# Patient Record
Sex: FEMALE | Race: WHITE | Employment: OTHER
[De-identification: names, ages, dates, MRNs, and addresses within clinical notes are randomized per-mention and may not be internally consistent; named-entity substitution may affect disease eponyms.]

---

## 2017-06-01 ENCOUNTER — RECORDS - HEALTHEAST (OUTPATIENT)
Dept: ADMINISTRATIVE | Facility: OTHER | Age: 82
End: 2017-06-01

## 2017-08-04 ENCOUNTER — RECORDS - HEALTHEAST (OUTPATIENT)
Dept: ADMINISTRATIVE | Facility: OTHER | Age: 82
End: 2017-08-04

## 2017-11-28 ENCOUNTER — RECORDS - HEALTHEAST (OUTPATIENT)
Dept: ADMINISTRATIVE | Facility: OTHER | Age: 82
End: 2017-11-28

## 2017-11-30 ENCOUNTER — RECORDS - HEALTHEAST (OUTPATIENT)
Dept: ADMINISTRATIVE | Facility: OTHER | Age: 82
End: 2017-11-30

## 2017-11-30 ENCOUNTER — RECORDS - HEALTHEAST (OUTPATIENT)
Dept: LAB | Facility: CLINIC | Age: 82
End: 2017-11-30

## 2017-12-01 LAB
LAB AP CHARGES (HE HISTORICAL CONVERSION): ABNORMAL
PATH REPORT.ADDENDUM SPEC: ABNORMAL
PATH REPORT.COMMENTS IMP SPEC: ABNORMAL
PATH REPORT.COMMENTS IMP SPEC: ABNORMAL
PATH REPORT.FINAL DX SPEC: ABNORMAL
PATH REPORT.GROSS SPEC: ABNORMAL
PATH REPORT.MICROSCOPIC SPEC OTHER STN: ABNORMAL
PATH REPORT.RELEVANT HX SPEC: ABNORMAL
RESULT FLAG (HE HISTORICAL CONVERSION): ABNORMAL

## 2017-12-07 ENCOUNTER — COMMUNICATION - HEALTHEAST (OUTPATIENT)
Dept: ONCOLOGY | Facility: HOSPITAL | Age: 82
End: 2017-12-07

## 2017-12-07 ENCOUNTER — OFFICE VISIT - HEALTHEAST (OUTPATIENT)
Dept: SURGERY | Facility: CLINIC | Age: 82
End: 2017-12-07

## 2017-12-07 DIAGNOSIS — Z17.1 MALIGNANT NEOPLASM OF CENTRAL PORTION OF RIGHT BREAST IN FEMALE, ESTROGEN RECEPTOR NEGATIVE (H): ICD-10-CM

## 2017-12-07 DIAGNOSIS — C50.111 MALIGNANT NEOPLASM OF CENTRAL PORTION OF RIGHT BREAST IN FEMALE, ESTROGEN RECEPTOR NEGATIVE (H): ICD-10-CM

## 2017-12-07 ASSESSMENT — MIFFLIN-ST. JEOR: SCORE: 1239.21

## 2017-12-08 ENCOUNTER — RECORDS - HEALTHEAST (OUTPATIENT)
Dept: ADMINISTRATIVE | Facility: OTHER | Age: 82
End: 2017-12-08

## 2017-12-08 ENCOUNTER — COMMUNICATION - HEALTHEAST (OUTPATIENT)
Dept: ONCOLOGY | Facility: HOSPITAL | Age: 82
End: 2017-12-08

## 2017-12-11 ENCOUNTER — HOSPITAL ENCOUNTER (OUTPATIENT)
Dept: PET IMAGING | Facility: HOSPITAL | Age: 82
Discharge: HOME | End: 2017-12-11
Attending: SPECIALIST
Payer: MEDICARE

## 2017-12-11 ENCOUNTER — AMBULATORY - HEALTHEAST (OUTPATIENT)
Dept: ONCOLOGY | Facility: HOSPITAL | Age: 82
End: 2017-12-11

## 2017-12-11 ENCOUNTER — HOSPITAL ENCOUNTER (OUTPATIENT)
Dept: PET IMAGING | Facility: HOSPITAL | Age: 82
Discharge: HOME | End: 2017-12-11
Attending: SPECIALIST

## 2017-12-11 ENCOUNTER — OFFICE VISIT - HEALTHEAST (OUTPATIENT)
Dept: ONCOLOGY | Facility: HOSPITAL | Age: 82
End: 2017-12-11

## 2017-12-11 ENCOUNTER — COMMUNICATION - HEALTHEAST (OUTPATIENT)
Dept: ONCOLOGY | Facility: HOSPITAL | Age: 82
End: 2017-12-11

## 2017-12-11 DIAGNOSIS — C50.111 MALIGNANT NEOPLASM OF CENTRAL PORTION OF RIGHT BREAST IN FEMALE, ESTROGEN RECEPTOR NEGATIVE (H): ICD-10-CM

## 2017-12-11 DIAGNOSIS — Z17.1 MALIGNANT NEOPLASM OF CENTRAL PORTION OF RIGHT BREAST IN FEMALE, ESTROGEN RECEPTOR NEGATIVE (H): ICD-10-CM

## 2017-12-11 ASSESSMENT — MIFFLIN-ST. JEOR: SCORE: 1203.04

## 2017-12-12 ENCOUNTER — AMBULATORY - HEALTHEAST (OUTPATIENT)
Dept: SURGERY | Facility: CLINIC | Age: 82
End: 2017-12-12

## 2017-12-12 ENCOUNTER — COMMUNICATION - HEALTHEAST (OUTPATIENT)
Dept: ADMINISTRATIVE | Facility: HOSPITAL | Age: 82
End: 2017-12-12

## 2017-12-12 DIAGNOSIS — Z17.1 MALIGNANT NEOPLASM OF CENTRAL PORTION OF RIGHT BREAST IN FEMALE, ESTROGEN RECEPTOR NEGATIVE (H): ICD-10-CM

## 2017-12-12 DIAGNOSIS — C50.111 MALIGNANT NEOPLASM OF CENTRAL PORTION OF RIGHT BREAST IN FEMALE, ESTROGEN RECEPTOR NEGATIVE (H): ICD-10-CM

## 2017-12-12 LAB
LAB AP CHARGES (HE HISTORICAL CONVERSION): NORMAL
PATH REPORT.COMMENTS IMP SPEC: NORMAL
PATH REPORT.COMMENTS IMP SPEC: NORMAL
PATH REPORT.FINAL DX SPEC: NORMAL
PATH REPORT.GROSS SPEC: NORMAL
PATH REPORT.MICROSCOPIC SPEC OTHER STN: NORMAL
PATH REPORT.RELEVANT HX SPEC: NORMAL
RESULT FLAG (HE HISTORICAL CONVERSION): NORMAL

## 2017-12-12 ASSESSMENT — MIFFLIN-ST. JEOR
SCORE: 1198.95
SCORE: 1198.95

## 2017-12-13 ENCOUNTER — ANESTHESIA - HEALTHEAST (OUTPATIENT)
Dept: SURGERY | Facility: AMBULATORY SURGERY CENTER | Age: 82
End: 2017-12-13

## 2017-12-14 ENCOUNTER — SURGERY - HEALTHEAST (OUTPATIENT)
Dept: SURGERY | Facility: AMBULATORY SURGERY CENTER | Age: 82
End: 2017-12-14

## 2017-12-14 ENCOUNTER — HOSPITAL ENCOUNTER (OUTPATIENT)
Dept: NUCLEAR MEDICINE | Facility: HOSPITAL | Age: 82
Setting detail: RADIATION/ONCOLOGY SERIES
Discharge: STILL ADMITTED OR ADMINISTRATIVE | End: 2017-12-14
Attending: INTERNAL MEDICINE

## 2017-12-14 ENCOUNTER — HOSPITAL ENCOUNTER (OUTPATIENT)
Dept: SURGERY | Facility: AMBULATORY SURGERY CENTER | Age: 82
Discharge: HOME | End: 2017-12-14
Attending: SPECIALIST | Admitting: SPECIALIST
Payer: MEDICARE

## 2017-12-14 ENCOUNTER — AMBULATORY - HEALTHEAST (OUTPATIENT)
Dept: INFUSION THERAPY | Facility: HOSPITAL | Age: 82
End: 2017-12-14

## 2017-12-14 ENCOUNTER — AMBULATORY - HEALTHEAST (OUTPATIENT)
Dept: ONCOLOGY | Facility: HOSPITAL | Age: 82
End: 2017-12-14

## 2017-12-14 DIAGNOSIS — C50.111 MALIGNANT NEOPLASM OF CENTRAL PORTION OF RIGHT BREAST IN FEMALE, ESTROGEN RECEPTOR NEGATIVE (H): ICD-10-CM

## 2017-12-14 DIAGNOSIS — Z51.11 CHEMOTHERAPY MANAGEMENT, ENCOUNTER FOR: ICD-10-CM

## 2017-12-14 DIAGNOSIS — Z17.1 MALIGNANT NEOPLASM OF CENTRAL PORTION OF RIGHT BREAST IN FEMALE, ESTROGEN RECEPTOR NEGATIVE (H): ICD-10-CM

## 2017-12-14 DIAGNOSIS — C50.911 BREAST CANCER, RIGHT (H): ICD-10-CM

## 2017-12-14 LAB — MUGA LV EJECTION FRACTION: 75.7 %

## 2017-12-14 ASSESSMENT — MIFFLIN-ST. JEOR
SCORE: 1187.04
SCORE: 1187.04

## 2017-12-18 ENCOUNTER — INFUSION - HEALTHEAST (OUTPATIENT)
Dept: INFUSION THERAPY | Facility: HOSPITAL | Age: 82
End: 2017-12-18

## 2017-12-18 ENCOUNTER — AMBULATORY - HEALTHEAST (OUTPATIENT)
Dept: ONCOLOGY | Facility: HOSPITAL | Age: 82
End: 2017-12-18

## 2017-12-18 ENCOUNTER — AMBULATORY - HEALTHEAST (OUTPATIENT)
Dept: INFUSION THERAPY | Facility: HOSPITAL | Age: 82
End: 2017-12-18

## 2017-12-18 ENCOUNTER — OFFICE VISIT - HEALTHEAST (OUTPATIENT)
Dept: ONCOLOGY | Facility: HOSPITAL | Age: 82
End: 2017-12-18

## 2017-12-18 DIAGNOSIS — C50.111 MALIGNANT NEOPLASM OF CENTRAL PORTION OF RIGHT BREAST IN FEMALE, ESTROGEN RECEPTOR NEGATIVE (H): ICD-10-CM

## 2017-12-18 DIAGNOSIS — Z17.1 MALIGNANT NEOPLASM OF CENTRAL PORTION OF RIGHT BREAST IN FEMALE, ESTROGEN RECEPTOR NEGATIVE (H): ICD-10-CM

## 2017-12-20 LAB — BREAST CARCINOMA ASSOC AG(CA 27.29) - HISTORICAL: 29.4 U/ML

## 2017-12-21 ENCOUNTER — COMMUNICATION - HEALTHEAST (OUTPATIENT)
Dept: HEALTH INFORMATION MANAGEMENT | Facility: CLINIC | Age: 82
End: 2017-12-21

## 2017-12-21 ENCOUNTER — COMMUNICATION - HEALTHEAST (OUTPATIENT)
Dept: INFUSION THERAPY | Facility: HOSPITAL | Age: 82
End: 2017-12-21

## 2017-12-27 ENCOUNTER — INFUSION - HEALTHEAST (OUTPATIENT)
Dept: INFUSION THERAPY | Facility: HOSPITAL | Age: 82
End: 2017-12-27

## 2017-12-27 DIAGNOSIS — C50.111 MALIGNANT NEOPLASM OF CENTRAL PORTION OF RIGHT BREAST IN FEMALE, ESTROGEN RECEPTOR NEGATIVE (H): ICD-10-CM

## 2017-12-27 DIAGNOSIS — Z17.1 MALIGNANT NEOPLASM OF CENTRAL PORTION OF RIGHT BREAST IN FEMALE, ESTROGEN RECEPTOR NEGATIVE (H): ICD-10-CM

## 2018-01-03 ENCOUNTER — INFUSION - HEALTHEAST (OUTPATIENT)
Dept: INFUSION THERAPY | Facility: HOSPITAL | Age: 83
End: 2018-01-03

## 2018-01-03 DIAGNOSIS — Z17.1 MALIGNANT NEOPLASM OF CENTRAL PORTION OF RIGHT BREAST IN FEMALE, ESTROGEN RECEPTOR NEGATIVE (H): ICD-10-CM

## 2018-01-03 DIAGNOSIS — C50.111 MALIGNANT NEOPLASM OF CENTRAL PORTION OF RIGHT BREAST IN FEMALE, ESTROGEN RECEPTOR NEGATIVE (H): ICD-10-CM

## 2018-01-03 LAB
BASOPHILS # BLD AUTO: 0.1 THOU/UL (ref 0–0.2)
BASOPHILS NFR BLD AUTO: 1 % (ref 0–2)
EOSINOPHIL # BLD AUTO: 0.2 THOU/UL (ref 0–0.4)
EOSINOPHIL NFR BLD AUTO: 3 % (ref 0–6)
ERYTHROCYTE [DISTWIDTH] IN BLOOD BY AUTOMATED COUNT: 13.6 % (ref 11–14.5)
HCT VFR BLD AUTO: 37.6 % (ref 35–47)
HGB BLD-MCNC: 12.5 G/DL (ref 12–16)
LYMPHOCYTES # BLD AUTO: 1.1 THOU/UL (ref 0.8–4.4)
LYMPHOCYTES NFR BLD AUTO: 18 % (ref 20–40)
MCH RBC QN AUTO: 32.2 PG (ref 27–34)
MCHC RBC AUTO-ENTMCNC: 33.2 G/DL (ref 32–36)
MCV RBC AUTO: 97 FL (ref 80–100)
MONOCYTES # BLD AUTO: 0.9 THOU/UL (ref 0–0.9)
MONOCYTES NFR BLD AUTO: 14 % (ref 2–10)
NEUTROPHILS # BLD AUTO: 3.9 THOU/UL (ref 2–7.7)
NEUTROPHILS NFR BLD AUTO: 64 % (ref 50–70)
PLATELET # BLD AUTO: 372 THOU/UL (ref 140–440)
PMV BLD AUTO: 9.3 FL (ref 8.5–12.5)
RBC # BLD AUTO: 3.88 MILL/UL (ref 3.8–5.4)
WBC: 6.1 THOU/UL (ref 4–11)

## 2018-01-09 ENCOUNTER — AMBULATORY - HEALTHEAST (OUTPATIENT)
Dept: INFUSION THERAPY | Facility: HOSPITAL | Age: 83
End: 2018-01-09

## 2018-01-09 ENCOUNTER — OFFICE VISIT - HEALTHEAST (OUTPATIENT)
Dept: ONCOLOGY | Facility: HOSPITAL | Age: 83
End: 2018-01-09

## 2018-01-09 ENCOUNTER — INFUSION - HEALTHEAST (OUTPATIENT)
Dept: INFUSION THERAPY | Facility: HOSPITAL | Age: 83
End: 2018-01-09

## 2018-01-09 DIAGNOSIS — Z17.1 MALIGNANT NEOPLASM OF CENTRAL PORTION OF RIGHT BREAST IN FEMALE, ESTROGEN RECEPTOR NEGATIVE (H): ICD-10-CM

## 2018-01-09 DIAGNOSIS — C50.111 MALIGNANT NEOPLASM OF CENTRAL PORTION OF RIGHT BREAST IN FEMALE, ESTROGEN RECEPTOR NEGATIVE (H): ICD-10-CM

## 2018-01-09 DIAGNOSIS — R53.1 WEAKNESS: ICD-10-CM

## 2018-01-09 DIAGNOSIS — K52.1 DIARRHEA DUE TO DRUG: ICD-10-CM

## 2018-01-09 LAB
ALBUMIN SERPL-MCNC: 3.3 G/DL (ref 3.5–5)
ALP SERPL-CCNC: 50 U/L (ref 45–120)
ALT SERPL W P-5'-P-CCNC: 18 U/L (ref 0–45)
ANION GAP SERPL CALCULATED.3IONS-SCNC: 11 MMOL/L (ref 5–18)
AST SERPL W P-5'-P-CCNC: 17 U/L (ref 0–40)
BASOPHILS # BLD AUTO: 0.1 THOU/UL (ref 0–0.2)
BASOPHILS NFR BLD AUTO: 1 % (ref 0–2)
BILIRUB SERPL-MCNC: 0.6 MG/DL (ref 0–1)
BUN SERPL-MCNC: 22 MG/DL (ref 8–28)
CALCIUM SERPL-MCNC: 9.8 MG/DL (ref 8.5–10.5)
CHLORIDE BLD-SCNC: 102 MMOL/L (ref 98–107)
CO2 SERPL-SCNC: 23 MMOL/L (ref 22–31)
CREAT SERPL-MCNC: 1.15 MG/DL (ref 0.6–1.1)
EOSINOPHIL # BLD AUTO: 0.2 THOU/UL (ref 0–0.4)
EOSINOPHIL NFR BLD AUTO: 3 % (ref 0–6)
ERYTHROCYTE [DISTWIDTH] IN BLOOD BY AUTOMATED COUNT: 13.7 % (ref 11–14.5)
GFR SERPL CREATININE-BSD FRML MDRD: 45 ML/MIN/1.73M2
GLUCOSE BLD-MCNC: 119 MG/DL (ref 70–125)
HCT VFR BLD AUTO: 37.5 % (ref 35–47)
HGB BLD-MCNC: 12.3 G/DL (ref 12–16)
LYMPHOCYTES # BLD AUTO: 1.2 THOU/UL (ref 0.8–4.4)
LYMPHOCYTES NFR BLD AUTO: 18 % (ref 20–40)
MCH RBC QN AUTO: 31.8 PG (ref 27–34)
MCHC RBC AUTO-ENTMCNC: 32.8 G/DL (ref 32–36)
MCV RBC AUTO: 97 FL (ref 80–100)
MONOCYTES # BLD AUTO: 0.5 THOU/UL (ref 0–0.9)
MONOCYTES NFR BLD AUTO: 8 % (ref 2–10)
NEUTROPHILS # BLD AUTO: 4.5 THOU/UL (ref 2–7.7)
NEUTROPHILS NFR BLD AUTO: 70 % (ref 50–70)
PLATELET # BLD AUTO: 398 THOU/UL (ref 140–440)
PMV BLD AUTO: 10.2 FL (ref 8.5–12.5)
POTASSIUM BLD-SCNC: 4.2 MMOL/L (ref 3.5–5)
PROT SERPL-MCNC: 6.5 G/DL (ref 6–8)
RBC # BLD AUTO: 3.87 MILL/UL (ref 3.8–5.4)
SODIUM SERPL-SCNC: 136 MMOL/L (ref 136–145)
WBC: 6.5 THOU/UL (ref 4–11)

## 2018-01-12 ENCOUNTER — COMMUNICATION - HEALTHEAST (OUTPATIENT)
Dept: ONCOLOGY | Facility: HOSPITAL | Age: 83
End: 2018-01-12

## 2018-01-15 ENCOUNTER — INFUSION - HEALTHEAST (OUTPATIENT)
Dept: INFUSION THERAPY | Facility: HOSPITAL | Age: 83
End: 2018-01-15

## 2018-01-15 DIAGNOSIS — C50.111 MALIGNANT NEOPLASM OF CENTRAL PORTION OF RIGHT BREAST IN FEMALE, ESTROGEN RECEPTOR NEGATIVE (H): ICD-10-CM

## 2018-01-15 DIAGNOSIS — Z17.1 MALIGNANT NEOPLASM OF CENTRAL PORTION OF RIGHT BREAST IN FEMALE, ESTROGEN RECEPTOR NEGATIVE (H): ICD-10-CM

## 2018-01-15 LAB
BASOPHILS # BLD AUTO: 0 THOU/UL (ref 0–0.2)
BASOPHILS NFR BLD AUTO: 1 % (ref 0–2)
EOSINOPHIL # BLD AUTO: 0.2 THOU/UL (ref 0–0.4)
EOSINOPHIL NFR BLD AUTO: 3 % (ref 0–6)
ERYTHROCYTE [DISTWIDTH] IN BLOOD BY AUTOMATED COUNT: 14 % (ref 11–14.5)
HCT VFR BLD AUTO: 34.4 % (ref 35–47)
HGB BLD-MCNC: 11.4 G/DL (ref 12–16)
LYMPHOCYTES # BLD AUTO: 0.7 THOU/UL (ref 0.8–4.4)
LYMPHOCYTES NFR BLD AUTO: 14 % (ref 20–40)
MCH RBC QN AUTO: 32.7 PG (ref 27–34)
MCHC RBC AUTO-ENTMCNC: 33.1 G/DL (ref 32–36)
MCV RBC AUTO: 99 FL (ref 80–100)
MONOCYTES # BLD AUTO: 0.4 THOU/UL (ref 0–0.9)
MONOCYTES NFR BLD AUTO: 9 % (ref 2–10)
NEUTROPHILS # BLD AUTO: 3.6 THOU/UL (ref 2–7.7)
NEUTROPHILS NFR BLD AUTO: 73 % (ref 50–70)
PLATELET # BLD AUTO: 309 THOU/UL (ref 140–440)
PMV BLD AUTO: 9.9 FL (ref 8.5–12.5)
RBC # BLD AUTO: 3.49 MILL/UL (ref 3.8–5.4)
WBC: 4.9 THOU/UL (ref 4–11)

## 2018-01-22 ENCOUNTER — INFUSION - HEALTHEAST (OUTPATIENT)
Dept: INFUSION THERAPY | Facility: HOSPITAL | Age: 83
End: 2018-01-22

## 2018-01-22 DIAGNOSIS — C50.111 MALIGNANT NEOPLASM OF CENTRAL PORTION OF RIGHT BREAST IN FEMALE, ESTROGEN RECEPTOR NEGATIVE (H): ICD-10-CM

## 2018-01-22 DIAGNOSIS — Z17.1 MALIGNANT NEOPLASM OF CENTRAL PORTION OF RIGHT BREAST IN FEMALE, ESTROGEN RECEPTOR NEGATIVE (H): ICD-10-CM

## 2018-01-22 LAB
BASOPHILS # BLD AUTO: 0.1 THOU/UL (ref 0–0.2)
BASOPHILS NFR BLD AUTO: 1 % (ref 0–2)
EOSINOPHIL # BLD AUTO: 0.1 THOU/UL (ref 0–0.4)
EOSINOPHIL NFR BLD AUTO: 2 % (ref 0–6)
ERYTHROCYTE [DISTWIDTH] IN BLOOD BY AUTOMATED COUNT: 14.9 % (ref 11–14.5)
HCT VFR BLD AUTO: 33.2 % (ref 35–47)
HGB BLD-MCNC: 11.1 G/DL (ref 12–16)
LYMPHOCYTES # BLD AUTO: 1.4 THOU/UL (ref 0.8–4.4)
LYMPHOCYTES NFR BLD AUTO: 23 % (ref 20–40)
MCH RBC QN AUTO: 32.7 PG (ref 27–34)
MCHC RBC AUTO-ENTMCNC: 33.4 G/DL (ref 32–36)
MCV RBC AUTO: 98 FL (ref 80–100)
MONOCYTES # BLD AUTO: 0.6 THOU/UL (ref 0–0.9)
MONOCYTES NFR BLD AUTO: 10 % (ref 2–10)
NEUTROPHILS # BLD AUTO: 3.6 THOU/UL (ref 2–7.7)
NEUTROPHILS NFR BLD AUTO: 64 % (ref 50–70)
PLATELET # BLD AUTO: 367 THOU/UL (ref 140–440)
PMV BLD AUTO: 9.3 FL (ref 8.5–12.5)
RBC # BLD AUTO: 3.39 MILL/UL (ref 3.8–5.4)
WBC: 5.8 THOU/UL (ref 4–11)

## 2018-01-29 ENCOUNTER — AMBULATORY - HEALTHEAST (OUTPATIENT)
Dept: INFUSION THERAPY | Facility: HOSPITAL | Age: 83
End: 2018-01-29

## 2018-01-29 ENCOUNTER — AMBULATORY - HEALTHEAST (OUTPATIENT)
Dept: ONCOLOGY | Facility: HOSPITAL | Age: 83
End: 2018-01-29

## 2018-01-29 ENCOUNTER — OFFICE VISIT - HEALTHEAST (OUTPATIENT)
Dept: ONCOLOGY | Facility: HOSPITAL | Age: 83
End: 2018-01-29

## 2018-01-29 ENCOUNTER — INFUSION - HEALTHEAST (OUTPATIENT)
Dept: INFUSION THERAPY | Facility: HOSPITAL | Age: 83
End: 2018-01-29

## 2018-01-29 DIAGNOSIS — Z17.1 MALIGNANT NEOPLASM OF CENTRAL PORTION OF RIGHT BREAST IN FEMALE, ESTROGEN RECEPTOR NEGATIVE (H): ICD-10-CM

## 2018-01-29 DIAGNOSIS — C50.111 MALIGNANT NEOPLASM OF CENTRAL PORTION OF RIGHT BREAST IN FEMALE, ESTROGEN RECEPTOR NEGATIVE (H): ICD-10-CM

## 2018-01-29 LAB
ALBUMIN SERPL-MCNC: 3 G/DL (ref 3.5–5)
ALP SERPL-CCNC: 54 U/L (ref 45–120)
ALT SERPL W P-5'-P-CCNC: 19 U/L (ref 0–45)
ANION GAP SERPL CALCULATED.3IONS-SCNC: 16 MMOL/L (ref 5–18)
AST SERPL W P-5'-P-CCNC: 16 U/L (ref 0–40)
BASOPHILS # BLD AUTO: 0.1 THOU/UL (ref 0–0.2)
BASOPHILS NFR BLD AUTO: 1 % (ref 0–2)
BILIRUB SERPL-MCNC: 0.5 MG/DL (ref 0–1)
BUN SERPL-MCNC: 21 MG/DL (ref 8–28)
CALCIUM SERPL-MCNC: 9.5 MG/DL (ref 8.5–10.5)
CHLORIDE BLD-SCNC: 103 MMOL/L (ref 98–107)
CO2 SERPL-SCNC: 21 MMOL/L (ref 22–31)
CREAT SERPL-MCNC: 1.21 MG/DL (ref 0.6–1.1)
EOSINOPHIL # BLD AUTO: 0.1 THOU/UL (ref 0–0.4)
EOSINOPHIL NFR BLD AUTO: 2 % (ref 0–6)
ERYTHROCYTE [DISTWIDTH] IN BLOOD BY AUTOMATED COUNT: 15.7 % (ref 11–14.5)
GFR SERPL CREATININE-BSD FRML MDRD: 42 ML/MIN/1.73M2
GLUCOSE BLD-MCNC: 125 MG/DL (ref 70–125)
HCT VFR BLD AUTO: 33.1 % (ref 35–47)
HGB BLD-MCNC: 11 G/DL (ref 12–16)
LYMPHOCYTES # BLD AUTO: 1.8 THOU/UL (ref 0.8–4.4)
LYMPHOCYTES NFR BLD AUTO: 22 % (ref 20–40)
MCH RBC QN AUTO: 32.4 PG (ref 27–34)
MCHC RBC AUTO-ENTMCNC: 33.2 G/DL (ref 32–36)
MCV RBC AUTO: 98 FL (ref 80–100)
MONOCYTES # BLD AUTO: 0.9 THOU/UL (ref 0–0.9)
MONOCYTES NFR BLD AUTO: 11 % (ref 2–10)
NEUTROPHILS # BLD AUTO: 5 THOU/UL (ref 2–7.7)
NEUTROPHILS NFR BLD AUTO: 64 % (ref 50–70)
PLATELET # BLD AUTO: 447 THOU/UL (ref 140–440)
PMV BLD AUTO: 9.5 FL (ref 8.5–12.5)
POTASSIUM BLD-SCNC: 3.9 MMOL/L (ref 3.5–5)
PROT SERPL-MCNC: 6.3 G/DL (ref 6–8)
RBC # BLD AUTO: 3.39 MILL/UL (ref 3.8–5.4)
SODIUM SERPL-SCNC: 140 MMOL/L (ref 136–145)
WBC: 8 THOU/UL (ref 4–11)

## 2018-01-30 LAB — CANCER AG27-29 SERPL-ACNC: 24 U/ML (ref 0–39)

## 2018-02-05 ENCOUNTER — INFUSION - HEALTHEAST (OUTPATIENT)
Dept: INFUSION THERAPY | Facility: HOSPITAL | Age: 83
End: 2018-02-05

## 2018-02-05 DIAGNOSIS — C50.111 MALIGNANT NEOPLASM OF CENTRAL PORTION OF RIGHT BREAST IN FEMALE, ESTROGEN RECEPTOR NEGATIVE (H): ICD-10-CM

## 2018-02-05 DIAGNOSIS — Z17.1 MALIGNANT NEOPLASM OF CENTRAL PORTION OF RIGHT BREAST IN FEMALE, ESTROGEN RECEPTOR NEGATIVE (H): ICD-10-CM

## 2018-02-05 LAB
BASOPHILS # BLD AUTO: 0.1 THOU/UL (ref 0–0.2)
BASOPHILS NFR BLD AUTO: 1 % (ref 0–2)
EOSINOPHIL # BLD AUTO: 0.2 THOU/UL (ref 0–0.4)
EOSINOPHIL NFR BLD AUTO: 2 % (ref 0–6)
ERYTHROCYTE [DISTWIDTH] IN BLOOD BY AUTOMATED COUNT: 17.1 % (ref 11–14.5)
HCT VFR BLD AUTO: 32.4 % (ref 35–47)
HGB BLD-MCNC: 10.8 G/DL (ref 12–16)
LYMPHOCYTES # BLD AUTO: 1.9 THOU/UL (ref 0.8–4.4)
LYMPHOCYTES NFR BLD AUTO: 20 % (ref 20–40)
MCH RBC QN AUTO: 33.2 PG (ref 27–34)
MCHC RBC AUTO-ENTMCNC: 33.3 G/DL (ref 32–36)
MCV RBC AUTO: 100 FL (ref 80–100)
MONOCYTES # BLD AUTO: 1 THOU/UL (ref 0–0.9)
MONOCYTES NFR BLD AUTO: 10 % (ref 2–10)
NEUTROPHILS # BLD AUTO: 6.2 THOU/UL (ref 2–7.7)
NEUTROPHILS NFR BLD AUTO: 67 % (ref 50–70)
PLATELET # BLD AUTO: 439 THOU/UL (ref 140–440)
PMV BLD AUTO: 9.3 FL (ref 8.5–12.5)
RBC # BLD AUTO: 3.25 MILL/UL (ref 3.8–5.4)
WBC: 9.6 THOU/UL (ref 4–11)

## 2018-02-12 ENCOUNTER — INFUSION - HEALTHEAST (OUTPATIENT)
Dept: INFUSION THERAPY | Facility: HOSPITAL | Age: 83
End: 2018-02-12

## 2018-02-12 DIAGNOSIS — C50.111 MALIGNANT NEOPLASM OF CENTRAL PORTION OF RIGHT BREAST IN FEMALE, ESTROGEN RECEPTOR NEGATIVE (H): ICD-10-CM

## 2018-02-12 DIAGNOSIS — Z17.1 MALIGNANT NEOPLASM OF CENTRAL PORTION OF RIGHT BREAST IN FEMALE, ESTROGEN RECEPTOR NEGATIVE (H): ICD-10-CM

## 2018-02-12 LAB
BASOPHILS # BLD AUTO: 0.1 THOU/UL (ref 0–0.2)
BASOPHILS NFR BLD AUTO: 1 % (ref 0–2)
EOSINOPHIL # BLD AUTO: 0.2 THOU/UL (ref 0–0.4)
EOSINOPHIL NFR BLD AUTO: 3 % (ref 0–6)
ERYTHROCYTE [DISTWIDTH] IN BLOOD BY AUTOMATED COUNT: 17.8 % (ref 11–14.5)
HCT VFR BLD AUTO: 31.7 % (ref 35–47)
HGB BLD-MCNC: 10.5 G/DL (ref 12–16)
LYMPHOCYTES # BLD AUTO: 1.1 THOU/UL (ref 0.8–4.4)
LYMPHOCYTES NFR BLD AUTO: 16 % (ref 20–40)
MCH RBC QN AUTO: 33.4 PG (ref 27–34)
MCHC RBC AUTO-ENTMCNC: 33.1 G/DL (ref 32–36)
MCV RBC AUTO: 101 FL (ref 80–100)
MONOCYTES # BLD AUTO: 0.7 THOU/UL (ref 0–0.9)
MONOCYTES NFR BLD AUTO: 10 % (ref 2–10)
NEUTROPHILS # BLD AUTO: 4.7 THOU/UL (ref 2–7.7)
NEUTROPHILS NFR BLD AUTO: 70 % (ref 50–70)
PLATELET # BLD AUTO: 398 THOU/UL (ref 140–440)
PMV BLD AUTO: 9.7 FL (ref 8.5–12.5)
RBC # BLD AUTO: 3.14 MILL/UL (ref 3.8–5.4)
WBC: 6.9 THOU/UL (ref 4–11)

## 2018-02-19 ENCOUNTER — INFUSION - HEALTHEAST (OUTPATIENT)
Dept: INFUSION THERAPY | Facility: HOSPITAL | Age: 83
End: 2018-02-19

## 2018-02-19 ENCOUNTER — AMBULATORY - HEALTHEAST (OUTPATIENT)
Dept: ONCOLOGY | Facility: HOSPITAL | Age: 83
End: 2018-02-19

## 2018-02-19 ENCOUNTER — OFFICE VISIT - HEALTHEAST (OUTPATIENT)
Dept: ONCOLOGY | Facility: HOSPITAL | Age: 83
End: 2018-02-19

## 2018-02-19 ENCOUNTER — AMBULATORY - HEALTHEAST (OUTPATIENT)
Dept: INFUSION THERAPY | Facility: HOSPITAL | Age: 83
End: 2018-02-19

## 2018-02-19 DIAGNOSIS — C50.111 MALIGNANT NEOPLASM OF CENTRAL PORTION OF RIGHT BREAST IN FEMALE, ESTROGEN RECEPTOR NEGATIVE (H): ICD-10-CM

## 2018-02-19 DIAGNOSIS — Z17.1 MALIGNANT NEOPLASM OF CENTRAL PORTION OF RIGHT BREAST IN FEMALE, ESTROGEN RECEPTOR NEGATIVE (H): ICD-10-CM

## 2018-02-19 LAB
ALBUMIN SERPL-MCNC: 3.2 G/DL (ref 3.5–5)
ALP SERPL-CCNC: 53 U/L (ref 45–120)
ALT SERPL W P-5'-P-CCNC: 17 U/L (ref 0–45)
ANION GAP SERPL CALCULATED.3IONS-SCNC: 18 MMOL/L (ref 5–18)
AST SERPL W P-5'-P-CCNC: 13 U/L (ref 0–40)
BASOPHILS # BLD AUTO: 0.1 THOU/UL (ref 0–0.2)
BASOPHILS NFR BLD AUTO: 1 % (ref 0–2)
BILIRUB SERPL-MCNC: 0.6 MG/DL (ref 0–1)
BUN SERPL-MCNC: 25 MG/DL (ref 8–28)
CALCIUM SERPL-MCNC: 10.3 MG/DL (ref 8.5–10.5)
CHLORIDE BLD-SCNC: 101 MMOL/L (ref 98–107)
CO2 SERPL-SCNC: 20 MMOL/L (ref 22–31)
CREAT SERPL-MCNC: 1.45 MG/DL (ref 0.6–1.1)
EOSINOPHIL # BLD AUTO: 0.2 THOU/UL (ref 0–0.4)
EOSINOPHIL NFR BLD AUTO: 2 % (ref 0–6)
ERYTHROCYTE [DISTWIDTH] IN BLOOD BY AUTOMATED COUNT: 18.4 % (ref 11–14.5)
GFR SERPL CREATININE-BSD FRML MDRD: 34 ML/MIN/1.73M2
GLUCOSE BLD-MCNC: 144 MG/DL (ref 70–125)
HCT VFR BLD AUTO: 32.9 % (ref 35–47)
HGB BLD-MCNC: 10.8 G/DL (ref 12–16)
LYMPHOCYTES # BLD AUTO: 3.4 THOU/UL (ref 0.8–4.4)
LYMPHOCYTES NFR BLD AUTO: 31 % (ref 20–40)
MCH RBC QN AUTO: 33.9 PG (ref 27–34)
MCHC RBC AUTO-ENTMCNC: 32.8 G/DL (ref 32–36)
MCV RBC AUTO: 103 FL (ref 80–100)
MONOCYTES # BLD AUTO: 1 THOU/UL (ref 0–0.9)
MONOCYTES NFR BLD AUTO: 9 % (ref 2–10)
NEUTROPHILS # BLD AUTO: 6.2 THOU/UL (ref 2–7.7)
NEUTROPHILS NFR BLD AUTO: 58 % (ref 50–70)
PLATELET # BLD AUTO: 443 THOU/UL (ref 140–440)
PMV BLD AUTO: 9.7 FL (ref 8.5–12.5)
POTASSIUM BLD-SCNC: 4.2 MMOL/L (ref 3.5–5)
PROT SERPL-MCNC: 6.2 G/DL (ref 6–8)
RBC # BLD AUTO: 3.19 MILL/UL (ref 3.8–5.4)
SODIUM SERPL-SCNC: 139 MMOL/L (ref 136–145)
WBC: 11 THOU/UL (ref 4–11)

## 2018-02-19 ASSESSMENT — MIFFLIN-ST. JEOR: SCORE: 1167.09

## 2018-02-23 ENCOUNTER — COMMUNICATION - HEALTHEAST (OUTPATIENT)
Dept: ONCOLOGY | Facility: HOSPITAL | Age: 83
End: 2018-02-23

## 2018-02-26 ENCOUNTER — AMBULATORY - HEALTHEAST (OUTPATIENT)
Dept: ONCOLOGY | Facility: HOSPITAL | Age: 83
End: 2018-02-26

## 2018-02-26 ENCOUNTER — INFUSION - HEALTHEAST (OUTPATIENT)
Dept: INFUSION THERAPY | Facility: HOSPITAL | Age: 83
End: 2018-02-26

## 2018-02-26 DIAGNOSIS — Z17.1 MALIGNANT NEOPLASM OF CENTRAL PORTION OF RIGHT BREAST IN FEMALE, ESTROGEN RECEPTOR NEGATIVE (H): ICD-10-CM

## 2018-02-26 DIAGNOSIS — C50.111 MALIGNANT NEOPLASM OF CENTRAL PORTION OF RIGHT BREAST IN FEMALE, ESTROGEN RECEPTOR NEGATIVE (H): ICD-10-CM

## 2018-02-26 LAB
ANION GAP SERPL CALCULATED.3IONS-SCNC: 11 MMOL/L (ref 5–18)
BASOPHILS # BLD AUTO: 0.1 THOU/UL (ref 0–0.2)
BASOPHILS NFR BLD AUTO: 1 % (ref 0–2)
BUN SERPL-MCNC: 18 MG/DL (ref 8–28)
CALCIUM SERPL-MCNC: 9.4 MG/DL (ref 8.5–10.5)
CHLORIDE BLD-SCNC: 102 MMOL/L (ref 98–107)
CO2 SERPL-SCNC: 24 MMOL/L (ref 22–31)
CREAT SERPL-MCNC: 0.98 MG/DL (ref 0.6–1.1)
EOSINOPHIL # BLD AUTO: 0.1 THOU/UL (ref 0–0.4)
EOSINOPHIL NFR BLD AUTO: 1 % (ref 0–6)
ERYTHROCYTE [DISTWIDTH] IN BLOOD BY AUTOMATED COUNT: 18.7 % (ref 11–14.5)
GFR SERPL CREATININE-BSD FRML MDRD: 54 ML/MIN/1.73M2
GLUCOSE BLD-MCNC: 137 MG/DL (ref 70–125)
HCT VFR BLD AUTO: 30.1 % (ref 35–47)
HGB BLD-MCNC: 9.9 G/DL (ref 12–16)
LYMPHOCYTES # BLD AUTO: 1 THOU/UL (ref 0.8–4.4)
LYMPHOCYTES NFR BLD AUTO: 14 % (ref 20–40)
MCH RBC QN AUTO: 34.3 PG (ref 27–34)
MCHC RBC AUTO-ENTMCNC: 32.9 G/DL (ref 32–36)
MCV RBC AUTO: 104 FL (ref 80–100)
MONOCYTES # BLD AUTO: 0.6 THOU/UL (ref 0–0.9)
MONOCYTES NFR BLD AUTO: 9 % (ref 2–10)
NEUTROPHILS # BLD AUTO: 5.1 THOU/UL (ref 2–7.7)
NEUTROPHILS NFR BLD AUTO: 75 % (ref 50–70)
PLATELET # BLD AUTO: 378 THOU/UL (ref 140–440)
PMV BLD AUTO: 10 FL (ref 8.5–12.5)
POTASSIUM BLD-SCNC: 3.8 MMOL/L (ref 3.5–5)
RBC # BLD AUTO: 2.89 MILL/UL (ref 3.8–5.4)
SODIUM SERPL-SCNC: 137 MMOL/L (ref 136–145)
WBC: 6.9 THOU/UL (ref 4–11)

## 2018-03-02 ENCOUNTER — COMMUNICATION - HEALTHEAST (OUTPATIENT)
Dept: ONCOLOGY | Facility: HOSPITAL | Age: 83
End: 2018-03-02

## 2018-03-05 ENCOUNTER — AMBULATORY - HEALTHEAST (OUTPATIENT)
Dept: ONCOLOGY | Facility: HOSPITAL | Age: 83
End: 2018-03-05

## 2018-03-05 ENCOUNTER — INFUSION - HEALTHEAST (OUTPATIENT)
Dept: INFUSION THERAPY | Facility: HOSPITAL | Age: 83
End: 2018-03-05

## 2018-03-05 DIAGNOSIS — C50.111 MALIGNANT NEOPLASM OF CENTRAL PORTION OF RIGHT BREAST IN FEMALE, ESTROGEN RECEPTOR NEGATIVE (H): ICD-10-CM

## 2018-03-05 DIAGNOSIS — Z17.1 MALIGNANT NEOPLASM OF CENTRAL PORTION OF RIGHT BREAST IN FEMALE, ESTROGEN RECEPTOR NEGATIVE (H): ICD-10-CM

## 2018-03-05 LAB
ALBUMIN SERPL-MCNC: 3.1 G/DL (ref 3.5–5)
ALP SERPL-CCNC: 49 U/L (ref 45–120)
ALT SERPL W P-5'-P-CCNC: 18 U/L (ref 0–45)
ANION GAP SERPL CALCULATED.3IONS-SCNC: 12 MMOL/L (ref 5–18)
AST SERPL W P-5'-P-CCNC: 18 U/L (ref 0–40)
BASOPHILS # BLD AUTO: 0 THOU/UL (ref 0–0.2)
BASOPHILS NFR BLD AUTO: 1 % (ref 0–2)
BILIRUB SERPL-MCNC: 0.6 MG/DL (ref 0–1)
BUN SERPL-MCNC: 23 MG/DL (ref 8–28)
CALCIUM SERPL-MCNC: 9.8 MG/DL (ref 8.5–10.5)
CHLORIDE BLD-SCNC: 101 MMOL/L (ref 98–107)
CO2 SERPL-SCNC: 23 MMOL/L (ref 22–31)
CREAT SERPL-MCNC: 1.34 MG/DL (ref 0.6–1.1)
EOSINOPHIL # BLD AUTO: 0.1 THOU/UL (ref 0–0.4)
EOSINOPHIL NFR BLD AUTO: 2 % (ref 0–6)
ERYTHROCYTE [DISTWIDTH] IN BLOOD BY AUTOMATED COUNT: 18.7 % (ref 11–14.5)
GFR SERPL CREATININE-BSD FRML MDRD: 38 ML/MIN/1.73M2
GLUCOSE BLD-MCNC: 155 MG/DL (ref 70–125)
HCT VFR BLD AUTO: 29 % (ref 35–47)
HGB BLD-MCNC: 9.7 G/DL (ref 12–16)
LYMPHOCYTES # BLD AUTO: 1.1 THOU/UL (ref 0.8–4.4)
LYMPHOCYTES NFR BLD AUTO: 15 % (ref 20–40)
MCH RBC QN AUTO: 34.9 PG (ref 27–34)
MCHC RBC AUTO-ENTMCNC: 33.4 G/DL (ref 32–36)
MCV RBC AUTO: 104 FL (ref 80–100)
MONOCYTES # BLD AUTO: 0.6 THOU/UL (ref 0–0.9)
MONOCYTES NFR BLD AUTO: 9 % (ref 2–10)
NEUTROPHILS # BLD AUTO: 5.5 THOU/UL (ref 2–7.7)
NEUTROPHILS NFR BLD AUTO: 75 % (ref 50–70)
PLATELET # BLD AUTO: 377 THOU/UL (ref 140–440)
PMV BLD AUTO: 9.6 FL (ref 8.5–12.5)
POTASSIUM BLD-SCNC: 3.9 MMOL/L (ref 3.5–5)
PROT SERPL-MCNC: 6 G/DL (ref 6–8)
RBC # BLD AUTO: 2.78 MILL/UL (ref 3.8–5.4)
SODIUM SERPL-SCNC: 136 MMOL/L (ref 136–145)
WBC: 7.4 THOU/UL (ref 4–11)

## 2018-03-06 ENCOUNTER — OFFICE VISIT - HEALTHEAST (OUTPATIENT)
Dept: SURGERY | Facility: CLINIC | Age: 83
End: 2018-03-06

## 2018-03-06 ENCOUNTER — COMMUNICATION - HEALTHEAST (OUTPATIENT)
Dept: RADIATION ONCOLOGY | Facility: HOSPITAL | Age: 83
End: 2018-03-06

## 2018-03-06 DIAGNOSIS — Z17.1 MALIGNANT NEOPLASM OF CENTRAL PORTION OF RIGHT BREAST IN FEMALE, ESTROGEN RECEPTOR NEGATIVE (H): ICD-10-CM

## 2018-03-06 DIAGNOSIS — C50.111 MALIGNANT NEOPLASM OF CENTRAL PORTION OF RIGHT BREAST IN FEMALE, ESTROGEN RECEPTOR NEGATIVE (H): ICD-10-CM

## 2018-03-08 ENCOUNTER — OFFICE VISIT - HEALTHEAST (OUTPATIENT)
Dept: ONCOLOGY | Facility: HOSPITAL | Age: 83
End: 2018-03-08

## 2018-03-08 ENCOUNTER — HOSPITAL ENCOUNTER (OUTPATIENT)
Dept: NUCLEAR MEDICINE | Facility: HOSPITAL | Age: 83
Setting detail: RADIATION/ONCOLOGY SERIES
Discharge: STILL ADMITTED OR ADMINISTRATIVE | End: 2018-03-08
Attending: INTERNAL MEDICINE

## 2018-03-08 DIAGNOSIS — C50.111 MALIGNANT NEOPLASM OF CENTRAL PORTION OF RIGHT BREAST IN FEMALE, ESTROGEN RECEPTOR NEGATIVE (H): ICD-10-CM

## 2018-03-08 DIAGNOSIS — Z17.1 MALIGNANT NEOPLASM OF CENTRAL PORTION OF RIGHT BREAST IN FEMALE, ESTROGEN RECEPTOR NEGATIVE (H): ICD-10-CM

## 2018-03-08 DIAGNOSIS — F43.20 ADJUSTMENT REACTION: ICD-10-CM

## 2018-03-08 LAB
MUGA LV EJECTION FRACTION: 75.6 %
MUGA PRIOR EJECTION FRACTION: 75.7 %

## 2018-03-09 ENCOUNTER — COMMUNICATION - HEALTHEAST (OUTPATIENT)
Dept: RADIATION ONCOLOGY | Facility: HOSPITAL | Age: 83
End: 2018-03-09

## 2018-03-12 ENCOUNTER — INFUSION - HEALTHEAST (OUTPATIENT)
Dept: INFUSION THERAPY | Facility: HOSPITAL | Age: 83
End: 2018-03-12

## 2018-03-12 ENCOUNTER — OFFICE VISIT - HEALTHEAST (OUTPATIENT)
Dept: ONCOLOGY | Facility: HOSPITAL | Age: 83
End: 2018-03-12

## 2018-03-12 DIAGNOSIS — C50.111 MALIGNANT NEOPLASM OF CENTRAL PORTION OF RIGHT BREAST IN FEMALE, ESTROGEN RECEPTOR NEGATIVE (H): ICD-10-CM

## 2018-03-12 DIAGNOSIS — Z17.1 MALIGNANT NEOPLASM OF CENTRAL PORTION OF RIGHT BREAST IN FEMALE, ESTROGEN RECEPTOR NEGATIVE (H): ICD-10-CM

## 2018-03-12 DIAGNOSIS — T45.1X5A PERIPHERAL NEUROPATHY DUE TO CHEMOTHERAPY (H): ICD-10-CM

## 2018-03-12 DIAGNOSIS — R53.1 WEAK: ICD-10-CM

## 2018-03-12 DIAGNOSIS — L60.8 CHANGE IN NAIL APPEARANCE: ICD-10-CM

## 2018-03-12 DIAGNOSIS — G62.0 PERIPHERAL NEUROPATHY DUE TO CHEMOTHERAPY (H): ICD-10-CM

## 2018-03-12 DIAGNOSIS — D64.81 ANEMIA ASSOCIATED WITH CHEMOTHERAPY: ICD-10-CM

## 2018-03-12 DIAGNOSIS — T45.1X5A ANEMIA ASSOCIATED WITH CHEMOTHERAPY: ICD-10-CM

## 2018-03-12 LAB
ALBUMIN SERPL-MCNC: 3 G/DL (ref 3.5–5)
ALP SERPL-CCNC: 49 U/L (ref 45–120)
ALT SERPL W P-5'-P-CCNC: 14 U/L (ref 0–45)
ANION GAP SERPL CALCULATED.3IONS-SCNC: 10 MMOL/L (ref 5–18)
AST SERPL W P-5'-P-CCNC: 17 U/L (ref 0–40)
BASOPHILS # BLD AUTO: 0.1 THOU/UL (ref 0–0.2)
BASOPHILS NFR BLD AUTO: 1 % (ref 0–2)
BILIRUB SERPL-MCNC: 0.6 MG/DL (ref 0–1)
BUN SERPL-MCNC: 13 MG/DL (ref 8–28)
CALCIUM SERPL-MCNC: 9.6 MG/DL (ref 8.5–10.5)
CANCER AG27-29 SERPL-ACNC: 31 U/ML (ref 0–39)
CHLORIDE BLD-SCNC: 104 MMOL/L (ref 98–107)
CO2 SERPL-SCNC: 23 MMOL/L (ref 22–31)
CREAT SERPL-MCNC: 1.04 MG/DL (ref 0.6–1.1)
EOSINOPHIL COUNT (ABSOLUTE): 0.1 THOU/UL (ref 0–0.4)
EOSINOPHIL NFR BLD AUTO: 1 % (ref 0–6)
ERYTHROCYTE [DISTWIDTH] IN BLOOD BY AUTOMATED COUNT: 19.7 % (ref 11–14.5)
GFR SERPL CREATININE-BSD FRML MDRD: 51 ML/MIN/1.73M2
GLUCOSE BLD-MCNC: 113 MG/DL (ref 70–125)
HCT VFR BLD AUTO: 27.2 % (ref 35–47)
HGB BLD-MCNC: 8.8 G/DL (ref 12–16)
LYMPHOCYTES # BLD AUTO: 1.3 THOU/UL (ref 0.8–4.4)
LYMPHOCYTES NFR BLD AUTO: 23 % (ref 20–40)
MANUAL NRBC PER 100 CELLS: 2
MCH RBC QN AUTO: 34.8 PG (ref 27–34)
MCHC RBC AUTO-ENTMCNC: 32.4 G/DL (ref 32–36)
MCV RBC AUTO: 108 FL (ref 80–100)
MONOCYTES # BLD AUTO: 0.5 THOU/UL (ref 0–0.9)
MONOCYTES NFR BLD AUTO: 8 % (ref 2–10)
PLAT MORPH BLD: NORMAL
PLATELET # BLD AUTO: 352 THOU/UL (ref 140–440)
PMV BLD AUTO: 9.2 FL (ref 8.5–12.5)
POLYCHROMASIA BLD QL SMEAR: ABNORMAL
POTASSIUM BLD-SCNC: 4.1 MMOL/L (ref 3.5–5)
PROT SERPL-MCNC: 5.9 G/DL (ref 6–8)
RBC # BLD AUTO: 2.53 MILL/UL (ref 3.8–5.4)
SODIUM SERPL-SCNC: 137 MMOL/L (ref 136–145)
TOTAL NEUTROPHILS-ABS(DIFF): 3.8 THOU/UL (ref 2–7.7)
TOTAL NEUTROPHILS-REL(DIFF): 67 % (ref 50–70)
WBC: 5.7 THOU/UL (ref 4–11)

## 2018-03-13 ENCOUNTER — COMMUNICATION - HEALTHEAST (OUTPATIENT)
Dept: ONCOLOGY | Facility: HOSPITAL | Age: 83
End: 2018-03-13

## 2018-03-14 ENCOUNTER — COMMUNICATION - HEALTHEAST (OUTPATIENT)
Dept: ONCOLOGY | Facility: HOSPITAL | Age: 83
End: 2018-03-14

## 2018-03-15 ENCOUNTER — RECORDS - HEALTHEAST (OUTPATIENT)
Dept: ADMINISTRATIVE | Facility: OTHER | Age: 83
End: 2018-03-15

## 2018-03-16 ENCOUNTER — COMMUNICATION - HEALTHEAST (OUTPATIENT)
Dept: ONCOLOGY | Facility: HOSPITAL | Age: 83
End: 2018-03-16

## 2018-03-16 ENCOUNTER — OFFICE VISIT - HEALTHEAST (OUTPATIENT)
Dept: ONCOLOGY | Facility: HOSPITAL | Age: 83
End: 2018-03-16

## 2018-03-16 DIAGNOSIS — Z17.1 MALIGNANT NEOPLASM OF CENTRAL PORTION OF RIGHT BREAST IN FEMALE, ESTROGEN RECEPTOR NEGATIVE (H): ICD-10-CM

## 2018-03-16 DIAGNOSIS — C50.111 MALIGNANT NEOPLASM OF CENTRAL PORTION OF RIGHT BREAST IN FEMALE, ESTROGEN RECEPTOR NEGATIVE (H): ICD-10-CM

## 2018-03-16 DIAGNOSIS — L08.9 TOE INFECTION: ICD-10-CM

## 2018-03-16 DIAGNOSIS — L60.8 CHANGE IN NAIL APPEARANCE: ICD-10-CM

## 2018-03-22 ENCOUNTER — AMBULATORY - HEALTHEAST (OUTPATIENT)
Dept: SURGERY | Facility: CLINIC | Age: 83
End: 2018-03-22

## 2018-03-22 DIAGNOSIS — C50.111 MALIGNANT NEOPLASM OF CENTRAL PORTION OF RIGHT BREAST IN FEMALE, ESTROGEN RECEPTOR NEGATIVE (H): ICD-10-CM

## 2018-03-22 DIAGNOSIS — Z17.1 MALIGNANT NEOPLASM OF CENTRAL PORTION OF RIGHT BREAST IN FEMALE, ESTROGEN RECEPTOR NEGATIVE (H): ICD-10-CM

## 2018-03-23 ENCOUNTER — COMMUNICATION - HEALTHEAST (OUTPATIENT)
Dept: ONCOLOGY | Facility: HOSPITAL | Age: 83
End: 2018-03-23

## 2018-03-26 ENCOUNTER — COMMUNICATION - HEALTHEAST (OUTPATIENT)
Dept: ONCOLOGY | Facility: HOSPITAL | Age: 83
End: 2018-03-26

## 2018-03-27 ENCOUNTER — RECORDS - HEALTHEAST (OUTPATIENT)
Dept: LAB | Facility: CLINIC | Age: 83
End: 2018-03-27

## 2018-03-27 LAB
ANION GAP SERPL CALCULATED.3IONS-SCNC: 13 MMOL/L (ref 5–18)
BUN SERPL-MCNC: 21 MG/DL (ref 8–28)
CALCIUM SERPL-MCNC: 9.7 MG/DL (ref 8.5–10.5)
CHLORIDE BLD-SCNC: 101 MMOL/L (ref 98–107)
CO2 SERPL-SCNC: 22 MMOL/L (ref 22–31)
CREAT SERPL-MCNC: 0.99 MG/DL (ref 0.6–1.1)
GFR SERPL CREATININE-BSD FRML MDRD: 54 ML/MIN/1.73M2
GLUCOSE BLD-MCNC: 112 MG/DL (ref 70–125)
POTASSIUM BLD-SCNC: 4.5 MMOL/L (ref 3.5–5)
SODIUM SERPL-SCNC: 136 MMOL/L (ref 136–145)

## 2018-03-29 ASSESSMENT — MIFFLIN-ST. JEOR
SCORE: 1175.71
SCORE: 1175.71

## 2018-03-30 ENCOUNTER — INFUSION - HEALTHEAST (OUTPATIENT)
Dept: INFUSION THERAPY | Facility: HOSPITAL | Age: 83
End: 2018-03-30

## 2018-03-30 DIAGNOSIS — Z17.1 MALIGNANT NEOPLASM OF CENTRAL PORTION OF RIGHT BREAST IN FEMALE, ESTROGEN RECEPTOR NEGATIVE (H): ICD-10-CM

## 2018-03-30 DIAGNOSIS — C50.111 MALIGNANT NEOPLASM OF CENTRAL PORTION OF RIGHT BREAST IN FEMALE, ESTROGEN RECEPTOR NEGATIVE (H): ICD-10-CM

## 2018-03-30 LAB
BASOPHILS # BLD AUTO: 0.1 THOU/UL (ref 0–0.2)
BASOPHILS NFR BLD AUTO: 0 % (ref 0–2)
EOSINOPHIL # BLD AUTO: 0.3 THOU/UL (ref 0–0.4)
EOSINOPHIL NFR BLD AUTO: 2 % (ref 0–6)
ERYTHROCYTE [DISTWIDTH] IN BLOOD BY AUTOMATED COUNT: 14.8 % (ref 11–14.5)
HCT VFR BLD AUTO: 35.5 % (ref 35–47)
HGB BLD-MCNC: 11.3 G/DL (ref 12–16)
LYMPHOCYTES # BLD AUTO: 2.3 THOU/UL (ref 0.8–4.4)
LYMPHOCYTES NFR BLD AUTO: 19 % (ref 20–40)
MCH RBC QN AUTO: 33.7 PG (ref 27–34)
MCHC RBC AUTO-ENTMCNC: 31.8 G/DL (ref 32–36)
MCV RBC AUTO: 106 FL (ref 80–100)
MONOCYTES # BLD AUTO: 1.1 THOU/UL (ref 0–0.9)
MONOCYTES NFR BLD AUTO: 9 % (ref 2–10)
NEUTROPHILS # BLD AUTO: 8.4 THOU/UL (ref 2–7.7)
NEUTROPHILS NFR BLD AUTO: 70 % (ref 50–70)
PLATELET # BLD AUTO: 384 THOU/UL (ref 140–440)
PMV BLD AUTO: 9.7 FL (ref 8.5–12.5)
RBC # BLD AUTO: 3.35 MILL/UL (ref 3.8–5.4)
WBC: 12.2 THOU/UL (ref 4–11)

## 2018-03-31 ENCOUNTER — ANESTHESIA - HEALTHEAST (OUTPATIENT)
Dept: SURGERY | Facility: HOSPITAL | Age: 83
End: 2018-03-31

## 2018-04-02 ENCOUNTER — SURGERY - HEALTHEAST (OUTPATIENT)
Dept: SURGERY | Facility: HOSPITAL | Age: 83
End: 2018-04-02

## 2018-04-02 ENCOUNTER — HOSPITAL ENCOUNTER (OUTPATIENT)
Dept: MEDSURG UNIT | Facility: HOSPITAL | Age: 83
Discharge: HOME | End: 2018-04-02
Attending: SPECIALIST | Admitting: SPECIALIST
Payer: MEDICARE

## 2018-04-02 DIAGNOSIS — C50.111 MALIGNANT NEOPLASM OF CENTRAL PORTION OF RIGHT BREAST IN FEMALE, ESTROGEN RECEPTOR NEGATIVE (H): ICD-10-CM

## 2018-04-02 DIAGNOSIS — Z17.1 MALIGNANT NEOPLASM OF CENTRAL PORTION OF RIGHT BREAST IN FEMALE, ESTROGEN RECEPTOR NEGATIVE (H): ICD-10-CM

## 2018-04-02 ASSESSMENT — MIFFLIN-ST. JEOR
SCORE: 1175.71
SCORE: 1175.71

## 2018-04-03 ENCOUNTER — COMMUNICATION - HEALTHEAST (OUTPATIENT)
Dept: ONCOLOGY | Facility: HOSPITAL | Age: 83
End: 2018-04-03

## 2018-04-03 ENCOUNTER — AMBULATORY - HEALTHEAST (OUTPATIENT)
Dept: SURGERY | Facility: CLINIC | Age: 83
End: 2018-04-03

## 2018-04-03 DIAGNOSIS — C50.911 BREAST CANCER, RIGHT (H): ICD-10-CM

## 2018-04-06 ENCOUNTER — COMMUNICATION - HEALTHEAST (OUTPATIENT)
Dept: SURGERY | Facility: CLINIC | Age: 83
End: 2018-04-06

## 2018-04-10 ENCOUNTER — HOSPITAL ENCOUNTER (OUTPATIENT)
Dept: SURGERY | Facility: CLINIC | Age: 83
Discharge: HOME | End: 2018-04-10
Attending: SPECIALIST

## 2018-04-10 ENCOUNTER — RECORDS - HEALTHEAST (OUTPATIENT)
Dept: ADMINISTRATIVE | Facility: OTHER | Age: 83
End: 2018-04-10

## 2018-04-10 DIAGNOSIS — Z48.89 POSTOPERATIVE VISIT: ICD-10-CM

## 2018-04-10 LAB
LAB AP CHARGES (HE HISTORICAL CONVERSION): ABNORMAL
PATH REPORT.ADDENDUM SPEC: ABNORMAL
PATH REPORT.COMMENTS IMP SPEC: ABNORMAL
PATH REPORT.COMMENTS IMP SPEC: ABNORMAL
PATH REPORT.FINAL DX SPEC: ABNORMAL
PATH REPORT.GROSS SPEC: ABNORMAL
PATH REPORT.MICROSCOPIC SPEC OTHER STN: ABNORMAL
PATH REPORT.MICROSCOPIC SPEC OTHER STN: ABNORMAL
PATH REPORT.RELEVANT HX SPEC: ABNORMAL
PATHOLOGY SYNOPTIC REPORT: ABNORMAL
RESULT FLAG (HE HISTORICAL CONVERSION): ABNORMAL

## 2018-04-10 ASSESSMENT — MIFFLIN-ST. JEOR: SCORE: 1175.71

## 2018-04-13 ENCOUNTER — COMMUNICATION - HEALTHEAST (OUTPATIENT)
Dept: ONCOLOGY | Facility: HOSPITAL | Age: 83
End: 2018-04-13

## 2018-04-19 ENCOUNTER — COMMUNICATION - HEALTHEAST (OUTPATIENT)
Dept: ONCOLOGY | Facility: HOSPITAL | Age: 83
End: 2018-04-19

## 2018-04-23 ENCOUNTER — OFFICE VISIT - HEALTHEAST (OUTPATIENT)
Dept: ONCOLOGY | Facility: HOSPITAL | Age: 83
End: 2018-04-23

## 2018-04-23 ENCOUNTER — AMBULATORY - HEALTHEAST (OUTPATIENT)
Dept: INFUSION THERAPY | Facility: HOSPITAL | Age: 83
End: 2018-04-23

## 2018-04-23 ENCOUNTER — INFUSION - HEALTHEAST (OUTPATIENT)
Dept: INFUSION THERAPY | Facility: HOSPITAL | Age: 83
End: 2018-04-23

## 2018-04-23 DIAGNOSIS — C50.111 MALIGNANT NEOPLASM OF CENTRAL PORTION OF RIGHT BREAST IN FEMALE, ESTROGEN RECEPTOR NEGATIVE (H): ICD-10-CM

## 2018-04-23 DIAGNOSIS — Z17.1 MALIGNANT NEOPLASM OF CENTRAL PORTION OF RIGHT BREAST IN FEMALE, ESTROGEN RECEPTOR NEGATIVE (H): ICD-10-CM

## 2018-04-23 DIAGNOSIS — C50.111 MALIGNANT NEOPLASM OF CENTRAL PORTION OF RIGHT BREAST IN FEMALE, ESTROGEN RECEPTOR POSITIVE (H): ICD-10-CM

## 2018-04-23 DIAGNOSIS — Z17.0 MALIGNANT NEOPLASM OF CENTRAL PORTION OF RIGHT BREAST IN FEMALE, ESTROGEN RECEPTOR POSITIVE (H): ICD-10-CM

## 2018-04-23 LAB
ALBUMIN SERPL-MCNC: 2.9 G/DL (ref 3.5–5)
ALP SERPL-CCNC: 58 U/L (ref 45–120)
ALT SERPL W P-5'-P-CCNC: 15 U/L (ref 0–45)
ANION GAP SERPL CALCULATED.3IONS-SCNC: 13 MMOL/L (ref 5–18)
AST SERPL W P-5'-P-CCNC: 19 U/L (ref 0–40)
BASOPHILS # BLD AUTO: 0 THOU/UL (ref 0–0.2)
BASOPHILS NFR BLD AUTO: 1 % (ref 0–2)
BILIRUB SERPL-MCNC: 0.3 MG/DL (ref 0–1)
BUN SERPL-MCNC: 18 MG/DL (ref 8–28)
CALCIUM SERPL-MCNC: 9.8 MG/DL (ref 8.5–10.5)
CHLORIDE BLD-SCNC: 99 MMOL/L (ref 98–107)
CO2 SERPL-SCNC: 26 MMOL/L (ref 22–31)
CREAT SERPL-MCNC: 1.09 MG/DL (ref 0.6–1.1)
EOSINOPHIL # BLD AUTO: 0.4 THOU/UL (ref 0–0.4)
EOSINOPHIL NFR BLD AUTO: 4 % (ref 0–6)
ERYTHROCYTE [DISTWIDTH] IN BLOOD BY AUTOMATED COUNT: 13.4 % (ref 11–14.5)
GFR SERPL CREATININE-BSD FRML MDRD: 48 ML/MIN/1.73M2
GLUCOSE BLD-MCNC: 127 MG/DL (ref 70–125)
HCT VFR BLD AUTO: 35.7 % (ref 35–47)
HGB BLD-MCNC: 11.6 G/DL (ref 12–16)
LYMPHOCYTES # BLD AUTO: 1.7 THOU/UL (ref 0.8–4.4)
LYMPHOCYTES NFR BLD AUTO: 19 % (ref 20–40)
MCH RBC QN AUTO: 33 PG (ref 27–34)
MCHC RBC AUTO-ENTMCNC: 32.5 G/DL (ref 32–36)
MCV RBC AUTO: 102 FL (ref 80–100)
MONOCYTES # BLD AUTO: 1 THOU/UL (ref 0–0.9)
MONOCYTES NFR BLD AUTO: 12 % (ref 2–10)
NEUTROPHILS # BLD AUTO: 5.6 THOU/UL (ref 2–7.7)
NEUTROPHILS NFR BLD AUTO: 65 % (ref 50–70)
PLATELET # BLD AUTO: 357 THOU/UL (ref 140–440)
PMV BLD AUTO: 9.5 FL (ref 8.5–12.5)
POTASSIUM BLD-SCNC: 4.2 MMOL/L (ref 3.5–5)
PROT SERPL-MCNC: 6.2 G/DL (ref 6–8)
RBC # BLD AUTO: 3.51 MILL/UL (ref 3.8–5.4)
SODIUM SERPL-SCNC: 138 MMOL/L (ref 136–145)
WBC: 8.6 THOU/UL (ref 4–11)

## 2018-04-24 ENCOUNTER — HOSPITAL ENCOUNTER (OUTPATIENT)
Dept: SURGERY | Facility: CLINIC | Age: 83
Discharge: HOME | End: 2018-04-24
Attending: SPECIALIST

## 2018-04-24 DIAGNOSIS — Z48.89 POSTOPERATIVE VISIT: ICD-10-CM

## 2018-04-30 ENCOUNTER — COMMUNICATION - HEALTHEAST (OUTPATIENT)
Dept: ONCOLOGY | Facility: HOSPITAL | Age: 83
End: 2018-04-30

## 2018-05-01 ENCOUNTER — HOSPITAL ENCOUNTER (OUTPATIENT)
Dept: SURGERY | Facility: CLINIC | Age: 83
Discharge: HOME | End: 2018-05-01
Attending: SPECIALIST

## 2018-05-01 ENCOUNTER — AMBULATORY - HEALTHEAST (OUTPATIENT)
Dept: ONCOLOGY | Facility: HOSPITAL | Age: 83
End: 2018-05-01

## 2018-05-01 DIAGNOSIS — Z48.89 POSTOPERATIVE VISIT: ICD-10-CM

## 2018-05-01 DIAGNOSIS — C50.111 MALIGNANT NEOPLASM OF CENTRAL PORTION OF RIGHT BREAST IN FEMALE, ESTROGEN RECEPTOR POSITIVE (H): ICD-10-CM

## 2018-05-01 DIAGNOSIS — Z17.0 MALIGNANT NEOPLASM OF CENTRAL PORTION OF RIGHT BREAST IN FEMALE, ESTROGEN RECEPTOR POSITIVE (H): ICD-10-CM

## 2018-05-01 DIAGNOSIS — T88.8XXA FLUID COLLECTION AT SURGICAL SITE, INITIAL ENCOUNTER: ICD-10-CM

## 2018-05-02 ENCOUNTER — COMMUNICATION - HEALTHEAST (OUTPATIENT)
Dept: ONCOLOGY | Facility: HOSPITAL | Age: 83
End: 2018-05-02

## 2018-05-02 ENCOUNTER — COMMUNICATION - HEALTHEAST (OUTPATIENT)
Dept: ADMINISTRATIVE | Facility: HOSPITAL | Age: 83
End: 2018-05-02

## 2018-05-08 ENCOUNTER — HOSPITAL ENCOUNTER (OUTPATIENT)
Dept: SURGERY | Facility: CLINIC | Age: 83
Discharge: HOME | End: 2018-05-08
Attending: SPECIALIST

## 2018-05-08 DIAGNOSIS — T88.8XXD FLUID COLLECTION AT SURGICAL SITE, SUBSEQUENT ENCOUNTER: ICD-10-CM

## 2018-05-08 DIAGNOSIS — Z48.89 POSTOPERATIVE VISIT: ICD-10-CM

## 2018-05-09 ENCOUNTER — COMMUNICATION - HEALTHEAST (OUTPATIENT)
Dept: ONCOLOGY | Facility: HOSPITAL | Age: 83
End: 2018-05-09

## 2018-05-09 ENCOUNTER — AMBULATORY - HEALTHEAST (OUTPATIENT)
Dept: PALLIATIVE MEDICINE | Facility: OTHER | Age: 83
End: 2018-05-09

## 2018-05-09 DIAGNOSIS — Z17.0: ICD-10-CM

## 2018-05-09 DIAGNOSIS — C50.111: ICD-10-CM

## 2018-05-11 ENCOUNTER — HOSPITAL ENCOUNTER (OUTPATIENT)
Dept: SURGERY | Facility: CLINIC | Age: 83
Discharge: HOME | End: 2018-05-11
Attending: SPECIALIST

## 2018-05-11 DIAGNOSIS — Z48.89 POSTOPERATIVE VISIT: ICD-10-CM

## 2018-05-11 DIAGNOSIS — T88.8XXD FLUID COLLECTION AT SURGICAL SITE, SUBSEQUENT ENCOUNTER: ICD-10-CM

## 2018-05-11 ASSESSMENT — MIFFLIN-ST. JEOR: SCORE: 1153.03

## 2018-05-14 ENCOUNTER — INFUSION - HEALTHEAST (OUTPATIENT)
Dept: INFUSION THERAPY | Facility: HOSPITAL | Age: 83
End: 2018-05-14

## 2018-05-14 DIAGNOSIS — C50.111 MALIGNANT NEOPLASM OF CENTRAL PORTION OF RIGHT BREAST IN FEMALE, ESTROGEN RECEPTOR POSITIVE (H): ICD-10-CM

## 2018-05-14 DIAGNOSIS — Z17.0 MALIGNANT NEOPLASM OF CENTRAL PORTION OF RIGHT BREAST IN FEMALE, ESTROGEN RECEPTOR POSITIVE (H): ICD-10-CM

## 2018-05-15 ENCOUNTER — HOSPITAL ENCOUNTER (OUTPATIENT)
Dept: SURGERY | Facility: CLINIC | Age: 83
Discharge: HOME | End: 2018-05-15
Attending: SPECIALIST | Admitting: SPECIALIST

## 2018-05-15 DIAGNOSIS — T88.8XXD FLUID COLLECTION AT SURGICAL SITE, SUBSEQUENT ENCOUNTER: ICD-10-CM

## 2018-05-15 ASSESSMENT — MIFFLIN-ST. JEOR: SCORE: 1153.03

## 2018-05-21 ENCOUNTER — AMBULATORY - HEALTHEAST (OUTPATIENT)
Dept: SURGERY | Facility: CLINIC | Age: 83
End: 2018-05-21

## 2018-05-22 ENCOUNTER — COMMUNICATION - HEALTHEAST (OUTPATIENT)
Dept: ONCOLOGY | Facility: HOSPITAL | Age: 83
End: 2018-05-22

## 2018-05-31 ENCOUNTER — HOSPITAL ENCOUNTER (OUTPATIENT)
Dept: NUCLEAR MEDICINE | Facility: HOSPITAL | Age: 83
Setting detail: RADIATION/ONCOLOGY SERIES
Discharge: STILL ADMITTED OR ADMINISTRATIVE | End: 2018-05-31
Attending: INTERNAL MEDICINE

## 2018-05-31 DIAGNOSIS — Z01.818 EXAMINATION PRIOR TO CHEMOTHERAPY: ICD-10-CM

## 2018-05-31 DIAGNOSIS — C50.111 MALIGNANT NEOPLASM OF CENTRAL PORTION OF RIGHT BREAST (H): ICD-10-CM

## 2018-05-31 LAB
MUGA LV EJECTION FRACTION: 72.8 %
MUGA PRIOR EJECTION FRACTION: 75.6 %

## 2018-06-04 ENCOUNTER — INFUSION - HEALTHEAST (OUTPATIENT)
Dept: INFUSION THERAPY | Facility: HOSPITAL | Age: 83
End: 2018-06-04

## 2018-06-04 ENCOUNTER — AMBULATORY - HEALTHEAST (OUTPATIENT)
Dept: ONCOLOGY | Facility: HOSPITAL | Age: 83
End: 2018-06-04

## 2018-06-04 ENCOUNTER — OFFICE VISIT - HEALTHEAST (OUTPATIENT)
Dept: ONCOLOGY | Facility: HOSPITAL | Age: 83
End: 2018-06-04

## 2018-06-04 ENCOUNTER — AMBULATORY - HEALTHEAST (OUTPATIENT)
Dept: INFUSION THERAPY | Facility: HOSPITAL | Age: 83
End: 2018-06-04

## 2018-06-04 DIAGNOSIS — Z17.0 MALIGNANT NEOPLASM OF CENTRAL PORTION OF RIGHT BREAST IN FEMALE, ESTROGEN RECEPTOR POSITIVE (H): ICD-10-CM

## 2018-06-04 DIAGNOSIS — C50.111 MALIGNANT NEOPLASM OF CENTRAL PORTION OF RIGHT BREAST IN FEMALE, ESTROGEN RECEPTOR POSITIVE (H): ICD-10-CM

## 2018-06-04 LAB
ALBUMIN SERPL-MCNC: 3.2 G/DL (ref 3.5–5)
ALP SERPL-CCNC: 54 U/L (ref 45–120)
ALT SERPL W P-5'-P-CCNC: 13 U/L (ref 0–45)
ANION GAP SERPL CALCULATED.3IONS-SCNC: 10 MMOL/L (ref 5–18)
AST SERPL W P-5'-P-CCNC: 17 U/L (ref 0–40)
BASOPHILS # BLD AUTO: 0 THOU/UL (ref 0–0.2)
BASOPHILS NFR BLD AUTO: 0 % (ref 0–2)
BILIRUB SERPL-MCNC: 0.3 MG/DL (ref 0–1)
BUN SERPL-MCNC: 28 MG/DL (ref 8–28)
CALCIUM SERPL-MCNC: 9.8 MG/DL (ref 8.5–10.5)
CHLORIDE BLD-SCNC: 104 MMOL/L (ref 98–107)
CO2 SERPL-SCNC: 24 MMOL/L (ref 22–31)
CREAT SERPL-MCNC: 1.02 MG/DL (ref 0.6–1.1)
EOSINOPHIL # BLD AUTO: 0.4 THOU/UL (ref 0–0.4)
EOSINOPHIL NFR BLD AUTO: 3 % (ref 0–6)
ERYTHROCYTE [DISTWIDTH] IN BLOOD BY AUTOMATED COUNT: 13.8 % (ref 11–14.5)
GFR SERPL CREATININE-BSD FRML MDRD: 52 ML/MIN/1.73M2
GLUCOSE BLD-MCNC: 119 MG/DL (ref 70–125)
HCT VFR BLD AUTO: 37.6 % (ref 35–47)
HGB BLD-MCNC: 12.1 G/DL (ref 12–16)
LYMPHOCYTES # BLD AUTO: 1.9 THOU/UL (ref 0.8–4.4)
LYMPHOCYTES NFR BLD AUTO: 16 % (ref 20–40)
MCH RBC QN AUTO: 30.5 PG (ref 27–34)
MCHC RBC AUTO-ENTMCNC: 32.2 G/DL (ref 32–36)
MCV RBC AUTO: 95 FL (ref 80–100)
MONOCYTES # BLD AUTO: 1.1 THOU/UL (ref 0–0.9)
MONOCYTES NFR BLD AUTO: 10 % (ref 2–10)
NEUTROPHILS # BLD AUTO: 8.3 THOU/UL (ref 2–7.7)
NEUTROPHILS NFR BLD AUTO: 71 % (ref 50–70)
PLATELET # BLD AUTO: 352 THOU/UL (ref 140–440)
PMV BLD AUTO: 9.7 FL (ref 8.5–12.5)
POTASSIUM BLD-SCNC: 4.1 MMOL/L (ref 3.5–5)
PROT SERPL-MCNC: 6.4 G/DL (ref 6–8)
RBC # BLD AUTO: 3.97 MILL/UL (ref 3.8–5.4)
SODIUM SERPL-SCNC: 138 MMOL/L (ref 136–145)
WBC: 11.7 THOU/UL (ref 4–11)

## 2018-06-06 ENCOUNTER — COMMUNICATION - HEALTHEAST (OUTPATIENT)
Dept: ONCOLOGY | Facility: HOSPITAL | Age: 83
End: 2018-06-06

## 2018-06-07 ENCOUNTER — COMMUNICATION - HEALTHEAST (OUTPATIENT)
Dept: ONCOLOGY | Facility: HOSPITAL | Age: 83
End: 2018-06-07

## 2018-06-12 ENCOUNTER — HOSPITAL ENCOUNTER (OUTPATIENT)
Dept: SURGERY | Facility: CLINIC | Age: 83
Discharge: HOME | End: 2018-06-12
Attending: SPECIALIST

## 2018-06-12 DIAGNOSIS — T88.8XXS FLUID COLLECTION AT SURGICAL SITE, SEQUELA: ICD-10-CM

## 2018-06-12 ASSESSMENT — MIFFLIN-ST. JEOR: SCORE: 1143.96

## 2018-06-15 ENCOUNTER — COMMUNICATION - HEALTHEAST (OUTPATIENT)
Dept: ONCOLOGY | Facility: HOSPITAL | Age: 83
End: 2018-06-15

## 2018-06-25 ENCOUNTER — INFUSION - HEALTHEAST (OUTPATIENT)
Dept: INFUSION THERAPY | Facility: HOSPITAL | Age: 83
End: 2018-06-25

## 2018-06-25 DIAGNOSIS — Z17.0 MALIGNANT NEOPLASM OF CENTRAL PORTION OF RIGHT BREAST IN FEMALE, ESTROGEN RECEPTOR POSITIVE (H): ICD-10-CM

## 2018-06-25 DIAGNOSIS — C50.111 MALIGNANT NEOPLASM OF CENTRAL PORTION OF RIGHT BREAST IN FEMALE, ESTROGEN RECEPTOR POSITIVE (H): ICD-10-CM

## 2018-06-26 ENCOUNTER — COMMUNICATION - HEALTHEAST (OUTPATIENT)
Dept: ONCOLOGY | Facility: HOSPITAL | Age: 83
End: 2018-06-26

## 2018-07-10 ENCOUNTER — COMMUNICATION - HEALTHEAST (OUTPATIENT)
Dept: ONCOLOGY | Facility: HOSPITAL | Age: 83
End: 2018-07-10

## 2018-07-16 ENCOUNTER — OFFICE VISIT - HEALTHEAST (OUTPATIENT)
Dept: ONCOLOGY | Facility: HOSPITAL | Age: 83
End: 2018-07-16

## 2018-07-16 ENCOUNTER — INFUSION - HEALTHEAST (OUTPATIENT)
Dept: INFUSION THERAPY | Facility: HOSPITAL | Age: 83
End: 2018-07-16

## 2018-07-16 ENCOUNTER — AMBULATORY - HEALTHEAST (OUTPATIENT)
Dept: INFUSION THERAPY | Facility: HOSPITAL | Age: 83
End: 2018-07-16

## 2018-07-16 ENCOUNTER — RECORDS - HEALTHEAST (OUTPATIENT)
Dept: ADMINISTRATIVE | Facility: OTHER | Age: 83
End: 2018-07-16

## 2018-07-16 DIAGNOSIS — C50.111 MALIGNANT NEOPLASM OF CENTRAL PORTION OF RIGHT BREAST IN FEMALE, ESTROGEN RECEPTOR POSITIVE (H): ICD-10-CM

## 2018-07-16 DIAGNOSIS — Z17.0 MALIGNANT NEOPLASM OF CENTRAL PORTION OF RIGHT BREAST IN FEMALE, ESTROGEN RECEPTOR POSITIVE (H): ICD-10-CM

## 2018-07-16 DIAGNOSIS — Z79.811 LONG TERM CURRENT USE OF AROMATASE INHIBITOR: ICD-10-CM

## 2018-07-16 LAB
ALBUMIN SERPL-MCNC: 3.5 G/DL (ref 3.5–5)
ALP SERPL-CCNC: 47 U/L (ref 45–120)
ALT SERPL W P-5'-P-CCNC: 13 U/L (ref 0–45)
ANION GAP SERPL CALCULATED.3IONS-SCNC: 8 MMOL/L (ref 5–18)
AST SERPL W P-5'-P-CCNC: 16 U/L (ref 0–40)
BASOPHILS # BLD AUTO: 0 THOU/UL (ref 0–0.2)
BASOPHILS NFR BLD AUTO: 1 % (ref 0–2)
BILIRUB SERPL-MCNC: 0.6 MG/DL (ref 0–1)
BUN SERPL-MCNC: 25 MG/DL (ref 8–28)
CALCIUM SERPL-MCNC: 9.8 MG/DL (ref 8.5–10.5)
CHLORIDE BLD-SCNC: 103 MMOL/L (ref 98–107)
CO2 SERPL-SCNC: 28 MMOL/L (ref 22–31)
CREAT SERPL-MCNC: 0.93 MG/DL (ref 0.6–1.1)
EOSINOPHIL # BLD AUTO: 0.3 THOU/UL (ref 0–0.4)
EOSINOPHIL NFR BLD AUTO: 4 % (ref 0–6)
ERYTHROCYTE [DISTWIDTH] IN BLOOD BY AUTOMATED COUNT: 15.3 % (ref 11–14.5)
GFR SERPL CREATININE-BSD FRML MDRD: 58 ML/MIN/1.73M2
GLUCOSE BLD-MCNC: 108 MG/DL (ref 70–125)
HCT VFR BLD AUTO: 37.3 % (ref 35–47)
HGB BLD-MCNC: 12.2 G/DL (ref 12–16)
LYMPHOCYTES # BLD AUTO: 0.9 THOU/UL (ref 0.8–4.4)
LYMPHOCYTES NFR BLD AUTO: 12 % (ref 20–40)
MCH RBC QN AUTO: 30.8 PG (ref 27–34)
MCHC RBC AUTO-ENTMCNC: 32.7 G/DL (ref 32–36)
MCV RBC AUTO: 94 FL (ref 80–100)
MONOCYTES # BLD AUTO: 1 THOU/UL (ref 0–0.9)
MONOCYTES NFR BLD AUTO: 14 % (ref 2–10)
NEUTROPHILS # BLD AUTO: 4.7 THOU/UL (ref 2–7.7)
NEUTROPHILS NFR BLD AUTO: 69 % (ref 50–70)
PLATELET # BLD AUTO: 275 THOU/UL (ref 140–440)
PMV BLD AUTO: 9.3 FL (ref 8.5–12.5)
POTASSIUM BLD-SCNC: 3.8 MMOL/L (ref 3.5–5)
PROT SERPL-MCNC: 6 G/DL (ref 6–8)
RBC # BLD AUTO: 3.96 MILL/UL (ref 3.8–5.4)
SODIUM SERPL-SCNC: 139 MMOL/L (ref 136–145)
WBC: 6.9 THOU/UL (ref 4–11)

## 2018-07-31 ENCOUNTER — COMMUNICATION - HEALTHEAST (OUTPATIENT)
Dept: ONCOLOGY | Facility: HOSPITAL | Age: 83
End: 2018-07-31

## 2018-08-06 ENCOUNTER — AMBULATORY - HEALTHEAST (OUTPATIENT)
Dept: ONCOLOGY | Facility: HOSPITAL | Age: 83
End: 2018-08-06

## 2018-08-06 ENCOUNTER — INFUSION - HEALTHEAST (OUTPATIENT)
Dept: INFUSION THERAPY | Facility: HOSPITAL | Age: 83
End: 2018-08-06

## 2018-08-06 DIAGNOSIS — C50.111 MALIGNANT NEOPLASM OF CENTRAL PORTION OF RIGHT BREAST IN FEMALE, ESTROGEN RECEPTOR POSITIVE (H): ICD-10-CM

## 2018-08-06 DIAGNOSIS — Z17.0 MALIGNANT NEOPLASM OF CENTRAL PORTION OF RIGHT BREAST IN FEMALE, ESTROGEN RECEPTOR POSITIVE (H): ICD-10-CM

## 2018-08-13 ENCOUNTER — RECORDS - HEALTHEAST (OUTPATIENT)
Dept: BONE DENSITY | Facility: CLINIC | Age: 83
End: 2018-08-13

## 2018-08-13 DIAGNOSIS — C50.111 MALIGNANT NEOPLASM OF CENTRAL PORTION OF RIGHT FEMALE BREAST (H): ICD-10-CM

## 2018-08-13 DIAGNOSIS — Z17.0 ESTROGEN RECEPTOR POSITIVE STATUS (ER+): ICD-10-CM

## 2018-08-13 DIAGNOSIS — Z79.811 LONG TERM (CURRENT) USE OF AROMATASE INHIBITORS: ICD-10-CM

## 2018-08-15 ENCOUNTER — RECORDS - HEALTHEAST (OUTPATIENT)
Dept: ADMINISTRATIVE | Facility: OTHER | Age: 83
End: 2018-08-15

## 2018-08-23 ENCOUNTER — HOSPITAL ENCOUNTER (OUTPATIENT)
Dept: NUCLEAR MEDICINE | Facility: HOSPITAL | Age: 83
Setting detail: RADIATION/ONCOLOGY SERIES
Discharge: STILL ADMITTED OR ADMINISTRATIVE | End: 2018-08-23
Attending: INTERNAL MEDICINE

## 2018-08-23 DIAGNOSIS — Z17.0 MALIGNANT NEOPLASM OF CENTRAL PORTION OF RIGHT BREAST IN FEMALE, ESTROGEN RECEPTOR POSITIVE (H): ICD-10-CM

## 2018-08-23 DIAGNOSIS — C50.111 MALIGNANT NEOPLASM OF CENTRAL PORTION OF RIGHT BREAST IN FEMALE, ESTROGEN RECEPTOR POSITIVE (H): ICD-10-CM

## 2018-08-23 LAB
MUGA LV EJECTION FRACTION: 68 %
MUGA PRIOR EJECTION FRACTION: 73 %

## 2018-08-27 ENCOUNTER — OFFICE VISIT - HEALTHEAST (OUTPATIENT)
Dept: ONCOLOGY | Facility: HOSPITAL | Age: 83
End: 2018-08-27

## 2018-08-27 ENCOUNTER — AMBULATORY - HEALTHEAST (OUTPATIENT)
Dept: INFUSION THERAPY | Facility: HOSPITAL | Age: 83
End: 2018-08-27

## 2018-08-27 ENCOUNTER — INFUSION - HEALTHEAST (OUTPATIENT)
Dept: INFUSION THERAPY | Facility: HOSPITAL | Age: 83
End: 2018-08-27

## 2018-08-27 DIAGNOSIS — C50.111 MALIGNANT NEOPLASM OF CENTRAL PORTION OF RIGHT BREAST IN FEMALE, ESTROGEN RECEPTOR POSITIVE (H): ICD-10-CM

## 2018-08-27 DIAGNOSIS — Z17.0 MALIGNANT NEOPLASM OF CENTRAL PORTION OF RIGHT BREAST IN FEMALE, ESTROGEN RECEPTOR POSITIVE (H): ICD-10-CM

## 2018-08-27 LAB
ALBUMIN SERPL-MCNC: 3.7 G/DL (ref 3.5–5)
ALP SERPL-CCNC: 61 U/L (ref 45–120)
ALT SERPL W P-5'-P-CCNC: 22 U/L (ref 0–45)
ANION GAP SERPL CALCULATED.3IONS-SCNC: 8 MMOL/L (ref 5–18)
AST SERPL W P-5'-P-CCNC: 36 U/L (ref 0–40)
BASOPHILS # BLD AUTO: 0.1 THOU/UL (ref 0–0.2)
BASOPHILS NFR BLD AUTO: 0 % (ref 0–2)
BILIRUB SERPL-MCNC: 0.5 MG/DL (ref 0–1)
BUN SERPL-MCNC: 25 MG/DL (ref 8–28)
CALCIUM SERPL-MCNC: 10.4 MG/DL (ref 8.5–10.5)
CHLORIDE BLD-SCNC: 104 MMOL/L (ref 98–107)
CO2 SERPL-SCNC: 28 MMOL/L (ref 22–31)
CREAT SERPL-MCNC: 1.02 MG/DL (ref 0.6–1.1)
EOSINOPHIL # BLD AUTO: 0.3 THOU/UL (ref 0–0.4)
EOSINOPHIL NFR BLD AUTO: 2 % (ref 0–6)
ERYTHROCYTE [DISTWIDTH] IN BLOOD BY AUTOMATED COUNT: 13.8 % (ref 11–14.5)
GFR SERPL CREATININE-BSD FRML MDRD: 52 ML/MIN/1.73M2
GLUCOSE BLD-MCNC: 125 MG/DL (ref 70–125)
HCT VFR BLD AUTO: 40.1 % (ref 35–47)
HGB BLD-MCNC: 13.2 G/DL (ref 12–16)
LYMPHOCYTES # BLD AUTO: 1.1 THOU/UL (ref 0.8–4.4)
LYMPHOCYTES NFR BLD AUTO: 9 % (ref 20–40)
MCH RBC QN AUTO: 31.5 PG (ref 27–34)
MCHC RBC AUTO-ENTMCNC: 32.9 G/DL (ref 32–36)
MCV RBC AUTO: 96 FL (ref 80–100)
MONOCYTES # BLD AUTO: 0.9 THOU/UL (ref 0–0.9)
MONOCYTES NFR BLD AUTO: 7 % (ref 2–10)
NEUTROPHILS # BLD AUTO: 10.1 THOU/UL (ref 2–7.7)
NEUTROPHILS NFR BLD AUTO: 81 % (ref 50–70)
PLATELET # BLD AUTO: 293 THOU/UL (ref 140–440)
PMV BLD AUTO: 9.5 FL (ref 8.5–12.5)
POTASSIUM BLD-SCNC: 4.5 MMOL/L (ref 3.5–5)
PROT SERPL-MCNC: 6.8 G/DL (ref 6–8)
RBC # BLD AUTO: 4.19 MILL/UL (ref 3.8–5.4)
SODIUM SERPL-SCNC: 140 MMOL/L (ref 136–145)
WBC: 12.5 THOU/UL (ref 4–11)

## 2018-08-28 ENCOUNTER — COMMUNICATION - HEALTHEAST (OUTPATIENT)
Dept: ONCOLOGY | Facility: HOSPITAL | Age: 83
End: 2018-08-28

## 2018-09-17 ENCOUNTER — INFUSION - HEALTHEAST (OUTPATIENT)
Dept: INFUSION THERAPY | Facility: HOSPITAL | Age: 83
End: 2018-09-17

## 2018-09-17 ENCOUNTER — AMBULATORY - HEALTHEAST (OUTPATIENT)
Dept: ONCOLOGY | Facility: HOSPITAL | Age: 83
End: 2018-09-17

## 2018-09-17 DIAGNOSIS — C50.111 MALIGNANT NEOPLASM OF CENTRAL PORTION OF RIGHT BREAST IN FEMALE, ESTROGEN RECEPTOR POSITIVE (H): ICD-10-CM

## 2018-09-17 DIAGNOSIS — Z17.0 MALIGNANT NEOPLASM OF CENTRAL PORTION OF RIGHT BREAST IN FEMALE, ESTROGEN RECEPTOR POSITIVE (H): ICD-10-CM

## 2018-09-24 ENCOUNTER — COMMUNICATION - HEALTHEAST (OUTPATIENT)
Dept: ONCOLOGY | Facility: HOSPITAL | Age: 83
End: 2018-09-24

## 2018-10-01 ENCOUNTER — COMMUNICATION - HEALTHEAST (OUTPATIENT)
Dept: ONCOLOGY | Facility: HOSPITAL | Age: 83
End: 2018-10-01

## 2018-10-08 ENCOUNTER — INFUSION - HEALTHEAST (OUTPATIENT)
Dept: INFUSION THERAPY | Facility: HOSPITAL | Age: 83
End: 2018-10-08

## 2018-10-08 ENCOUNTER — AMBULATORY - HEALTHEAST (OUTPATIENT)
Dept: INFUSION THERAPY | Facility: HOSPITAL | Age: 83
End: 2018-10-08

## 2018-10-08 ENCOUNTER — OFFICE VISIT - HEALTHEAST (OUTPATIENT)
Dept: ONCOLOGY | Facility: HOSPITAL | Age: 83
End: 2018-10-08

## 2018-10-08 ENCOUNTER — AMBULATORY - HEALTHEAST (OUTPATIENT)
Dept: ONCOLOGY | Facility: HOSPITAL | Age: 83
End: 2018-10-08

## 2018-10-08 DIAGNOSIS — C50.111 MALIGNANT NEOPLASM OF CENTRAL PORTION OF RIGHT BREAST IN FEMALE, ESTROGEN RECEPTOR POSITIVE (H): ICD-10-CM

## 2018-10-08 DIAGNOSIS — Z17.0 MALIGNANT NEOPLASM OF CENTRAL PORTION OF RIGHT BREAST IN FEMALE, ESTROGEN RECEPTOR POSITIVE (H): ICD-10-CM

## 2018-10-08 LAB
ALBUMIN SERPL-MCNC: 3.5 G/DL (ref 3.5–5)
ALP SERPL-CCNC: 60 U/L (ref 45–120)
ALT SERPL W P-5'-P-CCNC: 15 U/L (ref 0–45)
ANION GAP SERPL CALCULATED.3IONS-SCNC: 6 MMOL/L (ref 5–18)
AST SERPL W P-5'-P-CCNC: 19 U/L (ref 0–40)
BASOPHILS # BLD AUTO: 0 THOU/UL (ref 0–0.2)
BASOPHILS NFR BLD AUTO: 1 % (ref 0–2)
BILIRUB SERPL-MCNC: 0.5 MG/DL (ref 0–1)
BUN SERPL-MCNC: 27 MG/DL (ref 8–28)
CALCIUM SERPL-MCNC: 10 MG/DL (ref 8.5–10.5)
CHLORIDE BLD-SCNC: 104 MMOL/L (ref 98–107)
CO2 SERPL-SCNC: 28 MMOL/L (ref 22–31)
CREAT SERPL-MCNC: 0.95 MG/DL (ref 0.6–1.1)
EOSINOPHIL # BLD AUTO: 0.3 THOU/UL (ref 0–0.4)
EOSINOPHIL NFR BLD AUTO: 4 % (ref 0–6)
ERYTHROCYTE [DISTWIDTH] IN BLOOD BY AUTOMATED COUNT: 13 % (ref 11–14.5)
GFR SERPL CREATININE-BSD FRML MDRD: 56 ML/MIN/1.73M2
GLUCOSE BLD-MCNC: 137 MG/DL (ref 70–125)
HCT VFR BLD AUTO: 39.8 % (ref 35–47)
HGB BLD-MCNC: 13 G/DL (ref 12–16)
LYMPHOCYTES # BLD AUTO: 1 THOU/UL (ref 0.8–4.4)
LYMPHOCYTES NFR BLD AUTO: 13 % (ref 20–40)
MCH RBC QN AUTO: 31.3 PG (ref 27–34)
MCHC RBC AUTO-ENTMCNC: 32.7 G/DL (ref 32–36)
MCV RBC AUTO: 96 FL (ref 80–100)
MONOCYTES # BLD AUTO: 0.8 THOU/UL (ref 0–0.9)
MONOCYTES NFR BLD AUTO: 11 % (ref 2–10)
NEUTROPHILS # BLD AUTO: 5.6 THOU/UL (ref 2–7.7)
NEUTROPHILS NFR BLD AUTO: 72 % (ref 50–70)
PLATELET # BLD AUTO: 268 THOU/UL (ref 140–440)
PMV BLD AUTO: 9.5 FL (ref 8.5–12.5)
POTASSIUM BLD-SCNC: 4.1 MMOL/L (ref 3.5–5)
PROT SERPL-MCNC: 6.4 G/DL (ref 6–8)
RBC # BLD AUTO: 4.15 MILL/UL (ref 3.8–5.4)
SODIUM SERPL-SCNC: 138 MMOL/L (ref 136–145)
WBC: 7.8 THOU/UL (ref 4–11)

## 2018-10-29 ENCOUNTER — INFUSION - HEALTHEAST (OUTPATIENT)
Dept: INFUSION THERAPY | Facility: HOSPITAL | Age: 83
End: 2018-10-29

## 2018-10-29 DIAGNOSIS — Z17.0 MALIGNANT NEOPLASM OF CENTRAL PORTION OF RIGHT BREAST IN FEMALE, ESTROGEN RECEPTOR POSITIVE (H): ICD-10-CM

## 2018-10-29 DIAGNOSIS — C50.111 MALIGNANT NEOPLASM OF CENTRAL PORTION OF RIGHT BREAST IN FEMALE, ESTROGEN RECEPTOR POSITIVE (H): ICD-10-CM

## 2018-11-21 ENCOUNTER — COMMUNICATION - HEALTHEAST (OUTPATIENT)
Dept: ONCOLOGY | Facility: HOSPITAL | Age: 83
End: 2018-11-21

## 2018-11-26 ENCOUNTER — OFFICE VISIT - HEALTHEAST (OUTPATIENT)
Dept: ONCOLOGY | Facility: HOSPITAL | Age: 83
End: 2018-11-26

## 2018-11-26 ENCOUNTER — AMBULATORY - HEALTHEAST (OUTPATIENT)
Dept: INFUSION THERAPY | Facility: HOSPITAL | Age: 83
End: 2018-11-26

## 2018-11-26 ENCOUNTER — INFUSION - HEALTHEAST (OUTPATIENT)
Dept: INFUSION THERAPY | Facility: HOSPITAL | Age: 83
End: 2018-11-26

## 2018-11-26 DIAGNOSIS — Z17.0 MALIGNANT NEOPLASM OF CENTRAL PORTION OF RIGHT BREAST IN FEMALE, ESTROGEN RECEPTOR POSITIVE (H): ICD-10-CM

## 2018-11-26 DIAGNOSIS — C50.111 MALIGNANT NEOPLASM OF CENTRAL PORTION OF RIGHT BREAST IN FEMALE, ESTROGEN RECEPTOR POSITIVE (H): ICD-10-CM

## 2018-11-26 LAB
ALBUMIN SERPL-MCNC: 3.6 G/DL (ref 3.5–5)
ALP SERPL-CCNC: 62 U/L (ref 45–120)
ALT SERPL W P-5'-P-CCNC: 15 U/L (ref 0–45)
ANION GAP SERPL CALCULATED.3IONS-SCNC: 8 MMOL/L (ref 5–18)
AST SERPL W P-5'-P-CCNC: 19 U/L (ref 0–40)
BASOPHILS # BLD AUTO: 0 THOU/UL (ref 0–0.2)
BASOPHILS NFR BLD AUTO: 0 % (ref 0–2)
BILIRUB SERPL-MCNC: 0.4 MG/DL (ref 0–1)
BUN SERPL-MCNC: 29 MG/DL (ref 8–28)
CALCIUM SERPL-MCNC: 10.5 MG/DL (ref 8.5–10.5)
CHLORIDE BLD-SCNC: 104 MMOL/L (ref 98–107)
CO2 SERPL-SCNC: 26 MMOL/L (ref 22–31)
CREAT SERPL-MCNC: 0.91 MG/DL (ref 0.6–1.1)
EOSINOPHIL # BLD AUTO: 0.3 THOU/UL (ref 0–0.4)
EOSINOPHIL NFR BLD AUTO: 3 % (ref 0–6)
ERYTHROCYTE [DISTWIDTH] IN BLOOD BY AUTOMATED COUNT: 13.2 % (ref 11–14.5)
GFR SERPL CREATININE-BSD FRML MDRD: 59 ML/MIN/1.73M2
GLUCOSE BLD-MCNC: 103 MG/DL (ref 70–125)
HCT VFR BLD AUTO: 40.9 % (ref 35–47)
HGB BLD-MCNC: 13.7 G/DL (ref 12–16)
LYMPHOCYTES # BLD AUTO: 1.5 THOU/UL (ref 0.8–4.4)
LYMPHOCYTES NFR BLD AUTO: 18 % (ref 20–40)
MCH RBC QN AUTO: 32 PG (ref 27–34)
MCHC RBC AUTO-ENTMCNC: 33.5 G/DL (ref 32–36)
MCV RBC AUTO: 96 FL (ref 80–100)
MONOCYTES # BLD AUTO: 0.8 THOU/UL (ref 0–0.9)
MONOCYTES NFR BLD AUTO: 10 % (ref 2–10)
NEUTROPHILS # BLD AUTO: 5.7 THOU/UL (ref 2–7.7)
NEUTROPHILS NFR BLD AUTO: 69 % (ref 50–70)
PLATELET # BLD AUTO: 298 THOU/UL (ref 140–440)
PMV BLD AUTO: 9.3 FL (ref 8.5–12.5)
POTASSIUM BLD-SCNC: 4.2 MMOL/L (ref 3.5–5)
PROT SERPL-MCNC: 6.9 G/DL (ref 6–8)
RBC # BLD AUTO: 4.28 MILL/UL (ref 3.8–5.4)
SODIUM SERPL-SCNC: 138 MMOL/L (ref 136–145)
WBC: 8.3 THOU/UL (ref 4–11)

## 2018-12-10 ENCOUNTER — AMBULATORY - HEALTHEAST (OUTPATIENT)
Dept: SURGERY | Facility: CLINIC | Age: 83
End: 2018-12-10

## 2018-12-10 DIAGNOSIS — Z85.3 PERSONAL HISTORY OF BREAST CANCER: ICD-10-CM

## 2018-12-13 ENCOUNTER — HOSPITAL ENCOUNTER (OUTPATIENT)
Dept: SURGERY | Facility: CLINIC | Age: 83
Discharge: HOME | End: 2018-12-13
Attending: SPECIALIST

## 2018-12-13 ENCOUNTER — AMBULATORY - HEALTHEAST (OUTPATIENT)
Dept: ONCOLOGY | Facility: HOSPITAL | Age: 83
End: 2018-12-13

## 2018-12-13 DIAGNOSIS — Z12.31 ENCOUNTER FOR SCREENING MAMMOGRAM FOR MALIGNANT NEOPLASM OF BREAST: ICD-10-CM

## 2018-12-13 DIAGNOSIS — Z85.3 PERSONAL HISTORY OF BREAST CANCER: ICD-10-CM

## 2018-12-13 ASSESSMENT — MIFFLIN-ST. JEOR: SCORE: 1162.1

## 2019-01-07 ENCOUNTER — INFUSION - HEALTHEAST (OUTPATIENT)
Dept: INFUSION THERAPY | Facility: HOSPITAL | Age: 84
End: 2019-01-07

## 2019-01-07 DIAGNOSIS — C50.919 BREAST CANCER (H): ICD-10-CM

## 2019-01-08 ENCOUNTER — HOSPITAL ENCOUNTER (OUTPATIENT)
Dept: MAMMOGRAPHY | Facility: CLINIC | Age: 84
Discharge: HOME | End: 2019-01-08
Attending: SPECIALIST

## 2019-01-08 DIAGNOSIS — Z12.31 ENCOUNTER FOR SCREENING MAMMOGRAM FOR MALIGNANT NEOPLASM OF BREAST: ICD-10-CM

## 2019-01-08 DIAGNOSIS — Z85.3 PERSONAL HISTORY OF BREAST CANCER: ICD-10-CM

## 2019-02-18 ENCOUNTER — AMBULATORY - HEALTHEAST (OUTPATIENT)
Dept: ONCOLOGY | Facility: HOSPITAL | Age: 84
End: 2019-02-18

## 2019-02-18 ENCOUNTER — OFFICE VISIT - HEALTHEAST (OUTPATIENT)
Dept: ONCOLOGY | Facility: HOSPITAL | Age: 84
End: 2019-02-18

## 2019-02-18 ENCOUNTER — AMBULATORY - HEALTHEAST (OUTPATIENT)
Dept: INFUSION THERAPY | Facility: HOSPITAL | Age: 84
End: 2019-02-18

## 2019-02-18 DIAGNOSIS — C50.111 MALIGNANT NEOPLASM OF CENTRAL PORTION OF RIGHT BREAST IN FEMALE, ESTROGEN RECEPTOR POSITIVE (H): ICD-10-CM

## 2019-02-18 DIAGNOSIS — Z17.0 MALIGNANT NEOPLASM OF CENTRAL PORTION OF RIGHT BREAST IN FEMALE, ESTROGEN RECEPTOR POSITIVE (H): ICD-10-CM

## 2019-02-18 LAB
ALBUMIN SERPL-MCNC: 3.5 G/DL (ref 3.5–5)
ALP SERPL-CCNC: 64 U/L (ref 45–120)
ALT SERPL W P-5'-P-CCNC: 15 U/L (ref 0–45)
ANION GAP SERPL CALCULATED.3IONS-SCNC: 9 MMOL/L (ref 5–18)
AST SERPL W P-5'-P-CCNC: 16 U/L (ref 0–40)
BASOPHILS # BLD AUTO: 0 THOU/UL (ref 0–0.2)
BASOPHILS NFR BLD AUTO: 1 % (ref 0–2)
BILIRUB SERPL-MCNC: 0.4 MG/DL (ref 0–1)
BUN SERPL-MCNC: 36 MG/DL (ref 8–28)
CALCIUM SERPL-MCNC: 10.2 MG/DL (ref 8.5–10.5)
CHLORIDE BLD-SCNC: 105 MMOL/L (ref 98–107)
CO2 SERPL-SCNC: 25 MMOL/L (ref 22–31)
CREAT SERPL-MCNC: 0.99 MG/DL (ref 0.6–1.1)
EOSINOPHIL # BLD AUTO: 0.3 THOU/UL (ref 0–0.4)
EOSINOPHIL NFR BLD AUTO: 4 % (ref 0–6)
ERYTHROCYTE [DISTWIDTH] IN BLOOD BY AUTOMATED COUNT: 13.4 % (ref 11–14.5)
GFR SERPL CREATININE-BSD FRML MDRD: 53 ML/MIN/1.73M2
GLUCOSE BLD-MCNC: 120 MG/DL (ref 70–125)
HCT VFR BLD AUTO: 40.4 % (ref 35–47)
HGB BLD-MCNC: 13.3 G/DL (ref 12–16)
LYMPHOCYTES # BLD AUTO: 1.7 THOU/UL (ref 0.8–4.4)
LYMPHOCYTES NFR BLD AUTO: 20 % (ref 20–40)
MCH RBC QN AUTO: 31.8 PG (ref 27–34)
MCHC RBC AUTO-ENTMCNC: 32.9 G/DL (ref 32–36)
MCV RBC AUTO: 97 FL (ref 80–100)
MONOCYTES # BLD AUTO: 0.8 THOU/UL (ref 0–0.9)
MONOCYTES NFR BLD AUTO: 10 % (ref 2–10)
NEUTROPHILS # BLD AUTO: 5.7 THOU/UL (ref 2–7.7)
NEUTROPHILS NFR BLD AUTO: 67 % (ref 50–70)
PLATELET # BLD AUTO: 290 THOU/UL (ref 140–440)
PMV BLD AUTO: 9.5 FL (ref 8.5–12.5)
POTASSIUM BLD-SCNC: 4.3 MMOL/L (ref 3.5–5)
PROT SERPL-MCNC: 6.6 G/DL (ref 6–8)
RBC # BLD AUTO: 4.18 MILL/UL (ref 3.8–5.4)
SODIUM SERPL-SCNC: 139 MMOL/L (ref 136–145)
WBC: 8.6 THOU/UL (ref 4–11)

## 2019-03-18 ASSESSMENT — MIFFLIN-ST. JEOR
SCORE: 1143.96
SCORE: 1143.96

## 2019-03-20 ENCOUNTER — SURGERY - HEALTHEAST (OUTPATIENT)
Dept: SURGERY | Facility: AMBULATORY SURGERY CENTER | Age: 84
End: 2019-03-20

## 2019-03-20 ENCOUNTER — HOSPITAL ENCOUNTER (OUTPATIENT)
Dept: SURGERY | Facility: AMBULATORY SURGERY CENTER | Age: 84
Discharge: HOME | End: 2019-03-20
Attending: SPECIALIST | Admitting: SPECIALIST
Payer: MEDICARE

## 2019-03-20 ASSESSMENT — MIFFLIN-ST. JEOR
SCORE: 1143.96
SCORE: 1143.96

## 2019-05-20 ENCOUNTER — OFFICE VISIT - HEALTHEAST (OUTPATIENT)
Dept: ONCOLOGY | Facility: HOSPITAL | Age: 84
End: 2019-05-20

## 2019-05-20 ENCOUNTER — AMBULATORY - HEALTHEAST (OUTPATIENT)
Dept: INFUSION THERAPY | Facility: HOSPITAL | Age: 84
End: 2019-05-20

## 2019-05-20 DIAGNOSIS — C50.111 MALIGNANT NEOPLASM OF CENTRAL PORTION OF RIGHT BREAST IN FEMALE, ESTROGEN RECEPTOR POSITIVE (H): ICD-10-CM

## 2019-05-20 DIAGNOSIS — Z17.0 MALIGNANT NEOPLASM OF CENTRAL PORTION OF RIGHT BREAST IN FEMALE, ESTROGEN RECEPTOR POSITIVE (H): ICD-10-CM

## 2019-05-20 LAB
ALBUMIN SERPL-MCNC: 3.7 G/DL (ref 3.5–5)
ALP SERPL-CCNC: 68 U/L (ref 45–120)
ALT SERPL W P-5'-P-CCNC: 10 U/L (ref 0–45)
ANION GAP SERPL CALCULATED.3IONS-SCNC: 12 MMOL/L (ref 5–18)
AST SERPL W P-5'-P-CCNC: 17 U/L (ref 0–40)
BASOPHILS # BLD AUTO: 0.1 THOU/UL (ref 0–0.2)
BASOPHILS NFR BLD AUTO: 1 % (ref 0–2)
BILIRUB SERPL-MCNC: 0.5 MG/DL (ref 0–1)
BUN SERPL-MCNC: 30 MG/DL (ref 8–28)
CALCIUM SERPL-MCNC: 11 MG/DL (ref 8.5–10.5)
CHLORIDE BLD-SCNC: 104 MMOL/L (ref 98–107)
CO2 SERPL-SCNC: 25 MMOL/L (ref 22–31)
CREAT SERPL-MCNC: 0.99 MG/DL (ref 0.6–1.1)
EOSINOPHIL # BLD AUTO: 0.3 THOU/UL (ref 0–0.4)
EOSINOPHIL NFR BLD AUTO: 2 % (ref 0–6)
ERYTHROCYTE [DISTWIDTH] IN BLOOD BY AUTOMATED COUNT: 13.2 % (ref 11–14.5)
GFR SERPL CREATININE-BSD FRML MDRD: 53 ML/MIN/1.73M2
GLUCOSE BLD-MCNC: 99 MG/DL (ref 70–125)
HCT VFR BLD AUTO: 43.3 % (ref 35–47)
HGB BLD-MCNC: 14.3 G/DL (ref 12–16)
LYMPHOCYTES # BLD AUTO: 1.8 THOU/UL (ref 0.8–4.4)
LYMPHOCYTES NFR BLD AUTO: 16 % (ref 20–40)
MCH RBC QN AUTO: 32.4 PG (ref 27–34)
MCHC RBC AUTO-ENTMCNC: 33 G/DL (ref 32–36)
MCV RBC AUTO: 98 FL (ref 80–100)
MONOCYTES # BLD AUTO: 1 THOU/UL (ref 0–0.9)
MONOCYTES NFR BLD AUTO: 9 % (ref 2–10)
NEUTROPHILS # BLD AUTO: 8.1 THOU/UL (ref 2–7.7)
NEUTROPHILS NFR BLD AUTO: 72 % (ref 50–70)
PLATELET # BLD AUTO: 349 THOU/UL (ref 140–440)
PMV BLD AUTO: 9.6 FL (ref 8.5–12.5)
POTASSIUM BLD-SCNC: 4.6 MMOL/L (ref 3.5–5)
PROT SERPL-MCNC: 6.9 G/DL (ref 6–8)
RBC # BLD AUTO: 4.41 MILL/UL (ref 3.8–5.4)
SODIUM SERPL-SCNC: 141 MMOL/L (ref 136–145)
WBC: 11.3 THOU/UL (ref 4–11)

## 2019-05-31 ENCOUNTER — COMMUNICATION - HEALTHEAST (OUTPATIENT)
Dept: ONCOLOGY | Facility: HOSPITAL | Age: 84
End: 2019-05-31

## 2019-05-31 ENCOUNTER — AMBULATORY - HEALTHEAST (OUTPATIENT)
Dept: INFUSION THERAPY | Facility: HOSPITAL | Age: 84
End: 2019-05-31

## 2019-05-31 DIAGNOSIS — E83.52 HYPERCALCEMIA: ICD-10-CM

## 2019-05-31 DIAGNOSIS — R30.9 PAINFUL URINATION: ICD-10-CM

## 2019-05-31 LAB
ALBUMIN UR-MCNC: ABNORMAL MG/DL
ANION GAP SERPL CALCULATED.3IONS-SCNC: 9 MMOL/L (ref 5–18)
APPEARANCE UR: ABNORMAL
BACTERIA #/AREA URNS HPF: ABNORMAL HPF
BILIRUB UR QL STRIP: NEGATIVE
BUN SERPL-MCNC: 27 MG/DL (ref 8–28)
CALCIUM SERPL-MCNC: 10.7 MG/DL (ref 8.5–10.5)
CHLORIDE BLD-SCNC: 105 MMOL/L (ref 98–107)
CO2 SERPL-SCNC: 24 MMOL/L (ref 22–31)
COLOR UR AUTO: YELLOW
CREAT SERPL-MCNC: 1.02 MG/DL (ref 0.6–1.1)
GFR SERPL CREATININE-BSD FRML MDRD: 52 ML/MIN/1.73M2
GLUCOSE BLD-MCNC: 95 MG/DL (ref 70–125)
GLUCOSE UR STRIP-MCNC: NEGATIVE MG/DL
HGB UR QL STRIP: ABNORMAL
KETONES UR STRIP-MCNC: NEGATIVE MG/DL
LEUKOCYTE ESTERASE UR QL STRIP: ABNORMAL
NITRATE UR QL: NEGATIVE
PH UR STRIP: 6.5 [PH] (ref 4.5–8)
POTASSIUM BLD-SCNC: 4.5 MMOL/L (ref 3.5–5)
RBC #/AREA URNS AUTO: >100 HPF
SODIUM SERPL-SCNC: 138 MMOL/L (ref 136–145)
SP GR UR STRIP: 1.02 (ref 1–1.03)
SQUAMOUS #/AREA URNS AUTO: ABNORMAL LPF
UROBILINOGEN UR STRIP-ACNC: ABNORMAL
WBC #/AREA URNS AUTO: >100 HPF
WBC CLUMPS #/AREA URNS HPF: PRESENT /[HPF]

## 2019-06-01 LAB — BACTERIA SPEC CULT: NORMAL

## 2019-06-03 ENCOUNTER — COMMUNICATION - HEALTHEAST (OUTPATIENT)
Dept: ONCOLOGY | Facility: HOSPITAL | Age: 84
End: 2019-06-03

## 2019-07-02 ASSESSMENT — MIFFLIN-ST. JEOR
SCORE: 1225.6
SCORE: 1225.6

## 2019-07-23 ENCOUNTER — RECORDS - HEALTHEAST (OUTPATIENT)
Dept: LAB | Facility: CLINIC | Age: 84
End: 2019-07-23

## 2019-07-23 LAB
ANION GAP SERPL CALCULATED.3IONS-SCNC: 13 MMOL/L (ref 5–18)
BUN SERPL-MCNC: 39 MG/DL (ref 8–28)
CALCIUM SERPL-MCNC: 10.5 MG/DL (ref 8.5–10.5)
CHLORIDE BLD-SCNC: 105 MMOL/L (ref 98–107)
CO2 SERPL-SCNC: 22 MMOL/L (ref 22–31)
CREAT SERPL-MCNC: 1.11 MG/DL (ref 0.6–1.1)
GFR SERPL CREATININE-BSD FRML MDRD: 47 ML/MIN/1.73M2
GLUCOSE BLD-MCNC: 82 MG/DL (ref 70–125)
MAGNESIUM SERPL-MCNC: 2.2 MG/DL (ref 1.8–2.6)
POTASSIUM BLD-SCNC: 5.2 MMOL/L (ref 3.5–5)
SODIUM SERPL-SCNC: 140 MMOL/L (ref 136–145)

## 2019-07-26 LAB — BACTERIA SPEC CULT: ABNORMAL

## 2019-07-26 ASSESSMENT — MIFFLIN-ST. JEOR
SCORE: 1153.03
SCORE: 1153.03

## 2019-07-30 ENCOUNTER — ANESTHESIA - HEALTHEAST (OUTPATIENT)
Dept: SURGERY | Facility: CLINIC | Age: 84
End: 2019-07-30

## 2019-07-31 ENCOUNTER — HOSPITAL ENCOUNTER (INPATIENT)
Dept: MEDSURG UNIT | Facility: CLINIC | Age: 84
Discharge: HOME | End: 2019-08-01
Attending: ORTHOPAEDIC SURGERY | Admitting: ORTHOPAEDIC SURGERY
Payer: MEDICARE

## 2019-07-31 ENCOUNTER — SURGERY - HEALTHEAST (OUTPATIENT)
Dept: SURGERY | Facility: CLINIC | Age: 84
End: 2019-07-31

## 2019-07-31 DIAGNOSIS — M16.11 PRIMARY OSTEOARTHRITIS OF RIGHT HIP: ICD-10-CM

## 2019-07-31 LAB
ABO/RH(D): NORMAL
ANTIBODY SCREEN: NEGATIVE
CREAT SERPL-MCNC: 0.98 MG/DL (ref 0.6–1.1)
ERYTHROCYTE [DISTWIDTH] IN BLOOD BY AUTOMATED COUNT: 13.4 % (ref 11–14.5)
GFR SERPL CREATININE-BSD FRML MDRD: 54 ML/MIN/1.73M2
HCT VFR BLD AUTO: 38.6 % (ref 35–47)
HGB BLD-MCNC: 12.6 G/DL (ref 12–16)
MCH RBC QN AUTO: 32.1 PG (ref 27–34)
MCHC RBC AUTO-ENTMCNC: 32.6 G/DL (ref 32–36)
MCV RBC AUTO: 99 FL (ref 80–100)
PLATELET # BLD AUTO: 304 THOU/UL (ref 140–440)
PMV BLD AUTO: 9.5 FL (ref 8.5–12.5)
POTASSIUM BLD-SCNC: 4.2 MMOL/L (ref 3.5–5)
RBC # BLD AUTO: 3.92 MILL/UL (ref 3.8–5.4)
WBC: 7.7 THOU/UL (ref 4–11)

## 2019-07-31 ASSESSMENT — MIFFLIN-ST. JEOR
SCORE: 1136.25

## 2019-08-01 LAB
ANION GAP SERPL CALCULATED.3IONS-SCNC: 6 MMOL/L (ref 5–18)
BUN SERPL-MCNC: 32 MG/DL (ref 8–28)
CALCIUM SERPL-MCNC: 9.1 MG/DL (ref 8.5–10.5)
CHLORIDE BLD-SCNC: 107 MMOL/L (ref 98–107)
CO2 SERPL-SCNC: 23 MMOL/L (ref 22–31)
CREAT SERPL-MCNC: 1.07 MG/DL (ref 0.6–1.1)
ERYTHROCYTE [DISTWIDTH] IN BLOOD BY AUTOMATED COUNT: 13.3 % (ref 11–14.5)
GFR SERPL CREATININE-BSD FRML MDRD: 49 ML/MIN/1.73M2
GLUCOSE BLD-MCNC: 162 MG/DL (ref 70–125)
HCT VFR BLD AUTO: 31.8 % (ref 35–47)
HGB BLD-MCNC: 10.3 G/DL (ref 12–16)
MCH RBC QN AUTO: 32.1 PG (ref 27–34)
MCHC RBC AUTO-ENTMCNC: 32.4 G/DL (ref 32–36)
MCV RBC AUTO: 99 FL (ref 80–100)
PLATELET # BLD AUTO: 248 THOU/UL (ref 140–440)
PMV BLD AUTO: 9.8 FL (ref 8.5–12.5)
POTASSIUM BLD-SCNC: 4.8 MMOL/L (ref 3.5–5)
RBC # BLD AUTO: 3.21 MILL/UL (ref 3.8–5.4)
SODIUM SERPL-SCNC: 136 MMOL/L (ref 136–145)
WBC: 15.2 THOU/UL (ref 4–11)

## 2019-08-13 ENCOUNTER — RECORDS - HEALTHEAST (OUTPATIENT)
Dept: LAB | Facility: CLINIC | Age: 84
End: 2019-08-13

## 2019-08-13 LAB
ANION GAP SERPL CALCULATED.3IONS-SCNC: 10 MMOL/L (ref 5–18)
BUN SERPL-MCNC: 32 MG/DL (ref 8–28)
CALCIUM SERPL-MCNC: 10.4 MG/DL (ref 8.5–10.5)
CHLORIDE BLD-SCNC: 106 MMOL/L (ref 98–107)
CO2 SERPL-SCNC: 25 MMOL/L (ref 22–31)
CREAT SERPL-MCNC: 1 MG/DL (ref 0.6–1.1)
GFR SERPL CREATININE-BSD FRML MDRD: 53 ML/MIN/1.73M2
GLUCOSE BLD-MCNC: 105 MG/DL (ref 70–125)
POTASSIUM BLD-SCNC: 5.5 MMOL/L (ref 3.5–5)
SODIUM SERPL-SCNC: 141 MMOL/L (ref 136–145)

## 2019-08-15 ENCOUNTER — RECORDS - HEALTHEAST (OUTPATIENT)
Dept: LAB | Facility: CLINIC | Age: 84
End: 2019-08-15

## 2019-08-15 LAB
ANION GAP SERPL CALCULATED.3IONS-SCNC: 9 MMOL/L (ref 5–18)
BUN SERPL-MCNC: 40 MG/DL (ref 8–28)
CALCIUM SERPL-MCNC: 10.2 MG/DL (ref 8.5–10.5)
CHLORIDE BLD-SCNC: 107 MMOL/L (ref 98–107)
CO2 SERPL-SCNC: 25 MMOL/L (ref 22–31)
CREAT SERPL-MCNC: 1.23 MG/DL (ref 0.6–1.1)
GFR SERPL CREATININE-BSD FRML MDRD: 41 ML/MIN/1.73M2
GLUCOSE BLD-MCNC: 140 MG/DL (ref 70–125)
POTASSIUM BLD-SCNC: 5.1 MMOL/L (ref 3.5–5)
SODIUM SERPL-SCNC: 141 MMOL/L (ref 136–145)

## 2019-09-19 ENCOUNTER — AMBULATORY - HEALTHEAST (OUTPATIENT)
Dept: INFUSION THERAPY | Facility: HOSPITAL | Age: 84
End: 2019-09-19

## 2019-09-19 ENCOUNTER — OFFICE VISIT - HEALTHEAST (OUTPATIENT)
Dept: ONCOLOGY | Facility: HOSPITAL | Age: 84
End: 2019-09-19

## 2019-09-19 DIAGNOSIS — Z17.0 MALIGNANT NEOPLASM OF CENTRAL PORTION OF RIGHT BREAST IN FEMALE, ESTROGEN RECEPTOR POSITIVE (H): ICD-10-CM

## 2019-09-19 DIAGNOSIS — C50.111 MALIGNANT NEOPLASM OF CENTRAL PORTION OF RIGHT BREAST IN FEMALE, ESTROGEN RECEPTOR POSITIVE (H): ICD-10-CM

## 2019-09-19 LAB
ALBUMIN SERPL-MCNC: 3.4 G/DL (ref 3.5–5)
ALP SERPL-CCNC: 75 U/L (ref 45–120)
ALT SERPL W P-5'-P-CCNC: 10 U/L (ref 0–45)
ANION GAP SERPL CALCULATED.3IONS-SCNC: 9 MMOL/L (ref 5–18)
AST SERPL W P-5'-P-CCNC: 15 U/L (ref 0–40)
BASOPHILS # BLD AUTO: 0.1 THOU/UL (ref 0–0.2)
BASOPHILS NFR BLD AUTO: 1 % (ref 0–2)
BILIRUB SERPL-MCNC: 0.4 MG/DL (ref 0–1)
BUN SERPL-MCNC: 32 MG/DL (ref 8–28)
CALCIUM SERPL-MCNC: 10.2 MG/DL (ref 8.5–10.5)
CHLORIDE BLD-SCNC: 104 MMOL/L (ref 98–107)
CO2 SERPL-SCNC: 28 MMOL/L (ref 22–31)
CREAT SERPL-MCNC: 1.17 MG/DL (ref 0.6–1.1)
EOSINOPHIL # BLD AUTO: 0.5 THOU/UL (ref 0–0.4)
EOSINOPHIL NFR BLD AUTO: 6 % (ref 0–6)
ERYTHROCYTE [DISTWIDTH] IN BLOOD BY AUTOMATED COUNT: 13.2 % (ref 11–14.5)
GFR SERPL CREATININE-BSD FRML MDRD: 44 ML/MIN/1.73M2
GLUCOSE BLD-MCNC: 104 MG/DL (ref 70–125)
HCT VFR BLD AUTO: 39.4 % (ref 35–47)
HGB BLD-MCNC: 12.3 G/DL (ref 12–16)
LYMPHOCYTES # BLD AUTO: 2.1 THOU/UL (ref 0.8–4.4)
LYMPHOCYTES NFR BLD AUTO: 23 % (ref 20–40)
MCH RBC QN AUTO: 31.5 PG (ref 27–34)
MCHC RBC AUTO-ENTMCNC: 31.2 G/DL (ref 32–36)
MCV RBC AUTO: 101 FL (ref 80–100)
MONOCYTES # BLD AUTO: 1.1 THOU/UL (ref 0–0.9)
MONOCYTES NFR BLD AUTO: 12 % (ref 2–10)
NEUTROPHILS # BLD AUTO: 5.3 THOU/UL (ref 2–7.7)
NEUTROPHILS NFR BLD AUTO: 58 % (ref 50–70)
PLATELET # BLD AUTO: 335 THOU/UL (ref 140–440)
PMV BLD AUTO: 9.4 FL (ref 8.5–12.5)
POTASSIUM BLD-SCNC: 4.3 MMOL/L (ref 3.5–5)
PROT SERPL-MCNC: 6.8 G/DL (ref 6–8)
RBC # BLD AUTO: 3.9 MILL/UL (ref 3.8–5.4)
SODIUM SERPL-SCNC: 141 MMOL/L (ref 136–145)
WBC: 9.1 THOU/UL (ref 4–11)

## 2020-01-01 ENCOUNTER — COMMUNICATION - HEALTHEAST (OUTPATIENT)
Dept: ONCOLOGY | Facility: HOSPITAL | Age: 85
End: 2020-01-01

## 2020-01-01 ENCOUNTER — AMBULATORY - HEALTHEAST (OUTPATIENT)
Dept: ONCOLOGY | Facility: HOSPITAL | Age: 85
End: 2020-01-01

## 2020-01-01 ENCOUNTER — RECORDS - HEALTHEAST (OUTPATIENT)
Dept: LAB | Facility: CLINIC | Age: 85
End: 2020-01-01

## 2020-01-01 ENCOUNTER — OFFICE VISIT - HEALTHEAST (OUTPATIENT)
Dept: ONCOLOGY | Facility: HOSPITAL | Age: 85
End: 2020-01-01

## 2020-01-01 ENCOUNTER — AMBULATORY - HEALTHEAST (OUTPATIENT)
Dept: FAMILY MEDICINE | Facility: CLINIC | Age: 85
End: 2020-01-01

## 2020-01-01 ENCOUNTER — AMBULATORY - HEALTHEAST (OUTPATIENT)
Dept: INFUSION THERAPY | Facility: HOSPITAL | Age: 85
End: 2020-01-01

## 2020-01-01 ENCOUNTER — HOSPITAL ENCOUNTER (OUTPATIENT)
Dept: MAMMOGRAPHY | Facility: CLINIC | Age: 85
Discharge: HOME | End: 2020-02-11
Attending: SPECIALIST

## 2020-01-01 ENCOUNTER — HOSPITAL ENCOUNTER (OUTPATIENT)
Dept: ULTRASOUND IMAGING | Facility: HOSPITAL | Age: 85
Setting detail: RADIATION/ONCOLOGY SERIES
Discharge: STILL ADMITTED OR ADMINISTRATIVE | End: 2020-07-07
Attending: INTERNAL MEDICINE

## 2020-01-01 DIAGNOSIS — Z11.59 ENCOUNTER FOR SCREENING FOR OTHER VIRAL DISEASES: ICD-10-CM

## 2020-01-01 DIAGNOSIS — C50.111 MALIGNANT NEOPLASM OF CENTRAL PORTION OF RIGHT BREAST IN FEMALE, ESTROGEN RECEPTOR POSITIVE (H): ICD-10-CM

## 2020-01-01 DIAGNOSIS — Z17.0 MALIGNANT NEOPLASM OF CENTRAL PORTION OF RIGHT BREAST IN FEMALE, ESTROGEN RECEPTOR POSITIVE (H): ICD-10-CM

## 2020-01-01 DIAGNOSIS — Z12.31 VISIT FOR SCREENING MAMMOGRAM: ICD-10-CM

## 2020-01-01 DIAGNOSIS — D21.12 BENIGN NEOPLASM OF CONNECTIVE AND OTHER SOFT TISSUE OF LEFT UPPER LIMB, INCLUDING SHOULDER: ICD-10-CM

## 2020-01-01 LAB
ALBUMIN SERPL-MCNC: 3.6 G/DL (ref 3.5–5)
ALP SERPL-CCNC: 72 U/L (ref 45–120)
ALT SERPL W P-5'-P-CCNC: 12 U/L (ref 0–45)
ANION GAP SERPL CALCULATED.3IONS-SCNC: 8 MMOL/L (ref 5–18)
ANION GAP SERPL CALCULATED.3IONS-SCNC: 9 MMOL/L (ref 5–18)
AST SERPL W P-5'-P-CCNC: 15 U/L (ref 0–40)
BASOPHILS # BLD AUTO: 0.1 THOU/UL (ref 0–0.2)
BASOPHILS NFR BLD AUTO: 1 % (ref 0–2)
BILIRUB SERPL-MCNC: 0.4 MG/DL (ref 0–1)
BUN SERPL-MCNC: 22 MG/DL (ref 8–28)
BUN SERPL-MCNC: 23 MG/DL (ref 8–28)
CALCIUM SERPL-MCNC: 10.3 MG/DL (ref 8.5–10.5)
CALCIUM SERPL-MCNC: 10.3 MG/DL (ref 8.5–10.5)
CHLORIDE BLD-SCNC: 105 MMOL/L (ref 98–107)
CHLORIDE BLD-SCNC: 105 MMOL/L (ref 98–107)
CO2 SERPL-SCNC: 26 MMOL/L (ref 22–31)
CO2 SERPL-SCNC: 27 MMOL/L (ref 22–31)
CREAT SERPL-MCNC: 1.02 MG/DL (ref 0.6–1.1)
CREAT SERPL-MCNC: 1.06 MG/DL (ref 0.6–1.1)
EOSINOPHIL # BLD AUTO: 0.2 THOU/UL (ref 0–0.4)
EOSINOPHIL NFR BLD AUTO: 2 % (ref 0–6)
ERYTHROCYTE [DISTWIDTH] IN BLOOD BY AUTOMATED COUNT: 14.2 % (ref 11–14.5)
GFR SERPL CREATININE-BSD FRML MDRD: 49 ML/MIN/1.73M2
GFR SERPL CREATININE-BSD FRML MDRD: 51 ML/MIN/1.73M2
GLUCOSE BLD-MCNC: 103 MG/DL (ref 70–125)
GLUCOSE BLD-MCNC: 132 MG/DL (ref 70–125)
HCT VFR BLD AUTO: 43.6 % (ref 35–47)
HGB BLD-MCNC: 13.7 G/DL (ref 12–16)
LYMPHOCYTES # BLD AUTO: 1.9 THOU/UL (ref 0.8–4.4)
LYMPHOCYTES NFR BLD AUTO: 18 % (ref 20–40)
MCH RBC QN AUTO: 31.4 PG (ref 27–34)
MCHC RBC AUTO-ENTMCNC: 31.4 G/DL (ref 32–36)
MCV RBC AUTO: 100 FL (ref 80–100)
MONOCYTES # BLD AUTO: 0.9 THOU/UL (ref 0–0.9)
MONOCYTES NFR BLD AUTO: 9 % (ref 2–10)
NEUTROPHILS # BLD AUTO: 7.8 THOU/UL (ref 2–7.7)
NEUTROPHILS NFR BLD AUTO: 71 % (ref 50–70)
PLATELET # BLD AUTO: 321 THOU/UL (ref 140–440)
PMV BLD AUTO: 9.4 FL (ref 8.5–12.5)
POTASSIUM BLD-SCNC: 4.2 MMOL/L (ref 3.5–5)
POTASSIUM BLD-SCNC: 5.1 MMOL/L (ref 3.5–5)
PROT SERPL-MCNC: 7 G/DL (ref 6–8)
RBC # BLD AUTO: 4.37 MILL/UL (ref 3.8–5.4)
SODIUM SERPL-SCNC: 140 MMOL/L (ref 136–145)
SODIUM SERPL-SCNC: 140 MMOL/L (ref 136–145)
WBC: 10.9 THOU/UL (ref 4–11)

## 2020-01-20 ENCOUNTER — AMBULATORY - HEALTHEAST (OUTPATIENT)
Dept: INFUSION THERAPY | Facility: HOSPITAL | Age: 85
End: 2020-01-20

## 2020-01-20 ENCOUNTER — OFFICE VISIT - HEALTHEAST (OUTPATIENT)
Dept: ONCOLOGY | Facility: HOSPITAL | Age: 85
End: 2020-01-20

## 2020-01-20 DIAGNOSIS — Z17.0 MALIGNANT NEOPLASM OF CENTRAL PORTION OF RIGHT BREAST IN FEMALE, ESTROGEN RECEPTOR POSITIVE (H): ICD-10-CM

## 2020-01-20 DIAGNOSIS — C50.111 MALIGNANT NEOPLASM OF CENTRAL PORTION OF RIGHT BREAST IN FEMALE, ESTROGEN RECEPTOR POSITIVE (H): ICD-10-CM

## 2020-01-20 LAB
ALBUMIN SERPL-MCNC: 3.7 G/DL (ref 3.5–5)
ALP SERPL-CCNC: 67 U/L (ref 45–120)
ALT SERPL W P-5'-P-CCNC: 9 U/L (ref 0–45)
ANION GAP SERPL CALCULATED.3IONS-SCNC: 10 MMOL/L (ref 5–18)
AST SERPL W P-5'-P-CCNC: 16 U/L (ref 0–40)
BASOPHILS # BLD AUTO: 0.1 THOU/UL (ref 0–0.2)
BASOPHILS NFR BLD AUTO: 1 % (ref 0–2)
BILIRUB SERPL-MCNC: 0.5 MG/DL (ref 0–1)
BUN SERPL-MCNC: 20 MG/DL (ref 8–28)
CALCIUM SERPL-MCNC: 10.1 MG/DL (ref 8.5–10.5)
CHLORIDE BLD-SCNC: 105 MMOL/L (ref 98–107)
CO2 SERPL-SCNC: 26 MMOL/L (ref 22–31)
CREAT SERPL-MCNC: 0.96 MG/DL (ref 0.6–1.1)
EOSINOPHIL # BLD AUTO: 0.3 THOU/UL (ref 0–0.4)
EOSINOPHIL NFR BLD AUTO: 4 % (ref 0–6)
ERYTHROCYTE [DISTWIDTH] IN BLOOD BY AUTOMATED COUNT: 14 % (ref 11–14.5)
GFR SERPL CREATININE-BSD FRML MDRD: 55 ML/MIN/1.73M2
GLUCOSE BLD-MCNC: 80 MG/DL (ref 70–125)
HCT VFR BLD AUTO: 43.3 % (ref 35–47)
HGB BLD-MCNC: 13.8 G/DL (ref 12–16)
LDH SERPL L TO P-CCNC: 165 U/L (ref 125–220)
LYMPHOCYTES # BLD AUTO: 1.6 THOU/UL (ref 0.8–4.4)
LYMPHOCYTES NFR BLD AUTO: 23 % (ref 20–40)
MCH RBC QN AUTO: 30.3 PG (ref 27–34)
MCHC RBC AUTO-ENTMCNC: 31.9 G/DL (ref 32–36)
MCV RBC AUTO: 95 FL (ref 80–100)
MONOCYTES # BLD AUTO: 1 THOU/UL (ref 0–0.9)
MONOCYTES NFR BLD AUTO: 14 % (ref 2–10)
NEUTROPHILS # BLD AUTO: 4.2 THOU/UL (ref 2–7.7)
NEUTROPHILS NFR BLD AUTO: 59 % (ref 50–70)
PLATELET # BLD AUTO: 318 THOU/UL (ref 140–440)
PMV BLD AUTO: 9.5 FL (ref 8.5–12.5)
POTASSIUM BLD-SCNC: 4.4 MMOL/L (ref 3.5–5)
PROT SERPL-MCNC: 6.6 G/DL (ref 6–8)
RBC # BLD AUTO: 4.55 MILL/UL (ref 3.8–5.4)
SODIUM SERPL-SCNC: 141 MMOL/L (ref 136–145)
WBC: 7.1 THOU/UL (ref 4–11)

## 2021-01-01 ENCOUNTER — APPOINTMENT (OUTPATIENT)
Dept: MRI IMAGING | Facility: CLINIC | Age: 86
DRG: 025 | End: 2021-01-01
Attending: PHYSICIAN ASSISTANT
Payer: MEDICARE

## 2021-01-01 ENCOUNTER — APPOINTMENT (OUTPATIENT)
Dept: CT IMAGING | Facility: CLINIC | Age: 86
DRG: 025 | End: 2021-01-01
Attending: INTERNAL MEDICINE
Payer: MEDICARE

## 2021-01-01 ENCOUNTER — COMMUNICATION - HEALTHEAST (OUTPATIENT)
Dept: SCHEDULING | Facility: CLINIC | Age: 86
End: 2021-01-01

## 2021-01-01 ENCOUNTER — APPOINTMENT (OUTPATIENT)
Dept: SPEECH THERAPY | Facility: CLINIC | Age: 86
DRG: 025 | End: 2021-01-01
Attending: INTERNAL MEDICINE
Payer: MEDICARE

## 2021-01-01 ENCOUNTER — APPOINTMENT (OUTPATIENT)
Dept: CT IMAGING | Facility: CLINIC | Age: 86
DRG: 025 | End: 2021-01-01
Attending: PHYSICIAN ASSISTANT
Payer: MEDICARE

## 2021-01-01 ENCOUNTER — APPOINTMENT (OUTPATIENT)
Dept: GENERAL RADIOLOGY | Facility: CLINIC | Age: 86
DRG: 025 | End: 2021-01-01
Attending: INTERNAL MEDICINE
Payer: MEDICARE

## 2021-01-01 ENCOUNTER — APPOINTMENT (OUTPATIENT)
Dept: NUCLEAR MEDICINE | Facility: CLINIC | Age: 86
DRG: 025 | End: 2021-01-01
Attending: INTERNAL MEDICINE
Payer: MEDICARE

## 2021-01-01 ENCOUNTER — APPOINTMENT (OUTPATIENT)
Dept: PHYSICAL THERAPY | Facility: CLINIC | Age: 86
DRG: 025 | End: 2021-01-01
Attending: INTERNAL MEDICINE
Payer: MEDICARE

## 2021-01-01 ENCOUNTER — ANESTHESIA (OUTPATIENT)
Dept: SURGERY | Facility: CLINIC | Age: 86
DRG: 025 | End: 2021-01-01
Payer: MEDICARE

## 2021-01-01 ENCOUNTER — APPOINTMENT (OUTPATIENT)
Dept: CT IMAGING | Facility: CLINIC | Age: 86
DRG: 025 | End: 2021-01-01
Attending: NURSE PRACTITIONER
Payer: MEDICARE

## 2021-01-01 ENCOUNTER — ANESTHESIA EVENT (OUTPATIENT)
Dept: SURGERY | Facility: CLINIC | Age: 86
DRG: 025 | End: 2021-01-01
Payer: MEDICARE

## 2021-01-01 ENCOUNTER — APPOINTMENT (OUTPATIENT)
Dept: CT IMAGING | Facility: CLINIC | Age: 86
DRG: 025 | End: 2021-01-01
Attending: NEUROLOGICAL SURGERY
Payer: MEDICARE

## 2021-01-01 ENCOUNTER — APPOINTMENT (OUTPATIENT)
Dept: OCCUPATIONAL THERAPY | Facility: CLINIC | Age: 86
DRG: 025 | End: 2021-01-01
Attending: PHYSICIAN ASSISTANT
Payer: MEDICARE

## 2021-01-01 ENCOUNTER — APPOINTMENT (OUTPATIENT)
Dept: PHYSICAL THERAPY | Facility: CLINIC | Age: 86
DRG: 025 | End: 2021-01-01
Attending: PHYSICIAN ASSISTANT
Payer: MEDICARE

## 2021-01-01 ENCOUNTER — APPOINTMENT (OUTPATIENT)
Dept: GENERAL RADIOLOGY | Facility: CLINIC | Age: 86
DRG: 025 | End: 2021-01-01
Attending: SURGERY
Payer: MEDICARE

## 2021-01-01 ENCOUNTER — APPOINTMENT (OUTPATIENT)
Dept: MRI IMAGING | Facility: CLINIC | Age: 86
DRG: 025 | End: 2021-01-01
Attending: NURSE PRACTITIONER
Payer: MEDICARE

## 2021-01-01 ENCOUNTER — APPOINTMENT (OUTPATIENT)
Dept: OCCUPATIONAL THERAPY | Facility: CLINIC | Age: 86
DRG: 025 | End: 2021-01-01
Attending: INTERNAL MEDICINE
Payer: MEDICARE

## 2021-01-01 ENCOUNTER — PREP FOR PROCEDURE (OUTPATIENT)
Dept: NEUROLOGY | Facility: CLINIC | Age: 86
End: 2021-01-01

## 2021-01-01 ENCOUNTER — APPOINTMENT (OUTPATIENT)
Dept: CARDIOLOGY | Facility: CLINIC | Age: 86
DRG: 025 | End: 2021-01-01
Attending: INTERNAL MEDICINE
Payer: MEDICARE

## 2021-01-01 ENCOUNTER — COMMUNICATION - HEALTHEAST (OUTPATIENT)
Dept: EMERGENCY MEDICINE | Facility: CLINIC | Age: 86
End: 2021-01-01

## 2021-01-01 ENCOUNTER — TRANSFERRED RECORDS (OUTPATIENT)
Dept: HEALTH INFORMATION MANAGEMENT | Facility: CLINIC | Age: 86
End: 2021-01-01

## 2021-01-01 ENCOUNTER — HOSPITAL ENCOUNTER (INPATIENT)
Facility: CLINIC | Age: 86
LOS: 24 days | Discharge: HOSPICE | DRG: 025 | End: 2021-01-27
Attending: INTERNAL MEDICINE | Admitting: HOSPITALIST
Payer: MEDICARE

## 2021-01-01 VITALS
SYSTOLIC BLOOD PRESSURE: 149 MMHG | DIASTOLIC BLOOD PRESSURE: 63 MMHG | HEIGHT: 65 IN | RESPIRATION RATE: 18 BRPM | OXYGEN SATURATION: 96 % | BODY MASS INDEX: 27.18 KG/M2 | WEIGHT: 163.14 LBS | TEMPERATURE: 96.9 F | HEART RATE: 74 BPM

## 2021-01-01 DIAGNOSIS — Z51.5 END OF LIFE CARE: Primary | ICD-10-CM

## 2021-01-01 DIAGNOSIS — C79.31 BRAIN METASTASIS: Primary | ICD-10-CM

## 2021-01-01 DIAGNOSIS — C79.31 BRAIN METASTASIS: ICD-10-CM

## 2021-01-01 DIAGNOSIS — S06.5XAA SUBDURAL HEMATOMA (H): ICD-10-CM

## 2021-01-01 DIAGNOSIS — S06.5XAA SUBDURAL HEMATOMA (H): Primary | ICD-10-CM

## 2021-01-01 LAB
ABO + RH BLD: NORMAL
ABO + RH BLD: NORMAL
ALBUMIN SERPL-MCNC: 1.8 G/DL (ref 3.4–5)
ALBUMIN SERPL-MCNC: 2.6 G/DL (ref 3.4–5)
ALBUMIN SERPL-MCNC: 3 G/DL (ref 3.4–5)
ALP SERPL-CCNC: 103 U/L (ref 40–150)
ALP SERPL-CCNC: 61 U/L (ref 40–150)
ALP SERPL-CCNC: 76 U/L (ref 40–150)
ALT SERPL W P-5'-P-CCNC: 13 U/L (ref 0–50)
ALT SERPL W P-5'-P-CCNC: 20 U/L (ref 0–50)
ALT SERPL W P-5'-P-CCNC: 24 U/L (ref 0–50)
ALT SERPL-CCNC: 14 U/L (ref 0–45)
ANION GAP SERPL CALCULATED.3IONS-SCNC: 10 MMOL/L (ref 3–14)
ANION GAP SERPL CALCULATED.3IONS-SCNC: 2 MMOL/L (ref 3–14)
ANION GAP SERPL CALCULATED.3IONS-SCNC: 2 MMOL/L (ref 3–14)
ANION GAP SERPL CALCULATED.3IONS-SCNC: 3 MMOL/L (ref 3–14)
ANION GAP SERPL CALCULATED.3IONS-SCNC: 4 MMOL/L (ref 3–14)
ANION GAP SERPL CALCULATED.3IONS-SCNC: 5 MMOL/L (ref 3–14)
ANION GAP SERPL CALCULATED.3IONS-SCNC: 6 MMOL/L (ref 3–14)
ANION GAP SERPL CALCULATED.3IONS-SCNC: 6 MMOL/L (ref 3–14)
ANION GAP SERPL CALCULATED.3IONS-SCNC: 7 MMOL/L (ref 3–14)
ANION GAP SERPL CALCULATED.3IONS-SCNC: 7 MMOL/L (ref 3–14)
AST SERPL W P-5'-P-CCNC: 13 U/L (ref 0–45)
AST SERPL W P-5'-P-CCNC: 13 U/L (ref 0–45)
AST SERPL W P-5'-P-CCNC: 20 U/L (ref 0–45)
AST SERPL-CCNC: 23 U/L (ref 0–40)
BASE DEFICIT BLDA-SCNC: 3 MMOL/L
BASE DEFICIT BLDA-SCNC: 5.1 MMOL/L
BASOPHILS # BLD AUTO: 0 10E9/L (ref 0–0.2)
BASOPHILS # BLD AUTO: 0.1 10E9/L (ref 0–0.2)
BASOPHILS # BLD AUTO: 0.1 10E9/L (ref 0–0.2)
BASOPHILS NFR BLD AUTO: 0.1 %
BASOPHILS NFR BLD AUTO: 0.1 %
BASOPHILS NFR BLD AUTO: 0.2 %
BILIRUB DIRECT SERPL-MCNC: 0.1 MG/DL (ref 0–0.2)
BILIRUB SERPL-MCNC: 0.2 MG/DL (ref 0.2–1.3)
BILIRUB SERPL-MCNC: 0.5 MG/DL (ref 0.2–1.3)
BILIRUB SERPL-MCNC: 0.6 MG/DL (ref 0.2–1.3)
BLD GP AB SCN SERPL QL: NORMAL
BLD PROD TYP BPU: NORMAL
BLD UNIT ID BPU: 0
BLOOD BANK CMNT PATIENT-IMP: NORMAL
BLOOD PRODUCT CODE: NORMAL
BPU ID: NORMAL
BUN SERPL-MCNC: 22 MG/DL (ref 7–30)
BUN SERPL-MCNC: 23 MG/DL (ref 7–30)
BUN SERPL-MCNC: 24 MG/DL (ref 7–30)
BUN SERPL-MCNC: 28 MG/DL (ref 7–30)
BUN SERPL-MCNC: 32 MG/DL (ref 7–30)
BUN SERPL-MCNC: 33 MG/DL (ref 7–30)
BUN SERPL-MCNC: 33 MG/DL (ref 7–30)
BUN SERPL-MCNC: 34 MG/DL (ref 7–30)
BUN SERPL-MCNC: 35 MG/DL (ref 7–30)
BUN SERPL-MCNC: 35 MG/DL (ref 7–30)
BUN SERPL-MCNC: 36 MG/DL (ref 7–30)
BUN SERPL-MCNC: 36 MG/DL (ref 7–30)
BUN SERPL-MCNC: 37 MG/DL (ref 7–30)
BUN SERPL-MCNC: 44 MG/DL (ref 7–30)
BUN SERPL-MCNC: 46 MG/DL (ref 7–30)
BUN SERPL-MCNC: 52 MG/DL (ref 7–30)
BUN SERPL-MCNC: 57 MG/DL (ref 7–30)
C COLI+JEJUNI+LARI FUSA STL QL NAA+PROBE: NOT DETECTED
C DIFF TOX B STL QL: POSITIVE
CALCIUM SERPL-MCNC: 7.7 MG/DL (ref 8.5–10.1)
CALCIUM SERPL-MCNC: 7.8 MG/DL (ref 8.5–10.1)
CALCIUM SERPL-MCNC: 8 MG/DL (ref 8.5–10.1)
CALCIUM SERPL-MCNC: 8.1 MG/DL (ref 8.5–10.1)
CALCIUM SERPL-MCNC: 8.2 MG/DL (ref 8.5–10.1)
CALCIUM SERPL-MCNC: 8.3 MG/DL (ref 8.5–10.1)
CALCIUM SERPL-MCNC: 8.3 MG/DL (ref 8.5–10.1)
CALCIUM SERPL-MCNC: 8.4 MG/DL (ref 8.5–10.1)
CALCIUM SERPL-MCNC: 8.6 MG/DL (ref 8.5–10.1)
CALCIUM SERPL-MCNC: 8.6 MG/DL (ref 8.5–10.1)
CALCIUM SERPL-MCNC: 9.3 MG/DL (ref 8.5–10.1)
CHLORIDE SERPL-SCNC: 100 MMOL/L (ref 94–109)
CHLORIDE SERPL-SCNC: 101 MMOL/L (ref 94–109)
CHLORIDE SERPL-SCNC: 102 MMOL/L (ref 94–109)
CHLORIDE SERPL-SCNC: 103 MMOL/L (ref 94–109)
CHLORIDE SERPL-SCNC: 105 MMOL/L (ref 94–109)
CHLORIDE SERPL-SCNC: 106 MMOL/L (ref 94–109)
CHLORIDE SERPL-SCNC: 109 MMOL/L (ref 94–109)
CHLORIDE SERPL-SCNC: 109 MMOL/L (ref 94–109)
CHLORIDE SERPL-SCNC: 115 MMOL/L (ref 94–109)
CHLORIDE SERPL-SCNC: 116 MMOL/L (ref 94–109)
CHLORIDE SERPL-SCNC: 119 MMOL/L (ref 94–109)
CHLORIDE SERPL-SCNC: 98 MMOL/L (ref 94–109)
CHLORIDE SERPL-SCNC: 98 MMOL/L (ref 94–109)
CO2 BLD-SCNC: 19 MMOL/L (ref 21–28)
CO2 SERPL-SCNC: 21 MMOL/L (ref 20–32)
CO2 SERPL-SCNC: 22 MMOL/L (ref 20–32)
CO2 SERPL-SCNC: 22 MMOL/L (ref 20–32)
CO2 SERPL-SCNC: 23 MMOL/L (ref 20–32)
CO2 SERPL-SCNC: 25 MMOL/L (ref 20–32)
CO2 SERPL-SCNC: 26 MMOL/L (ref 20–32)
CO2 SERPL-SCNC: 26 MMOL/L (ref 20–32)
CO2 SERPL-SCNC: 27 MMOL/L (ref 20–32)
CO2 SERPL-SCNC: 28 MMOL/L (ref 20–32)
CO2 SERPL-SCNC: 28 MMOL/L (ref 20–32)
CO2 SERPL-SCNC: 29 MMOL/L (ref 20–32)
CO2 SERPL-SCNC: 29 MMOL/L (ref 20–32)
CO2 SERPL-SCNC: 30 MMOL/L (ref 20–32)
COPATH REPORT: NORMAL
CREAT SERPL-MCNC: 0.48 MG/DL (ref 0.52–1.04)
CREAT SERPL-MCNC: 0.56 MG/DL (ref 0.52–1.04)
CREAT SERPL-MCNC: 0.57 MG/DL (ref 0.52–1.04)
CREAT SERPL-MCNC: 0.57 MG/DL (ref 0.52–1.04)
CREAT SERPL-MCNC: 0.58 MG/DL (ref 0.52–1.04)
CREAT SERPL-MCNC: 0.63 MG/DL (ref 0.52–1.04)
CREAT SERPL-MCNC: 0.64 MG/DL (ref 0.52–1.04)
CREAT SERPL-MCNC: 0.65 MG/DL (ref 0.52–1.04)
CREAT SERPL-MCNC: 0.66 MG/DL (ref 0.52–1.04)
CREAT SERPL-MCNC: 0.67 MG/DL (ref 0.52–1.04)
CREAT SERPL-MCNC: 0.7 MG/DL (ref 0.52–1.04)
CREAT SERPL-MCNC: 0.73 MG/DL (ref 0.52–1.04)
CREAT SERPL-MCNC: 0.74 MG/DL (ref 0.52–1.04)
CREAT SERPL-MCNC: 0.8 MG/DL (ref 0.52–1.04)
CREAT SERPL-MCNC: 0.81 MG/DL (ref 0.52–1.04)
CREAT SERPL-MCNC: 1.06 MG/DL (ref 0.6–1.1)
CREAT UR-MCNC: 57 MG/DL
DIFFERENTIAL METHOD BLD: ABNORMAL
DIFFERENTIAL METHOD BLD: NORMAL
EC STX1 GENE STL QL NAA+PROBE: NOT DETECTED
EC STX2 GENE STL QL NAA+PROBE: NOT DETECTED
ENTERIC PATHOGEN COMMENT: NORMAL
EOSINOPHIL # BLD AUTO: 0 10E9/L (ref 0–0.7)
EOSINOPHIL NFR BLD AUTO: 0 %
EOSINOPHIL NFR BLD AUTO: 0.1 %
EOSINOPHIL NFR BLD AUTO: 0.1 %
ERYTHROCYTE [DISTWIDTH] IN BLOOD BY AUTOMATED COUNT: 13.7 % (ref 10–15)
ERYTHROCYTE [DISTWIDTH] IN BLOOD BY AUTOMATED COUNT: 13.8 % (ref 10–15)
ERYTHROCYTE [DISTWIDTH] IN BLOOD BY AUTOMATED COUNT: 15.7 % (ref 10–15)
ERYTHROCYTE [DISTWIDTH] IN BLOOD BY AUTOMATED COUNT: 16 % (ref 10–15)
ERYTHROCYTE [DISTWIDTH] IN BLOOD BY AUTOMATED COUNT: 16.2 % (ref 10–15)
ERYTHROCYTE [DISTWIDTH] IN BLOOD BY AUTOMATED COUNT: 16.2 % (ref 10–15)
ERYTHROCYTE [DISTWIDTH] IN BLOOD BY AUTOMATED COUNT: 16.3 % (ref 10–15)
ERYTHROCYTE [DISTWIDTH] IN BLOOD BY AUTOMATED COUNT: 16.8 % (ref 10–15)
ERYTHROCYTE [DISTWIDTH] IN BLOOD BY AUTOMATED COUNT: 16.9 % (ref 10–15)
ERYTHROCYTE [DISTWIDTH] IN BLOOD BY AUTOMATED COUNT: 18 % (ref 10–15)
ERYTHROCYTE [DISTWIDTH] IN BLOOD BY AUTOMATED COUNT: 18.1 % (ref 10–15)
ERYTHROCYTE [DISTWIDTH] IN BLOOD BY AUTOMATED COUNT: 18.1 % (ref 10–15)
FRACT EXCRET NA UR+SERPL-RTO: 0.4 %
GFR SERPL CREATININE-BSD FRML MDRD: 49 ML/MIN/1.73M2
GFR SERPL CREATININE-BSD FRML MDRD: 66 ML/MIN/{1.73_M2}
GFR SERPL CREATININE-BSD FRML MDRD: 67 ML/MIN/{1.73_M2}
GFR SERPL CREATININE-BSD FRML MDRD: 73 ML/MIN/{1.73_M2}
GFR SERPL CREATININE-BSD FRML MDRD: 74 ML/MIN/{1.73_M2}
GFR SERPL CREATININE-BSD FRML MDRD: 78 ML/MIN/{1.73_M2}
GFR SERPL CREATININE-BSD FRML MDRD: 80 ML/MIN/{1.73_M2}
GFR SERPL CREATININE-BSD FRML MDRD: 81 ML/MIN/{1.73_M2}
GFR SERPL CREATININE-BSD FRML MDRD: 81 ML/MIN/{1.73_M2}
GFR SERPL CREATININE-BSD FRML MDRD: 83 ML/MIN/{1.73_M2}
GFR SERPL CREATININE-BSD FRML MDRD: 84 ML/MIN/{1.73_M2}
GFR SERPL CREATININE-BSD FRML MDRD: 89 ML/MIN/{1.73_M2}
GLUCOSE BLDC GLUCOMTR-MCNC: 100 MG/DL (ref 70–99)
GLUCOSE BLDC GLUCOMTR-MCNC: 100 MG/DL (ref 70–99)
GLUCOSE BLDC GLUCOMTR-MCNC: 109 MG/DL (ref 70–99)
GLUCOSE BLDC GLUCOMTR-MCNC: 114 MG/DL (ref 70–99)
GLUCOSE BLDC GLUCOMTR-MCNC: 123 MG/DL (ref 70–99)
GLUCOSE BLDC GLUCOMTR-MCNC: 127 MG/DL (ref 70–99)
GLUCOSE BLDC GLUCOMTR-MCNC: 129 MG/DL (ref 70–99)
GLUCOSE BLDC GLUCOMTR-MCNC: 131 MG/DL (ref 70–99)
GLUCOSE BLDC GLUCOMTR-MCNC: 134 MG/DL (ref 70–99)
GLUCOSE BLDC GLUCOMTR-MCNC: 136 MG/DL (ref 70–99)
GLUCOSE BLDC GLUCOMTR-MCNC: 138 MG/DL (ref 70–99)
GLUCOSE BLDC GLUCOMTR-MCNC: 140 MG/DL (ref 70–99)
GLUCOSE BLDC GLUCOMTR-MCNC: 143 MG/DL (ref 70–99)
GLUCOSE BLDC GLUCOMTR-MCNC: 144 MG/DL (ref 70–99)
GLUCOSE BLDC GLUCOMTR-MCNC: 145 MG/DL (ref 70–99)
GLUCOSE BLDC GLUCOMTR-MCNC: 145 MG/DL (ref 70–99)
GLUCOSE BLDC GLUCOMTR-MCNC: 150 MG/DL (ref 70–99)
GLUCOSE BLDC GLUCOMTR-MCNC: 151 MG/DL (ref 70–99)
GLUCOSE BLDC GLUCOMTR-MCNC: 151 MG/DL (ref 70–99)
GLUCOSE BLDC GLUCOMTR-MCNC: 154 MG/DL (ref 70–99)
GLUCOSE BLDC GLUCOMTR-MCNC: 159 MG/DL (ref 70–99)
GLUCOSE BLDC GLUCOMTR-MCNC: 159 MG/DL (ref 70–99)
GLUCOSE BLDC GLUCOMTR-MCNC: 161 MG/DL (ref 70–99)
GLUCOSE BLDC GLUCOMTR-MCNC: 163 MG/DL (ref 70–99)
GLUCOSE BLDC GLUCOMTR-MCNC: 163 MG/DL (ref 70–99)
GLUCOSE BLDC GLUCOMTR-MCNC: 167 MG/DL (ref 70–99)
GLUCOSE BLDC GLUCOMTR-MCNC: 167 MG/DL (ref 70–99)
GLUCOSE BLDC GLUCOMTR-MCNC: 169 MG/DL (ref 70–99)
GLUCOSE BLDC GLUCOMTR-MCNC: 170 MG/DL (ref 70–99)
GLUCOSE BLDC GLUCOMTR-MCNC: 170 MG/DL (ref 70–99)
GLUCOSE BLDC GLUCOMTR-MCNC: 171 MG/DL (ref 70–99)
GLUCOSE BLDC GLUCOMTR-MCNC: 174 MG/DL (ref 70–99)
GLUCOSE BLDC GLUCOMTR-MCNC: 174 MG/DL (ref 70–99)
GLUCOSE BLDC GLUCOMTR-MCNC: 175 MG/DL (ref 70–99)
GLUCOSE BLDC GLUCOMTR-MCNC: 176 MG/DL (ref 70–99)
GLUCOSE BLDC GLUCOMTR-MCNC: 177 MG/DL (ref 70–99)
GLUCOSE BLDC GLUCOMTR-MCNC: 178 MG/DL (ref 70–99)
GLUCOSE BLDC GLUCOMTR-MCNC: 181 MG/DL (ref 70–99)
GLUCOSE BLDC GLUCOMTR-MCNC: 182 MG/DL (ref 70–99)
GLUCOSE BLDC GLUCOMTR-MCNC: 188 MG/DL (ref 70–99)
GLUCOSE BLDC GLUCOMTR-MCNC: 191 MG/DL (ref 70–99)
GLUCOSE BLDC GLUCOMTR-MCNC: 193 MG/DL (ref 70–99)
GLUCOSE BLDC GLUCOMTR-MCNC: 193 MG/DL (ref 70–99)
GLUCOSE BLDC GLUCOMTR-MCNC: 194 MG/DL (ref 70–99)
GLUCOSE BLDC GLUCOMTR-MCNC: 195 MG/DL (ref 70–99)
GLUCOSE BLDC GLUCOMTR-MCNC: 196 MG/DL (ref 70–99)
GLUCOSE BLDC GLUCOMTR-MCNC: 196 MG/DL (ref 70–99)
GLUCOSE BLDC GLUCOMTR-MCNC: 197 MG/DL (ref 70–99)
GLUCOSE BLDC GLUCOMTR-MCNC: 197 MG/DL (ref 70–99)
GLUCOSE BLDC GLUCOMTR-MCNC: 198 MG/DL (ref 70–99)
GLUCOSE BLDC GLUCOMTR-MCNC: 201 MG/DL (ref 70–99)
GLUCOSE BLDC GLUCOMTR-MCNC: 201 MG/DL (ref 70–99)
GLUCOSE BLDC GLUCOMTR-MCNC: 202 MG/DL (ref 70–99)
GLUCOSE BLDC GLUCOMTR-MCNC: 203 MG/DL (ref 70–99)
GLUCOSE BLDC GLUCOMTR-MCNC: 205 MG/DL (ref 70–99)
GLUCOSE BLDC GLUCOMTR-MCNC: 206 MG/DL (ref 70–99)
GLUCOSE BLDC GLUCOMTR-MCNC: 206 MG/DL (ref 70–99)
GLUCOSE BLDC GLUCOMTR-MCNC: 207 MG/DL (ref 70–99)
GLUCOSE BLDC GLUCOMTR-MCNC: 209 MG/DL (ref 70–99)
GLUCOSE BLDC GLUCOMTR-MCNC: 212 MG/DL (ref 70–99)
GLUCOSE BLDC GLUCOMTR-MCNC: 213 MG/DL (ref 70–99)
GLUCOSE BLDC GLUCOMTR-MCNC: 216 MG/DL (ref 70–99)
GLUCOSE BLDC GLUCOMTR-MCNC: 218 MG/DL (ref 70–99)
GLUCOSE BLDC GLUCOMTR-MCNC: 220 MG/DL (ref 70–99)
GLUCOSE BLDC GLUCOMTR-MCNC: 222 MG/DL (ref 70–99)
GLUCOSE BLDC GLUCOMTR-MCNC: 222 MG/DL (ref 70–99)
GLUCOSE BLDC GLUCOMTR-MCNC: 223 MG/DL (ref 70–99)
GLUCOSE BLDC GLUCOMTR-MCNC: 224 MG/DL (ref 70–99)
GLUCOSE BLDC GLUCOMTR-MCNC: 227 MG/DL (ref 70–99)
GLUCOSE BLDC GLUCOMTR-MCNC: 227 MG/DL (ref 70–99)
GLUCOSE BLDC GLUCOMTR-MCNC: 228 MG/DL (ref 70–99)
GLUCOSE BLDC GLUCOMTR-MCNC: 242 MG/DL (ref 70–99)
GLUCOSE BLDC GLUCOMTR-MCNC: 242 MG/DL (ref 70–99)
GLUCOSE BLDC GLUCOMTR-MCNC: 243 MG/DL (ref 70–99)
GLUCOSE BLDC GLUCOMTR-MCNC: 243 MG/DL (ref 70–99)
GLUCOSE BLDC GLUCOMTR-MCNC: 244 MG/DL (ref 70–99)
GLUCOSE BLDC GLUCOMTR-MCNC: 244 MG/DL (ref 70–99)
GLUCOSE BLDC GLUCOMTR-MCNC: 253 MG/DL (ref 70–99)
GLUCOSE BLDC GLUCOMTR-MCNC: 254 MG/DL (ref 70–99)
GLUCOSE BLDC GLUCOMTR-MCNC: 261 MG/DL (ref 70–99)
GLUCOSE BLDC GLUCOMTR-MCNC: 269 MG/DL (ref 70–99)
GLUCOSE BLDC GLUCOMTR-MCNC: 277 MG/DL (ref 70–99)
GLUCOSE BLDC GLUCOMTR-MCNC: 301 MG/DL (ref 70–99)
GLUCOSE BLDC GLUCOMTR-MCNC: 306 MG/DL (ref 70–99)
GLUCOSE SERPL-MCNC: 131 MG/DL (ref 70–125)
GLUCOSE SERPL-MCNC: 146 MG/DL (ref 70–99)
GLUCOSE SERPL-MCNC: 177 MG/DL (ref 70–99)
GLUCOSE SERPL-MCNC: 187 MG/DL (ref 70–99)
GLUCOSE SERPL-MCNC: 188 MG/DL (ref 70–99)
GLUCOSE SERPL-MCNC: 191 MG/DL (ref 70–99)
GLUCOSE SERPL-MCNC: 193 MG/DL (ref 70–99)
GLUCOSE SERPL-MCNC: 196 MG/DL (ref 70–99)
GLUCOSE SERPL-MCNC: 201 MG/DL (ref 70–99)
GLUCOSE SERPL-MCNC: 202 MG/DL (ref 70–99)
GLUCOSE SERPL-MCNC: 204 MG/DL (ref 70–99)
GLUCOSE SERPL-MCNC: 207 MG/DL (ref 70–99)
GLUCOSE SERPL-MCNC: 207 MG/DL (ref 70–99)
GLUCOSE SERPL-MCNC: 212 MG/DL (ref 70–99)
GLUCOSE SERPL-MCNC: 212 MG/DL (ref 70–99)
GLUCOSE SERPL-MCNC: 220 MG/DL (ref 70–99)
GLUCOSE SERPL-MCNC: 223 MG/DL (ref 70–99)
GLUCOSE SERPL-MCNC: 234 MG/DL (ref 70–99)
GLUCOSE SERPL-MCNC: 241 MG/DL (ref 70–99)
GLUCOSE SERPL-MCNC: 276 MG/DL (ref 70–99)
GLUCOSE SERPL-MCNC: 287 MG/DL (ref 70–99)
HBA1C MFR BLD: 5.6 % (ref 0–5.6)
HBA1C MFR BLD: 6.1 % (ref 0–5.6)
HCO3 BLD-SCNC: 20 MMOL/L (ref 21–28)
HCO3 BLD-SCNC: 21 MMOL/L (ref 21–28)
HCT VFR BLD AUTO: 25.4 % (ref 35–47)
HCT VFR BLD AUTO: 29.5 % (ref 35–47)
HCT VFR BLD AUTO: 29.5 % (ref 35–47)
HCT VFR BLD AUTO: 29.8 % (ref 35–47)
HCT VFR BLD AUTO: 30 % (ref 35–47)
HCT VFR BLD AUTO: 30.3 % (ref 35–47)
HCT VFR BLD AUTO: 31.4 % (ref 35–47)
HCT VFR BLD AUTO: 33 % (ref 35–47)
HCT VFR BLD AUTO: 34.6 % (ref 35–47)
HCT VFR BLD AUTO: 35.1 % (ref 35–47)
HCT VFR BLD AUTO: 35.1 % (ref 35–47)
HCT VFR BLD AUTO: 41.7 % (ref 35–47)
HCT VFR BLD CALC: 25 %PCV (ref 35–47)
HGB BLD CALC-MCNC: 8.5 G/DL (ref 11.7–15.7)
HGB BLD-MCNC: 10 G/DL (ref 11.7–15.7)
HGB BLD-MCNC: 10 G/DL (ref 11.7–15.7)
HGB BLD-MCNC: 10.6 G/DL (ref 11.7–15.7)
HGB BLD-MCNC: 10.9 G/DL (ref 11.7–15.7)
HGB BLD-MCNC: 11 G/DL (ref 11.7–15.7)
HGB BLD-MCNC: 11.2 G/DL (ref 11.7–15.7)
HGB BLD-MCNC: 13.6 G/DL (ref 11.7–15.7)
HGB BLD-MCNC: 6.1 G/DL (ref 11.7–15.7)
HGB BLD-MCNC: 6.3 G/DL (ref 11.7–15.7)
HGB BLD-MCNC: 6.6 G/DL (ref 11.7–15.7)
HGB BLD-MCNC: 7.6 G/DL (ref 11.7–15.7)
HGB BLD-MCNC: 8 G/DL (ref 11.7–15.7)
HGB BLD-MCNC: 8.1 G/DL (ref 11.7–15.7)
HGB BLD-MCNC: 9.1 G/DL (ref 11.7–15.7)
HGB BLD-MCNC: 9.2 G/DL (ref 11.7–15.7)
HGB BLD-MCNC: 9.4 G/DL (ref 11.7–15.7)
HGB BLD-MCNC: 9.5 G/DL (ref 11.7–15.7)
HGB BLD-MCNC: 9.5 G/DL (ref 11.7–15.7)
HGB BLD-MCNC: 9.6 G/DL (ref 11.7–15.7)
HGB BLD-MCNC: 9.7 G/DL (ref 11.7–15.7)
HGB BLD-MCNC: 9.9 G/DL (ref 11.7–15.7)
IMM GRANULOCYTES # BLD: 0.1 10E9/L (ref 0–0.4)
IMM GRANULOCYTES # BLD: 0.3 10E9/L (ref 0–0.4)
IMM GRANULOCYTES # BLD: 0.3 10E9/L (ref 0–0.4)
IMM GRANULOCYTES # BLD: 0.4 10E9/L (ref 0–0.4)
IMM GRANULOCYTES # BLD: 0.5 10E9/L (ref 0–0.4)
IMM GRANULOCYTES # BLD: 0.5 10E9/L (ref 0–0.4)
IMM GRANULOCYTES # BLD: 0.6 10E9/L (ref 0–0.4)
IMM GRANULOCYTES NFR BLD: 0.7 %
IMM GRANULOCYTES NFR BLD: 1.2 %
IMM GRANULOCYTES NFR BLD: 1.4 %
IMM GRANULOCYTES NFR BLD: 1.8 %
IMM GRANULOCYTES NFR BLD: 2.3 %
IMM GRANULOCYTES NFR BLD: 3 %
IMM GRANULOCYTES NFR BLD: 3.1 %
INR PPP: 1.06 (ref 0.86–1.14)
INTERPRETATION ECG - MUSE: NORMAL
LABORATORY COMMENT REPORT: NORMAL
LABORATORY COMMENT REPORT: NORMAL
LIPASE SERPL-CCNC: 322 U/L (ref 73–393)
LYMPHOCYTES # BLD AUTO: 0.4 10E9/L (ref 0.8–5.3)
LYMPHOCYTES # BLD AUTO: 0.6 10E9/L (ref 0.8–5.3)
LYMPHOCYTES # BLD AUTO: 0.6 10E9/L (ref 0.8–5.3)
LYMPHOCYTES # BLD AUTO: 0.8 10E9/L (ref 0.8–5.3)
LYMPHOCYTES # BLD AUTO: 0.9 10E9/L (ref 0.8–5.3)
LYMPHOCYTES NFR BLD AUTO: 1.7 %
LYMPHOCYTES NFR BLD AUTO: 2.7 %
LYMPHOCYTES NFR BLD AUTO: 3.6 %
LYMPHOCYTES NFR BLD AUTO: 3.7 %
LYMPHOCYTES NFR BLD AUTO: 4 %
LYMPHOCYTES NFR BLD AUTO: 4.6 %
LYMPHOCYTES NFR BLD AUTO: 9.8 %
MAGNESIUM SERPL-MCNC: 1.8 MG/DL (ref 1.6–2.3)
MAGNESIUM SERPL-MCNC: 2 MG/DL (ref 1.6–2.3)
MAGNESIUM SERPL-MCNC: 2.1 MG/DL (ref 1.6–2.3)
MAGNESIUM SERPL-MCNC: 2.2 MG/DL (ref 1.6–2.3)
MAGNESIUM SERPL-MCNC: 2.3 MG/DL (ref 1.6–2.3)
MAGNESIUM SERPL-MCNC: 2.4 MG/DL (ref 1.6–2.3)
MAGNESIUM SERPL-MCNC: 2.5 MG/DL (ref 1.6–2.3)
MCH RBC QN AUTO: 29.2 PG (ref 26.5–33)
MCH RBC QN AUTO: 29.2 PG (ref 26.5–33)
MCH RBC QN AUTO: 29.3 PG (ref 26.5–33)
MCH RBC QN AUTO: 29.3 PG (ref 26.5–33)
MCH RBC QN AUTO: 29.6 PG (ref 26.5–33)
MCH RBC QN AUTO: 29.6 PG (ref 26.5–33)
MCH RBC QN AUTO: 29.8 PG (ref 26.5–33)
MCH RBC QN AUTO: 29.9 PG (ref 26.5–33)
MCH RBC QN AUTO: 31.2 PG (ref 26.5–33)
MCH RBC QN AUTO: 31.4 PG (ref 26.5–33)
MCHC RBC AUTO-ENTMCNC: 30.5 G/DL (ref 31.5–36.5)
MCHC RBC AUTO-ENTMCNC: 31.1 G/DL (ref 31.5–36.5)
MCHC RBC AUTO-ENTMCNC: 31.8 G/DL (ref 31.5–36.5)
MCHC RBC AUTO-ENTMCNC: 31.8 G/DL (ref 31.5–36.5)
MCHC RBC AUTO-ENTMCNC: 31.9 G/DL (ref 31.5–36.5)
MCHC RBC AUTO-ENTMCNC: 32 G/DL (ref 31.5–36.5)
MCHC RBC AUTO-ENTMCNC: 32.1 G/DL (ref 31.5–36.5)
MCHC RBC AUTO-ENTMCNC: 32.6 G/DL (ref 31.5–36.5)
MCHC RBC AUTO-ENTMCNC: 32.7 G/DL (ref 31.5–36.5)
MCHC RBC AUTO-ENTMCNC: 32.9 G/DL (ref 31.5–36.5)
MCV RBC AUTO: 91 FL (ref 78–100)
MCV RBC AUTO: 91 FL (ref 78–100)
MCV RBC AUTO: 92 FL (ref 78–100)
MCV RBC AUTO: 94 FL (ref 78–100)
MCV RBC AUTO: 96 FL (ref 78–100)
MCV RBC AUTO: 96 FL (ref 78–100)
MCV RBC AUTO: 98 FL (ref 78–100)
MONOCYTES # BLD AUTO: 0.2 10E9/L (ref 0–1.3)
MONOCYTES # BLD AUTO: 0.9 10E9/L (ref 0–1.3)
MONOCYTES # BLD AUTO: 1 10E9/L (ref 0–1.3)
MONOCYTES # BLD AUTO: 1.2 10E9/L (ref 0–1.3)
MONOCYTES # BLD AUTO: 1.3 10E9/L (ref 0–1.3)
MONOCYTES NFR BLD AUTO: 2.7 %
MONOCYTES NFR BLD AUTO: 4 %
MONOCYTES NFR BLD AUTO: 4.4 %
MONOCYTES NFR BLD AUTO: 4.6 %
MONOCYTES NFR BLD AUTO: 5 %
MONOCYTES NFR BLD AUTO: 7 %
MONOCYTES NFR BLD AUTO: 7.2 %
NEUTROPHILS # BLD AUTO: 14.9 10E9/L (ref 1.6–8.3)
NEUTROPHILS # BLD AUTO: 15.1 10E9/L (ref 1.6–8.3)
NEUTROPHILS # BLD AUTO: 17.9 10E9/L (ref 1.6–8.3)
NEUTROPHILS # BLD AUTO: 20 10E9/L (ref 1.6–8.3)
NEUTROPHILS # BLD AUTO: 20.4 10E9/L (ref 1.6–8.3)
NEUTROPHILS # BLD AUTO: 21.4 10E9/L (ref 1.6–8.3)
NEUTROPHILS # BLD AUTO: 7.6 10E9/L (ref 1.6–8.3)
NEUTROPHILS NFR BLD AUTO: 85 %
NEUTROPHILS NFR BLD AUTO: 86 %
NEUTROPHILS NFR BLD AUTO: 86.6 %
NEUTROPHILS NFR BLD AUTO: 88.5 %
NEUTROPHILS NFR BLD AUTO: 90.6 %
NEUTROPHILS NFR BLD AUTO: 91.7 %
NEUTROPHILS NFR BLD AUTO: 91.7 %
NOROV GI+II ORF1-ORF2 JNC STL QL NAA+PR: NOT DETECTED
NRBC # BLD AUTO: 0 10*3/UL
NRBC BLD AUTO-RTO: 0 /100
NUM BPU REQUESTED: 3
O2/TOTAL GAS SETTING VFR VENT: 30 %
O2/TOTAL GAS SETTING VFR VENT: 30 %
OSMOLALITY SERPL: 286 MMOL/KG (ref 280–301)
OSMOLALITY UR: 740 MMOL/KG (ref 100–1200)
OXYHGB MFR BLD: 99 % (ref 92–100)
OXYHGB MFR BLD: 99 % (ref 92–100)
PCO2 BLD: 34 MM HG (ref 35–45)
PCO2 BLD: 34 MM HG (ref 35–45)
PCO2 BLD: 39 MM HG (ref 35–45)
PH BLD: 7.29 PH (ref 7.35–7.45)
PH BLD: 7.37 PH (ref 7.35–7.45)
PH BLD: 7.4 PH (ref 7.35–7.45)
PHOSPHATE SERPL-MCNC: 1.4 MG/DL (ref 2.5–4.5)
PHOSPHATE SERPL-MCNC: 1.9 MG/DL (ref 2.5–4.5)
PHOSPHATE SERPL-MCNC: 2 MG/DL (ref 2.5–4.5)
PHOSPHATE SERPL-MCNC: 2.1 MG/DL (ref 2.5–4.5)
PHOSPHATE SERPL-MCNC: 2.2 MG/DL (ref 2.5–4.5)
PHOSPHATE SERPL-MCNC: 2.4 MG/DL (ref 2.5–4.5)
PHOSPHATE SERPL-MCNC: 2.7 MG/DL (ref 2.5–4.5)
PHOSPHATE SERPL-MCNC: 2.7 MG/DL (ref 2.5–4.5)
PHOSPHATE SERPL-MCNC: 3.5 MG/DL (ref 2.5–4.5)
PHOSPHATE SERPL-MCNC: 4.2 MG/DL (ref 2.5–4.5)
PLATELET # BLD AUTO: 248 10E9/L (ref 150–450)
PLATELET # BLD AUTO: 263 10E9/L (ref 150–450)
PLATELET # BLD AUTO: 276 10E9/L (ref 150–450)
PLATELET # BLD AUTO: 304 10E9/L (ref 150–450)
PLATELET # BLD AUTO: 320 10E9/L (ref 150–450)
PLATELET # BLD AUTO: 324 10E9/L (ref 150–450)
PLATELET # BLD AUTO: 353 10E9/L (ref 150–450)
PLATELET # BLD AUTO: 359 10E9/L (ref 150–450)
PLATELET # BLD AUTO: 367 10E9/L (ref 150–450)
PLATELET # BLD AUTO: 380 10E9/L (ref 150–450)
PLATELET # BLD AUTO: 384 10E9/L (ref 150–450)
PLATELET # BLD AUTO: 533 10E9/L (ref 150–450)
PO2 BLD: 150 MM HG (ref 80–105)
PO2 BLD: 162 MM HG (ref 80–105)
PO2 BLD: 282 MM HG (ref 80–105)
POTASSIUM BLD-SCNC: 4.3 MMOL/L (ref 3.4–5.3)
POTASSIUM SERPL-SCNC: 3.4 MMOL/L (ref 3.4–5.3)
POTASSIUM SERPL-SCNC: 3.7 MMOL/L (ref 3.4–5.3)
POTASSIUM SERPL-SCNC: 3.8 MMOL/L (ref 3.4–5.3)
POTASSIUM SERPL-SCNC: 4.1 MMOL/L (ref 3.4–5.3)
POTASSIUM SERPL-SCNC: 4.3 MMOL/L (ref 3.4–5.3)
POTASSIUM SERPL-SCNC: 4.3 MMOL/L (ref 3.4–5.3)
POTASSIUM SERPL-SCNC: 4.4 MMOL/L (ref 3.4–5.3)
POTASSIUM SERPL-SCNC: 4.5 MMOL/L (ref 3.4–5.3)
POTASSIUM SERPL-SCNC: 4.6 MMOL/L (ref 3.5–5)
POTASSIUM SERPL-SCNC: 4.8 MMOL/L (ref 3.4–5.3)
POTASSIUM SERPL-SCNC: 5 MMOL/L (ref 3.4–5.3)
POTASSIUM SERPL-SCNC: 5.1 MMOL/L (ref 3.4–5.3)
POTASSIUM SERPL-SCNC: 5.1 MMOL/L (ref 3.4–5.3)
POTASSIUM SERPL-SCNC: 5.2 MMOL/L (ref 3.4–5.3)
PROT SERPL-MCNC: 4.9 G/DL (ref 6.8–8.8)
PROT SERPL-MCNC: 5.2 G/DL (ref 6.8–8.8)
PROT SERPL-MCNC: 7.4 G/DL (ref 6.8–8.8)
RADIOLOGIST FLAGS: ABNORMAL
RBC # BLD AUTO: 2.58 10E12/L (ref 3.8–5.2)
RBC # BLD AUTO: 3.12 10E12/L (ref 3.8–5.2)
RBC # BLD AUTO: 3.14 10E12/L (ref 3.8–5.2)
RBC # BLD AUTO: 3.21 10E12/L (ref 3.8–5.2)
RBC # BLD AUTO: 3.25 10E12/L (ref 3.8–5.2)
RBC # BLD AUTO: 3.34 10E12/L (ref 3.8–5.2)
RBC # BLD AUTO: 3.35 10E12/L (ref 3.8–5.2)
RBC # BLD AUTO: 3.58 10E12/L (ref 3.8–5.2)
RBC # BLD AUTO: 3.72 10E12/L (ref 3.8–5.2)
RBC # BLD AUTO: 3.76 10E12/L (ref 3.8–5.2)
RBC # BLD AUTO: 3.83 10E12/L (ref 3.8–5.2)
RBC # BLD AUTO: 4.36 10E12/L (ref 3.8–5.2)
RVA NSP5 STL QL NAA+PROBE: NOT DETECTED
SALMONELLA SP RPOD STL QL NAA+PROBE: NOT DETECTED
SAO2 % BLDA FROM PO2: 100 % (ref 92–100)
SARS-COV-2 RNA RESP QL NAA+PROBE: NEGATIVE
SARS-COV-2 RNA SPEC QL NAA+PROBE: NEGATIVE
SHIGELLA SP+EIEC IPAH STL QL NAA+PROBE: NOT DETECTED
SODIUM BLD-SCNC: 139 MMOL/L (ref 133–144)
SODIUM SERPL-SCNC: 129 MMOL/L (ref 133–144)
SODIUM SERPL-SCNC: 130 MMOL/L (ref 133–144)
SODIUM SERPL-SCNC: 131 MMOL/L (ref 133–144)
SODIUM SERPL-SCNC: 132 MMOL/L (ref 133–144)
SODIUM SERPL-SCNC: 132 MMOL/L (ref 133–144)
SODIUM SERPL-SCNC: 133 MMOL/L (ref 133–144)
SODIUM SERPL-SCNC: 133 MMOL/L (ref 133–144)
SODIUM SERPL-SCNC: 134 MMOL/L (ref 133–144)
SODIUM SERPL-SCNC: 135 MMOL/L (ref 133–144)
SODIUM SERPL-SCNC: 136 MMOL/L (ref 133–144)
SODIUM SERPL-SCNC: 136 MMOL/L (ref 133–144)
SODIUM SERPL-SCNC: 138 MMOL/L (ref 133–144)
SODIUM SERPL-SCNC: 138 MMOL/L (ref 133–144)
SODIUM SERPL-SCNC: 140 MMOL/L (ref 133–144)
SODIUM SERPL-SCNC: 143 MMOL/L (ref 133–144)
SODIUM SERPL-SCNC: 145 MMOL/L (ref 133–144)
SODIUM SERPL-SCNC: 147 MMOL/L (ref 133–144)
SODIUM UR-SCNC: 46 MMOL/L
SPECIMEN EXP DATE BLD: NORMAL
SPECIMEN SOURCE: ABNORMAL
SPECIMEN SOURCE: NORMAL
SPECIMEN SOURCE: NORMAL
T4 FREE SERPL-MCNC: 1.03 NG/DL (ref 0.76–1.46)
TRANSFUSION STATUS PATIENT QL: NORMAL
TSH SERPL DL<=0.005 MIU/L-ACNC: 0.09 MU/L (ref 0.4–4)
TSH SERPL DL<=0.005 MIU/L-ACNC: 0.51 MU/L (ref 0.4–4)
V CHOL+PARA RFBL+TRKH+TNAA STL QL NAA+PR: NOT DETECTED
WBC # BLD AUTO: 17.5 10E9/L (ref 4–11)
WBC # BLD AUTO: 17.6 10E9/L (ref 4–11)
WBC # BLD AUTO: 20.3 10E9/L (ref 4–11)
WBC # BLD AUTO: 22 10E9/L (ref 4–11)
WBC # BLD AUTO: 22.3 10E9/L (ref 4–11)
WBC # BLD AUTO: 23.4 10E9/L (ref 4–11)
WBC # BLD AUTO: 29 10E9/L (ref 4–11)
WBC # BLD AUTO: 32.1 10E9/L (ref 4–11)
WBC # BLD AUTO: 34.6 10E9/L (ref 4–11)
WBC # BLD AUTO: 37 10E9/L (ref 4–11)
WBC # BLD AUTO: 39.6 10E9/L (ref 4–11)
WBC # BLD AUTO: 8.8 10E9/L (ref 4–11)
Y ENTERO RECN STL QL NAA+PROBE: NOT DETECTED

## 2021-01-01 PROCEDURE — 250N000009 HC RX 250: Performed by: INTERNAL MEDICINE

## 2021-01-01 PROCEDURE — 250N000011 HC RX IP 250 OP 636: Performed by: PHYSICIAN ASSISTANT

## 2021-01-01 PROCEDURE — 250N000013 HC RX MED GY IP 250 OP 250 PS 637: Performed by: INTERNAL MEDICINE

## 2021-01-01 PROCEDURE — 92526 ORAL FUNCTION THERAPY: CPT | Mod: GN | Performed by: SPEECH-LANGUAGE PATHOLOGIST

## 2021-01-01 PROCEDURE — 999N001017 HC STATISTIC GLUCOSE BY METER IP

## 2021-01-01 PROCEDURE — 250N000011 HC RX IP 250 OP 636: Performed by: NEUROLOGICAL SURGERY

## 2021-01-01 PROCEDURE — 85025 COMPLETE CBC W/AUTO DIFF WBC: CPT | Performed by: INTERNAL MEDICINE

## 2021-01-01 PROCEDURE — 99231 SBSQ HOSP IP/OBS SF/LOW 25: CPT | Performed by: PHYSICIAN ASSISTANT

## 2021-01-01 PROCEDURE — 250N000011 HC RX IP 250 OP 636: Performed by: INTERNAL MEDICINE

## 2021-01-01 PROCEDURE — 70450 CT HEAD/BRAIN W/O DYE: CPT | Mod: MG

## 2021-01-01 PROCEDURE — 250N000013 HC RX MED GY IP 250 OP 250 PS 637: Performed by: PHYSICIAN ASSISTANT

## 2021-01-01 PROCEDURE — 360N000078 HC SURGERY LEVEL 5, PER MIN: Performed by: NEUROLOGICAL SURGERY

## 2021-01-01 PROCEDURE — 80053 COMPREHEN METABOLIC PANEL: CPT | Performed by: INTERNAL MEDICINE

## 2021-01-01 PROCEDURE — 84100 ASSAY OF PHOSPHORUS: CPT | Performed by: INTERNAL MEDICINE

## 2021-01-01 PROCEDURE — 999N000128 HC STATISTIC PERIPHERAL IV START W/O US GUIDANCE

## 2021-01-01 PROCEDURE — 93005 ELECTROCARDIOGRAM TRACING: CPT

## 2021-01-01 PROCEDURE — P9041 ALBUMIN (HUMAN),5%, 50ML: HCPCS | Performed by: NURSE ANESTHETIST, CERTIFIED REGISTERED

## 2021-01-01 PROCEDURE — 80048 BASIC METABOLIC PNL TOTAL CA: CPT | Performed by: INTERNAL MEDICINE

## 2021-01-01 PROCEDURE — 250N000025 HC SEVOFLURANE, PER MIN: Performed by: NEUROLOGICAL SURGERY

## 2021-01-01 PROCEDURE — 85014 HEMATOCRIT: CPT

## 2021-01-01 PROCEDURE — 99232 SBSQ HOSP IP/OBS MODERATE 35: CPT | Performed by: PHYSICIAN ASSISTANT

## 2021-01-01 PROCEDURE — 86900 BLOOD TYPING SEROLOGIC ABO: CPT | Performed by: NEUROLOGICAL SURGERY

## 2021-01-01 PROCEDURE — 360N000079 HC SURGERY LEVEL 6, PER MIN: Performed by: NEUROLOGICAL SURGERY

## 2021-01-01 PROCEDURE — 200N000001 HC R&B ICU

## 2021-01-01 PROCEDURE — 84100 ASSAY OF PHOSPHORUS: CPT | Performed by: SURGERY

## 2021-01-01 PROCEDURE — 84100 ASSAY OF PHOSPHORUS: CPT | Performed by: NURSE PRACTITIONER

## 2021-01-01 PROCEDURE — 83930 ASSAY OF BLOOD OSMOLALITY: CPT | Performed by: INTERNAL MEDICINE

## 2021-01-01 PROCEDURE — 99233 SBSQ HOSP IP/OBS HIGH 50: CPT | Performed by: INTERNAL MEDICINE

## 2021-01-01 PROCEDURE — 82805 BLOOD GASES W/O2 SATURATION: CPT | Performed by: SURGERY

## 2021-01-01 PROCEDURE — 250N000012 HC RX MED GY IP 250 OP 636 PS 637: Performed by: PHYSICIAN ASSISTANT

## 2021-01-01 PROCEDURE — 370N000017 HC ANESTHESIA TECHNICAL FEE, PER MIN: Performed by: NEUROLOGICAL SURGERY

## 2021-01-01 PROCEDURE — 258N000003 HC RX IP 258 OP 636: Performed by: PHYSICIAN ASSISTANT

## 2021-01-01 PROCEDURE — 250N000011 HC RX IP 250 OP 636: Performed by: HOSPITALIST

## 2021-01-01 PROCEDURE — 86901 BLOOD TYPING SEROLOGIC RH(D): CPT | Performed by: NEUROLOGICAL SURGERY

## 2021-01-01 PROCEDURE — 86923 COMPATIBILITY TEST ELECTRIC: CPT | Performed by: NEUROLOGICAL SURGERY

## 2021-01-01 PROCEDURE — 258N000003 HC RX IP 258 OP 636: Performed by: INTERNAL MEDICINE

## 2021-01-01 PROCEDURE — 87635 SARS-COV-2 COVID-19 AMP PRB: CPT | Performed by: PHYSICIAN ASSISTANT

## 2021-01-01 PROCEDURE — 250N000012 HC RX MED GY IP 250 OP 636 PS 637: Performed by: INTERNAL MEDICINE

## 2021-01-01 PROCEDURE — 272N000078 HC NUTRITION PRODUCT INTERMEDIATE LITER

## 2021-01-01 PROCEDURE — 999N000157 HC STATISTIC RCP TIME EA 10 MIN

## 2021-01-01 PROCEDURE — 250N000011 HC RX IP 250 OP 636: Performed by: SURGERY

## 2021-01-01 PROCEDURE — 999N000190 HC STATISTIC VAT ROUNDS

## 2021-01-01 PROCEDURE — 99291 CRITICAL CARE FIRST HOUR: CPT | Performed by: INTERNAL MEDICINE

## 2021-01-01 PROCEDURE — 272N000083 HC NUTRITION PRODUCT SEMIELEM INTERMED LITER

## 2021-01-01 PROCEDURE — 84132 ASSAY OF SERUM POTASSIUM: CPT

## 2021-01-01 PROCEDURE — 258N000003 HC RX IP 258 OP 636: Performed by: HOSPITALIST

## 2021-01-01 PROCEDURE — 83735 ASSAY OF MAGNESIUM: CPT | Performed by: SURGERY

## 2021-01-01 PROCEDURE — G1004 CDSM NDSC: HCPCS

## 2021-01-01 PROCEDURE — 61518 REMOVAL OF BRAIN LESION: CPT | Mod: AS | Performed by: PHYSICIAN ASSISTANT

## 2021-01-01 PROCEDURE — 99232 SBSQ HOSP IP/OBS MODERATE 35: CPT | Performed by: INTERNAL MEDICINE

## 2021-01-01 PROCEDURE — 61781 SCAN PROC CRANIAL INTRA: CPT | Performed by: NEUROLOGICAL SURGERY

## 2021-01-01 PROCEDURE — 250N000013 HC RX MED GY IP 250 OP 250 PS 637: Performed by: STUDENT IN AN ORGANIZED HEALTH CARE EDUCATION/TRAINING PROGRAM

## 2021-01-01 PROCEDURE — 92610 EVALUATE SWALLOWING FUNCTION: CPT | Mod: GN | Performed by: SPEECH-LANGUAGE PATHOLOGIST

## 2021-01-01 PROCEDURE — 999N000009 HC STATISTIC AIRWAY CARE

## 2021-01-01 PROCEDURE — 250N000005 HC OR RX SURGIFLO HEMOSTATIC MATRIX 10ML 199102S OPNP: Performed by: NEUROLOGICAL SURGERY

## 2021-01-01 PROCEDURE — 83735 ASSAY OF MAGNESIUM: CPT | Performed by: INTERNAL MEDICINE

## 2021-01-01 PROCEDURE — 36415 COLL VENOUS BLD VENIPUNCTURE: CPT | Performed by: SURGERY

## 2021-01-01 PROCEDURE — 85018 HEMOGLOBIN: CPT | Performed by: INTERNAL MEDICINE

## 2021-01-01 PROCEDURE — 80048 BASIC METABOLIC PNL TOTAL CA: CPT | Performed by: SURGERY

## 2021-01-01 PROCEDURE — 255N000002 HC RX 255 OP 636: Performed by: INTERNAL MEDICINE

## 2021-01-01 PROCEDURE — 120N000001 HC R&B MED SURG/OB

## 2021-01-01 PROCEDURE — 250N000013 HC RX MED GY IP 250 OP 250 PS 637: Performed by: HOSPITALIST

## 2021-01-01 PROCEDURE — 99231 SBSQ HOSP IP/OBS SF/LOW 25: CPT | Performed by: INTERNAL MEDICINE

## 2021-01-01 PROCEDURE — 99207 PR NO CHARGE LOS: CPT | Performed by: PHYSICIAN ASSISTANT

## 2021-01-01 PROCEDURE — 00943ZZ DRAINAGE OF INTRACRANIAL SUBDURAL SPACE, PERCUTANEOUS APPROACH: ICD-10-PCS | Performed by: NEUROLOGICAL SURGERY

## 2021-01-01 PROCEDURE — 272N000001 HC OR GENERAL SUPPLY STERILE: Performed by: NEUROLOGICAL SURGERY

## 2021-01-01 PROCEDURE — 250N000012 HC RX MED GY IP 250 OP 636 PS 637: Performed by: SURGERY

## 2021-01-01 PROCEDURE — 80048 BASIC METABOLIC PNL TOTAL CA: CPT | Performed by: STUDENT IN AN ORGANIZED HEALTH CARE EDUCATION/TRAINING PROGRAM

## 2021-01-01 PROCEDURE — 258N000003 HC RX IP 258 OP 636: Performed by: STUDENT IN AN ORGANIZED HEALTH CARE EDUCATION/TRAINING PROGRAM

## 2021-01-01 PROCEDURE — 97161 PT EVAL LOW COMPLEX 20 MIN: CPT | Mod: GP | Performed by: PHYSICAL THERAPIST

## 2021-01-01 PROCEDURE — 85025 COMPLETE CBC W/AUTO DIFF WBC: CPT | Performed by: SURGERY

## 2021-01-01 PROCEDURE — 250N000009 HC RX 250: Performed by: HOSPITALIST

## 2021-01-01 PROCEDURE — 99291 CRITICAL CARE FIRST HOUR: CPT | Performed by: PSYCHIATRY & NEUROLOGY

## 2021-01-01 PROCEDURE — 250N000011 HC RX IP 250 OP 636: Performed by: STUDENT IN AN ORGANIZED HEALTH CARE EDUCATION/TRAINING PROGRAM

## 2021-01-01 PROCEDURE — 278N000051 HC OR IMPLANT GENERAL: Performed by: NEUROLOGICAL SURGERY

## 2021-01-01 PROCEDURE — 97530 THERAPEUTIC ACTIVITIES: CPT | Mod: GP | Performed by: PHYSICAL THERAPIST

## 2021-01-01 PROCEDURE — 84132 ASSAY OF SERUM POTASSIUM: CPT | Performed by: INTERNAL MEDICINE

## 2021-01-01 PROCEDURE — 84295 ASSAY OF SERUM SODIUM: CPT | Performed by: HOSPITALIST

## 2021-01-01 PROCEDURE — 009630Z DRAINAGE OF CEREBRAL VENTRICLE WITH DRAINAGE DEVICE, PERCUTANEOUS APPROACH: ICD-10-PCS | Performed by: NEUROLOGICAL SURGERY

## 2021-01-01 PROCEDURE — 250N000013 HC RX MED GY IP 250 OP 250 PS 637: Performed by: SURGERY

## 2021-01-01 PROCEDURE — 250N000012 HC RX MED GY IP 250 OP 636 PS 637: Performed by: STUDENT IN AN ORGANIZED HEALTH CARE EDUCATION/TRAINING PROGRAM

## 2021-01-01 PROCEDURE — 84439 ASSAY OF FREE THYROXINE: CPT | Performed by: INTERNAL MEDICINE

## 2021-01-01 PROCEDURE — 250N000009 HC RX 250: Performed by: NEUROLOGICAL SURGERY

## 2021-01-01 PROCEDURE — 82570 ASSAY OF URINE CREATININE: CPT | Performed by: HOSPITALIST

## 2021-01-01 PROCEDURE — 250N000011 HC RX IP 250 OP 636: Performed by: ANESTHESIOLOGY

## 2021-01-01 PROCEDURE — 999N000141 HC STATISTIC PRE-PROCEDURE NURSING ASSESSMENT: Performed by: NEUROLOGICAL SURGERY

## 2021-01-01 PROCEDURE — C1763 CONN TISS, NON-HUMAN: HCPCS | Performed by: NEUROLOGICAL SURGERY

## 2021-01-01 PROCEDURE — 250N000009 HC RX 250: Performed by: SURGERY

## 2021-01-01 PROCEDURE — 36415 COLL VENOUS BLD VENIPUNCTURE: CPT | Performed by: NURSE PRACTITIONER

## 2021-01-01 PROCEDURE — 258N000003 HC RX IP 258 OP 636: Performed by: NURSE PRACTITIONER

## 2021-01-01 PROCEDURE — 250N000011 HC RX IP 250 OP 636

## 2021-01-01 PROCEDURE — 710N000011 HC RECOVERY PHASE 1, LEVEL 3, PER MIN: Performed by: NEUROLOGICAL SURGERY

## 2021-01-01 PROCEDURE — 88331 PATH CONSLTJ SURG 1 BLK 1SPC: CPT | Mod: TC | Performed by: NEUROLOGICAL SURGERY

## 2021-01-01 PROCEDURE — 250N000009 HC RX 250: Performed by: PHYSICIAN ASSISTANT

## 2021-01-01 PROCEDURE — 61315 CRNEC/CRNOT ITTL NTRACEREBLR: CPT | Performed by: NEUROLOGICAL SURGERY

## 2021-01-01 PROCEDURE — 99239 HOSP IP/OBS DSCHRG MGMT >30: CPT | Mod: GW | Performed by: INTERNAL MEDICINE

## 2021-01-01 PROCEDURE — A9503 TC99M MEDRONATE: HCPCS | Performed by: INTERNAL MEDICINE

## 2021-01-01 PROCEDURE — 999N000065 XR ABDOMEN PORT 1 VW

## 2021-01-01 PROCEDURE — 272N000451 HC KIT SHRLOCK 5FR POWER PICC DOUBLE LUMEN

## 2021-01-01 PROCEDURE — 8E09XBH COMPUTER ASSISTED PROCEDURE OF HEAD AND NECK REGION, WITH MAGNETIC RESONANCE IMAGING: ICD-10-PCS | Performed by: NEUROLOGICAL SURGERY

## 2021-01-01 PROCEDURE — 83930 ASSAY OF BLOOD OSMOLALITY: CPT | Performed by: HOSPITALIST

## 2021-01-01 PROCEDURE — 97110 THERAPEUTIC EXERCISES: CPT | Mod: GP

## 2021-01-01 PROCEDURE — 99223 1ST HOSP IP/OBS HIGH 75: CPT | Performed by: SURGERY

## 2021-01-01 PROCEDURE — 93010 ELECTROCARDIOGRAM REPORT: CPT | Performed by: INTERNAL MEDICINE

## 2021-01-01 PROCEDURE — C1713 ANCHOR/SCREW BN/BN,TIS/BN: HCPCS | Performed by: NEUROLOGICAL SURGERY

## 2021-01-01 PROCEDURE — 99291 CRITICAL CARE FIRST HOUR: CPT | Mod: GC | Performed by: PSYCHIATRY & NEUROLOGY

## 2021-01-01 PROCEDURE — U0005 INFEC AGEN DETEC AMPLI PROBE: HCPCS | Performed by: INTERNAL MEDICINE

## 2021-01-01 PROCEDURE — 85025 COMPLETE CBC W/AUTO DIFF WBC: CPT | Performed by: STUDENT IN AN ORGANIZED HEALTH CARE EDUCATION/TRAINING PROGRAM

## 2021-01-01 PROCEDURE — 99233 SBSQ HOSP IP/OBS HIGH 50: CPT | Performed by: HOSPITALIST

## 2021-01-01 PROCEDURE — 71260 CT THORAX DX C+: CPT | Mod: MG

## 2021-01-01 PROCEDURE — 00CC0ZZ EXTIRPATION OF MATTER FROM CEREBELLUM, OPEN APPROACH: ICD-10-PCS | Performed by: NEUROLOGICAL SURGERY

## 2021-01-01 PROCEDURE — 250N000009 HC RX 250: Performed by: NURSE PRACTITIONER

## 2021-01-01 PROCEDURE — P9041 ALBUMIN (HUMAN),5%, 50ML: HCPCS

## 2021-01-01 PROCEDURE — 99232 SBSQ HOSP IP/OBS MODERATE 35: CPT | Performed by: STUDENT IN AN ORGANIZED HEALTH CARE EDUCATION/TRAINING PROGRAM

## 2021-01-01 PROCEDURE — 85025 COMPLETE CBC W/AUTO DIFF WBC: CPT | Performed by: PHYSICIAN ASSISTANT

## 2021-01-01 PROCEDURE — U0003 INFECTIOUS AGENT DETECTION BY NUCLEIC ACID (DNA OR RNA); SEVERE ACUTE RESPIRATORY SYNDROME CORONAVIRUS 2 (SARS-COV-2) (CORONAVIRUS DISEASE [COVID-19]), AMPLIFIED PROBE TECHNIQUE, MAKING USE OF HIGH THROUGHPUT TECHNOLOGIES AS DESCRIBED BY CMS-2020-01-R: HCPCS | Performed by: INTERNAL MEDICINE

## 2021-01-01 PROCEDURE — 36415 COLL VENOUS BLD VENIPUNCTURE: CPT | Performed by: PHYSICIAN ASSISTANT

## 2021-01-01 PROCEDURE — 80076 HEPATIC FUNCTION PANEL: CPT | Performed by: INTERNAL MEDICINE

## 2021-01-01 PROCEDURE — 258N000002 HC RX IP 258 OP 250: Performed by: INTERNAL MEDICINE

## 2021-01-01 PROCEDURE — 999N000040 HC STATISTIC CONSULT NO CHARGE VASC ACCESS

## 2021-01-01 PROCEDURE — 97530 THERAPEUTIC ACTIVITIES: CPT | Mod: GO

## 2021-01-01 PROCEDURE — 97165 OT EVAL LOW COMPLEX 30 MIN: CPT | Mod: GO

## 2021-01-01 PROCEDURE — 84443 ASSAY THYROID STIM HORMONE: CPT | Performed by: INTERNAL MEDICINE

## 2021-01-01 PROCEDURE — 78306 BONE IMAGING WHOLE BODY: CPT | Mod: MG

## 2021-01-01 PROCEDURE — 84100 ASSAY OF PHOSPHORUS: CPT | Performed by: HOSPITALIST

## 2021-01-01 PROCEDURE — 87493 C DIFF AMPLIFIED PROBE: CPT | Performed by: HOSPITALIST

## 2021-01-01 PROCEDURE — 93306 TTE W/DOPPLER COMPLETE: CPT

## 2021-01-01 PROCEDURE — 99222 1ST HOSP IP/OBS MODERATE 55: CPT | Performed by: INTERNAL MEDICINE

## 2021-01-01 PROCEDURE — 70450 CT HEAD/BRAIN W/O DYE: CPT | Mod: ME

## 2021-01-01 PROCEDURE — 99221 1ST HOSP IP/OBS SF/LOW 40: CPT | Performed by: INTERNAL MEDICINE

## 2021-01-01 PROCEDURE — 250N000011 HC RX IP 250 OP 636: Performed by: NURSE ANESTHETIST, CERTIFIED REGISTERED

## 2021-01-01 PROCEDURE — 36569 INSJ PICC 5 YR+ W/O IMAGING: CPT

## 2021-01-01 PROCEDURE — 85018 HEMOGLOBIN: CPT | Performed by: SURGERY

## 2021-01-01 PROCEDURE — 70450 CT HEAD/BRAIN W/O DYE: CPT | Mod: MG,77

## 2021-01-01 PROCEDURE — 85610 PROTHROMBIN TIME: CPT | Performed by: PHYSICIAN ASSISTANT

## 2021-01-01 PROCEDURE — 83735 ASSAY OF MAGNESIUM: CPT | Performed by: HOSPITALIST

## 2021-01-01 PROCEDURE — 80048 BASIC METABOLIC PNL TOTAL CA: CPT | Performed by: NURSE PRACTITIONER

## 2021-01-01 PROCEDURE — 250N000009 HC RX 250: Performed by: NURSE ANESTHETIST, CERTIFIED REGISTERED

## 2021-01-01 PROCEDURE — 36415 COLL VENOUS BLD VENIPUNCTURE: CPT | Performed by: STUDENT IN AN ORGANIZED HEALTH CARE EDUCATION/TRAINING PROGRAM

## 2021-01-01 PROCEDURE — 85027 COMPLETE CBC AUTOMATED: CPT | Performed by: INTERNAL MEDICINE

## 2021-01-01 PROCEDURE — 88331 PATH CONSLTJ SURG 1 BLK 1SPC: CPT | Mod: 26 | Performed by: PATHOLOGY

## 2021-01-01 PROCEDURE — 5A1945Z RESPIRATORY VENTILATION, 24-96 CONSECUTIVE HOURS: ICD-10-PCS | Performed by: SURGERY

## 2021-01-01 PROCEDURE — 94003 VENT MGMT INPAT SUBQ DAY: CPT

## 2021-01-01 PROCEDURE — 250N000009 HC RX 250

## 2021-01-01 PROCEDURE — 61315 CRNEC/CRNOT ITTL NTRACEREBLR: CPT | Mod: AS | Performed by: PHYSICIAN ASSISTANT

## 2021-01-01 PROCEDURE — 88307 TISSUE EXAM BY PATHOLOGIST: CPT | Mod: TC | Performed by: NEUROLOGICAL SURGERY

## 2021-01-01 PROCEDURE — 999N000185 HC STATISTIC TRANSPORT TIME EA 15 MIN

## 2021-01-01 PROCEDURE — 250N000011 HC RX IP 250 OP 636: Performed by: NURSE PRACTITIONER

## 2021-01-01 PROCEDURE — 83735 ASSAY OF MAGNESIUM: CPT | Performed by: NURSE PRACTITIONER

## 2021-01-01 PROCEDURE — 87506 IADNA-DNA/RNA PROBE TQ 6-11: CPT | Performed by: HOSPITALIST

## 2021-01-01 PROCEDURE — 00BC0ZZ EXCISION OF CEREBELLUM, OPEN APPROACH: ICD-10-PCS | Performed by: NEUROLOGICAL SURGERY

## 2021-01-01 PROCEDURE — 343N000001 HC RX 343: Performed by: INTERNAL MEDICINE

## 2021-01-01 PROCEDURE — 99233 SBSQ HOSP IP/OBS HIGH 50: CPT | Performed by: STUDENT IN AN ORGANIZED HEALTH CARE EDUCATION/TRAINING PROGRAM

## 2021-01-01 PROCEDURE — 88161 CYTOPATH SMEAR OTHER SOURCE: CPT | Mod: 26 | Performed by: PATHOLOGY

## 2021-01-01 PROCEDURE — 84295 ASSAY OF SERUM SODIUM: CPT

## 2021-01-01 PROCEDURE — P9016 RBC LEUKOCYTES REDUCED: HCPCS | Performed by: NEUROLOGICAL SURGERY

## 2021-01-01 PROCEDURE — 97110 THERAPEUTIC EXERCISES: CPT | Mod: GO

## 2021-01-01 PROCEDURE — 258N000001 HC RX 258: Performed by: NEUROLOGICAL SURGERY

## 2021-01-01 PROCEDURE — 70553 MRI BRAIN STEM W/O & W/DYE: CPT | Mod: MG

## 2021-01-01 PROCEDURE — 99207 PR CDG-CUT & PASTE-POTENTIAL IMPACT ON LEVEL: CPT | Performed by: INTERNAL MEDICINE

## 2021-01-01 PROCEDURE — 258N000003 HC RX IP 258 OP 636

## 2021-01-01 PROCEDURE — 80048 BASIC METABOLIC PNL TOTAL CA: CPT | Performed by: HOSPITALIST

## 2021-01-01 PROCEDURE — 36415 COLL VENOUS BLD VENIPUNCTURE: CPT | Performed by: NEUROLOGICAL SURGERY

## 2021-01-01 PROCEDURE — 99223 1ST HOSP IP/OBS HIGH 75: CPT | Mod: AI | Performed by: PHYSICIAN ASSISTANT

## 2021-01-01 PROCEDURE — 61154 BURR HOLE W/EVAC&/DRG HMTMA: CPT | Mod: 78 | Performed by: NEUROLOGICAL SURGERY

## 2021-01-01 PROCEDURE — 258N000003 HC RX IP 258 OP 636: Performed by: NURSE ANESTHETIST, CERTIFIED REGISTERED

## 2021-01-01 PROCEDURE — 258N000003 HC RX IP 258 OP 636: Performed by: ANESTHESIOLOGY

## 2021-01-01 PROCEDURE — 99205 OFFICE O/P NEW HI 60 MIN: CPT | Performed by: PHYSICIAN ASSISTANT

## 2021-01-01 PROCEDURE — 82803 BLOOD GASES ANY COMBINATION: CPT

## 2021-01-01 PROCEDURE — A9585 GADOBUTROL INJECTION: HCPCS | Performed by: INTERNAL MEDICINE

## 2021-01-01 PROCEDURE — 36415 COLL VENOUS BLD VENIPUNCTURE: CPT | Performed by: INTERNAL MEDICINE

## 2021-01-01 PROCEDURE — 82805 BLOOD GASES W/O2 SATURATION: CPT | Performed by: INTERNAL MEDICINE

## 2021-01-01 PROCEDURE — 999N000065 XR CHEST PORT 1 VW

## 2021-01-01 PROCEDURE — 83690 ASSAY OF LIPASE: CPT | Performed by: INTERNAL MEDICINE

## 2021-01-01 PROCEDURE — 710N000010 HC RECOVERY PHASE 1, LEVEL 2, PER MIN: Performed by: NEUROLOGICAL SURGERY

## 2021-01-01 PROCEDURE — 99291 CRITICAL CARE FIRST HOUR: CPT | Performed by: SURGERY

## 2021-01-01 PROCEDURE — 97530 THERAPEUTIC ACTIVITIES: CPT | Mod: GP

## 2021-01-01 PROCEDURE — 88161 CYTOPATH SMEAR OTHER SOURCE: CPT | Mod: TC | Performed by: NEUROLOGICAL SURGERY

## 2021-01-01 PROCEDURE — 86850 RBC ANTIBODY SCREEN: CPT | Performed by: NEUROLOGICAL SURGERY

## 2021-01-01 PROCEDURE — 83036 HEMOGLOBIN GLYCOSYLATED A1C: CPT | Performed by: INTERNAL MEDICINE

## 2021-01-01 PROCEDURE — 94002 VENT MGMT INPAT INIT DAY: CPT | Performed by: SURGERY

## 2021-01-01 PROCEDURE — 80053 COMPREHEN METABOLIC PANEL: CPT | Performed by: PHYSICIAN ASSISTANT

## 2021-01-01 PROCEDURE — 99223 1ST HOSP IP/OBS HIGH 75: CPT | Performed by: INTERNAL MEDICINE

## 2021-01-01 PROCEDURE — 88307 TISSUE EXAM BY PATHOLOGIST: CPT | Mod: 26 | Performed by: PATHOLOGY

## 2021-01-01 PROCEDURE — 83036 HEMOGLOBIN GLYCOSYLATED A1C: CPT | Performed by: PHYSICIAN ASSISTANT

## 2021-01-01 PROCEDURE — 93306 TTE W/DOPPLER COMPLETE: CPT | Mod: 26 | Performed by: INTERNAL MEDICINE

## 2021-01-01 PROCEDURE — 258N000003 HC RX IP 258 OP 636: Performed by: SURGERY

## 2021-01-01 PROCEDURE — 83935 ASSAY OF URINE OSMOLALITY: CPT | Performed by: HOSPITALIST

## 2021-01-01 PROCEDURE — 84300 ASSAY OF URINE SODIUM: CPT | Performed by: HOSPITALIST

## 2021-01-01 PROCEDURE — 61518 REMOVAL OF BRAIN LESION: CPT | Performed by: NEUROLOGICAL SURGERY

## 2021-01-01 PROCEDURE — 84132 ASSAY OF SERUM POTASSIUM: CPT | Performed by: PHYSICIAN ASSISTANT

## 2021-01-01 PROCEDURE — 61210 BURR HOLE IMPLT VENTR CATH: CPT | Mod: 59 | Performed by: NEUROLOGICAL SURGERY

## 2021-01-01 PROCEDURE — 250N000012 HC RX MED GY IP 250 OP 636 PS 637: Performed by: HOSPITALIST

## 2021-01-01 DEVICE — GRAFT DURAGEN 3X3" ID330: Type: IMPLANTABLE DEVICE | Site: CRANIAL | Status: FUNCTIONAL

## 2021-01-01 DEVICE — IMP PLATE BURR HOLE COVER  MATRIXNEURO SHUNT 17MM 04.503.028: Type: IMPLANTABLE DEVICE | Site: CRANIAL | Status: FUNCTIONAL

## 2021-01-01 DEVICE — IMP PLATE SYN BURR HOLE COVER 17MM 04.503.023: Type: IMPLANTABLE DEVICE | Site: CRANIAL | Status: FUNCTIONAL

## 2021-01-01 DEVICE — IMPLANTABLE DEVICE: Type: IMPLANTABLE DEVICE | Site: CRANIAL | Status: FUNCTIONAL

## 2021-01-01 DEVICE — IMP SCR SYN MATRIX LOW PRO 1.5X04MM SELF DRILL 04.503.104.01: Type: IMPLANTABLE DEVICE | Site: CRANIAL | Status: FUNCTIONAL

## 2021-01-01 RX ORDER — AMOXICILLIN 250 MG
2 CAPSULE ORAL 2 TIMES DAILY PRN
Status: DISCONTINUED | OUTPATIENT
Start: 2021-01-01 | End: 2021-01-01

## 2021-01-01 RX ORDER — CEFAZOLIN SODIUM 1 G/3ML
1 INJECTION, POWDER, FOR SOLUTION INTRAMUSCULAR; INTRAVENOUS SEE ADMIN INSTRUCTIONS
Status: DISCONTINUED | OUTPATIENT
Start: 2021-01-01 | End: 2021-01-01 | Stop reason: HOSPADM

## 2021-01-01 RX ORDER — ONDANSETRON 2 MG/ML
4 INJECTION INTRAMUSCULAR; INTRAVENOUS EVERY 6 HOURS PRN
Status: DISCONTINUED | OUTPATIENT
Start: 2021-01-01 | End: 2021-01-01

## 2021-01-01 RX ORDER — CEFAZOLIN SODIUM 2 G/100ML
2 INJECTION, SOLUTION INTRAVENOUS
Status: COMPLETED | OUTPATIENT
Start: 2021-01-01 | End: 2021-01-01

## 2021-01-01 RX ORDER — AMOXICILLIN 250 MG
1 CAPSULE ORAL 2 TIMES DAILY
Status: DISCONTINUED | OUTPATIENT
Start: 2021-01-01 | End: 2021-01-01

## 2021-01-01 RX ORDER — SALIVA STIMULANT COMB. NO.3
1 SPRAY, NON-AEROSOL (ML) MUCOUS MEMBRANE
Status: DISCONTINUED | OUTPATIENT
Start: 2021-01-01 | End: 2021-01-01 | Stop reason: HOSPADM

## 2021-01-01 RX ORDER — SODIUM CHLORIDE 450 MG/100ML
INJECTION, SOLUTION INTRAVENOUS CONTINUOUS
Status: DISCONTINUED | OUTPATIENT
Start: 2021-01-01 | End: 2021-01-01

## 2021-01-01 RX ORDER — AMIODARONE HYDROCHLORIDE 200 MG/1
400 TABLET ORAL 2 TIMES DAILY
Status: DISCONTINUED | OUTPATIENT
Start: 2021-01-01 | End: 2021-01-01

## 2021-01-01 RX ORDER — DEXTROSE MONOHYDRATE 100 MG/ML
INJECTION, SOLUTION INTRAVENOUS CONTINUOUS PRN
Status: DISCONTINUED | OUTPATIENT
Start: 2021-01-01 | End: 2021-01-01

## 2021-01-01 RX ORDER — FENTANYL CITRATE 50 UG/ML
50-100 INJECTION, SOLUTION INTRAMUSCULAR; INTRAVENOUS ONCE
Status: COMPLETED | OUTPATIENT
Start: 2021-01-01 | End: 2021-01-01

## 2021-01-01 RX ORDER — NICARDIPINE HYDROCHLORIDE 0.2 MG/ML
2.5-15 INJECTION INTRAVENOUS CONTINUOUS
Status: DISCONTINUED | OUTPATIENT
Start: 2021-01-01 | End: 2021-01-01

## 2021-01-01 RX ORDER — LORAZEPAM 0.5 MG/1
0.5 TABLET ORAL EVERY 4 HOURS PRN
Qty: 30 TABLET | Refills: 0 | Status: SHIPPED | OUTPATIENT
Start: 2021-01-01

## 2021-01-01 RX ORDER — NALOXONE HYDROCHLORIDE 0.4 MG/ML
0.4 INJECTION, SOLUTION INTRAMUSCULAR; INTRAVENOUS; SUBCUTANEOUS
Status: DISCONTINUED | OUTPATIENT
Start: 2021-01-01 | End: 2021-01-01

## 2021-01-01 RX ORDER — PROCHLORPERAZINE MALEATE 5 MG
5 TABLET ORAL EVERY 6 HOURS PRN
Status: DISCONTINUED | OUTPATIENT
Start: 2021-01-01 | End: 2021-01-01

## 2021-01-01 RX ORDER — LABETALOL HYDROCHLORIDE 5 MG/ML
10-40 INJECTION, SOLUTION INTRAVENOUS EVERY 10 MIN PRN
Status: DISCONTINUED | OUTPATIENT
Start: 2021-01-01 | End: 2021-01-01

## 2021-01-01 RX ORDER — MORPHINE SULFATE 2 MG/ML
1-2 INJECTION, SOLUTION INTRAMUSCULAR; INTRAVENOUS
Status: DISCONTINUED | OUTPATIENT
Start: 2021-01-01 | End: 2021-01-01 | Stop reason: HOSPADM

## 2021-01-01 RX ORDER — LORAZEPAM 1 MG/1
1 TABLET ORAL
Status: DISCONTINUED | OUTPATIENT
Start: 2021-01-01 | End: 2021-01-01

## 2021-01-01 RX ORDER — ACETAMINOPHEN 500 MG
1000 TABLET ORAL EVERY 6 HOURS PRN
Status: ON HOLD | COMMUNITY
End: 2021-01-01

## 2021-01-01 RX ORDER — DEXAMETHASONE SODIUM PHOSPHATE 4 MG/ML
4 INJECTION, SOLUTION INTRA-ARTICULAR; INTRALESIONAL; INTRAMUSCULAR; INTRAVENOUS; SOFT TISSUE EVERY 8 HOURS
Status: DISCONTINUED | OUTPATIENT
Start: 2021-01-01 | End: 2021-01-01

## 2021-01-01 RX ORDER — GADOBUTROL 604.72 MG/ML
7 INJECTION INTRAVENOUS ONCE
Status: COMPLETED | OUTPATIENT
Start: 2021-01-01 | End: 2021-01-01

## 2021-01-01 RX ORDER — LISINOPRIL 10 MG/1
10 TABLET ORAL DAILY
Status: DISCONTINUED | OUTPATIENT
Start: 2021-01-01 | End: 2021-01-01

## 2021-01-01 RX ORDER — FENTANYL CITRATE 50 UG/ML
25-50 INJECTION, SOLUTION INTRAMUSCULAR; INTRAVENOUS
Status: DISCONTINUED | OUTPATIENT
Start: 2021-01-01 | End: 2021-01-01

## 2021-01-01 RX ORDER — PROCHLORPERAZINE 25 MG
12.5 SUPPOSITORY, RECTAL RECTAL EVERY 12 HOURS PRN
Status: DISCONTINUED | OUTPATIENT
Start: 2021-01-01 | End: 2021-01-01 | Stop reason: HOSPADM

## 2021-01-01 RX ORDER — CARBOXYMETHYLCELLULOSE SODIUM 5 MG/ML
1-2 SOLUTION/ DROPS OPHTHALMIC EVERY 8 HOURS PRN
Status: DISCONTINUED | OUTPATIENT
Start: 2021-01-01 | End: 2021-01-01 | Stop reason: HOSPADM

## 2021-01-01 RX ORDER — HYDRALAZINE HYDROCHLORIDE 20 MG/ML
10 INJECTION INTRAMUSCULAR; INTRAVENOUS EVERY 4 HOURS PRN
Status: DISCONTINUED | OUTPATIENT
Start: 2021-01-01 | End: 2021-01-01 | Stop reason: DRUGHIGH

## 2021-01-01 RX ORDER — HALOPERIDOL 0.5 MG/1
0.5 TABLET ORAL EVERY 6 HOURS PRN
Qty: 30 TABLET | Refills: 0 | Status: SHIPPED | OUTPATIENT
Start: 2021-01-01

## 2021-01-01 RX ORDER — AMOXICILLIN 250 MG
2 CAPSULE ORAL 2 TIMES DAILY PRN
Status: DISCONTINUED | OUTPATIENT
Start: 2021-01-01 | End: 2021-01-01 | Stop reason: HOSPADM

## 2021-01-01 RX ORDER — NICOTINE POLACRILEX 4 MG
15-30 LOZENGE BUCCAL
Status: DISCONTINUED | OUTPATIENT
Start: 2021-01-01 | End: 2021-01-01

## 2021-01-01 RX ORDER — LISINOPRIL 20 MG/1
20 TABLET ORAL DAILY
Status: DISCONTINUED | OUTPATIENT
Start: 2021-01-01 | End: 2021-01-01

## 2021-01-01 RX ORDER — NYSTATIN 100000/ML
500000 SUSPENSION, ORAL (FINAL DOSE FORM) ORAL 4 TIMES DAILY
Status: DISCONTINUED | OUTPATIENT
Start: 2021-01-01 | End: 2021-01-01

## 2021-01-01 RX ORDER — NALOXONE HYDROCHLORIDE 0.4 MG/ML
0.2 INJECTION, SOLUTION INTRAMUSCULAR; INTRAVENOUS; SUBCUTANEOUS
Status: DISCONTINUED | OUTPATIENT
Start: 2021-01-01 | End: 2021-01-01 | Stop reason: HOSPADM

## 2021-01-01 RX ORDER — EPHEDRINE SULFATE 50 MG/ML
INJECTION, SOLUTION INTRAMUSCULAR; INTRAVENOUS; SUBCUTANEOUS PRN
Status: DISCONTINUED | OUTPATIENT
Start: 2021-01-01 | End: 2021-01-01

## 2021-01-01 RX ORDER — SODIUM CHLORIDE, SODIUM LACTATE, POTASSIUM CHLORIDE, CALCIUM CHLORIDE 600; 310; 30; 20 MG/100ML; MG/100ML; MG/100ML; MG/100ML
INJECTION, SOLUTION INTRAVENOUS CONTINUOUS
Status: DISCONTINUED | OUTPATIENT
Start: 2021-01-01 | End: 2021-01-01

## 2021-01-01 RX ORDER — MORPHINE SULFATE 100 MG/5ML
5-10 SOLUTION ORAL
Status: DISCONTINUED | OUTPATIENT
Start: 2021-01-01 | End: 2021-01-01 | Stop reason: HOSPADM

## 2021-01-01 RX ORDER — MAGNESIUM SULFATE HEPTAHYDRATE 40 MG/ML
2 INJECTION, SOLUTION INTRAVENOUS ONCE
Status: COMPLETED | OUTPATIENT
Start: 2021-01-01 | End: 2021-01-01

## 2021-01-01 RX ORDER — HYDRALAZINE HYDROCHLORIDE 20 MG/ML
10 INJECTION INTRAMUSCULAR; INTRAVENOUS ONCE
Status: COMPLETED | OUTPATIENT
Start: 2021-01-01 | End: 2021-01-01

## 2021-01-01 RX ORDER — PROPOFOL 10 MG/ML
INJECTION, EMULSION INTRAVENOUS PRN
Status: DISCONTINUED | OUTPATIENT
Start: 2021-01-01 | End: 2021-01-01

## 2021-01-01 RX ORDER — ACETAMINOPHEN 650 MG/1
650 SUPPOSITORY RECTAL EVERY 4 HOURS PRN
Status: DISCONTINUED | OUTPATIENT
Start: 2021-01-01 | End: 2021-01-01 | Stop reason: HOSPADM

## 2021-01-01 RX ORDER — LIDOCAINE HYDROCHLORIDE 20 MG/ML
INJECTION, SOLUTION INFILTRATION; PERINEURAL PRN
Status: DISCONTINUED | OUTPATIENT
Start: 2021-01-01 | End: 2021-01-01

## 2021-01-01 RX ORDER — MORPHINE SULFATE 30 MG/1
5 TABLET ORAL
Qty: 60 TABLET | Refills: 0 | Status: SHIPPED | OUTPATIENT
Start: 2021-01-01 | End: 2021-01-01

## 2021-01-01 RX ORDER — MAGNESIUM HYDROXIDE 1200 MG/15ML
LIQUID ORAL PRN
Status: DISCONTINUED | OUTPATIENT
Start: 2021-01-01 | End: 2021-01-01 | Stop reason: HOSPADM

## 2021-01-01 RX ORDER — DEXMEDETOMIDINE HYDROCHLORIDE 4 UG/ML
0.2-0.7 INJECTION, SOLUTION INTRAVENOUS CONTINUOUS
Status: DISCONTINUED | OUTPATIENT
Start: 2021-01-01 | End: 2021-01-01

## 2021-01-01 RX ORDER — OXYCODONE HYDROCHLORIDE 5 MG/1
5-10 TABLET ORAL EVERY 4 HOURS PRN
Status: DISCONTINUED | OUTPATIENT
Start: 2021-01-01 | End: 2021-01-01

## 2021-01-01 RX ORDER — ONDANSETRON 4 MG/1
4 TABLET, ORALLY DISINTEGRATING ORAL EVERY 6 HOURS PRN
Status: DISCONTINUED | OUTPATIENT
Start: 2021-01-01 | End: 2021-01-01

## 2021-01-01 RX ORDER — AMLODIPINE BESYLATE 5 MG/1
5 TABLET ORAL DAILY
Status: DISCONTINUED | OUTPATIENT
Start: 2021-01-01 | End: 2021-01-01

## 2021-01-01 RX ORDER — LIDOCAINE 40 MG/G
CREAM TOPICAL
Status: DISCONTINUED | OUTPATIENT
Start: 2021-01-01 | End: 2021-01-01 | Stop reason: HOSPADM

## 2021-01-01 RX ORDER — FLUMAZENIL 0.1 MG/ML
0.2 INJECTION, SOLUTION INTRAVENOUS
Status: ACTIVE | OUTPATIENT
Start: 2021-01-01 | End: 2021-01-01

## 2021-01-01 RX ORDER — PHENYLEPHRINE HCL IN 0.9% NACL 50MG/250ML
0.5-6 PLASTIC BAG, INJECTION (ML) INTRAVENOUS CONTINUOUS
Status: DISCONTINUED | OUTPATIENT
Start: 2021-01-01 | End: 2021-01-01

## 2021-01-01 RX ORDER — MEROPENEM 1 G/1
1 INJECTION, POWDER, FOR SOLUTION INTRAVENOUS EVERY 8 HOURS
Status: DISPENSED | OUTPATIENT
Start: 2021-01-01 | End: 2021-01-01

## 2021-01-01 RX ORDER — AMOXICILLIN 250 MG
1 CAPSULE ORAL 2 TIMES DAILY PRN
Status: DISCONTINUED | OUTPATIENT
Start: 2021-01-01 | End: 2021-01-01

## 2021-01-01 RX ORDER — LORAZEPAM 2 MG/ML
1 CONCENTRATE ORAL ONCE
Status: DISCONTINUED | OUTPATIENT
Start: 2021-01-01 | End: 2021-01-01

## 2021-01-01 RX ORDER — ALBUMIN, HUMAN INJ 5% 5 %
SOLUTION INTRAVENOUS CONTINUOUS PRN
Status: DISCONTINUED | OUTPATIENT
Start: 2021-01-01 | End: 2021-01-01

## 2021-01-01 RX ORDER — PROCHLORPERAZINE MALEATE 5 MG
5 TABLET ORAL EVERY 6 HOURS PRN
Status: DISCONTINUED | OUTPATIENT
Start: 2021-01-01 | End: 2021-01-01 | Stop reason: HOSPADM

## 2021-01-01 RX ORDER — ACETAMINOPHEN 325 MG/1
650 TABLET ORAL EVERY 4 HOURS PRN
Status: DISCONTINUED | OUTPATIENT
Start: 2021-01-01 | End: 2021-01-01 | Stop reason: HOSPADM

## 2021-01-01 RX ORDER — LORAZEPAM 2 MG/ML
0.25 CONCENTRATE ORAL EVERY 4 HOURS PRN
Qty: 15 ML | Refills: 0 | Status: SHIPPED | OUTPATIENT
Start: 2021-01-01 | End: 2021-01-01

## 2021-01-01 RX ORDER — HYDROMORPHONE HYDROCHLORIDE 1 MG/ML
.3-.5 INJECTION, SOLUTION INTRAMUSCULAR; INTRAVENOUS; SUBCUTANEOUS
Status: DISCONTINUED | OUTPATIENT
Start: 2021-01-01 | End: 2021-01-01

## 2021-01-01 RX ORDER — TC 99M MEDRONATE 20 MG/10ML
25 INJECTION, POWDER, LYOPHILIZED, FOR SOLUTION INTRAVENOUS ONCE
Status: COMPLETED | OUTPATIENT
Start: 2021-01-01 | End: 2021-01-01

## 2021-01-01 RX ORDER — BUPIVACAINE HYDROCHLORIDE AND EPINEPHRINE 2.5; 5 MG/ML; UG/ML
INJECTION, SOLUTION INFILTRATION; PERINEURAL PRN
Status: DISCONTINUED | OUTPATIENT
Start: 2021-01-01 | End: 2021-01-01 | Stop reason: HOSPADM

## 2021-01-01 RX ORDER — LORAZEPAM 2 MG/ML
1 INJECTION INTRAMUSCULAR EVERY 8 HOURS
Status: DISCONTINUED | OUTPATIENT
Start: 2021-01-01 | End: 2021-01-01

## 2021-01-01 RX ORDER — ACETAMINOPHEN 325 MG/1
650 TABLET ORAL EVERY 4 HOURS PRN
Status: DISCONTINUED | OUTPATIENT
Start: 2021-01-01 | End: 2021-01-01

## 2021-01-01 RX ORDER — IOPAMIDOL 755 MG/ML
80 INJECTION, SOLUTION INTRAVASCULAR ONCE
Status: COMPLETED | OUTPATIENT
Start: 2021-01-01 | End: 2021-01-01

## 2021-01-01 RX ORDER — LABETALOL HYDROCHLORIDE 5 MG/ML
10 INJECTION, SOLUTION INTRAVENOUS EVERY 4 HOURS PRN
Status: DISCONTINUED | OUTPATIENT
Start: 2021-01-01 | End: 2021-01-01

## 2021-01-01 RX ORDER — AMOXICILLIN 500 MG/1
2000 CAPSULE ORAL PRN
Status: ON HOLD | COMMUNITY
Start: 2019-07-02 | End: 2021-01-01

## 2021-01-01 RX ORDER — BISACODYL 10 MG
10 SUPPOSITORY, RECTAL RECTAL DAILY PRN
Qty: 10 SUPPOSITORY | Refills: 0 | Status: SHIPPED | OUTPATIENT
Start: 2021-01-01

## 2021-01-01 RX ORDER — DEXTROSE MONOHYDRATE 25 G/50ML
25-50 INJECTION, SOLUTION INTRAVENOUS
Status: DISCONTINUED | OUTPATIENT
Start: 2021-01-01 | End: 2021-01-01

## 2021-01-01 RX ORDER — SODIUM CHLORIDE 9 MG/ML
INJECTION, SOLUTION INTRAVENOUS CONTINUOUS PRN
Status: DISCONTINUED | OUTPATIENT
Start: 2021-01-01 | End: 2021-01-01

## 2021-01-01 RX ORDER — CEFAZOLIN SODIUM 2 G/100ML
INJECTION, SOLUTION INTRAVENOUS PRN
Status: DISCONTINUED | OUTPATIENT
Start: 2021-01-01 | End: 2021-01-01

## 2021-01-01 RX ORDER — ACETAMINOPHEN 325 MG/1
975 TABLET ORAL EVERY 8 HOURS
Status: DISCONTINUED | OUTPATIENT
Start: 2021-01-01 | End: 2021-01-01

## 2021-01-01 RX ORDER — GADOBUTROL 604.72 MG/ML
20 INJECTION INTRAVENOUS ONCE
Status: COMPLETED | OUTPATIENT
Start: 2021-01-01 | End: 2021-01-01

## 2021-01-01 RX ORDER — AMOXICILLIN 250 MG
2 CAPSULE ORAL 2 TIMES DAILY
Status: DISCONTINUED | OUTPATIENT
Start: 2021-01-01 | End: 2021-01-01

## 2021-01-01 RX ORDER — CEFTRIAXONE 1 G/1
1 INJECTION, POWDER, FOR SOLUTION INTRAMUSCULAR; INTRAVENOUS EVERY 24 HOURS
Status: DISCONTINUED | OUTPATIENT
Start: 2021-01-01 | End: 2021-01-01

## 2021-01-01 RX ORDER — ONDANSETRON 2 MG/ML
4 INJECTION INTRAMUSCULAR; INTRAVENOUS EVERY 6 HOURS PRN
Status: DISCONTINUED | OUTPATIENT
Start: 2021-01-01 | End: 2021-01-01 | Stop reason: HOSPADM

## 2021-01-01 RX ORDER — NOREPINEPHRINE BITARTRATE 0.06 MG/ML
0.03-0.4 INJECTION, SOLUTION INTRAVENOUS CONTINUOUS
Status: DISCONTINUED | OUTPATIENT
Start: 2021-01-01 | End: 2021-01-01

## 2021-01-01 RX ORDER — DOCUSATE SODIUM 100 MG/1
100 CAPSULE, LIQUID FILLED ORAL DAILY
Status: ON HOLD | COMMUNITY
End: 2021-01-01

## 2021-01-01 RX ORDER — ACETAMINOPHEN 650 MG/1
650 SUPPOSITORY RECTAL EVERY 4 HOURS PRN
Qty: 30 SUPPOSITORY | Refills: 0 | Status: SHIPPED | OUTPATIENT
Start: 2021-01-01

## 2021-01-01 RX ORDER — ANASTROZOLE 1 MG/1
1 TABLET ORAL DAILY
Status: DISCONTINUED | OUTPATIENT
Start: 2021-01-01 | End: 2021-01-01

## 2021-01-01 RX ORDER — IOPAMIDOL 755 MG/ML
82 INJECTION, SOLUTION INTRAVASCULAR ONCE
Status: COMPLETED | OUTPATIENT
Start: 2021-01-01 | End: 2021-01-01

## 2021-01-01 RX ORDER — ATROPINE SULFATE 10 MG/ML
1-2 SOLUTION/ DROPS OPHTHALMIC
Status: DISCONTINUED | OUTPATIENT
Start: 2021-01-01 | End: 2021-01-01 | Stop reason: HOSPADM

## 2021-01-01 RX ORDER — DOCUSATE SODIUM 100 MG/1
100 CAPSULE, LIQUID FILLED ORAL 2 TIMES DAILY
Status: DISCONTINUED | OUTPATIENT
Start: 2021-01-01 | End: 2021-01-01

## 2021-01-01 RX ORDER — HYDRALAZINE HYDROCHLORIDE 20 MG/ML
10 INJECTION INTRAMUSCULAR; INTRAVENOUS EVERY 4 HOURS PRN
Status: DISCONTINUED | OUTPATIENT
Start: 2021-01-01 | End: 2021-01-01

## 2021-01-01 RX ORDER — NALOXONE HYDROCHLORIDE 0.4 MG/ML
0.2 INJECTION, SOLUTION INTRAMUSCULAR; INTRAVENOUS; SUBCUTANEOUS
Status: DISCONTINUED | OUTPATIENT
Start: 2021-01-01 | End: 2021-01-01

## 2021-01-01 RX ORDER — VECURONIUM BROMIDE 1 MG/ML
INJECTION, POWDER, LYOPHILIZED, FOR SOLUTION INTRAVENOUS PRN
Status: DISCONTINUED | OUTPATIENT
Start: 2021-01-01 | End: 2021-01-01

## 2021-01-01 RX ORDER — DEXMEDETOMIDINE HYDROCHLORIDE 4 UG/ML
.2-1.7 INJECTION, SOLUTION INTRAVENOUS CONTINUOUS
Status: DISCONTINUED | OUTPATIENT
Start: 2021-01-01 | End: 2021-01-01

## 2021-01-01 RX ORDER — CARVEDILOL 6.25 MG/1
12.5 TABLET ORAL 2 TIMES DAILY WITH MEALS
Status: DISCONTINUED | OUTPATIENT
Start: 2021-01-01 | End: 2021-01-01

## 2021-01-01 RX ORDER — AMOXICILLIN 250 MG
1 CAPSULE ORAL 2 TIMES DAILY PRN
Status: DISCONTINUED | OUTPATIENT
Start: 2021-01-01 | End: 2021-01-01 | Stop reason: HOSPADM

## 2021-01-01 RX ORDER — BACITRACIN ZINC 500 [USP'U]/G
OINTMENT TOPICAL PRN
Status: DISCONTINUED | OUTPATIENT
Start: 2021-01-01 | End: 2021-01-01 | Stop reason: HOSPADM

## 2021-01-01 RX ORDER — LIDOCAINE 40 MG/G
CREAM TOPICAL
Status: DISCONTINUED | OUTPATIENT
Start: 2021-01-01 | End: 2021-01-01

## 2021-01-01 RX ORDER — CARVEDILOL 6.25 MG/1
6.25 TABLET ORAL 2 TIMES DAILY WITH MEALS
Status: DISCONTINUED | OUTPATIENT
Start: 2021-01-01 | End: 2021-01-01

## 2021-01-01 RX ORDER — ACETAMINOPHEN 325 MG/10.15ML
975 LIQUID ORAL 3 TIMES DAILY
Status: DISCONTINUED | OUTPATIENT
Start: 2021-01-01 | End: 2021-01-01

## 2021-01-01 RX ORDER — SODIUM CHLORIDE, SODIUM LACTATE, POTASSIUM CHLORIDE, CALCIUM CHLORIDE 600; 310; 30; 20 MG/100ML; MG/100ML; MG/100ML; MG/100ML
INJECTION, SOLUTION INTRAVENOUS CONTINUOUS PRN
Status: DISCONTINUED | OUTPATIENT
Start: 2021-01-01 | End: 2021-01-01

## 2021-01-01 RX ORDER — HYDROXYZINE HYDROCHLORIDE 10 MG/1
10 TABLET, FILM COATED ORAL EVERY 6 HOURS PRN
Status: DISCONTINUED | OUTPATIENT
Start: 2021-01-01 | End: 2021-01-01

## 2021-01-01 RX ORDER — NALOXONE HYDROCHLORIDE 0.4 MG/ML
0.2 INJECTION, SOLUTION INTRAMUSCULAR; INTRAVENOUS; SUBCUTANEOUS
Status: ACTIVE | OUTPATIENT
Start: 2021-01-01 | End: 2021-01-01

## 2021-01-01 RX ORDER — MORPHINE SULFATE 30 MG/1
5 TABLET ORAL
Qty: 60 TABLET | Refills: 0 | Status: SHIPPED | OUTPATIENT
Start: 2021-01-01

## 2021-01-01 RX ORDER — SODIUM CHLORIDE 9 MG/ML
INJECTION, SOLUTION INTRAVENOUS CONTINUOUS
Status: DISCONTINUED | OUTPATIENT
Start: 2021-01-01 | End: 2021-01-01

## 2021-01-01 RX ORDER — CEFAZOLIN SODIUM 2 G/100ML
2 INJECTION, SOLUTION INTRAVENOUS
Status: DISCONTINUED | OUTPATIENT
Start: 2021-01-01 | End: 2021-01-01 | Stop reason: HOSPADM

## 2021-01-01 RX ORDER — VANCOMYCIN HYDROCHLORIDE 50 MG/ML
125 KIT ORAL 4 TIMES DAILY
Status: DISCONTINUED | OUTPATIENT
Start: 2021-01-01 | End: 2021-01-01

## 2021-01-01 RX ORDER — FENTANYL CITRATE 50 UG/ML
INJECTION, SOLUTION INTRAMUSCULAR; INTRAVENOUS PRN
Status: DISCONTINUED | OUTPATIENT
Start: 2021-01-01 | End: 2021-01-01

## 2021-01-01 RX ORDER — MORPHINE SULFATE 10 MG/5ML
5-10 SOLUTION ORAL
Status: DISCONTINUED | OUTPATIENT
Start: 2021-01-01 | End: 2021-01-01 | Stop reason: HOSPADM

## 2021-01-01 RX ORDER — VITAMIN B COMPLEX
1 TABLET ORAL DAILY
Status: ON HOLD | COMMUNITY
End: 2021-01-01

## 2021-01-01 RX ORDER — BISACODYL 10 MG
10 SUPPOSITORY, RECTAL RECTAL DAILY PRN
Status: DISCONTINUED | OUTPATIENT
Start: 2021-01-01 | End: 2021-01-01 | Stop reason: HOSPADM

## 2021-01-01 RX ORDER — LORAZEPAM 2 MG/ML
1 INJECTION INTRAMUSCULAR
Status: DISCONTINUED | OUTPATIENT
Start: 2021-01-01 | End: 2021-01-01

## 2021-01-01 RX ORDER — METOPROLOL TARTRATE 25 MG/1
25 TABLET, FILM COATED ORAL ONCE
Status: COMPLETED | OUTPATIENT
Start: 2021-01-01 | End: 2021-01-01

## 2021-01-01 RX ORDER — POLYETHYLENE GLYCOL 3350 17 G/17G
17 POWDER, FOR SOLUTION ORAL DAILY
Status: DISCONTINUED | OUTPATIENT
Start: 2021-01-01 | End: 2021-01-01

## 2021-01-01 RX ORDER — POTASSIUM CHLORIDE 29.8 MG/ML
20 INJECTION INTRAVENOUS
Status: COMPLETED | OUTPATIENT
Start: 2021-01-01 | End: 2021-01-01

## 2021-01-01 RX ORDER — FENTANYL CITRATE 50 UG/ML
INJECTION, SOLUTION INTRAMUSCULAR; INTRAVENOUS
Status: DISCONTINUED
Start: 2021-01-01 | End: 2021-01-01 | Stop reason: HOSPADM

## 2021-01-01 RX ORDER — AMIODARONE HYDROCHLORIDE 200 MG/1
200 TABLET ORAL DAILY
Status: DISCONTINUED | OUTPATIENT
Start: 2021-01-01 | End: 2021-01-01

## 2021-01-01 RX ORDER — ONDANSETRON 2 MG/ML
INJECTION INTRAMUSCULAR; INTRAVENOUS PRN
Status: DISCONTINUED | OUTPATIENT
Start: 2021-01-01 | End: 2021-01-01

## 2021-01-01 RX ORDER — MORPHINE SULFATE 10 MG/5ML
5 SOLUTION ORAL
Qty: 30 ML | Refills: 0 | Status: SHIPPED | OUTPATIENT
Start: 2021-01-01 | End: 2021-01-01

## 2021-01-01 RX ORDER — ATROPINE SULFATE 10 MG/ML
2 SOLUTION/ DROPS OPHTHALMIC
Qty: 1 ML | Refills: 0 | Status: SHIPPED | OUTPATIENT
Start: 2021-01-01

## 2021-01-01 RX ORDER — NALOXONE HYDROCHLORIDE 0.4 MG/ML
0.4 INJECTION, SOLUTION INTRAMUSCULAR; INTRAVENOUS; SUBCUTANEOUS
Status: ACTIVE | OUTPATIENT
Start: 2021-01-01 | End: 2021-01-01

## 2021-01-01 RX ORDER — BUPIVACAINE HYDROCHLORIDE AND EPINEPHRINE 5; 5 MG/ML; UG/ML
INJECTION, SOLUTION PERINEURAL PRN
Status: DISCONTINUED | OUTPATIENT
Start: 2021-01-01 | End: 2021-01-01 | Stop reason: HOSPADM

## 2021-01-01 RX ORDER — METOPROLOL TARTRATE 1 MG/ML
5 INJECTION, SOLUTION INTRAVENOUS
Status: COMPLETED | OUTPATIENT
Start: 2021-01-01 | End: 2021-01-01

## 2021-01-01 RX ORDER — METOPROLOL TARTRATE 25 MG/1
25 TABLET, FILM COATED ORAL 2 TIMES DAILY
Status: DISCONTINUED | OUTPATIENT
Start: 2021-01-01 | End: 2021-01-01

## 2021-01-01 RX ORDER — NALOXONE HYDROCHLORIDE 0.4 MG/ML
0.4 INJECTION, SOLUTION INTRAMUSCULAR; INTRAVENOUS; SUBCUTANEOUS
Status: DISCONTINUED | OUTPATIENT
Start: 2021-01-01 | End: 2021-01-01 | Stop reason: HOSPADM

## 2021-01-01 RX ORDER — LABETALOL HYDROCHLORIDE 5 MG/ML
INJECTION, SOLUTION INTRAVENOUS PRN
Status: DISCONTINUED | OUTPATIENT
Start: 2021-01-01 | End: 2021-01-01

## 2021-01-01 RX ORDER — CALCIUM CARBONATE 500 MG/1
1000 TABLET, CHEWABLE ORAL 4 TIMES DAILY PRN
Status: DISCONTINUED | OUTPATIENT
Start: 2021-01-01 | End: 2021-01-01

## 2021-01-01 RX ORDER — ANASTROZOLE 1 MG/1
1 TABLET ORAL DAILY
Status: ON HOLD | COMMUNITY
Start: 2020-01-01 | End: 2021-01-01

## 2021-01-01 RX ORDER — POLYETHYLENE GLYCOL 3350 17 G/17G
17 POWDER, FOR SOLUTION ORAL DAILY PRN
Status: DISCONTINUED | OUTPATIENT
Start: 2021-01-01 | End: 2021-01-01

## 2021-01-01 RX ORDER — VANCOMYCIN HYDROCHLORIDE 125 MG/1
125 CAPSULE ORAL 4 TIMES DAILY
Status: DISCONTINUED | OUTPATIENT
Start: 2021-01-01 | End: 2021-01-01

## 2021-01-01 RX ORDER — CARVEDILOL 12.5 MG/1
12.5 TABLET ORAL 2 TIMES DAILY WITH MEALS
Status: DISCONTINUED | OUTPATIENT
Start: 2021-01-01 | End: 2021-01-01

## 2021-01-01 RX ORDER — ONDANSETRON 4 MG/1
4 TABLET, ORALLY DISINTEGRATING ORAL EVERY 6 HOURS PRN
Status: DISCONTINUED | OUTPATIENT
Start: 2021-01-01 | End: 2021-01-01 | Stop reason: HOSPADM

## 2021-01-01 RX ORDER — SODIUM CHLORIDE, SODIUM LACTATE, POTASSIUM CHLORIDE, CALCIUM CHLORIDE 600; 310; 30; 20 MG/100ML; MG/100ML; MG/100ML; MG/100ML
INJECTION, SOLUTION INTRAVENOUS CONTINUOUS
Status: DISCONTINUED | OUTPATIENT
Start: 2021-01-01 | End: 2021-01-01 | Stop reason: HOSPADM

## 2021-01-01 RX ORDER — LORAZEPAM 2 MG/ML
1 CONCENTRATE ORAL EVERY 8 HOURS
Status: DISCONTINUED | OUTPATIENT
Start: 2021-01-01 | End: 2021-01-01

## 2021-01-01 RX ORDER — DEXAMETHASONE SODIUM PHOSPHATE 4 MG/ML
4 INJECTION, SOLUTION INTRA-ARTICULAR; INTRALESIONAL; INTRAMUSCULAR; INTRAVENOUS; SOFT TISSUE EVERY 12 HOURS
Status: DISCONTINUED | OUTPATIENT
Start: 2021-01-01 | End: 2021-01-01

## 2021-01-01 RX ORDER — HYDRALAZINE HYDROCHLORIDE 20 MG/ML
10-20 INJECTION INTRAMUSCULAR; INTRAVENOUS EVERY 30 MIN PRN
Status: DISCONTINUED | OUTPATIENT
Start: 2021-01-01 | End: 2021-01-01

## 2021-01-01 RX ORDER — DEXAMETHASONE SODIUM PHOSPHATE 4 MG/ML
INJECTION, SOLUTION INTRA-ARTICULAR; INTRALESIONAL; INTRAMUSCULAR; INTRAVENOUS; SOFT TISSUE PRN
Status: DISCONTINUED | OUTPATIENT
Start: 2021-01-01 | End: 2021-01-01

## 2021-01-01 RX ORDER — LISINOPRIL 20 MG/1
20 TABLET ORAL DAILY
Status: ON HOLD | COMMUNITY
Start: 2020-01-01 | End: 2021-01-01

## 2021-01-01 RX ORDER — METOPROLOL TARTRATE 1 MG/ML
INJECTION, SOLUTION INTRAVENOUS
Status: DISCONTINUED
Start: 2021-01-01 | End: 2021-01-01 | Stop reason: HOSPADM

## 2021-01-01 RX ORDER — MINERAL OIL/HYDROPHIL PETROLAT
OINTMENT (GRAM) TOPICAL EVERY 8 HOURS PRN
Status: DISCONTINUED | OUTPATIENT
Start: 2021-01-01 | End: 2021-01-01 | Stop reason: HOSPADM

## 2021-01-01 RX ORDER — CHLORHEXIDINE GLUCONATE ORAL RINSE 1.2 MG/ML
15 SOLUTION DENTAL EVERY 12 HOURS
Status: DISCONTINUED | OUTPATIENT
Start: 2021-01-01 | End: 2021-01-01 | Stop reason: CLARIF

## 2021-01-01 RX ORDER — LANOLIN ALCOHOL/MO/W.PET/CERES
3 CREAM (GRAM) TOPICAL
COMMUNITY

## 2021-01-01 RX ORDER — MAGNESIUM HYDROXIDE/ALUMINUM HYDROXICE/SIMETHICONE 120; 1200; 1200 MG/30ML; MG/30ML; MG/30ML
30 SUSPENSION ORAL EVERY 4 HOURS PRN
Status: DISCONTINUED | OUTPATIENT
Start: 2021-01-01 | End: 2021-01-01

## 2021-01-01 RX ORDER — LORAZEPAM 1 MG/1
1 TABLET ORAL EVERY 8 HOURS
Status: DISCONTINUED | OUTPATIENT
Start: 2021-01-01 | End: 2021-01-01 | Stop reason: HOSPADM

## 2021-01-01 RX ORDER — ACETAMINOPHEN 325 MG/10.15ML
975 LIQUID ORAL EVERY 8 HOURS
Status: COMPLETED | OUTPATIENT
Start: 2021-01-01 | End: 2021-01-01

## 2021-01-01 RX ADMIN — TC 99M MEDRONATE 28 MCI.: 20 INJECTION, POWDER, LYOPHILIZED, FOR SOLUTION INTRAVENOUS at 07:15

## 2021-01-01 RX ADMIN — DEXAMETHASONE SODIUM PHOSPHATE 4 MG: 4 INJECTION, SOLUTION INTRAMUSCULAR; INTRAVENOUS at 21:03

## 2021-01-01 RX ADMIN — LISINOPRIL 10 MG: 10 TABLET ORAL at 09:06

## 2021-01-01 RX ADMIN — OXYCODONE HYDROCHLORIDE 5 MG: 5 TABLET ORAL at 09:19

## 2021-01-01 RX ADMIN — Medication 10 MG: at 18:07

## 2021-01-01 RX ADMIN — Medication 5 MG: at 14:10

## 2021-01-01 RX ADMIN — FENTANYL CITRATE 50 MCG: 50 INJECTION, SOLUTION INTRAMUSCULAR; INTRAVENOUS at 13:05

## 2021-01-01 RX ADMIN — CARVEDILOL 12.5 MG: 6.25 TABLET, FILM COATED ORAL at 18:04

## 2021-01-01 RX ADMIN — PANTOPRAZOLE SODIUM 40 MG: 40 TABLET, DELAYED RELEASE ORAL at 12:49

## 2021-01-01 RX ADMIN — ACETAMINOPHEN 975 MG: 325 SUSPENSION ORAL at 05:02

## 2021-01-01 RX ADMIN — CEFTRIAXONE SODIUM 1 G: 1 INJECTION, POWDER, FOR SOLUTION INTRAMUSCULAR; INTRAVENOUS at 15:28

## 2021-01-01 RX ADMIN — INSULIN ASPART 1 UNITS: 100 INJECTION, SOLUTION INTRAVENOUS; SUBCUTANEOUS at 23:57

## 2021-01-01 RX ADMIN — ACETAMINOPHEN 975 MG: 325 SUSPENSION ORAL at 00:02

## 2021-01-01 RX ADMIN — AMIODARONE HYDROCHLORIDE 200 MG: 200 TABLET ORAL at 10:14

## 2021-01-01 RX ADMIN — MORPHINE SULFATE 5 MG: 100 SOLUTION ORAL at 08:21

## 2021-01-01 RX ADMIN — CARVEDILOL 12.5 MG: 12.5 TABLET, FILM COATED ORAL at 18:15

## 2021-01-01 RX ADMIN — CARVEDILOL 12.5 MG: 6.25 TABLET, FILM COATED ORAL at 08:04

## 2021-01-01 RX ADMIN — ROCURONIUM BROMIDE 30 MG: 10 INJECTION INTRAVENOUS at 14:10

## 2021-01-01 RX ADMIN — SODIUM PHOSPHATE, MONOBASIC, MONOHYDRATE 9 MMOL: 276; 142 INJECTION, SOLUTION INTRAVENOUS at 05:56

## 2021-01-01 RX ADMIN — ONDANSETRON 4 MG: 2 INJECTION INTRAMUSCULAR; INTRAVENOUS at 17:13

## 2021-01-01 RX ADMIN — HYDRALAZINE HYDROCHLORIDE 10 MG: 20 INJECTION INTRAMUSCULAR; INTRAVENOUS at 11:10

## 2021-01-01 RX ADMIN — DOCUSATE SODIUM 100 MG: 50 LIQUID ORAL at 21:34

## 2021-01-01 RX ADMIN — NOREPINEPHRINE BITARTRATE 6.4 MCG: 1 INJECTION, SOLUTION, CONCENTRATE INTRAVENOUS at 22:45

## 2021-01-01 RX ADMIN — ACETAMINOPHEN 650 MG: 325 TABLET, FILM COATED ORAL at 09:28

## 2021-01-01 RX ADMIN — POTASSIUM & SODIUM PHOSPHATES POWDER PACK 280-160-250 MG 1 PACKET: 280-160-250 PACK at 22:12

## 2021-01-01 RX ADMIN — POTASSIUM & SODIUM PHOSPHATES POWDER PACK 280-160-250 MG 1 PACKET: 280-160-250 PACK at 08:27

## 2021-01-01 RX ADMIN — HYDRALAZINE HYDROCHLORIDE 10 MG: 20 INJECTION INTRAMUSCULAR; INTRAVENOUS at 04:13

## 2021-01-01 RX ADMIN — PHENYLEPHRINE HYDROCHLORIDE 50 MCG: 10 INJECTION INTRAVENOUS at 16:23

## 2021-01-01 RX ADMIN — HYDROMORPHONE HYDROCHLORIDE 0.5 MG: 1 INJECTION, SOLUTION INTRAMUSCULAR; INTRAVENOUS; SUBCUTANEOUS at 03:04

## 2021-01-01 RX ADMIN — FENTANYL CITRATE 50 MCG: 50 INJECTION, SOLUTION INTRAMUSCULAR; INTRAVENOUS at 15:52

## 2021-01-01 RX ADMIN — SODIUM CHLORIDE 65 ML: 9 INJECTION, SOLUTION INTRAVENOUS at 13:56

## 2021-01-01 RX ADMIN — ACETAMINOPHEN 975 MG: 325 SUSPENSION ORAL at 13:45

## 2021-01-01 RX ADMIN — HYDRALAZINE HYDROCHLORIDE 10 MG: 20 INJECTION INTRAMUSCULAR; INTRAVENOUS at 04:04

## 2021-01-01 RX ADMIN — ANASTROZOLE 1 MG: 1 TABLET, COATED ORAL at 22:16

## 2021-01-01 RX ADMIN — DEXAMETHASONE SODIUM PHOSPHATE 4 MG: 4 INJECTION, SOLUTION INTRAMUSCULAR; INTRAVENOUS at 15:45

## 2021-01-01 RX ADMIN — SENNOSIDES 5 ML: 8.8 SYRUP ORAL at 20:35

## 2021-01-01 RX ADMIN — SODIUM CHLORIDE, POTASSIUM CHLORIDE, SODIUM LACTATE AND CALCIUM CHLORIDE: 600; 310; 30; 20 INJECTION, SOLUTION INTRAVENOUS at 21:21

## 2021-01-01 RX ADMIN — MORPHINE SULFATE 5 MG: 100 SOLUTION ORAL at 08:34

## 2021-01-01 RX ADMIN — CARVEDILOL 12.5 MG: 6.25 TABLET, FILM COATED ORAL at 08:27

## 2021-01-01 RX ADMIN — PANTOPRAZOLE SODIUM 40 MG: 40 TABLET, DELAYED RELEASE ORAL at 09:05

## 2021-01-01 RX ADMIN — DEXAMETHASONE SODIUM PHOSPHATE 4 MG: 4 INJECTION, SOLUTION INTRAMUSCULAR; INTRAVENOUS at 09:25

## 2021-01-01 RX ADMIN — HYDROMORPHONE HYDROCHLORIDE 0.4 MG: 1 INJECTION, SOLUTION INTRAMUSCULAR; INTRAVENOUS; SUBCUTANEOUS at 20:48

## 2021-01-01 RX ADMIN — HYDROMORPHONE HYDROCHLORIDE 0.3 MG: 1 INJECTION, SOLUTION INTRAMUSCULAR; INTRAVENOUS; SUBCUTANEOUS at 05:47

## 2021-01-01 RX ADMIN — DOCUSATE SODIUM 100 MG: 50 LIQUID ORAL at 08:51

## 2021-01-01 RX ADMIN — LABETALOL HYDROCHLORIDE 5 MG: 5 INJECTION INTRAVENOUS at 17:40

## 2021-01-01 RX ADMIN — SODIUM CHLORIDE 3 UNITS/HR: 9 INJECTION, SOLUTION INTRAVENOUS at 06:44

## 2021-01-01 RX ADMIN — Medication 15 MG: at 18:13

## 2021-01-01 RX ADMIN — NYSTATIN 500000 UNITS: 100000 SUSPENSION ORAL at 00:16

## 2021-01-01 RX ADMIN — POTASSIUM & SODIUM PHOSPHATES POWDER PACK 280-160-250 MG 1 PACKET: 280-160-250 PACK at 21:34

## 2021-01-01 RX ADMIN — NYSTATIN 500000 UNITS: 100000 SUSPENSION ORAL at 08:13

## 2021-01-01 RX ADMIN — DOCUSATE SODIUM 50 MG: 50 LIQUID ORAL at 08:52

## 2021-01-01 RX ADMIN — Medication 1 UNITS: at 22:13

## 2021-01-01 RX ADMIN — OXYCODONE HYDROCHLORIDE 5 MG: 5 TABLET ORAL at 23:59

## 2021-01-01 RX ADMIN — OXYCODONE HYDROCHLORIDE 5 MG: 5 TABLET ORAL at 11:54

## 2021-01-01 RX ADMIN — OXYCODONE HYDROCHLORIDE 5 MG: 5 TABLET ORAL at 15:10

## 2021-01-01 RX ADMIN — AMIODARONE HYDROCHLORIDE 400 MG: 200 TABLET ORAL at 21:20

## 2021-01-01 RX ADMIN — MEROPENEM 1 G: 1 INJECTION, POWDER, FOR SOLUTION INTRAVENOUS at 21:09

## 2021-01-01 RX ADMIN — LABETALOL HYDROCHLORIDE 10 MG: 5 INJECTION, SOLUTION INTRAVENOUS at 16:13

## 2021-01-01 RX ADMIN — HYDROMORPHONE HYDROCHLORIDE 0.5 MG: 1 INJECTION, SOLUTION INTRAMUSCULAR; INTRAVENOUS; SUBCUTANEOUS at 21:34

## 2021-01-01 RX ADMIN — CHLORHEXIDINE GLUCONATE 0.12% ORAL RINSE 15 ML: 1.2 LIQUID ORAL at 21:03

## 2021-01-01 RX ADMIN — ROCURONIUM BROMIDE 50 MG: 10 INJECTION INTRAVENOUS at 22:20

## 2021-01-01 RX ADMIN — DOCUSATE SODIUM 100 MG: 50 LIQUID ORAL at 08:40

## 2021-01-01 RX ADMIN — HYDROMORPHONE HYDROCHLORIDE 0.5 MG: 1 INJECTION, SOLUTION INTRAMUSCULAR; INTRAVENOUS; SUBCUTANEOUS at 15:07

## 2021-01-01 RX ADMIN — DEXAMETHASONE SODIUM PHOSPHATE 4 MG: 4 INJECTION, SOLUTION INTRAMUSCULAR; INTRAVENOUS at 00:14

## 2021-01-01 RX ADMIN — DEXAMETHASONE SODIUM PHOSPHATE 4 MG: 4 INJECTION, SOLUTION INTRAMUSCULAR; INTRAVENOUS at 20:03

## 2021-01-01 RX ADMIN — HYDRALAZINE HYDROCHLORIDE 10 MG: 20 INJECTION INTRAMUSCULAR; INTRAVENOUS at 06:38

## 2021-01-01 RX ADMIN — SODIUM CHLORIDE: 9 INJECTION, SOLUTION INTRAVENOUS at 21:11

## 2021-01-01 RX ADMIN — PHENYLEPHRINE HYDROCHLORIDE 100 MCG: 10 INJECTION INTRAVENOUS at 18:13

## 2021-01-01 RX ADMIN — MEROPENEM 1 G: 1 INJECTION, POWDER, FOR SOLUTION INTRAVENOUS at 05:38

## 2021-01-01 RX ADMIN — HYDROMORPHONE HYDROCHLORIDE 0.5 MG: 1 INJECTION, SOLUTION INTRAMUSCULAR; INTRAVENOUS; SUBCUTANEOUS at 23:34

## 2021-01-01 RX ADMIN — HYDROMORPHONE HYDROCHLORIDE 0.3 MG: 1 INJECTION, SOLUTION INTRAMUSCULAR; INTRAVENOUS; SUBCUTANEOUS at 06:12

## 2021-01-01 RX ADMIN — DOCUSATE SODIUM 100 MG: 50 LIQUID ORAL at 08:25

## 2021-01-01 RX ADMIN — MEROPENEM 1 G: 1 INJECTION, POWDER, FOR SOLUTION INTRAVENOUS at 13:18

## 2021-01-01 RX ADMIN — DOCUSATE SODIUM 100 MG: 50 LIQUID ORAL at 20:35

## 2021-01-01 RX ADMIN — HYDROMORPHONE HYDROCHLORIDE 0.3 MG: 1 INJECTION, SOLUTION INTRAMUSCULAR; INTRAVENOUS; SUBCUTANEOUS at 10:59

## 2021-01-01 RX ADMIN — SUGAMMADEX 200 MG: 100 INJECTION, SOLUTION INTRAVENOUS at 17:34

## 2021-01-01 RX ADMIN — SODIUM PHOSPHATE, MONOBASIC, MONOHYDRATE 15 MMOL: 276; 142 INJECTION, SOLUTION INTRAVENOUS at 06:03

## 2021-01-01 RX ADMIN — INSULIN ASPART 1 UNITS: 100 INJECTION, SOLUTION INTRAVENOUS; SUBCUTANEOUS at 06:23

## 2021-01-01 RX ADMIN — NOREPINEPHRINE BITARTRATE 0.03 MCG/KG/MIN: 1 INJECTION, SOLUTION, CONCENTRATE INTRAVENOUS at 22:20

## 2021-01-01 RX ADMIN — DEXAMETHASONE SODIUM PHOSPHATE 4 MG: 4 INJECTION, SOLUTION INTRAMUSCULAR; INTRAVENOUS at 15:26

## 2021-01-01 RX ADMIN — SENNOSIDES 5 ML: 8.8 SYRUP ORAL at 08:25

## 2021-01-01 RX ADMIN — FENTANYL CITRATE 150 MCG: 50 INJECTION, SOLUTION INTRAMUSCULAR; INTRAVENOUS at 13:51

## 2021-01-01 RX ADMIN — Medication 0.03 MCG/KG/MIN: at 02:30

## 2021-01-01 RX ADMIN — ACETAMINOPHEN 650 MG: 325 TABLET, FILM COATED ORAL at 18:13

## 2021-01-01 RX ADMIN — OXYCODONE HYDROCHLORIDE 5 MG: 5 TABLET ORAL at 00:21

## 2021-01-01 RX ADMIN — INSULIN GLARGINE 8 UNITS: 100 INJECTION, SOLUTION SUBCUTANEOUS at 07:59

## 2021-01-01 RX ADMIN — ACETAMINOPHEN 975 MG: 325 SUSPENSION ORAL at 21:20

## 2021-01-01 RX ADMIN — DEXAMETHASONE SODIUM PHOSPHATE 4 MG: 4 INJECTION, SOLUTION INTRAMUSCULAR; INTRAVENOUS at 07:49

## 2021-01-01 RX ADMIN — LABETALOL HYDROCHLORIDE 10 MG: 5 INJECTION, SOLUTION INTRAVENOUS at 23:03

## 2021-01-01 RX ADMIN — OXYCODONE HYDROCHLORIDE 5 MG: 5 TABLET ORAL at 18:40

## 2021-01-01 RX ADMIN — HYDROMORPHONE HYDROCHLORIDE 0.5 MG: 1 INJECTION, SOLUTION INTRAMUSCULAR; INTRAVENOUS; SUBCUTANEOUS at 21:01

## 2021-01-01 RX ADMIN — DEXAMETHASONE SODIUM PHOSPHATE 4 MG: 4 INJECTION, SOLUTION INTRAMUSCULAR; INTRAVENOUS at 01:09

## 2021-01-01 RX ADMIN — LIDOCAINE HYDROCHLORIDE 80 MG: 20 INJECTION, SOLUTION INFILTRATION; PERINEURAL at 17:42

## 2021-01-01 RX ADMIN — ANASTROZOLE 1 MG: 1 TABLET, COATED ORAL at 09:13

## 2021-01-01 RX ADMIN — OXYCODONE HYDROCHLORIDE 5 MG: 5 TABLET ORAL at 14:01

## 2021-01-01 RX ADMIN — LISINOPRIL 20 MG: 20 TABLET ORAL at 08:51

## 2021-01-01 RX ADMIN — VANCOMYCIN HYDROCHLORIDE 125 MG: KIT at 18:37

## 2021-01-01 RX ADMIN — MORPHINE SULFATE 10 MG: 10 SOLUTION ORAL at 04:31

## 2021-01-01 RX ADMIN — LISINOPRIL 20 MG: 20 TABLET ORAL at 08:31

## 2021-01-01 RX ADMIN — SODIUM PHOSPHATE, MONOBASIC, MONOHYDRATE 9 MMOL: 276; 142 INJECTION, SOLUTION INTRAVENOUS at 06:04

## 2021-01-01 RX ADMIN — AMIODARONE HYDROCHLORIDE 400 MG: 200 TABLET ORAL at 21:43

## 2021-01-01 RX ADMIN — IOPAMIDOL 80 ML: 755 INJECTION, SOLUTION INTRAVENOUS at 09:07

## 2021-01-01 RX ADMIN — OXYCODONE HYDROCHLORIDE 5 MG: 5 TABLET ORAL at 21:59

## 2021-01-01 RX ADMIN — HYDROMORPHONE HYDROCHLORIDE 0.5 MG: 1 INJECTION, SOLUTION INTRAMUSCULAR; INTRAVENOUS; SUBCUTANEOUS at 03:03

## 2021-01-01 RX ADMIN — NOREPINEPHRINE BITARTRATE 6.4 MCG: 1 INJECTION, SOLUTION, CONCENTRATE INTRAVENOUS at 22:34

## 2021-01-01 RX ADMIN — INSULIN GLARGINE 17 UNITS: 100 INJECTION, SOLUTION SUBCUTANEOUS at 22:28

## 2021-01-01 RX ADMIN — ROCURONIUM BROMIDE 50 MG: 10 INJECTION INTRAVENOUS at 21:04

## 2021-01-01 RX ADMIN — NYSTATIN 500000 UNITS: 100000 SUSPENSION ORAL at 00:08

## 2021-01-01 RX ADMIN — SENNOSIDES 10 ML: 8.8 SYRUP ORAL at 07:51

## 2021-01-01 RX ADMIN — OXYCODONE HYDROCHLORIDE 5 MG: 5 TABLET ORAL at 11:39

## 2021-01-01 RX ADMIN — HYDROMORPHONE HYDROCHLORIDE 0.5 MG: 1 INJECTION, SOLUTION INTRAMUSCULAR; INTRAVENOUS; SUBCUTANEOUS at 10:39

## 2021-01-01 RX ADMIN — MULTIVITAMIN 15 ML: LIQUID ORAL at 08:40

## 2021-01-01 RX ADMIN — HYDROMORPHONE HYDROCHLORIDE 0.3 MG: 1 INJECTION, SOLUTION INTRAMUSCULAR; INTRAVENOUS; SUBCUTANEOUS at 20:19

## 2021-01-01 RX ADMIN — LABETALOL HYDROCHLORIDE 15 MG: 5 INJECTION INTRAVENOUS at 17:32

## 2021-01-01 RX ADMIN — Medication 1 UNITS: at 22:15

## 2021-01-01 RX ADMIN — DEXAMETHASONE SODIUM PHOSPHATE 4 MG: 4 INJECTION, SOLUTION INTRAMUSCULAR; INTRAVENOUS at 08:13

## 2021-01-01 RX ADMIN — LABETALOL HYDROCHLORIDE 10 MG: 5 INJECTION, SOLUTION INTRAVENOUS at 00:05

## 2021-01-01 RX ADMIN — METOPROLOL TARTRATE 25 MG: 25 TABLET, FILM COATED ORAL at 08:13

## 2021-01-01 RX ADMIN — HYDRALAZINE HYDROCHLORIDE 20 MG: 20 INJECTION INTRAMUSCULAR; INTRAVENOUS at 09:03

## 2021-01-01 RX ADMIN — CARVEDILOL 6.25 MG: 6.25 TABLET, FILM COATED ORAL at 17:02

## 2021-01-01 RX ADMIN — HYDRALAZINE HYDROCHLORIDE 10 MG: 20 INJECTION INTRAMUSCULAR; INTRAVENOUS at 07:46

## 2021-01-01 RX ADMIN — CARVEDILOL 12.5 MG: 6.25 TABLET, FILM COATED ORAL at 17:47

## 2021-01-01 RX ADMIN — AMIODARONE HYDROCHLORIDE 400 MG: 200 TABLET ORAL at 21:42

## 2021-01-01 RX ADMIN — OXYCODONE HYDROCHLORIDE 5 MG: 5 TABLET ORAL at 05:01

## 2021-01-01 RX ADMIN — NYSTATIN 500000 UNITS: 100000 SUSPENSION ORAL at 13:12

## 2021-01-01 RX ADMIN — ACETAMINOPHEN 975 MG: 325 SUSPENSION ORAL at 12:47

## 2021-01-01 RX ADMIN — ACETAMINOPHEN 975 MG: 325 SUSPENSION ORAL at 13:03

## 2021-01-01 RX ADMIN — HYDRALAZINE HYDROCHLORIDE 10 MG: 20 INJECTION INTRAMUSCULAR; INTRAVENOUS at 15:48

## 2021-01-01 RX ADMIN — DOCUSATE SODIUM 50 MG: 50 LIQUID ORAL at 00:51

## 2021-01-01 RX ADMIN — NOREPINEPHRINE BITARTRATE 6.4 MCG: 1 INJECTION, SOLUTION, CONCENTRATE INTRAVENOUS at 22:25

## 2021-01-01 RX ADMIN — PHENYLEPHRINE HYDROCHLORIDE 200 MCG: 10 INJECTION INTRAVENOUS at 21:21

## 2021-01-01 RX ADMIN — SODIUM CHLORIDE 500 ML: 9 INJECTION, SOLUTION INTRAVENOUS at 05:49

## 2021-01-01 RX ADMIN — MEROPENEM 1 G: 1 INJECTION, POWDER, FOR SOLUTION INTRAVENOUS at 21:04

## 2021-01-01 RX ADMIN — PANTOPRAZOLE SODIUM 40 MG: 40 TABLET, DELAYED RELEASE ORAL at 08:31

## 2021-01-01 RX ADMIN — HYDRALAZINE HYDROCHLORIDE 10 MG: 20 INJECTION INTRAMUSCULAR; INTRAVENOUS at 19:51

## 2021-01-01 RX ADMIN — INSULIN GLARGINE 15 UNITS: 100 INJECTION, SOLUTION SUBCUTANEOUS at 21:46

## 2021-01-01 RX ADMIN — INSULIN GLARGINE 15 UNITS: 100 INJECTION, SOLUTION SUBCUTANEOUS at 21:20

## 2021-01-01 RX ADMIN — DEXAMETHASONE SODIUM PHOSPHATE 8 MG: 4 INJECTION, SOLUTION INTRA-ARTICULAR; INTRALESIONAL; INTRAMUSCULAR; INTRAVENOUS; SOFT TISSUE at 14:05

## 2021-01-01 RX ADMIN — HYDROMORPHONE HYDROCHLORIDE 0.4 MG: 1 INJECTION, SOLUTION INTRAMUSCULAR; INTRAVENOUS; SUBCUTANEOUS at 05:13

## 2021-01-01 RX ADMIN — CARVEDILOL 12.5 MG: 6.25 TABLET, FILM COATED ORAL at 08:40

## 2021-01-01 RX ADMIN — MEROPENEM 1 G: 1 INJECTION, POWDER, FOR SOLUTION INTRAVENOUS at 13:03

## 2021-01-01 RX ADMIN — INSULIN ASPART 1 UNITS: 100 INJECTION, SOLUTION INTRAVENOUS; SUBCUTANEOUS at 10:01

## 2021-01-01 RX ADMIN — INSULIN GLARGINE 10 UNITS: 100 INJECTION, SOLUTION SUBCUTANEOUS at 21:17

## 2021-01-01 RX ADMIN — HYDRALAZINE HYDROCHLORIDE 10 MG: 20 INJECTION INTRAMUSCULAR; INTRAVENOUS at 23:42

## 2021-01-01 RX ADMIN — DEXAMETHASONE SODIUM PHOSPHATE 4 MG: 4 INJECTION, SOLUTION INTRAMUSCULAR; INTRAVENOUS at 19:51

## 2021-01-01 RX ADMIN — LABETALOL HYDROCHLORIDE 10 MG: 5 INJECTION INTRAVENOUS at 18:02

## 2021-01-01 RX ADMIN — AMIODARONE HYDROCHLORIDE 400 MG: 200 TABLET ORAL at 10:48

## 2021-01-01 RX ADMIN — HYDRALAZINE HYDROCHLORIDE 10 MG: 20 INJECTION INTRAMUSCULAR; INTRAVENOUS at 01:07

## 2021-01-01 RX ADMIN — ONDANSETRON 4 MG: 4 TABLET, ORALLY DISINTEGRATING ORAL at 11:54

## 2021-01-01 RX ADMIN — LABETALOL HYDROCHLORIDE 10 MG: 5 INJECTION INTRAVENOUS at 15:37

## 2021-01-01 RX ADMIN — DOCUSATE SODIUM 100 MG: 50 LIQUID ORAL at 09:29

## 2021-01-01 RX ADMIN — SODIUM CHLORIDE 64 ML: 9 INJECTION, SOLUTION INTRAVENOUS at 09:07

## 2021-01-01 RX ADMIN — CARVEDILOL 12.5 MG: 6.25 TABLET, FILM COATED ORAL at 18:12

## 2021-01-01 RX ADMIN — SENNOSIDES 5 ML: 8.8 SYRUP ORAL at 21:03

## 2021-01-01 RX ADMIN — MULTIVITAMIN 15 ML: LIQUID ORAL at 13:10

## 2021-01-01 RX ADMIN — AMIODARONE HYDROCHLORIDE 400 MG: 200 TABLET ORAL at 21:34

## 2021-01-01 RX ADMIN — ACETAMINOPHEN 650 MG: 325 TABLET, FILM COATED ORAL at 22:15

## 2021-01-01 RX ADMIN — ACETAMINOPHEN 975 MG: 325 SUSPENSION ORAL at 23:45

## 2021-01-01 RX ADMIN — NYSTATIN 500000 UNITS: 100000 SUSPENSION ORAL at 18:13

## 2021-01-01 RX ADMIN — PANTOPRAZOLE SODIUM 40 MG: 40 TABLET, DELAYED RELEASE ORAL at 10:14

## 2021-01-01 RX ADMIN — MEROPENEM 1 G: 1 INJECTION, POWDER, FOR SOLUTION INTRAVENOUS at 13:41

## 2021-01-01 RX ADMIN — DEXAMETHASONE SODIUM PHOSPHATE 4 MG: 4 INJECTION, SOLUTION INTRAMUSCULAR; INTRAVENOUS at 19:41

## 2021-01-01 RX ADMIN — LABETALOL HYDROCHLORIDE 10 MG: 5 INJECTION, SOLUTION INTRAVENOUS at 08:13

## 2021-01-01 RX ADMIN — NYSTATIN 500000 UNITS: 100000 SUSPENSION ORAL at 00:14

## 2021-01-01 RX ADMIN — NYSTATIN 500000 UNITS: 100000 SUSPENSION ORAL at 11:55

## 2021-01-01 RX ADMIN — HYDROMORPHONE HYDROCHLORIDE 0.5 MG: 1 INJECTION, SOLUTION INTRAMUSCULAR; INTRAVENOUS; SUBCUTANEOUS at 21:52

## 2021-01-01 RX ADMIN — IOPAMIDOL 82 ML: 755 INJECTION, SOLUTION INTRAVENOUS at 13:56

## 2021-01-01 RX ADMIN — NYSTATIN 500000 UNITS: 100000 SUSPENSION ORAL at 11:59

## 2021-01-01 RX ADMIN — PHENYLEPHRINE HYDROCHLORIDE 200 MCG: 10 INJECTION INTRAVENOUS at 22:12

## 2021-01-01 RX ADMIN — PANTOPRAZOLE SODIUM 40 MG: 40 TABLET, DELAYED RELEASE ORAL at 09:30

## 2021-01-01 RX ADMIN — DEXAMETHASONE SODIUM PHOSPHATE 4 MG: 4 INJECTION, SOLUTION INTRAMUSCULAR; INTRAVENOUS at 22:17

## 2021-01-01 RX ADMIN — CHLORHEXIDINE GLUCONATE 0.12% ORAL RINSE 15 ML: 1.2 LIQUID ORAL at 20:54

## 2021-01-01 RX ADMIN — PANTOPRAZOLE SODIUM 40 MG: 40 TABLET, DELAYED RELEASE ORAL at 08:01

## 2021-01-01 RX ADMIN — OXYCODONE HYDROCHLORIDE 5 MG: 5 TABLET ORAL at 21:20

## 2021-01-01 RX ADMIN — CARVEDILOL 12.5 MG: 6.25 TABLET, FILM COATED ORAL at 08:31

## 2021-01-01 RX ADMIN — PANTOPRAZOLE SODIUM 40 MG: 40 TABLET, DELAYED RELEASE ORAL at 08:26

## 2021-01-01 RX ADMIN — CHLORHEXIDINE GLUCONATE 0.12% ORAL RINSE 15 ML: 1.2 LIQUID ORAL at 07:42

## 2021-01-01 RX ADMIN — LABETALOL HYDROCHLORIDE 10 MG: 5 INJECTION, SOLUTION INTRAVENOUS at 14:01

## 2021-01-01 RX ADMIN — ACETAMINOPHEN 975 MG: 325 SUSPENSION ORAL at 06:21

## 2021-01-01 RX ADMIN — ROCURONIUM BROMIDE 50 MG: 10 INJECTION INTRAVENOUS at 13:37

## 2021-01-01 RX ADMIN — DEXAMETHASONE SODIUM PHOSPHATE 4 MG: 4 INJECTION, SOLUTION INTRAMUSCULAR; INTRAVENOUS at 08:25

## 2021-01-01 RX ADMIN — ACETAMINOPHEN 975 MG: 325 SUSPENSION ORAL at 05:13

## 2021-01-01 RX ADMIN — HYDROMORPHONE HYDROCHLORIDE 0.3 MG: 1 INJECTION, SOLUTION INTRAMUSCULAR; INTRAVENOUS; SUBCUTANEOUS at 04:24

## 2021-01-01 RX ADMIN — INSULIN ASPART 2 UNITS: 100 INJECTION, SOLUTION INTRAVENOUS; SUBCUTANEOUS at 16:37

## 2021-01-01 RX ADMIN — SODIUM CHLORIDE: 0.9 INJECTION, SOLUTION INTRAVENOUS at 13:50

## 2021-01-01 RX ADMIN — HYDROMORPHONE HYDROCHLORIDE 0.5 MG: 1 INJECTION, SOLUTION INTRAMUSCULAR; INTRAVENOUS; SUBCUTANEOUS at 19:52

## 2021-01-01 RX ADMIN — METOPROLOL TARTRATE 12.5 MG: 25 TABLET, FILM COATED ORAL at 20:55

## 2021-01-01 RX ADMIN — ROCURONIUM BROMIDE 30 MG: 10 INJECTION INTRAVENOUS at 18:08

## 2021-01-01 RX ADMIN — DEXAMETHASONE SODIUM PHOSPHATE 4 MG: 4 INJECTION, SOLUTION INTRAMUSCULAR; INTRAVENOUS at 07:43

## 2021-01-01 RX ADMIN — INSULIN ASPART 3 UNITS: 100 INJECTION, SOLUTION INTRAVENOUS; SUBCUTANEOUS at 08:21

## 2021-01-01 RX ADMIN — INSULIN GLARGINE 8 UNITS: 100 INJECTION, SOLUTION SUBCUTANEOUS at 08:27

## 2021-01-01 RX ADMIN — LISINOPRIL 10 MG: 10 TABLET ORAL at 08:01

## 2021-01-01 RX ADMIN — FENTANYL CITRATE 25 MCG: 50 INJECTION, SOLUTION INTRAMUSCULAR; INTRAVENOUS at 16:39

## 2021-01-01 RX ADMIN — METOPROLOL TARTRATE 25 MG: 25 TABLET, FILM COATED ORAL at 10:23

## 2021-01-01 RX ADMIN — INSULIN GLARGINE 15 UNITS: 100 INJECTION, SOLUTION SUBCUTANEOUS at 22:24

## 2021-01-01 RX ADMIN — FENTANYL CITRATE 50 MCG: 50 INJECTION, SOLUTION INTRAMUSCULAR; INTRAVENOUS at 21:18

## 2021-01-01 RX ADMIN — POLYETHYLENE GLYCOL 3350 17 G: 17 POWDER, FOR SOLUTION ORAL at 14:33

## 2021-01-01 RX ADMIN — MORPHINE SULFATE 5 MG: 100 SOLUTION ORAL at 02:53

## 2021-01-01 RX ADMIN — INSULIN GLARGINE 8 UNITS: 100 INJECTION, SOLUTION SUBCUTANEOUS at 10:23

## 2021-01-01 RX ADMIN — ACETAMINOPHEN 975 MG: 325 SUSPENSION ORAL at 06:12

## 2021-01-01 RX ADMIN — DEXAMETHASONE SODIUM PHOSPHATE 4 MG: 4 INJECTION, SOLUTION INTRAMUSCULAR; INTRAVENOUS at 07:59

## 2021-01-01 RX ADMIN — AMIODARONE HYDROCHLORIDE 200 MG: 200 TABLET ORAL at 09:28

## 2021-01-01 RX ADMIN — NYSTATIN 500000 UNITS: 100000 SUSPENSION ORAL at 13:15

## 2021-01-01 RX ADMIN — HYDRALAZINE HYDROCHLORIDE 10 MG: 20 INJECTION INTRAMUSCULAR; INTRAVENOUS at 09:33

## 2021-01-01 RX ADMIN — NYSTATIN 500000 UNITS: 100000 SUSPENSION ORAL at 00:02

## 2021-01-01 RX ADMIN — SODIUM CHLORIDE, POTASSIUM CHLORIDE, SODIUM LACTATE AND CALCIUM CHLORIDE 500 ML: 600; 310; 30; 20 INJECTION, SOLUTION INTRAVENOUS at 06:08

## 2021-01-01 RX ADMIN — ACETAMINOPHEN 975 MG: 325 SUSPENSION ORAL at 21:09

## 2021-01-01 RX ADMIN — OXYCODONE HYDROCHLORIDE 5 MG: 5 TABLET ORAL at 06:59

## 2021-01-01 RX ADMIN — AMIODARONE HYDROCHLORIDE 200 MG: 200 TABLET ORAL at 08:01

## 2021-01-01 RX ADMIN — METOPROLOL TARTRATE 12.5 MG: 25 TABLET, FILM COATED ORAL at 07:15

## 2021-01-01 RX ADMIN — PHENYLEPHRINE HYDROCHLORIDE 200 MCG: 10 INJECTION INTRAVENOUS at 21:08

## 2021-01-01 RX ADMIN — PROPOFOL 80 MG: 10 INJECTION, EMULSION INTRAVENOUS at 13:37

## 2021-01-01 RX ADMIN — INSULIN ASPART 1 UNITS: 100 INJECTION, SOLUTION INTRAVENOUS; SUBCUTANEOUS at 02:49

## 2021-01-01 RX ADMIN — HYDRALAZINE HYDROCHLORIDE 10 MG: 20 INJECTION INTRAMUSCULAR; INTRAVENOUS at 13:17

## 2021-01-01 RX ADMIN — ACETAMINOPHEN 975 MG: 325 SUSPENSION ORAL at 19:38

## 2021-01-01 RX ADMIN — POTASSIUM & SODIUM PHOSPHATES POWDER PACK 280-160-250 MG 1 PACKET: 280-160-250 PACK at 22:13

## 2021-01-01 RX ADMIN — DEXAMETHASONE SODIUM PHOSPHATE 4 MG: 4 INJECTION, SOLUTION INTRAMUSCULAR; INTRAVENOUS at 06:21

## 2021-01-01 RX ADMIN — VECURONIUM BROMIDE 2 MG: 1 INJECTION, POWDER, LYOPHILIZED, FOR SOLUTION INTRAVENOUS at 15:38

## 2021-01-01 RX ADMIN — PANTOPRAZOLE SODIUM 40 MG: 40 TABLET, DELAYED RELEASE ORAL at 08:40

## 2021-01-01 RX ADMIN — CARVEDILOL 12.5 MG: 6.25 TABLET, FILM COATED ORAL at 17:07

## 2021-01-01 RX ADMIN — HYDROMORPHONE HYDROCHLORIDE 0.3 MG: 1 INJECTION, SOLUTION INTRAMUSCULAR; INTRAVENOUS; SUBCUTANEOUS at 07:51

## 2021-01-01 RX ADMIN — INSULIN GLARGINE 8 UNITS: 100 INJECTION, SOLUTION SUBCUTANEOUS at 08:26

## 2021-01-01 RX ADMIN — PANTOPRAZOLE SODIUM 40 MG: 40 TABLET, DELAYED RELEASE ORAL at 08:04

## 2021-01-01 RX ADMIN — HYDRALAZINE HYDROCHLORIDE 10 MG: 20 INJECTION INTRAMUSCULAR; INTRAVENOUS at 21:43

## 2021-01-01 RX ADMIN — DEXAMETHASONE SODIUM PHOSPHATE 4 MG: 4 INJECTION, SOLUTION INTRAMUSCULAR; INTRAVENOUS at 08:11

## 2021-01-01 RX ADMIN — ACETAMINOPHEN 975 MG: 325 SUSPENSION ORAL at 15:43

## 2021-01-01 RX ADMIN — PHENYLEPHRINE HYDROCHLORIDE 100 MCG: 10 INJECTION INTRAVENOUS at 16:10

## 2021-01-01 RX ADMIN — ONDANSETRON 4 MG: 2 INJECTION INTRAMUSCULAR; INTRAVENOUS at 18:29

## 2021-01-01 RX ADMIN — INSULIN GLARGINE 15 UNITS: 100 INJECTION, SOLUTION SUBCUTANEOUS at 22:08

## 2021-01-01 RX ADMIN — NYSTATIN 500000 UNITS: 100000 SUSPENSION ORAL at 17:00

## 2021-01-01 RX ADMIN — OXYCODONE HYDROCHLORIDE 5 MG: 5 TABLET ORAL at 23:47

## 2021-01-01 RX ADMIN — OXYCODONE HYDROCHLORIDE 5 MG: 5 TABLET ORAL at 19:38

## 2021-01-01 RX ADMIN — CEFAZOLIN SODIUM 1 G: 2 INJECTION, SOLUTION INTRAVENOUS at 16:13

## 2021-01-01 RX ADMIN — NYSTATIN 500000 UNITS: 100000 SUSPENSION ORAL at 12:18

## 2021-01-01 RX ADMIN — PHENYLEPHRINE HYDROCHLORIDE 0.25 MCG/KG/MIN: 10 INJECTION INTRAVENOUS at 21:09

## 2021-01-01 RX ADMIN — INSULIN GLARGINE 8 UNITS: 100 INJECTION, SOLUTION SUBCUTANEOUS at 08:10

## 2021-01-01 RX ADMIN — LORAZEPAM 1 MG: 2 INJECTION INTRAMUSCULAR; INTRAVENOUS at 09:29

## 2021-01-01 RX ADMIN — DEXAMETHASONE SODIUM PHOSPHATE 4 MG: 4 INJECTION, SOLUTION INTRAMUSCULAR; INTRAVENOUS at 19:52

## 2021-01-01 RX ADMIN — ACETAMINOPHEN 975 MG: 325 SUSPENSION ORAL at 21:34

## 2021-01-01 RX ADMIN — POTASSIUM & SODIUM PHOSPHATES POWDER PACK 280-160-250 MG 1 PACKET: 280-160-250 PACK at 16:18

## 2021-01-01 RX ADMIN — SODIUM CHLORIDE 4 UNITS/HR: 9 INJECTION, SOLUTION INTRAVENOUS at 01:50

## 2021-01-01 RX ADMIN — HYDRALAZINE HYDROCHLORIDE 10 MG: 20 INJECTION INTRAMUSCULAR; INTRAVENOUS at 18:18

## 2021-01-01 RX ADMIN — LABETALOL HYDROCHLORIDE 5 MG: 5 INJECTION INTRAVENOUS at 23:27

## 2021-01-01 RX ADMIN — LISINOPRIL 10 MG: 10 TABLET ORAL at 08:27

## 2021-01-01 RX ADMIN — LABETALOL HYDROCHLORIDE 10 MG: 5 INJECTION INTRAVENOUS at 14:33

## 2021-01-01 RX ADMIN — DOCUSATE SODIUM 100 MG: 50 LIQUID ORAL at 21:45

## 2021-01-01 RX ADMIN — CARVEDILOL 12.5 MG: 6.25 TABLET, FILM COATED ORAL at 17:08

## 2021-01-01 RX ADMIN — DEXAMETHASONE SODIUM PHOSPHATE 4 MG: 4 INJECTION, SOLUTION INTRAMUSCULAR; INTRAVENOUS at 08:27

## 2021-01-01 RX ADMIN — OXYCODONE HYDROCHLORIDE 5 MG: 5 TABLET ORAL at 23:45

## 2021-01-01 RX ADMIN — ROCURONIUM BROMIDE 20 MG: 10 INJECTION INTRAVENOUS at 15:01

## 2021-01-01 RX ADMIN — OXYCODONE HYDROCHLORIDE 5 MG: 5 TABLET ORAL at 12:49

## 2021-01-01 RX ADMIN — PHENYLEPHRINE HYDROCHLORIDE 200 MCG: 10 INJECTION INTRAVENOUS at 22:07

## 2021-01-01 RX ADMIN — PHENYLEPHRINE HYDROCHLORIDE 0.5 MCG/KG/MIN: 10 INJECTION, SOLUTION INTRAVENOUS at 00:39

## 2021-01-01 RX ADMIN — DEXAMETHASONE SODIUM PHOSPHATE 4 MG: 4 INJECTION, SOLUTION INTRAMUSCULAR; INTRAVENOUS at 00:05

## 2021-01-01 RX ADMIN — NYSTATIN 500000 UNITS: 100000 SUSPENSION ORAL at 05:50

## 2021-01-01 RX ADMIN — SODIUM CHLORIDE 2.5 MG/HR: 900 INJECTION, SOLUTION INTRAVENOUS at 14:47

## 2021-01-01 RX ADMIN — MEROPENEM 1 G: 1 INJECTION, POWDER, FOR SOLUTION INTRAVENOUS at 06:04

## 2021-01-01 RX ADMIN — Medication 5 MG: at 21:55

## 2021-01-01 RX ADMIN — ACETAMINOPHEN 975 MG: 325 SUSPENSION ORAL at 23:00

## 2021-01-01 RX ADMIN — NYSTATIN 500000 UNITS: 100000 SUSPENSION ORAL at 06:12

## 2021-01-01 RX ADMIN — ACETAMINOPHEN 975 MG: 325 SUSPENSION ORAL at 08:30

## 2021-01-01 RX ADMIN — HYDRALAZINE HYDROCHLORIDE 10 MG: 20 INJECTION INTRAMUSCULAR; INTRAVENOUS at 20:08

## 2021-01-01 RX ADMIN — SODIUM CHLORIDE, POTASSIUM CHLORIDE, SODIUM LACTATE AND CALCIUM CHLORIDE 500 ML: 600; 310; 30; 20 INJECTION, SOLUTION INTRAVENOUS at 06:31

## 2021-01-01 RX ADMIN — ACETAMINOPHEN 975 MG: 325 SUSPENSION ORAL at 13:16

## 2021-01-01 RX ADMIN — LABETALOL HYDROCHLORIDE 10 MG: 5 INJECTION, SOLUTION INTRAVENOUS at 05:26

## 2021-01-01 RX ADMIN — METOPROLOL TARTRATE 12.5 MG: 25 TABLET, FILM COATED ORAL at 08:13

## 2021-01-01 RX ADMIN — HYDROMORPHONE HYDROCHLORIDE 0.5 MG: 1 INJECTION, SOLUTION INTRAMUSCULAR; INTRAVENOUS; SUBCUTANEOUS at 01:43

## 2021-01-01 RX ADMIN — HYDRALAZINE HYDROCHLORIDE 10 MG: 20 INJECTION INTRAMUSCULAR; INTRAVENOUS at 13:09

## 2021-01-01 RX ADMIN — ACETAMINOPHEN 975 MG: 325 SUSPENSION ORAL at 21:03

## 2021-01-01 RX ADMIN — ACETAMINOPHEN 650 MG: 325 TABLET, FILM COATED ORAL at 19:23

## 2021-01-01 RX ADMIN — POLYETHYLENE GLYCOL 3350 17 G: 17 POWDER, FOR SOLUTION ORAL at 18:57

## 2021-01-01 RX ADMIN — LORAZEPAM 1 MG: 2 INJECTION INTRAMUSCULAR; INTRAVENOUS at 01:05

## 2021-01-01 RX ADMIN — HYDROMORPHONE HYDROCHLORIDE 0.5 MG: 1 INJECTION, SOLUTION INTRAMUSCULAR; INTRAVENOUS; SUBCUTANEOUS at 23:59

## 2021-01-01 RX ADMIN — ACETAMINOPHEN 650 MG: 325 TABLET, FILM COATED ORAL at 06:04

## 2021-01-01 RX ADMIN — MORPHINE SULFATE 5 MG: 100 SOLUTION ORAL at 22:57

## 2021-01-01 RX ADMIN — PROPOFOL 100 MG: 10 INJECTION, EMULSION INTRAVENOUS at 21:04

## 2021-01-01 RX ADMIN — HYDROMORPHONE HYDROCHLORIDE 0.5 MG: 1 INJECTION, SOLUTION INTRAMUSCULAR; INTRAVENOUS; SUBCUTANEOUS at 11:44

## 2021-01-01 RX ADMIN — NOREPINEPHRINE BITARTRATE 6.4 MCG: 1 INJECTION, SOLUTION, CONCENTRATE INTRAVENOUS at 22:59

## 2021-01-01 RX ADMIN — HYDRALAZINE HYDROCHLORIDE 10 MG: 20 INJECTION INTRAMUSCULAR; INTRAVENOUS at 03:02

## 2021-01-01 RX ADMIN — PHENYLEPHRINE HYDROCHLORIDE 200 MCG: 10 INJECTION INTRAVENOUS at 21:15

## 2021-01-01 RX ADMIN — SODIUM CHLORIDE, SODIUM LACTATE, POTASSIUM CHLORIDE, CALCIUM CHLORIDE: 600; 310; 30; 20 INJECTION, SOLUTION INTRAVENOUS at 17:33

## 2021-01-01 RX ADMIN — HYDRALAZINE HYDROCHLORIDE 10 MG: 20 INJECTION INTRAMUSCULAR; INTRAVENOUS at 20:16

## 2021-01-01 RX ADMIN — OXYCODONE HYDROCHLORIDE 5 MG: 5 TABLET ORAL at 04:36

## 2021-01-01 RX ADMIN — NYSTATIN 500000 UNITS: 100000 SUSPENSION ORAL at 06:21

## 2021-01-01 RX ADMIN — SODIUM CHLORIDE: 4.5 INJECTION, SOLUTION INTRAVENOUS at 23:38

## 2021-01-01 RX ADMIN — HYDROMORPHONE HYDROCHLORIDE 0.3 MG: 1 INJECTION, SOLUTION INTRAMUSCULAR; INTRAVENOUS; SUBCUTANEOUS at 13:19

## 2021-01-01 RX ADMIN — ACETAMINOPHEN 325 MG: 325 SUSPENSION ORAL at 13:38

## 2021-01-01 RX ADMIN — DOCUSATE SODIUM 100 MG: 50 LIQUID ORAL at 21:42

## 2021-01-01 RX ADMIN — OXYCODONE HYDROCHLORIDE 5 MG: 5 TABLET ORAL at 00:02

## 2021-01-01 RX ADMIN — PANTOPRAZOLE SODIUM 40 MG: 40 TABLET, DELAYED RELEASE ORAL at 08:25

## 2021-01-01 RX ADMIN — OXYCODONE HYDROCHLORIDE 5 MG: 5 TABLET ORAL at 19:41

## 2021-01-01 RX ADMIN — NYSTATIN 500000 UNITS: 100000 SUSPENSION ORAL at 06:29

## 2021-01-01 RX ADMIN — HYDRALAZINE HYDROCHLORIDE 10 MG: 20 INJECTION INTRAMUSCULAR; INTRAVENOUS at 23:05

## 2021-01-01 RX ADMIN — SODIUM CHLORIDE 500 ML: 9 INJECTION, SOLUTION INTRAVENOUS at 22:07

## 2021-01-01 RX ADMIN — Medication 10 MG: at 22:03

## 2021-01-01 RX ADMIN — HYDRALAZINE HYDROCHLORIDE 10 MG: 20 INJECTION INTRAMUSCULAR; INTRAVENOUS at 13:23

## 2021-01-01 RX ADMIN — MULTIVITAMIN 15 ML: LIQUID ORAL at 09:00

## 2021-01-01 RX ADMIN — HYDRALAZINE HYDROCHLORIDE 10 MG: 20 INJECTION INTRAMUSCULAR; INTRAVENOUS at 13:12

## 2021-01-01 RX ADMIN — MEROPENEM 1 G: 1 INJECTION, POWDER, FOR SOLUTION INTRAVENOUS at 13:15

## 2021-01-01 RX ADMIN — CEFAZOLIN SODIUM 2 G: 2 INJECTION, SOLUTION INTRAVENOUS at 13:44

## 2021-01-01 RX ADMIN — LABETALOL HYDROCHLORIDE 10 MG: 5 INJECTION, SOLUTION INTRAVENOUS at 22:02

## 2021-01-01 RX ADMIN — LIDOCAINE HYDROCHLORIDE 3 ML: 10 INJECTION, SOLUTION EPIDURAL; INFILTRATION; INTRACAUDAL; PERINEURAL at 11:55

## 2021-01-01 RX ADMIN — NYSTATIN 500000 UNITS: 100000 SUSPENSION ORAL at 14:07

## 2021-01-01 RX ADMIN — OXYCODONE HYDROCHLORIDE 5 MG: 5 TABLET ORAL at 11:59

## 2021-01-01 RX ADMIN — DOCUSATE SODIUM 100 MG: 50 LIQUID ORAL at 12:48

## 2021-01-01 RX ADMIN — LISINOPRIL 20 MG: 20 TABLET ORAL at 08:30

## 2021-01-01 RX ADMIN — OXYCODONE HYDROCHLORIDE 5 MG: 5 TABLET ORAL at 21:43

## 2021-01-01 RX ADMIN — HYDRALAZINE HYDROCHLORIDE 10 MG: 20 INJECTION INTRAMUSCULAR; INTRAVENOUS at 04:38

## 2021-01-01 RX ADMIN — HYDRALAZINE HYDROCHLORIDE 10 MG: 20 INJECTION INTRAMUSCULAR; INTRAVENOUS at 18:40

## 2021-01-01 RX ADMIN — NYSTATIN 500000 UNITS: 100000 SUSPENSION ORAL at 17:17

## 2021-01-01 RX ADMIN — HYDROMORPHONE HYDROCHLORIDE 0.5 MG: 1 INJECTION, SOLUTION INTRAMUSCULAR; INTRAVENOUS; SUBCUTANEOUS at 17:10

## 2021-01-01 RX ADMIN — PROPOFOL 50 MG: 10 INJECTION, EMULSION INTRAVENOUS at 14:07

## 2021-01-01 RX ADMIN — SODIUM CHLORIDE, POTASSIUM CHLORIDE, SODIUM LACTATE AND CALCIUM CHLORIDE: 600; 310; 30; 20 INJECTION, SOLUTION INTRAVENOUS at 12:45

## 2021-01-01 RX ADMIN — OXYCODONE HYDROCHLORIDE 5 MG: 5 TABLET ORAL at 04:18

## 2021-01-01 RX ADMIN — NYSTATIN 500000 UNITS: 100000 SUSPENSION ORAL at 23:59

## 2021-01-01 RX ADMIN — HYDROMORPHONE HYDROCHLORIDE 0.5 MG: 1 INJECTION, SOLUTION INTRAMUSCULAR; INTRAVENOUS; SUBCUTANEOUS at 02:14

## 2021-01-01 RX ADMIN — LISINOPRIL 20 MG: 20 TABLET ORAL at 08:13

## 2021-01-01 RX ADMIN — HYDROMORPHONE HYDROCHLORIDE 0.5 MG: 1 INJECTION, SOLUTION INTRAMUSCULAR; INTRAVENOUS; SUBCUTANEOUS at 23:45

## 2021-01-01 RX ADMIN — INSULIN ASPART 1 UNITS: 100 INJECTION, SOLUTION INTRAVENOUS; SUBCUTANEOUS at 04:07

## 2021-01-01 RX ADMIN — LORAZEPAM 1 MG: 2 INJECTION INTRAMUSCULAR; INTRAVENOUS at 19:08

## 2021-01-01 RX ADMIN — LISINOPRIL 10 MG: 10 TABLET ORAL at 10:08

## 2021-01-01 RX ADMIN — NYSTATIN 500000 UNITS: 100000 SUSPENSION ORAL at 12:31

## 2021-01-01 RX ADMIN — HYDROMORPHONE HYDROCHLORIDE 0.3 MG: 1 INJECTION, SOLUTION INTRAMUSCULAR; INTRAVENOUS; SUBCUTANEOUS at 12:30

## 2021-01-01 RX ADMIN — HYDROMORPHONE HYDROCHLORIDE 0.3 MG: 1 INJECTION, SOLUTION INTRAMUSCULAR; INTRAVENOUS; SUBCUTANEOUS at 15:24

## 2021-01-01 RX ADMIN — PANTOPRAZOLE SODIUM 40 MG: 40 TABLET, DELAYED RELEASE ORAL at 08:30

## 2021-01-01 RX ADMIN — DOCUSATE SODIUM 100 MG: 50 LIQUID ORAL at 09:06

## 2021-01-01 RX ADMIN — HYDRALAZINE HYDROCHLORIDE 10 MG: 20 INJECTION INTRAMUSCULAR; INTRAVENOUS at 22:00

## 2021-01-01 RX ADMIN — NYSTATIN 500000 UNITS: 100000 SUSPENSION ORAL at 14:33

## 2021-01-01 RX ADMIN — ACETAMINOPHEN 975 MG: 325 SUSPENSION ORAL at 21:04

## 2021-01-01 RX ADMIN — LISINOPRIL 20 MG: 20 TABLET ORAL at 09:14

## 2021-01-01 RX ADMIN — NYSTATIN 500000 UNITS: 100000 SUSPENSION ORAL at 12:49

## 2021-01-01 RX ADMIN — INSULIN GLARGINE 20 UNITS: 100 INJECTION, SOLUTION SUBCUTANEOUS at 21:34

## 2021-01-01 RX ADMIN — LISINOPRIL 10 MG: 10 TABLET ORAL at 07:52

## 2021-01-01 RX ADMIN — POLYETHYLENE GLYCOL 3350 17 G: 17 POWDER, FOR SOLUTION ORAL at 07:51

## 2021-01-01 RX ADMIN — ALBUMIN HUMAN: 0.05 INJECTION, SOLUTION INTRAVENOUS at 14:55

## 2021-01-01 RX ADMIN — METOPROLOL TARTRATE 12.5 MG: 25 TABLET, FILM COATED ORAL at 08:30

## 2021-01-01 RX ADMIN — SODIUM CHLORIDE: 9 INJECTION, SOLUTION INTRAVENOUS at 22:32

## 2021-01-01 RX ADMIN — DEXAMETHASONE SODIUM PHOSPHATE 4 MG: 4 INJECTION, SOLUTION INTRAMUSCULAR; INTRAVENOUS at 16:31

## 2021-01-01 RX ADMIN — MEROPENEM 1 G: 1 INJECTION, POWDER, FOR SOLUTION INTRAVENOUS at 05:58

## 2021-01-01 RX ADMIN — METOPROLOL TARTRATE 25 MG: 25 TABLET, FILM COATED ORAL at 21:09

## 2021-01-01 RX ADMIN — DEXAMETHASONE SODIUM PHOSPHATE 4 MG: 4 INJECTION, SOLUTION INTRAMUSCULAR; INTRAVENOUS at 09:53

## 2021-01-01 RX ADMIN — OXYCODONE HYDROCHLORIDE 5 MG: 5 TABLET ORAL at 08:10

## 2021-01-01 RX ADMIN — DEXAMETHASONE SODIUM PHOSPHATE 4 MG: 4 INJECTION, SOLUTION INTRAMUSCULAR; INTRAVENOUS at 20:14

## 2021-01-01 RX ADMIN — INSULIN ASPART 1 UNITS: 100 INJECTION, SOLUTION INTRAVENOUS; SUBCUTANEOUS at 07:46

## 2021-01-01 RX ADMIN — POTASSIUM & SODIUM PHOSPHATES POWDER PACK 280-160-250 MG 1 PACKET: 280-160-250 PACK at 09:06

## 2021-01-01 RX ADMIN — ACETAMINOPHEN 975 MG: 325 SUSPENSION ORAL at 06:00

## 2021-01-01 RX ADMIN — DEXAMETHASONE SODIUM PHOSPHATE 4 MG: 4 INJECTION, SOLUTION INTRAMUSCULAR; INTRAVENOUS at 05:36

## 2021-01-01 RX ADMIN — NYSTATIN 500000 UNITS: 100000 SUSPENSION ORAL at 23:30

## 2021-01-01 RX ADMIN — MULTIVITAMIN 15 ML: LIQUID ORAL at 10:32

## 2021-01-01 RX ADMIN — POTASSIUM CHLORIDE 20 MEQ: 29.8 INJECTION, SOLUTION INTRAVENOUS at 12:17

## 2021-01-01 RX ADMIN — Medication 1 UNITS: at 22:58

## 2021-01-01 RX ADMIN — NYSTATIN 500000 UNITS: 100000 SUSPENSION ORAL at 07:52

## 2021-01-01 RX ADMIN — OXYCODONE HYDROCHLORIDE 5 MG: 5 TABLET ORAL at 22:15

## 2021-01-01 RX ADMIN — POTASSIUM CHLORIDE 20 MEQ: 29.8 INJECTION, SOLUTION INTRAVENOUS at 07:44

## 2021-01-01 RX ADMIN — NYSTATIN 500000 UNITS: 100000 SUSPENSION ORAL at 11:57

## 2021-01-01 RX ADMIN — MEROPENEM 1 G: 1 INJECTION, POWDER, FOR SOLUTION INTRAVENOUS at 06:31

## 2021-01-01 RX ADMIN — SODIUM CHLORIDE 10 MG/HR: 900 INJECTION, SOLUTION INTRAVENOUS at 19:38

## 2021-01-01 RX ADMIN — AMIODARONE HYDROCHLORIDE 400 MG: 200 TABLET ORAL at 09:06

## 2021-01-01 RX ADMIN — DEXAMETHASONE SODIUM PHOSPHATE 4 MG: 4 INJECTION, SOLUTION INTRAMUSCULAR; INTRAVENOUS at 19:57

## 2021-01-01 RX ADMIN — HYDROMORPHONE HYDROCHLORIDE 0.5 MG: 1 INJECTION, SOLUTION INTRAMUSCULAR; INTRAVENOUS; SUBCUTANEOUS at 09:40

## 2021-01-01 RX ADMIN — NYSTATIN 500000 UNITS: 100000 SUSPENSION ORAL at 00:51

## 2021-01-01 RX ADMIN — MORPHINE SULFATE 10 MG: 100 SOLUTION ORAL at 14:53

## 2021-01-01 RX ADMIN — ACETAMINOPHEN 975 MG: 325 SUSPENSION ORAL at 21:01

## 2021-01-01 RX ADMIN — DEXAMETHASONE SODIUM PHOSPHATE 4 MG: 4 INJECTION, SOLUTION INTRAMUSCULAR; INTRAVENOUS at 08:31

## 2021-01-01 RX ADMIN — POTASSIUM & SODIUM PHOSPHATES POWDER PACK 280-160-250 MG 1 PACKET: 280-160-250 PACK at 15:41

## 2021-01-01 RX ADMIN — DILTIAZEM HYDROCHLORIDE 5 MG/HR: 5 INJECTION INTRAVENOUS at 17:00

## 2021-01-01 RX ADMIN — NYSTATIN 500000 UNITS: 100000 SUSPENSION ORAL at 21:09

## 2021-01-01 RX ADMIN — MEROPENEM 1 G: 1 INJECTION, POWDER, FOR SOLUTION INTRAVENOUS at 22:17

## 2021-01-01 RX ADMIN — HYDRALAZINE HYDROCHLORIDE 10 MG: 20 INJECTION INTRAMUSCULAR; INTRAVENOUS at 13:07

## 2021-01-01 RX ADMIN — SODIUM PHOSPHATE, MONOBASIC, MONOHYDRATE 9 MMOL: 276; 142 INJECTION, SOLUTION INTRAVENOUS at 10:05

## 2021-01-01 RX ADMIN — AMIODARONE HYDROCHLORIDE 400 MG: 200 TABLET ORAL at 21:45

## 2021-01-01 RX ADMIN — NYSTATIN 500000 UNITS: 100000 SUSPENSION ORAL at 17:05

## 2021-01-01 RX ADMIN — SODIUM CHLORIDE 12.5 MG/HR: 900 INJECTION, SOLUTION INTRAVENOUS at 19:23

## 2021-01-01 RX ADMIN — NYSTATIN 500000 UNITS: 100000 SUSPENSION ORAL at 18:04

## 2021-01-01 RX ADMIN — CARVEDILOL 12.5 MG: 6.25 TABLET, FILM COATED ORAL at 18:34

## 2021-01-01 RX ADMIN — PHENYLEPHRINE HYDROCHLORIDE 200 MCG: 10 INJECTION INTRAVENOUS at 21:51

## 2021-01-01 RX ADMIN — ACETAMINOPHEN 975 MG: 325 SUSPENSION ORAL at 17:47

## 2021-01-01 RX ADMIN — INSULIN GLARGINE 15 UNITS: 100 INJECTION, SOLUTION SUBCUTANEOUS at 21:10

## 2021-01-01 RX ADMIN — PANTOPRAZOLE SODIUM 40 MG: 40 TABLET, DELAYED RELEASE ORAL at 07:51

## 2021-01-01 RX ADMIN — METOPROLOL TARTRATE 12.5 MG: 25 TABLET, FILM COATED ORAL at 10:26

## 2021-01-01 RX ADMIN — NYSTATIN 500000 UNITS: 100000 SUSPENSION ORAL at 17:47

## 2021-01-01 RX ADMIN — ACETAMINOPHEN 975 MG: 325 SUSPENSION ORAL at 14:41

## 2021-01-01 RX ADMIN — DEXAMETHASONE SODIUM PHOSPHATE 4 MG: 4 INJECTION, SOLUTION INTRAMUSCULAR; INTRAVENOUS at 08:30

## 2021-01-01 RX ADMIN — NYSTATIN 500000 UNITS: 100000 SUSPENSION ORAL at 12:38

## 2021-01-01 RX ADMIN — HYDROMORPHONE HYDROCHLORIDE 0.3 MG: 1 INJECTION, SOLUTION INTRAMUSCULAR; INTRAVENOUS; SUBCUTANEOUS at 19:16

## 2021-01-01 RX ADMIN — HYDRALAZINE HYDROCHLORIDE 10 MG: 20 INJECTION INTRAMUSCULAR; INTRAVENOUS at 21:59

## 2021-01-01 RX ADMIN — ACETAMINOPHEN 975 MG: 325 SUSPENSION ORAL at 05:31

## 2021-01-01 RX ADMIN — ACETAMINOPHEN 975 MG: 325 SUSPENSION ORAL at 22:13

## 2021-01-01 RX ADMIN — DEXAMETHASONE SODIUM PHOSPHATE 4 MG: 4 INJECTION, SOLUTION INTRAMUSCULAR; INTRAVENOUS at 16:13

## 2021-01-01 RX ADMIN — PROPOFOL 70 MG: 10 INJECTION, EMULSION INTRAVENOUS at 13:51

## 2021-01-01 RX ADMIN — AMIODARONE HYDROCHLORIDE 400 MG: 200 TABLET ORAL at 08:25

## 2021-01-01 RX ADMIN — NOREPINEPHRINE BITARTRATE 6.4 MCG: 1 INJECTION, SOLUTION, CONCENTRATE INTRAVENOUS at 22:39

## 2021-01-01 RX ADMIN — BISACODYL 10 MG: 10 SUPPOSITORY RECTAL at 11:59

## 2021-01-01 RX ADMIN — ALBUMIN HUMAN: 0.05 INJECTION, SOLUTION INTRAVENOUS at 22:58

## 2021-01-01 RX ADMIN — DOCUSATE SODIUM 50 MG: 50 LIQUID ORAL at 11:02

## 2021-01-01 RX ADMIN — DOCUSATE SODIUM 100 MG: 50 LIQUID ORAL at 20:37

## 2021-01-01 RX ADMIN — ACETAMINOPHEN 975 MG: 325 SUSPENSION ORAL at 05:50

## 2021-01-01 RX ADMIN — LISINOPRIL 10 MG: 10 TABLET ORAL at 09:28

## 2021-01-01 RX ADMIN — LABETALOL HYDROCHLORIDE 10 MG: 5 INJECTION, SOLUTION INTRAVENOUS at 16:23

## 2021-01-01 RX ADMIN — HYDRALAZINE HYDROCHLORIDE 20 MG: 20 INJECTION INTRAMUSCULAR; INTRAVENOUS at 12:45

## 2021-01-01 RX ADMIN — DEXMEDETOMIDINE HYDROCHLORIDE 0.2 MCG/KG/HR: 4 INJECTION, SOLUTION INTRAVENOUS at 04:23

## 2021-01-01 RX ADMIN — DEXAMETHASONE SODIUM PHOSPHATE 4 MG: 4 INJECTION, SOLUTION INTRAMUSCULAR; INTRAVENOUS at 00:34

## 2021-01-01 RX ADMIN — INSULIN ASPART 3 UNITS: 100 INJECTION, SOLUTION INTRAVENOUS; SUBCUTANEOUS at 18:59

## 2021-01-01 RX ADMIN — INSULIN GLARGINE 8 UNITS: 100 INJECTION, SOLUTION SUBCUTANEOUS at 08:13

## 2021-01-01 RX ADMIN — DOCUSATE SODIUM 100 MG: 50 LIQUID ORAL at 20:54

## 2021-01-01 RX ADMIN — INSULIN ASPART 1 UNITS: 100 INJECTION, SOLUTION INTRAVENOUS; SUBCUTANEOUS at 18:32

## 2021-01-01 RX ADMIN — MULTIVITAMIN 15 ML: LIQUID ORAL at 09:05

## 2021-01-01 RX ADMIN — Medication 1 UNITS: at 22:50

## 2021-01-01 RX ADMIN — HYDRALAZINE HYDROCHLORIDE 20 MG: 20 INJECTION INTRAMUSCULAR; INTRAVENOUS at 07:53

## 2021-01-01 RX ADMIN — AMIODARONE HYDROCHLORIDE 400 MG: 200 TABLET ORAL at 08:26

## 2021-01-01 RX ADMIN — Medication 10 MG: at 14:15

## 2021-01-01 RX ADMIN — ACETAMINOPHEN 975 MG: 325 SUSPENSION ORAL at 16:07

## 2021-01-01 RX ADMIN — NYSTATIN 500000 UNITS: 100000 SUSPENSION ORAL at 17:02

## 2021-01-01 RX ADMIN — MEROPENEM 1 G: 1 INJECTION, POWDER, FOR SOLUTION INTRAVENOUS at 13:45

## 2021-01-01 RX ADMIN — PHENYLEPHRINE HYDROCHLORIDE 200 MCG: 10 INJECTION INTRAVENOUS at 18:17

## 2021-01-01 RX ADMIN — PHENYLEPHRINE HYDROCHLORIDE 200 MCG: 10 INJECTION INTRAVENOUS at 21:10

## 2021-01-01 RX ADMIN — PHENYLEPHRINE HYDROCHLORIDE 200 MCG: 10 INJECTION INTRAVENOUS at 22:11

## 2021-01-01 RX ADMIN — DOCUSATE SODIUM 50 MG: 50 LIQUID ORAL at 21:03

## 2021-01-01 RX ADMIN — GADOBUTROL 7 ML: 604.72 INJECTION INTRAVENOUS at 08:47

## 2021-01-01 RX ADMIN — INSULIN GLARGINE 20 UNITS: 100 INJECTION, SOLUTION SUBCUTANEOUS at 23:04

## 2021-01-01 RX ADMIN — AMIODARONE HYDROCHLORIDE 400 MG: 200 TABLET ORAL at 20:20

## 2021-01-01 RX ADMIN — DEXAMETHASONE SODIUM PHOSPHATE 4 MG: 4 INJECTION, SOLUTION INTRAMUSCULAR; INTRAVENOUS at 20:35

## 2021-01-01 RX ADMIN — CARVEDILOL 6.25 MG: 6.25 TABLET, FILM COATED ORAL at 10:08

## 2021-01-01 RX ADMIN — LISINOPRIL 20 MG: 20 TABLET ORAL at 12:48

## 2021-01-01 RX ADMIN — FENTANYL CITRATE 100 MCG: 50 INJECTION, SOLUTION INTRAMUSCULAR; INTRAVENOUS at 13:37

## 2021-01-01 RX ADMIN — MEROPENEM 1 G: 1 INJECTION, POWDER, FOR SOLUTION INTRAVENOUS at 05:20

## 2021-01-01 RX ADMIN — OXYCODONE HYDROCHLORIDE 5 MG: 5 TABLET ORAL at 02:47

## 2021-01-01 RX ADMIN — LABETALOL HYDROCHLORIDE 10 MG: 5 INJECTION, SOLUTION INTRAVENOUS at 16:52

## 2021-01-01 RX ADMIN — HYDRALAZINE HYDROCHLORIDE 10 MG: 20 INJECTION INTRAMUSCULAR; INTRAVENOUS at 16:15

## 2021-01-01 RX ADMIN — SODIUM CHLORIDE, POTASSIUM CHLORIDE, SODIUM LACTATE AND CALCIUM CHLORIDE: 600; 310; 30; 20 INJECTION, SOLUTION INTRAVENOUS at 15:07

## 2021-01-01 RX ADMIN — MEROPENEM 1 G: 1 INJECTION, POWDER, FOR SOLUTION INTRAVENOUS at 05:24

## 2021-01-01 RX ADMIN — SODIUM CHLORIDE, POTASSIUM CHLORIDE, SODIUM LACTATE AND CALCIUM CHLORIDE: 600; 310; 30; 20 INJECTION, SOLUTION INTRAVENOUS at 02:14

## 2021-01-01 RX ADMIN — CARVEDILOL 12.5 MG: 12.5 TABLET, FILM COATED ORAL at 10:14

## 2021-01-01 RX ADMIN — CEFAZOLIN SODIUM 2 G: 2 INJECTION, SOLUTION INTRAVENOUS at 21:15

## 2021-01-01 RX ADMIN — PHENYLEPHRINE HYDROCHLORIDE 100 MCG: 10 INJECTION INTRAVENOUS at 16:45

## 2021-01-01 RX ADMIN — LABETALOL HYDROCHLORIDE 10 MG: 5 INJECTION INTRAVENOUS at 23:25

## 2021-01-01 RX ADMIN — PROPOFOL 180 MG: 10 INJECTION, EMULSION INTRAVENOUS at 17:42

## 2021-01-01 RX ADMIN — LORAZEPAM 1 MG: 2 LIQUID ORAL at 11:17

## 2021-01-01 RX ADMIN — SODIUM CHLORIDE: 4.5 INJECTION, SOLUTION INTRAVENOUS at 00:05

## 2021-01-01 RX ADMIN — HYDRALAZINE HYDROCHLORIDE 20 MG: 20 INJECTION INTRAMUSCULAR; INTRAVENOUS at 22:12

## 2021-01-01 RX ADMIN — OXYCODONE HYDROCHLORIDE 5 MG: 5 TABLET ORAL at 18:12

## 2021-01-01 RX ADMIN — LISINOPRIL 20 MG: 20 TABLET ORAL at 20:40

## 2021-01-01 RX ADMIN — HYDRALAZINE HYDROCHLORIDE 10 MG: 20 INJECTION INTRAMUSCULAR; INTRAVENOUS at 19:20

## 2021-01-01 RX ADMIN — NYSTATIN 500000 UNITS: 100000 SUSPENSION ORAL at 22:13

## 2021-01-01 RX ADMIN — FENTANYL CITRATE 50 MCG: 50 INJECTION, SOLUTION INTRAMUSCULAR; INTRAVENOUS at 17:42

## 2021-01-01 RX ADMIN — VANCOMYCIN HYDROCHLORIDE 125 MG: KIT at 10:14

## 2021-01-01 RX ADMIN — DEXAMETHASONE SODIUM PHOSPHATE 4 MG: 4 INJECTION, SOLUTION INTRAMUSCULAR; INTRAVENOUS at 07:52

## 2021-01-01 RX ADMIN — POTASSIUM & SODIUM PHOSPHATES POWDER PACK 280-160-250 MG 1 PACKET: 280-160-250 PACK at 15:51

## 2021-01-01 RX ADMIN — DEXAMETHASONE SODIUM PHOSPHATE 4 MG: 4 INJECTION, SOLUTION INTRAMUSCULAR; INTRAVENOUS at 15:14

## 2021-01-01 RX ADMIN — VECURONIUM BROMIDE 2 MG: 1 INJECTION, POWDER, LYOPHILIZED, FOR SOLUTION INTRAVENOUS at 22:36

## 2021-01-01 RX ADMIN — AMIODARONE HYDROCHLORIDE 400 MG: 200 TABLET ORAL at 20:35

## 2021-01-01 RX ADMIN — SODIUM CHLORIDE, POTASSIUM CHLORIDE, SODIUM LACTATE AND CALCIUM CHLORIDE: 600; 310; 30; 20 INJECTION, SOLUTION INTRAVENOUS at 13:27

## 2021-01-01 RX ADMIN — DOCUSATE SODIUM 100 MG: 50 LIQUID ORAL at 08:30

## 2021-01-01 RX ADMIN — MULTIVITAMIN 15 ML: LIQUID ORAL at 08:26

## 2021-01-01 RX ADMIN — OXYCODONE HYDROCHLORIDE 5 MG: 5 TABLET ORAL at 16:20

## 2021-01-01 RX ADMIN — METOPROLOL TARTRATE 12.5 MG: 25 TABLET, FILM COATED ORAL at 21:04

## 2021-01-01 RX ADMIN — HYDRALAZINE HYDROCHLORIDE 10 MG: 20 INJECTION INTRAMUSCULAR; INTRAVENOUS at 16:41

## 2021-01-01 RX ADMIN — AMIODARONE HYDROCHLORIDE 400 MG: 200 TABLET ORAL at 08:31

## 2021-01-01 RX ADMIN — LISINOPRIL 10 MG: 10 TABLET ORAL at 10:14

## 2021-01-01 RX ADMIN — Medication 1 UNITS: at 22:14

## 2021-01-01 RX ADMIN — AMIODARONE HYDROCHLORIDE 400 MG: 200 TABLET ORAL at 08:40

## 2021-01-01 RX ADMIN — ACETAMINOPHEN 975 MG: 325 SUSPENSION ORAL at 14:33

## 2021-01-01 RX ADMIN — HYDROMORPHONE HYDROCHLORIDE 0.5 MG: 1 INJECTION, SOLUTION INTRAMUSCULAR; INTRAVENOUS; SUBCUTANEOUS at 04:42

## 2021-01-01 RX ADMIN — HYDROMORPHONE HYDROCHLORIDE 0.5 MG: 1 INJECTION, SOLUTION INTRAMUSCULAR; INTRAVENOUS; SUBCUTANEOUS at 06:47

## 2021-01-01 RX ADMIN — CARVEDILOL 12.5 MG: 6.25 TABLET, FILM COATED ORAL at 07:52

## 2021-01-01 RX ADMIN — MEROPENEM 1 G: 1 INJECTION, POWDER, FOR SOLUTION INTRAVENOUS at 21:17

## 2021-01-01 RX ADMIN — HYDRALAZINE HYDROCHLORIDE 10 MG: 20 INJECTION INTRAMUSCULAR; INTRAVENOUS at 10:32

## 2021-01-01 RX ADMIN — FENTANYL CITRATE 50 MCG: 50 INJECTION, SOLUTION INTRAMUSCULAR; INTRAVENOUS at 08:11

## 2021-01-01 RX ADMIN — POTASSIUM & SODIUM PHOSPHATES POWDER PACK 280-160-250 MG 2 PACKET: 280-160-250 PACK at 15:45

## 2021-01-01 RX ADMIN — MULTIVITAMIN 15 ML: LIQUID ORAL at 08:13

## 2021-01-01 RX ADMIN — ACETAMINOPHEN 650 MG: 325 TABLET, FILM COATED ORAL at 13:38

## 2021-01-01 RX ADMIN — DEXAMETHASONE SODIUM PHOSPHATE 4 MG: 4 INJECTION, SOLUTION INTRAMUSCULAR; INTRAVENOUS at 19:23

## 2021-01-01 RX ADMIN — SENNOSIDES 5 ML: 8.8 SYRUP ORAL at 21:45

## 2021-01-01 RX ADMIN — OXYCODONE HYDROCHLORIDE 5 MG: 5 TABLET ORAL at 09:22

## 2021-01-01 RX ADMIN — LABETALOL HYDROCHLORIDE 5 MG: 5 INJECTION INTRAVENOUS at 17:47

## 2021-01-01 RX ADMIN — OXYCODONE HYDROCHLORIDE 5 MG: 5 TABLET ORAL at 06:04

## 2021-01-01 RX ADMIN — ACETAMINOPHEN 975 MG: 325 SUSPENSION ORAL at 05:58

## 2021-01-01 RX ADMIN — AMIODARONE HYDROCHLORIDE 400 MG: 200 TABLET ORAL at 07:52

## 2021-01-01 RX ADMIN — SODIUM CHLORIDE 12.5 MG/HR: 900 INJECTION, SOLUTION INTRAVENOUS at 21:22

## 2021-01-01 RX ADMIN — INSULIN GLARGINE 10 UNITS: 100 INJECTION, SOLUTION SUBCUTANEOUS at 22:16

## 2021-01-01 RX ADMIN — POTASSIUM & SODIUM PHOSPHATES POWDER PACK 280-160-250 MG 2 PACKET: 280-160-250 PACK at 08:51

## 2021-01-01 RX ADMIN — LISINOPRIL 10 MG: 10 TABLET ORAL at 08:40

## 2021-01-01 RX ADMIN — ALBUMIN HUMAN: 0.05 INJECTION, SOLUTION INTRAVENOUS at 15:28

## 2021-01-01 RX ADMIN — CARVEDILOL 12.5 MG: 6.25 TABLET, FILM COATED ORAL at 08:01

## 2021-01-01 RX ADMIN — AMIODARONE HYDROCHLORIDE 200 MG: 200 TABLET ORAL at 08:04

## 2021-01-01 RX ADMIN — LISINOPRIL 10 MG: 10 TABLET ORAL at 08:25

## 2021-01-01 RX ADMIN — DEXAMETHASONE SODIUM PHOSPHATE 4 MG: 4 INJECTION, SOLUTION INTRAMUSCULAR; INTRAVENOUS at 19:54

## 2021-01-01 RX ADMIN — SODIUM CHLORIDE: 9 INJECTION, SOLUTION INTRAVENOUS at 13:09

## 2021-01-01 RX ADMIN — MORPHINE SULFATE 5 MG: 100 SOLUTION ORAL at 22:42

## 2021-01-01 RX ADMIN — INSULIN GLARGINE 8 UNITS: 100 INJECTION, SOLUTION SUBCUTANEOUS at 08:50

## 2021-01-01 RX ADMIN — SUGAMMADEX 200 MG: 100 INJECTION, SOLUTION INTRAVENOUS at 18:25

## 2021-01-01 RX ADMIN — ACETAMINOPHEN 650 MG: 325 TABLET, FILM COATED ORAL at 13:32

## 2021-01-01 RX ADMIN — SENNOSIDES 5 ML: 8.8 SYRUP ORAL at 08:31

## 2021-01-01 RX ADMIN — INSULIN GLARGINE 15 UNITS: 100 INJECTION, SOLUTION SUBCUTANEOUS at 22:15

## 2021-01-01 RX ADMIN — NYSTATIN 500000 UNITS: 100000 SUSPENSION ORAL at 17:08

## 2021-01-01 RX ADMIN — SODIUM CHLORIDE: 4.5 INJECTION, SOLUTION INTRAVENOUS at 10:31

## 2021-01-01 RX ADMIN — MEROPENEM 1 G: 1 INJECTION, POWDER, FOR SOLUTION INTRAVENOUS at 22:11

## 2021-01-01 RX ADMIN — CARVEDILOL 12.5 MG: 6.25 TABLET, FILM COATED ORAL at 09:28

## 2021-01-01 RX ADMIN — CARVEDILOL 12.5 MG: 6.25 TABLET, FILM COATED ORAL at 08:25

## 2021-01-01 RX ADMIN — PANTOPRAZOLE SODIUM 40 MG: 40 TABLET, DELAYED RELEASE ORAL at 08:51

## 2021-01-01 RX ADMIN — METOPROLOL TARTRATE 5 MG: 5 INJECTION INTRAVENOUS at 06:31

## 2021-01-01 RX ADMIN — MEROPENEM 1 G: 1 INJECTION, POWDER, FOR SOLUTION INTRAVENOUS at 15:14

## 2021-01-01 RX ADMIN — LISINOPRIL 20 MG: 20 TABLET ORAL at 09:13

## 2021-01-01 RX ADMIN — CHLORHEXIDINE GLUCONATE 0.12% ORAL RINSE 15 ML: 1.2 LIQUID ORAL at 07:52

## 2021-01-01 RX ADMIN — NYSTATIN 500000 UNITS: 100000 SUSPENSION ORAL at 06:04

## 2021-01-01 RX ADMIN — PANTOPRAZOLE SODIUM 40 MG: 40 TABLET, DELAYED RELEASE ORAL at 08:13

## 2021-01-01 RX ADMIN — DEXAMETHASONE SODIUM PHOSPHATE 4 MG: 4 INJECTION, SOLUTION INTRAMUSCULAR; INTRAVENOUS at 22:12

## 2021-01-01 RX ADMIN — LABETALOL HYDROCHLORIDE 10 MG: 5 INJECTION, SOLUTION INTRAVENOUS at 10:36

## 2021-01-01 RX ADMIN — LORAZEPAM 1 MG: 1 TABLET ORAL at 08:25

## 2021-01-01 RX ADMIN — DOCUSATE SODIUM 50 MG: 50 LIQUID ORAL at 21:46

## 2021-01-01 RX ADMIN — HYDROMORPHONE HYDROCHLORIDE 0.3 MG: 1 INJECTION, SOLUTION INTRAMUSCULAR; INTRAVENOUS; SUBCUTANEOUS at 11:03

## 2021-01-01 RX ADMIN — POTASSIUM & SODIUM PHOSPHATES POWDER PACK 280-160-250 MG 1 PACKET: 280-160-250 PACK at 16:13

## 2021-01-01 RX ADMIN — NYSTATIN 500000 UNITS: 100000 SUSPENSION ORAL at 17:25

## 2021-01-01 RX ADMIN — SENNOSIDES 5 ML: 8.8 SYRUP ORAL at 00:51

## 2021-01-01 RX ADMIN — MORPHINE SULFATE 5 MG: 100 SOLUTION ORAL at 20:35

## 2021-01-01 RX ADMIN — POTASSIUM & SODIUM PHOSPHATES POWDER PACK 280-160-250 MG 1 PACKET: 280-160-250 PACK at 08:13

## 2021-01-01 RX ADMIN — MORPHINE SULFATE 5 MG: 100 SOLUTION ORAL at 15:03

## 2021-01-01 RX ADMIN — HYDRALAZINE HYDROCHLORIDE 10 MG: 20 INJECTION INTRAMUSCULAR; INTRAVENOUS at 14:07

## 2021-01-01 RX ADMIN — SODIUM CHLORIDE: 9 INJECTION, SOLUTION INTRAVENOUS at 00:30

## 2021-01-01 RX ADMIN — POTASSIUM & SODIUM PHOSPHATES POWDER PACK 280-160-250 MG 1 PACKET: 280-160-250 PACK at 21:04

## 2021-01-01 RX ADMIN — HYDRALAZINE HYDROCHLORIDE 10 MG: 20 INJECTION INTRAMUSCULAR; INTRAVENOUS at 03:06

## 2021-01-01 RX ADMIN — DOCUSATE SODIUM 100 MG: 50 LIQUID ORAL at 21:03

## 2021-01-01 RX ADMIN — MEROPENEM 1 G: 1 INJECTION, POWDER, FOR SOLUTION INTRAVENOUS at 16:12

## 2021-01-01 RX ADMIN — DEXAMETHASONE SODIUM PHOSPHATE 4 MG: 4 INJECTION, SOLUTION INTRAMUSCULAR; INTRAVENOUS at 05:37

## 2021-01-01 RX ADMIN — DEXAMETHASONE SODIUM PHOSPHATE 4 MG: 4 INJECTION, SOLUTION INTRAMUSCULAR; INTRAVENOUS at 22:18

## 2021-01-01 RX ADMIN — DEXAMETHASONE SODIUM PHOSPHATE 4 MG: 4 INJECTION, SOLUTION INTRAMUSCULAR; INTRAVENOUS at 08:01

## 2021-01-01 RX ADMIN — METOPROLOL TARTRATE 5 MG: 5 INJECTION INTRAVENOUS at 07:15

## 2021-01-01 RX ADMIN — PROPOFOL 50 MG: 10 INJECTION, EMULSION INTRAVENOUS at 14:26

## 2021-01-01 RX ADMIN — GADOBUTROL 20 ML: 604.72 INJECTION INTRAVENOUS at 12:12

## 2021-01-01 RX ADMIN — HYDRALAZINE HYDROCHLORIDE 20 MG: 20 INJECTION INTRAMUSCULAR; INTRAVENOUS at 15:06

## 2021-01-01 RX ADMIN — SODIUM CHLORIDE, POTASSIUM CHLORIDE, SODIUM LACTATE AND CALCIUM CHLORIDE: 600; 310; 30; 20 INJECTION, SOLUTION INTRAVENOUS at 00:15

## 2021-01-01 RX ADMIN — SENNOSIDES 5 ML: 8.8 SYRUP ORAL at 11:03

## 2021-01-01 RX ADMIN — OXYCODONE HYDROCHLORIDE 5 MG: 5 TABLET ORAL at 19:54

## 2021-01-01 RX ADMIN — Medication 5 MG: at 14:12

## 2021-01-01 RX ADMIN — NYSTATIN 500000 UNITS: 100000 SUSPENSION ORAL at 05:02

## 2021-01-01 RX ADMIN — LABETALOL HYDROCHLORIDE 10 MG: 5 INJECTION INTRAVENOUS at 17:36

## 2021-01-01 RX ADMIN — HYDRALAZINE HYDROCHLORIDE 20 MG: 20 INJECTION INTRAMUSCULAR; INTRAVENOUS at 04:56

## 2021-01-01 RX ADMIN — POTASSIUM & SODIUM PHOSPHATES POWDER PACK 280-160-250 MG 2 PACKET: 280-160-250 PACK at 21:00

## 2021-01-01 RX ADMIN — MIDAZOLAM HYDROCHLORIDE 1 MG: 1 INJECTION, SOLUTION INTRAMUSCULAR; INTRAVENOUS at 13:06

## 2021-01-01 RX ADMIN — INSULIN ASPART 3 UNITS: 100 INJECTION, SOLUTION INTRAVENOUS; SUBCUTANEOUS at 22:49

## 2021-01-01 RX ADMIN — INSULIN GLARGINE 20 UNITS: 100 INJECTION, SOLUTION SUBCUTANEOUS at 21:30

## 2021-01-01 RX ADMIN — LABETALOL HYDROCHLORIDE 10 MG: 5 INJECTION INTRAVENOUS at 15:22

## 2021-01-01 RX ADMIN — CARVEDILOL 12.5 MG: 6.25 TABLET, FILM COATED ORAL at 17:25

## 2021-01-01 RX ADMIN — POTASSIUM & SODIUM PHOSPHATES POWDER PACK 280-160-250 MG 1 PACKET: 280-160-250 PACK at 21:20

## 2021-01-01 RX ADMIN — INSULIN GLARGINE 15 UNITS: 100 INJECTION, SOLUTION SUBCUTANEOUS at 22:13

## 2021-01-01 RX ADMIN — POLYETHYLENE GLYCOL 3350 17 G: 17 POWDER, FOR SOLUTION ORAL at 08:25

## 2021-01-01 RX ADMIN — DOCUSATE SODIUM 100 MG: 50 LIQUID ORAL at 07:52

## 2021-01-01 RX ADMIN — SODIUM CHLORIDE, POTASSIUM CHLORIDE, SODIUM LACTATE AND CALCIUM CHLORIDE: 600; 310; 30; 20 INJECTION, SOLUTION INTRAVENOUS at 13:09

## 2021-01-01 RX ADMIN — LISINOPRIL 10 MG: 10 TABLET ORAL at 08:04

## 2021-01-01 RX ADMIN — MAGNESIUM SULFATE HEPTAHYDRATE 2 G: 40 INJECTION, SOLUTION INTRAVENOUS at 23:11

## 2021-01-01 RX ADMIN — HYDROMORPHONE HYDROCHLORIDE 0.5 MG: 1 INJECTION, SOLUTION INTRAMUSCULAR; INTRAVENOUS; SUBCUTANEOUS at 12:20

## 2021-01-01 RX ADMIN — METOPROLOL TARTRATE 5 MG: 5 INJECTION INTRAVENOUS at 06:48

## 2021-01-01 RX ADMIN — DEXAMETHASONE SODIUM PHOSPHATE 4 MG: 4 INJECTION, SOLUTION INTRAMUSCULAR; INTRAVENOUS at 22:47

## 2021-01-01 RX ADMIN — MEROPENEM 1 G: 1 INJECTION, POWDER, FOR SOLUTION INTRAVENOUS at 21:18

## 2021-01-01 RX ADMIN — LIDOCAINE HYDROCHLORIDE 60 MG: 20 INJECTION, SOLUTION INFILTRATION; PERINEURAL at 13:37

## 2021-01-01 RX ADMIN — ACETAMINOPHEN 975 MG: 325 SUSPENSION ORAL at 16:13

## 2021-01-01 RX ADMIN — HYDROMORPHONE HYDROCHLORIDE 0.5 MG: 1 INJECTION, SOLUTION INTRAMUSCULAR; INTRAVENOUS; SUBCUTANEOUS at 04:58

## 2021-01-01 RX ADMIN — PHENYLEPHRINE HYDROCHLORIDE 200 MCG: 10 INJECTION INTRAVENOUS at 21:54

## 2021-01-01 RX ADMIN — PHENYLEPHRINE HYDROCHLORIDE 200 MCG: 10 INJECTION INTRAVENOUS at 22:09

## 2021-01-01 RX ADMIN — CARVEDILOL 12.5 MG: 6.25 TABLET, FILM COATED ORAL at 09:06

## 2021-01-01 RX ADMIN — ACETAMINOPHEN 975 MG: 325 SUSPENSION ORAL at 07:51

## 2021-01-01 RX ADMIN — INSULIN GLARGINE 17 UNITS: 100 INJECTION, SOLUTION SUBCUTANEOUS at 21:22

## 2021-01-01 RX ADMIN — SODIUM CHLORIDE: 9 INJECTION, SOLUTION INTRAVENOUS at 20:53

## 2021-01-01 RX ADMIN — VANCOMYCIN HYDROCHLORIDE 125 MG: KIT at 21:22

## 2021-01-01 RX ADMIN — AMIODARONE HYDROCHLORIDE 400 MG: 200 TABLET ORAL at 08:27

## 2021-01-01 RX ADMIN — CALCIUM GLUCONATE 1 G: 98 INJECTION, SOLUTION INTRAVENOUS at 07:37

## 2021-01-01 RX ADMIN — OXYCODONE HYDROCHLORIDE 5 MG: 5 TABLET ORAL at 13:32

## 2021-01-01 RX ADMIN — NYSTATIN 500000 UNITS: 100000 SUSPENSION ORAL at 05:19

## 2021-01-01 RX ADMIN — POTASSIUM & SODIUM PHOSPHATES POWDER PACK 280-160-250 MG 1 PACKET: 280-160-250 PACK at 08:30

## 2021-01-01 RX ADMIN — ACETAMINOPHEN 650 MG: 325 TABLET, FILM COATED ORAL at 15:14

## 2021-01-01 RX ADMIN — HYDRALAZINE HYDROCHLORIDE 20 MG: 20 INJECTION INTRAMUSCULAR; INTRAVENOUS at 00:01

## 2021-01-01 RX ADMIN — ACETAMINOPHEN 975 MG: 325 SUSPENSION ORAL at 08:24

## 2021-01-01 SDOH — HEALTH STABILITY: MENTAL HEALTH: HOW OFTEN DO YOU HAVE A DRINK CONTAINING ALCOHOL?: NEVER

## 2021-01-01 ASSESSMENT — ACTIVITIES OF DAILY LIVING (ADL)
ADLS_ACUITY_SCORE: 24
ADLS_ACUITY_SCORE: 19
ADLS_ACUITY_SCORE: 20
ADLS_ACUITY_SCORE: 22
ADLS_ACUITY_SCORE: 24
ADLS_ACUITY_SCORE: 22
ADLS_ACUITY_SCORE: 22
ADLS_ACUITY_SCORE: 19
ADLS_ACUITY_SCORE: 22
ADLS_ACUITY_SCORE: 19
ADLS_ACUITY_SCORE: 24
ADLS_ACUITY_SCORE: 24
ADLS_ACUITY_SCORE: 19
ADLS_ACUITY_SCORE: 28
ADLS_ACUITY_SCORE: 22
ADLS_ACUITY_SCORE: 20
ADLS_ACUITY_SCORE: 19
ADLS_ACUITY_SCORE: 19
ADLS_ACUITY_SCORE: 22
ADLS_ACUITY_SCORE: 19
ADLS_ACUITY_SCORE: 22
ADLS_ACUITY_SCORE: 19
ADLS_ACUITY_SCORE: 24
ADLS_ACUITY_SCORE: 19
ADLS_ACUITY_SCORE: 20
ADLS_ACUITY_SCORE: 26
ADLS_ACUITY_SCORE: 19
ADLS_ACUITY_SCORE: 22
ADLS_ACUITY_SCORE: 19
ADLS_ACUITY_SCORE: 24
ADLS_ACUITY_SCORE: 19
ADLS_ACUITY_SCORE: 19
ADLS_ACUITY_SCORE: 20
ADLS_ACUITY_SCORE: 22
ADLS_ACUITY_SCORE: 26
ADLS_ACUITY_SCORE: 21
ADLS_ACUITY_SCORE: 26
ADLS_ACUITY_SCORE: 24
ADLS_ACUITY_SCORE: 20
ADLS_ACUITY_SCORE: 22
ADLS_ACUITY_SCORE: 19
ADLS_ACUITY_SCORE: 19
ADLS_ACUITY_SCORE: 26
ADLS_ACUITY_SCORE: 22
ADLS_ACUITY_SCORE: 26
ADLS_ACUITY_SCORE: 26
ADLS_ACUITY_SCORE: 24
ADLS_ACUITY_SCORE: 19
ADLS_ACUITY_SCORE: 22
ADLS_ACUITY_SCORE: 20
ADLS_ACUITY_SCORE: 24
ADLS_ACUITY_SCORE: 19
ADLS_ACUITY_SCORE: 26
ADLS_ACUITY_SCORE: 19
ADLS_ACUITY_SCORE: 13
ADLS_ACUITY_SCORE: 20
ADLS_ACUITY_SCORE: 22
ADLS_ACUITY_SCORE: 22
ADLS_ACUITY_SCORE: 19
ADLS_ACUITY_SCORE: 24
ADLS_ACUITY_SCORE: 22
ADLS_ACUITY_SCORE: 19
ADLS_ACUITY_SCORE: 24
ADLS_ACUITY_SCORE: 26
ADLS_ACUITY_SCORE: 19
ADLS_ACUITY_SCORE: 22
ADLS_ACUITY_SCORE: 22
ADLS_ACUITY_SCORE: 19
ADLS_ACUITY_SCORE: 20
ADLS_ACUITY_SCORE: 22
ADLS_ACUITY_SCORE: 22
ADLS_ACUITY_SCORE: 19
ADLS_ACUITY_SCORE: 22
ADLS_ACUITY_SCORE: 24
ADLS_ACUITY_SCORE: 19
ADLS_ACUITY_SCORE: 19
ADLS_ACUITY_SCORE: 28
ADLS_ACUITY_SCORE: 22
ADLS_ACUITY_SCORE: 22
ADLS_ACUITY_SCORE: 19
ADLS_ACUITY_SCORE: 19
ADLS_ACUITY_SCORE: 22
ADLS_ACUITY_SCORE: 19
ADLS_ACUITY_SCORE: 24
ADLS_ACUITY_SCORE: 22
ADLS_ACUITY_SCORE: 22
ADLS_ACUITY_SCORE: 19
ADLS_ACUITY_SCORE: 24
ADLS_ACUITY_SCORE: 22
ADLS_ACUITY_SCORE: 24
ADLS_ACUITY_SCORE: 22
ADLS_ACUITY_SCORE: 19
ADLS_ACUITY_SCORE: 19
ADLS_ACUITY_SCORE: 22
ADLS_ACUITY_SCORE: 22
ADLS_ACUITY_SCORE: 20
ADLS_ACUITY_SCORE: 26
ADLS_ACUITY_SCORE: 22
ADLS_ACUITY_SCORE: 24
ADLS_ACUITY_SCORE: 22
ADLS_ACUITY_SCORE: 24
ADLS_ACUITY_SCORE: 20
ADLS_ACUITY_SCORE: 19
ADLS_ACUITY_SCORE: 22
ADLS_ACUITY_SCORE: 24
ADLS_ACUITY_SCORE: 22
ADLS_ACUITY_SCORE: 20
ADLS_ACUITY_SCORE: 19
ADLS_ACUITY_SCORE: 24
ADLS_ACUITY_SCORE: 22
ADLS_ACUITY_SCORE: 20
ADLS_ACUITY_SCORE: 19
ADLS_ACUITY_SCORE: 22
ADLS_ACUITY_SCORE: 24
ADLS_ACUITY_SCORE: 24
ADLS_ACUITY_SCORE: 26
ADLS_ACUITY_SCORE: 22
ADLS_ACUITY_SCORE: 21
ADLS_ACUITY_SCORE: 22
ADLS_ACUITY_SCORE: 20
ADLS_ACUITY_SCORE: 16

## 2021-01-01 ASSESSMENT — VISUAL ACUITY
OU: OTHER (SEE COMMENT)
OU: NORMAL ACUITY
OU: OTHER (SEE COMMENT)
OU: NORMAL ACUITY
OU: OTHER (SEE COMMENT)
OU: NORMAL ACUITY
OU: OTHER (SEE COMMENT)
OU: OTHER (SEE COMMENT)
OU: NORMAL ACUITY
OU: OTHER (SEE COMMENT)
OU: NORMAL ACUITY
OU: OTHER (SEE COMMENT)
OU: NORMAL ACUITY
OU: OTHER (SEE COMMENT)
OU: OTHER (SEE COMMENT)
OU: NORMAL ACUITY
OU: OTHER (SEE COMMENT)
OU: NORMAL ACUITY

## 2021-01-01 ASSESSMENT — MIFFLIN-ST. JEOR
SCORE: 1151.88
SCORE: 1185.88
SCORE: 1166.88
SCORE: 1152.88
SCORE: 1164.88
SCORE: 1122.88
SCORE: 1171.88
SCORE: 1157.88
SCORE: 1128.88
SCORE: 1143.88
SCORE: 1173.88
SCORE: 1180.88
SCORE: 1176.88
SCORE: 1169.88
SCORE: 1187.88
SCORE: 1134.88
SCORE: 1158.88
SCORE: 1171.88
SCORE: 1180.24
SCORE: 1177.88

## 2021-01-01 ASSESSMENT — ENCOUNTER SYMPTOMS
SEIZURES: 0
SEIZURES: 0
DYSRHYTHMIAS: 0
SEIZURES: 0

## 2021-01-03 PROBLEM — G93.89 BRAIN MASS: Status: ACTIVE | Noted: 2021-01-01

## 2021-01-03 NOTE — H&P
[facility]    History and Physical - Hospitalist Service       Date of Admission:  1/3/2021  PRIMARY CARE PROVIDER:    No primary care provider on file.    Assessment & Plan   Jacque Bernstein is a 86 year old female admitted on 1/3/2021.    Past medical history significant for HTN, GERD, CKD stage 2, OA, Osteopenia, Chemotherapy induced neuropathy, and frequent falls history of right sided breast cancer who was directly admitted to [facility] due to incidental discovery of 2 intra-axial masses following a mechanical fall.    Incidental discovery of 2 intra-axial masses concerning for metastatic disease:  Presented to [facility] ED after a mechanical fall which resulted in her striking her head on the headboard of her bed causing a persistent headache. Head CT revealed the 2 masses.  The larger mass is located in the cerebellum with mass effect and tonsillar herniation and associated mild hydrocephalus.  A 2 cm parietal mass with vasogenic edema was also noted.  With noted history of breast cancer these are concerning for metastatic disease.  Neurosurgery was called and advised starting dexamethasone and recommend transfer to [facility].    --Neurosurgery consult requested.     --Defer imaging studies to their service.  --Continue dexamethasone 4 mg every 8 hours.    --Neuro checks every 4 hours.      UTI:  UA from the ED grossly abnormal and patient was started on ceftriaxone.    --Continue ceftriaxone.    --Follow urine culture result from [facility].      History of right sided breast cancer:  Clinical staging from 2017: Stage IIIA (T3, N2a, M0).  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  S/p mastectomy, radiation and chemo therapy.    --Continue Arimidex 1 mg daily.      HTN:  --Resume PTA lisinopril 20 mg daily with hold parameters.      GERD:  --Resume PTA Pepcid 20 mg daily PRN.      OA:  --PRN APAP available.      Frequent  Falls:  --PT.      CKD stage 2:  Creatinine baseline around 1.0 with GFR in the 50-60's.    --Monitor.      Chemotherapy induced neuropathy:  Symptomatic management and not currently on gabapentin.  --PRN APAP.      Osteopenia:  --Hold PTA Calcium supplements and resume at discharge.      COVID-19 screening, asymptomatic:  Recent negative test on 12/18/2020.   --PCR ordered upon admit.         Diet: Combination Diet Low Saturated Fat Na <2400mg Diet  DVT Prophylaxis: Pneumatic Compression Devices  Matthews Catheter: not present  Code Status: Full Code       Disposition Plan   Expected discharge: 4 - 7 days, recommended to TBD once evalaution and management of suspected brain mets completed, safe dispo plan in place.  Entered: Ted Lua PA-C 01/03/2021, 3:17 PM     The patient's care was discussed with the Patient and Neurosurgery Consultant.    The patient has been discussed with Dr. Larkin, who agrees with the assessment and plan at this time. Dr. Larkin will evaluate the patient independently.     Ted Lua PA-C  [employer]    ______________________________________________________________________    Chief Complaint   Direct admit due to incidental discovery of 2 intra-axial masses following a mechanical fall.    History is obtained from the patient and EMR.      History of Present Illness   Jacque Bernstein is a 86 year old female with a past medical history significant for HTN, GERD, CKD stage 2, OA, Osteopenia, Chemotherapy induced neuropathy, and frequent falls history of right sided breast cancer who was directly admitted to [facility] due to incidental discovery of 2 intra-axial masses following a mechanical fall.    Presented to [facility] ED after a mechanical fall which resulted in her striking her head on the headboard of her bed causing a persistent headache. Head CT revealed the 2 masses.  The larger mass is in cerebellum with mass  effect and tonsillar herniation and associated mild hydrocephalus.  A 2 cm parietal mass with vasogenic edema was also noted.  With noted history of breast cancer these are concerning for metastatic disease.  Neurosurgery was called and advised starting dexamethasone and recommend transfer to [facility].      Cervical spine was negative.  UA was found to be grossly abnormal and patient was started on ceftriaxone.  CBC was unremarkable.  BMP with creatinine of 1.06 with GFR of 60.      Patient was seen in her hospital room.  Celestino Fisher PA-C of Neurosurgery was present.  Briefly discussed plan regarding imaging and steroids.      Patient stated she has had some chills a couple of times this past week or two.  She has had increased fatigue and reports some weight loss.  She rarely gets headaches but had one after hitting her head this morning when she fell.  She complained of pain in the back of her neck.  At first she denied having any vision changes but when asked if she has blurred or double vision she stated yes.  Patient has been having double vision for the last year.      She occasionally has palpitations.  She feels her feet and ankles swell regularly.  She has ongoing issues with constipation and her last bowel movement was a few days ago.  She has been having increased urinary frequency but no dysuria.  Patient has frequent falls and recently moved into [facility].  She feels she bruises and bleeds easily.  At times, she has dizzy spells and when asked she indicated it was a spinning sensation and not a feeling that she was going to faint or pass out.  She also feels her speech is slurred at times.      Review of Systems    The 10 point Review of Systems is negative other than noted in the HPI.      Past Medical History    I have reviewed this patient's medical history and updated it with pertinent information if needed.   No past medical history on file.   HTN, GERD, CKD stage 2, OA, Osteopenia,  Chemotherapy induced neuropathy, and frequent falls history of right sided breast cancer    Past Surgical History   I have reviewed this patient's surgical history and updated it with pertinent information if needed.  No past surgical history on file.   Knee arthroscopy, Appendectomy, Oophorectomy, Left chest wall port placement and subsequent removal, hysterectomy, Right sided mastectomy, Right TKA, Left MARY, Bilateral cataracts,  Right MARY    Social History   I have reviewed this patient's social history and updated it with pertinent information if needed.  Patient resides at [facility] with her .  Patient has never smoked.  She stated she does not consume alcohol.  She does not use illicit drugs.  She utilizes both a cane and walker.    Social History     Tobacco Use     Smoking status: Not on file   Substance Use Topics     Alcohol use: Not on file     Drug use: Not on file        Family History   I have reviewed this patient's family history and updated it with pertinent information if needed.   No family history on file.   Father: Prostate cancer  Mother: CAD/MI  Brother: Prostate and skin cancer.      Prior to Admission Medications   Prior to Admission Medications   Prescriptions Last Dose Informant Patient Reported? Taking?   B Complex Vitamins (VITAMIN B-COMPLEX) TABS 1/2/2021 at Unknown time  Yes Yes   Sig: Take 1 tablet by mouth daily   Calcium Carb-Ergocalciferol 500-200 MG-UNIT TABS 1/2/2021 at Unknown time  Yes Yes   Sig: Take 1 tablet by mouth daily   acetaminophen (TYLENOL) 500 MG tablet prn  Yes Yes   Sig: Take 1,000 mg by mouth every 6 hours as needed   amoxicillin (AMOXIL) 500 MG capsule prn  Yes Yes   Sig: Take 2,000 mg by mouth as needed Dental procedures   anastrozole (ARIMIDEX) 1 MG tablet 1/2/2021 at Unknown time  Yes Yes   Sig: Take 1 mg by mouth daily   docusate sodium (DSS) 100 MG capsule 1/2/2021 at Unknown time  Yes Yes   Sig: Take 100 mg by mouth daily    lisinopril (ZESTRIL) 20 MG tablet 1/2/2021 at Unknown time  Yes Yes   Sig: Take 20 mg by mouth daily   melatonin 3 MG tablet prn  Yes Yes   Sig: Take 3 mg by mouth nightly as needed      Facility-Administered Medications: None     Allergies   Not on File   No known drug allergies.      Physical Exam   Temp: 98.2  F (36.8  C) Temp src: Oral BP: (!) 160/81 Pulse: 86   Resp: 18 SpO2: 95 % O2 Device: None (Room air)       Constitutional: Awake, alert, cooperative, no apparent distress.    ENT: Normocephalic, without obvious abnormality, atraumatic, oral pharynx with moist mucus membranes, tonsils without erythema or exudates.  Eyes pupils are equal, round and reactive to light; extra occular movements intact.  Normal sclera.    Neck: Supple, symmetrical, trachea midline, no adenopathy.  Pulmonary: No increased work of breathing, good air exchange, clear to auscultation bilaterally, no crackles or wheezing.  Cardiovascular: Regular rate and rhythm, normal S1 and S2, no S3 or S4, and no murmur noted.  GI: Normal bowel sounds, soft, non-distended, non-tender.   Skin/Integumen: Clear.  Neuro: CN II-XII grossly intact.  Upper and lower extremities strength, coordination and sensation intact bilaterally.    Psych:  Alert and oriented x 3. Normal affect.  Extremities: 1+ lower extremity edema noted, and calves are non-tender to palpation bilaterally.     Data   Data reviewed today: I reviewed all medications, new labs and imaging results over the last 24 hours. I personally reviewed no images or EKG's today.    No lab results found in last 7 days.    No results found for this or any previous visit (from the past 24 hour(s)).

## 2021-01-04 PROBLEM — C79.31 BRAIN METASTASIS: Status: ACTIVE | Noted: 2021-01-01

## 2021-01-04 NOTE — CONSULTS
Consult Date:  01/04/2021      ONCOLOGY CONSULTATION      This consult has been requested by Iris Maldonado PA-C for breast cancer.      Mrs. Bernstein is an 86-year-old female with history of breast cancer.  The patient follows up with Dr. Ho in the [facility] system for breast cancer.  The patient was diagnosed with right breast cancer in 11/2017.  Tumor was ER negative, MO weakly positive and HER-2/matthew positive.  The patient received neoadjuvant chemotherapy with weekly Taxol, Herceptin and Perjeta.  The patient had mastectomy on 04/02/2018.  Pathology revealed grade 2 invasive mammary carcinoma with mixed ductal and lobular features measuring 1.8 cm. 1 of 8 lymph nodes revealed macrometastasis and 1 revealed isolated tumor cells.  Tumor was strongly ER positive, MO negative and HER-2/matthew positive.  The patient completed adjuvant Herceptin for a total of 1 year.  The patient is currently on anastrozole since 07/2018.      The patient was brought to [facility] emergency room on 01/03/2020 because of a fall.  The patient is a resident of an assisted living facility.  The patient recently has been getting weaker and gait has not been stable.  She fell hitting her head.  No loss of consciousness.  She was brought to the emergency room and had multiple investigations done.   -CBC normal.   -CMP revealed minor abnormalities.   -UA is abnormal, suspicious for UTI.   -COVID-19 negative.   -CT cervical spine did not reveal fracture or acute injury.   -CT head did not reveal any hemorrhage or infarct.  There are likely at least 2 intraaxial masses suspicious for metastatic disease.  The larger in the left cerebellum is causing mass effect.      The patient was admitted to [facility] for further management.  The patient has been seen by Neurosurgery.  The patient had a brain MRI done.  It reveals multiple intracranial enhancing masses, largest is in the left cerebellum.      REVIEW OF SYSTEMS:  I met with  the patient.  The patient has some confusion.      On directly asking, she denied any problem.  Denied any headache or dizziness.  Denied any chest pain or difficulty breathing.  Denied any abdominal pain, nausea or vomiting.  Denied urinary or bowel complaints.      I spoke to the nurse.  The patient has been confused.  No seizure.  She is not in any pain.      All other review of systems is negative.      ALLERGIES:  REVIEWED.      MEDICATIONS:  Reviewed.      PAST MEDICAL HISTORY:   1.  Right breast cancer as described above.   2.  Hypertension.   3.  Neuropathy from chemotherapy.   4.  Osteoarthritis.   5.  Chronic kidney disease.   6.  Appendectomy.   7.  YANG and BSO.   8.  Right mastectomy.   9.  Right knee arthroplasty.   10.  Left hip arthroplasty.   11.  Cataract surgery.   12.  Right hip arthroplasty.      SOCIAL HISTORY:   -No history of smoking.     -No alcohol use.      PHYSICAL EXAMINATION:   GENERAL:  The patient is alert. Not in any distress.   VITAL SIGNS:  Reviewed.   The rest of the systems not examined.      LABORATORY DATA:  Reviewed.      ASSESSMENT:   1.  An 86-year-old female with multiple intracranial metastases.  This is likely metastatic breast cancer.   2.  Right breast cancer diagnosed in 11/2017.  The patient is currently on anastrozole.   3.  Fall secondary to brain metastasis.   4.  Urinary tract infection.   5.  Multiple other medical problems including hypertension and neuropathy.      RECOMMENDATIONS:   1.  The patient had right breast cancer diagnosed in 2017.  The patient had chemotherapy, Herceptin, and surgery.  The patient is currently on anastrozole.      The patient was admitted with fall.  CT scan and brain MRI reveals multiple brain metastases.  This is likely metastases from breast cancer.      2.  For brain metastases, patient is on dexamethasone, which will be continued.      The patient has been evaluated by Neurosurgery.  We will wait for their final plan.  If the  patient has resection of a brain lesion, we will get the pathology from that. Depending on the pathology, further treatment will be planned.      3.  For further evaluation, we will get CT chest, abdomen and pelvis and bone scan.  As outpatient, she may get a PET scan.     4.  The patient has been on anastrozole.  This will be discontinued as patient has progression of disease.    5.  The patient will need radiation to the brain lesion.  We will get a Radiation Oncology consult.     6.  I called and spoke to patient's son, Vick, and updated him regarding patient's condition. Case was discussed with Iris Maldonado.      Thanks for the consult.      Total time spent 60 minutes.         TEENA PATTERSON MD             D: 2021   T: 2021   MT: CHERI      Name:     MIHAI GRAHAM   MRN:      [id number]        Account:       [id number]   :      1934           Consult Date:  2021      Document: [id number]

## 2021-01-04 NOTE — PROGRESS NOTES
"[facility]    Neurosurgery Progress Note    Date of Service (when I saw the patient): 01/04/2021     Assessment & Plan   Jacque Bernstein is a 86 year old female who was admitted on 1/3/2021. She presented to [facility] with a recent hisotry of multiple falls and dizziness. A head CT revealed 2 intra-axial masses suspicious for metastatic disease with mass effect and tonsillar herniation as well as associated mild hydrocephalus. Today she was seen lying in bed. She denies headache, dizziness, nausea/vomiting, and weakness.     Active Problems:    Brain mass    Assessment: intra-axial masses with history of breast cancer    Plan: MRI with Stealth protocol today    ADDENDUM:  Brain MRI completed today. Dr. Rojas to visit with patient and family tomorrow with likely surgery on Wednesday.    I have discussed the following assessment and plan Dr. Rojas who is in agreement with initial plan and will follow up with further consultation recommendations.    Trixie Dawson CNP  [facility]  [address]  [address] [place] [id number]    Tel [phone]  Pager [phone]      Interval History   Stable.    Physical Exam   Temp: 98.2  F (36.8  C) Temp src: Oral BP: (!) 135/97 Pulse: 87   Resp: 16 SpO2: 97 % O2 Device: None (Room air)    Vitals:    01/03/21 1900 01/04/21 0500   Weight: 164 lb 3.9 oz (74.5 kg) 163 lb (73.9 kg)     Vital Signs with Ranges  Temp:  [98.2  F (36.8  C)-98.6  F (37  C)] 98.2  F (36.8  C)  Pulse:  [80-88] 87  Resp:  [16-18] 16  BP: (134-160)/(74-97) 135/97  SpO2:  [95 %-97 %] 97 %  No intake/output data recorded.     , Blood pressure (!) 135/97, pulse 87, temperature 98.2  F (36.8  C), temperature source Oral, resp. rate 16, height 5' 5\" (1.651 m), weight 163 lb (73.9 kg), SpO2 97 %.  163 lbs 0 oz  HEENT:  Normocephalic.  PERRLA.  EOM s intact.    Heart:  No peripheral edema  Lungs:  No SOB  Skin:  Warm and dry, " good capillary refill.  Extremities:  Good radial and dorsalis pedis pulses bilaterally, no edema, cyanosis or clubbing.    NEUROLOGICAL EXAMINATION:   Mental status:  Alert and Oriented x 3, speech is fluent.  Cranial nerves:  II-XII intact.   Motor:  Strength is 5/5 throughout the upper and lower extremities    Medications       anastrozole  1 mg Oral Daily     cefTRIAXone  1 g Intravenous Q24H     dexamethasone  4 mg Intravenous Q8H     lisinopril  20 mg Oral Daily     sodium chloride (PF)  3 mL Intracatheter Q8H       Data     CBC RESULTS:   Recent Labs   Lab Test 01/04/21  1050   WBC 8.8   RBC 4.36   HGB 13.6   HCT 41.7   MCV 96   MCH 31.2   MCHC 32.6   RDW 13.7        Basic Metabolic Panel:  No results found for: NA   Lab Results   Component Value Date    POTASSIUM 4.5 01/03/2021     No results found for: CHLORIDE  No results found for: LADI  No results found for: CO2  No results found for: BUN  No results found for: CR  No results found for: GLC  INR:  No results found for: INR

## 2021-01-04 NOTE — PLAN OF CARE
Pt admitted today with multiple brain masses. Pt alert and oriented to self and time, forgetful. Neuros intact. Pt hypertensive. Pt assist of 2. Denies pain. Edema, BLE. Incontinent of urine. Plan pending MRI and surgery.   Son would like to be updated on plan of care.   Greenlight on aggression stop light tool.   Pt has her clothes in a bag at her bedside.

## 2021-01-04 NOTE — PROGRESS NOTES
[facility]    Medicine Progress Note - Hospitalist Service       Date of Admission:  1/3/2021    Assessment & Plan       Jacque Bernstein is a 86 year old female admitted on 1/3/2021.     Past medical history significant for HTN, GERD, CKD stage 2, OA, Chemotherapy induced neuropathy, frequent falls, and history of right sided breast cancer who was directly admitted to [facility] due to incidental discovery of 2 intra-axial masses following a mechanical fall.     Incidental discovery of 2 intra-axial masses concerning for metastatic disease:  Presented to [facility] ED after a mechanical fall which resulted in her striking her head on the headboard of her bed causing a persistent headache. Head CT revealed the 2 masses.  The larger mass is located in the cerebellum with mass effect and tonsillar herniation and associated mild hydrocephalus.  A 2 cm parietal mass with vasogenic edema was also noted.  With noted history of breast cancer these are concerning for metastatic disease.  Neurosurgery was called and advised starting dexamethasone and recommend transfer to [facility].    --Neurosurgery following, MRI with stealth protocol today with surgical plan thereafter   --Continue dexamethasone 4 mg every 8 hours.    --Neuro checks every 4 hours     UTI: UA from the ED grossly abnormal with positive nitrites, large LE, WBC >100, moderate blood, 25-50 RBCs. Patient was started on ceftriaxone.    --Continue ceftriaxone.    --Unfortunately does not appear that urine culture was obtained     Hyperglycemia: Secondary to steroid administration above. A1C 5.6.   --Monitor   --Medium sliding scale insulin ordered   --BS per protocol   --Monitor for hypoglycemia     Recent Labs   Lab 01/04/21  1050   *     History of right sided breast cancer: Clinical staging from 2017: Stage IIIA (T3, N2a, M0).  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  S/p  mastectomy, radiation and chemo therapy.    --Continue Arimidex 1 mg daily.    --[facility] consulted, pt follows with Dr. Ho     HTN: Continue PTA lisinopril 20 mg daily with hold parameters.       GERD: Continue PTA Pepcid 20 mg daily PRN.       OA:  --PRN APAP available.       Frequent Falls: Reports worsened over the past few months to the point of primarily using a wheelchair for ambulatory assistance.   --PT.       CKD stage 2:  Creatinine baseline around 1.0 with GFR in the 50-60's.    --Monitor.       Chemotherapy induced neuropathy: Symptomatic management and not currently on gabapentin.    --PRN APAP.       Osteopenia:  --Hold PTA Calcium supplements and resume at discharge.       COVID-19 screening, asymptomatic:  Recent negative test on 12/18/2020.   --PCR ordered upon admit and negative        Diet: Combination Diet Low Saturated Fat Na <2400mg Diet    DVT Prophylaxis: Pneumatic Compression Devices  Matthews Catheter: not present  Code Status: Full Code         Disposition Plan   Expected discharge: pending Neurosurgery game plan   Entered: Iris Maldonado PA-C 01/04/2021, 1:14 PM       The patient's care was discussed with the Attending Physician, Dr. Mendez, Bedside Nurse and Patient.    Iris Maldonado PA-C  Hospitalist Service  [facility]  Contact information available via [facility] Paging/Directory  ______________________________________________________________________    Interval History   Pt reports seeing double, but also does not have her glasses present with her. She feels that her glasses would help with this. She does report worsening balance issues over the past few months with frequent falls to the point of having to use a wheelchair for ambulatory assistance. Denies headaches. No tinnitus reported. No focal neuro deficits.     Data reviewed today: I reviewed all medications, new labs and imaging results over the last 24  hours. I personally reviewed all labs and imaging to date.     Physical Exam   Vital Signs: Temp: 98  F (36.7  C) Temp src: Oral BP: 123/59 Pulse: 84   Resp: 16 SpO2: 96 % O2 Device: None (Room air)    Weight: 163 lbs 0 oz    CONSTITUTIONAL: Pt laying in bed, dressed in hospital garb. Appears comfortable. Cooperative with interview.   HEENT: Normocephalic, atraumatic. Pupils equal, round, and reactive to light. EOMI,bilat without nystagmus. Negative for conjunctival redness or scleral icterus.  Oral mucosa pink and moist; negative for ulcerations, erythema, or exudates.  Dentition in good repair.   CARDIOVASCULAR: RRR, no murmurs, rubs, or extra heart sounds appreciated. Pulses +2/4 and regular in upper and lower extremities, bilaterally.   RESPIRATORY: No increased work of breathing. CTA, bilat; no wheezes, rales, or rhonchi appreciated.  GASTROINTESTINAL:  Abdomen soft, non-distended. BS auscultated in all four quadrants. Negative for tenderness to palpation.  No masses or organomegaly noted.  MUSCULOSKELETAL: Strength +5/5 in upper and lower extremities, bilaterally. No gross deformities noted. Normal muscle tone.   HEMATOLOGIC/LYMPHATIC/IMMUNOLOGIC:  Negative for lower extremity edema, bilaterally.  NEUROLOGIC: Alert and oriented to person, place, and time.  strength intact. CN's II-XII grossly intact. Cerebellar function intact per finger to nose with some apraxia of LUE. Sensation equal and intact in upper and lower extremities, bilat.   SKIN: Warm, dry, intact. No jaundice noted. Negative for suspicious lesions, rashes, bruising, open sores or abrasions.     Data   Recent Labs   Lab 01/04/21  1050 01/03/21 2003   WBC 8.8  --    HGB 13.6  --    MCV 96  --      --    INR 1.06  --      --    POTASSIUM 4.3 4.5   CHLORIDE 103  --    CO2 25  --    BUN 28  --    CR 0.73  --    ANIONGAP 7  --    LADI 9.3  --    *  --    ALBUMIN 3.0*  --    PROTTOTAL 7.4  --    BILITOTAL 0.6  --    ALKPHOS 103   --    ALT 20  --    AST 13  --      No results found for this or any previous visit (from the past 24 hour(s)).  Medications       anastrozole  1 mg Oral Daily     cefTRIAXone  1 g Intravenous Q24H     dexamethasone  4 mg Intravenous Q8H     lisinopril  20 mg Oral Daily     sodium chloride (PF)  3 mL Intracatheter Q8H

## 2021-01-04 NOTE — PLAN OF CARE
Pt here with new brain masses, frequent falls. A&Ox4, very forgetful. Neuros intact except forgetfulness. VSS. Low Fat diet, thin liquids. Takes pills whole. Bedrest this shift, PT unable to see patient due to testing, previous shift reported patient was very unsteady when standing. Denies pain. Incontinent of bladder. Impulsive at times. Had to straight cath x1 for retention, will continue to perform PVR's. Pt scoring green on the Aggression Stop Light Tool. Plan have all consults review CT and MRI. Discharge pending.

## 2021-01-04 NOTE — PROVIDER NOTIFICATION
MD Notification    Notified Person: MD    Notified Person Name: GABY Marcum     Notification Date/Time: 1/4/21 1500    Notification Interaction: Page     Purpose of Notification: Saw MRI results. Do you think patient should be on Keppra? Thanks!    Orders Received: Not needed at this time.     Comments:

## 2021-01-04 NOTE — PROGRESS NOTES
"Nutrition Admission Screen (incomplete):  Have you recently lost weight without trying in the past 6 months? - \"unsure\"    Chart reviewed  MRI this afternoon  Diet: 2400 mg Na, LSF  Breakfast - eggs, oatmeal, juice, coffee    5'5\"  IBW: 56.8 kg  1/4/21: 73.9 kg  6/22/20: 74 kg  1/20/20: 75.8 kg    Records reflect wt has been stable the past 6 months    Intervention  - No indication for RD assessment / intervention at this time. Will see for LOS or sooner if consulted.    Cathi Vora RD, LD  Clinical Dietitian - [facility]   Pager - [phone]    "

## 2021-01-04 NOTE — PROVIDER NOTIFICATION
MD Notification    Notified Person: MD    Notified Person Name: Iris PA    Notification Date/Time: 1/4/21    Notification Interaction: Page    Purpose of Notification: Do we want patient on sliding scale insulin while on steroids? Noticed AM glucose was 241.     Orders Received:Sliding scale added    Comments:

## 2021-01-04 NOTE — PLAN OF CARE
Nursing shift note:  Pt here with multiple brain masses. A&Ox2 disoriented but redirectable. Neuros intact except forgetfulness. BLE edema. VSS. Low fat diet, thin liquids. Takes pills whole with water. Up with A2/lift, unable to pivot to BSC. Incontinent of bladder, redness on inner thighs. Turn and repo. Denies pain. Pt scoring green on the Aggression Stop Light Tool. Slept well. Plan pending MRI and possible surgical intervention. Son Vick would like an update.        Pt's belongings:  Clothing and purse at bedside

## 2021-01-05 NOTE — PROGRESS NOTES
Service Date: 2021      SUBJECTIVE:  Ms. Bernstein is an 86-year-old female with breast cancer. ER positive and HER-2/matthew positive.  She is on anastrozole.      The patient was admitted after a fall.  Brain MRI reveals multiple brain metastases.  This is likely from breast cancer.      Multiple other imaging studies were ordered.  CT chest, abdomen and pelvis reveals a few tiny lung nodules of uncertain significance.  No evidence of metastatic disease on CT chest, abdomen and pelvis.  Bone scan reveals fracture of right ribs and sacrum.  This is likely secondary to fall.  No definite bone metastasis.      The patient is on dexamethasone.  Overall, she is stable.  Her mentation is better.  Her confusion is less.  No chest pain.  No shortness of breath.  No nausea or vomiting.      ASSESSMENT:     1.  An 86-year-old female with breast cancer has multiple new brain lesions.  These are highly suspicious for metastatic breast cancer.   2.  Urinary tract infection.      PLAN:   1.  CT scan and bone scan were reviewed with the patient.  I told her there is no definite evidence of metastatic disease.  She was happy to know that. I explained to the patient has brain metastasis, likely from breast cancer.      She will be undergoing resection of the brain lesion.  We will wait for the pathology.     2.  Oncology will see her after the pathology is back.  Please call us with any questions.         TEENA PATTERSON MD             D: 2021   T: 2021   MT: CARLOS      Name:     MIHAI BERNSTEIN   MRN:      [id number]        Account:      [id number]   :      1934           Service Date: 2021      Document: [id number]

## 2021-01-05 NOTE — PLAN OF CARE
Nursing shift note  Pt here with new brain masses, frequent falls. A&Ox4, very forgetful need reorientation . Neuros intact except forgetfulness. VSS. Low Fat diet, thin liquids. Takes pills whole. Bedrest this shift. Denies pain. Incontinent of bladder.  straight cath 450 mlx1 for retention, figueroa placed around morning. Pt scoring green on the Aggression Stop Light Tool.Need PT evaluation, Discharge pending.      Behavior & Aggression Tool color:  Green    Pt's belongings:   (Belongings from admission day present in pt's room)                  Home medications:    (N/A)

## 2021-01-05 NOTE — PROGRESS NOTES
Spoke to patient and her son about her brain metastases and the plan for surgery tomorrow for the large posterior fossa met.    Discussed the risks benefits and alternatives with the family, and they understand and wish to proceed.    Plan for surgery tomorrow pm.

## 2021-01-05 NOTE — PLAN OF CARE
PT-Reviewed chart. Patient transferred to ICU and plan is for surgery tomorrow. Will complete current PT order. Please reorder when appropriate after surgery.

## 2021-01-05 NOTE — PROVIDER NOTIFICATION
Paged [facility] ist regarding patient still retain urine already did two straight cath can you please put an order for figueroa placement thank you

## 2021-01-05 NOTE — PLAN OF CARE
Pt transferred to ICU, surgery tomorrow. Urine culture positive for ESBL, antibiotics changed to Meropenem. Matthews in place for retention.

## 2021-01-05 NOTE — CONSULTS
87 y/o female with Dx. Of right breast CA stage III in Nov. 2017 s/p chemorx, right mastectomy & XRT rx to right CW & LN completed in July 2018 at [facility].  Pt admitted after fall, with brain MRI showing 5 parenchymal brain metastases, largest in left cerebellum measuring > 5cm.  Pt being evaluated for craniotomy and resection of cerebellar mass by neurosurgery- Dr. Rojas.  Met with Pt. and discussed potential post op XRT rx. to multiple brain sites with stereotactic RT. I discussed with Dr. Cazares in Oncology.  Will follow and assess pt postop.   LISA Saez MD.

## 2021-01-05 NOTE — PROGRESS NOTES
[facility]    Medicine Progress Note - Hospitalist Service       Date of Admission:  1/3/2021  Assessment & Plan       Jacque Bernstein is a 86 year old female admitted on 1/3/2021.     Past medical history significant for HTN, GERD, CKD stage 2, OA, Chemotherapy induced neuropathy, frequent falls, and history of right sided breast cancer who was directly admitted to [facility] due to incidental discovery of 2 intra-axial masses following a mechanical fall.     Incidental discovery of 2 intra-axial masses concerning for metastatic disease  Presented to [facility] ED after a mechanical fall which resulted in her striking her head on the headboard of her bed causing a persistent headache. Head CT revealed the 2 masses.  The larger mass is located in the cerebellum with mass effect and tonsillar herniation and associated mild hydrocephalus.  A 2 cm parietal mass with vasogenic edema was also noted.  With noted history of breast cancer these are concerning for metastatic disease.  Neurosurgery was called and advised starting dexamethasone and recommend transfer to [facility].    --Neurosurgery following, MRI with stealth protocol done and sounds as if the plan is for resection tomorrow   --Continue dexamethasone 4 mg every 8 hours.    --Neuro checks every 4 hours     UTI  UA from the ED grossly abnormal with positive nitrites, large LE, WBC >100, moderate blood, 25-50 RBCs. Patient was initially started on ceftriaxone.    * Urine culture growing >100k ESBL   --Discontinued ceftriaxone  --Started Meropenem.  Depending on patient's length of stay may need to consider      Hyperglycemia  Secondary to steroid administration above. A1C 5.6.   Blood sugars better controlled today   --Monitor   --Medium sliding scale insulin ordered   --BS per protocol   --Monitor for hypoglycemia     History of right sided breast cancer  Clinical staging from 2017: Stage IIIA (T3, N2a,  M0).  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  S/p mastectomy, radiation and chemo therapy.    --Continue Arimidex 1 mg daily  --[facility] consulted, pt follows with Dr. Ho     HTN  Continue PTA lisinopril 20 mg daily with hold parameters     GERD  Continue PTA Pepcid 20 mg daily PRN     OA  --PRN APAP available     Frequent Falls  Reports worsened over the past few months to the point of primarily using a wheelchair for ambulatory assistance.   --PT     CKD stage 2  Creatinine baseline around 1.0 with GFR in the 50-60's.    --Monitor     Chemotherapy induced neuropathy  Symptomatic management and not currently on gabapentin.    --PRN APAP     Osteopenia:  --Hold PTA Calcium supplements and resume at discharge.       COVID-19 screening, asymptomatic  Recent negative test on 12/18/2020.   --PCR ordered upon admit and negative            Diet: Combination Diet Low Saturated Fat Na <2400mg Diet    DVT Prophylaxis: Pneumatic Compression Devices  Matthews Catheter: in place, indication: Retention  Code Status: Full Code           Disposition Plan   Expected discharge: TBD, still undergoing evaluation.  Will need therapy evaluation prior to discharge to help with disposition planning   Entered: Alpesh Mendez DO 01/05/2021, 11:54 AM       The patient's care was discussed with the Bedside Nurse, Care Coordinator/ and Patient.    Alpesh Mendez DO  Hospitalist Service  [facility]  Contact information available via [facility] Paging/Directory    ______________________________________________________________________    Interval History   Patient seen and examined.  No acute events over night.  At this time the patient is pain free.  No difficulty breathing.  Tolerating diet.  No fevers or chills.  No new neurologic symptoms    Data reviewed today: I reviewed all medications, new labs and imaging results over the last 24 hours. I personally reviewed no images  or EKG's today.    Physical Exam   Vital Signs: Temp: 98.2  F (36.8  C) Temp src: Oral BP: 126/68 Pulse: 77   Resp: 16 SpO2: 96 % O2 Device: None (Room air)    Weight: 162 lbs 7.66 oz  General Appearance: Resting comfortably. NAD   Respiratory: Clear to auscultation.  No respiratory distress  Cardiovascular: RRR.  No obvious murmurs  GI: Bowel sounds present.  Non-tender  Skin: No obvious rashes.  No cyanosis  Other: No edema.  No calf tenderness.  Moving all extremities grossly      Data   Recent Labs   Lab 01/04/21  1050 01/03/21 2003   WBC 8.8  --    HGB 13.6  --    MCV 96  --      --    INR 1.06  --      --    POTASSIUM 4.3 4.5   CHLORIDE 103  --    CO2 25  --    BUN 28  --    CR 0.73  --    ANIONGAP 7  --    LADI 9.3  --    *  --    ALBUMIN 3.0*  --    PROTTOTAL 7.4  --    BILITOTAL 0.6  --    ALKPHOS 103  --    ALT 20  --    AST 13  --      Recent Results (from the past 24 hour(s))   MR Brain w Contrast Stereotactic    Narrative    MR BRAIN WITH CONTRAST STEREOTACTIC  1/4/2021 1:23 PM     HISTORY: Multiple brain lesions.    TECHNIQUE: High-resolution postcontrast images were obtained through  the brain with intravenous contrast for neurosurgical localization  purposes. 20 mL of Gadavist given.    FINDINGS: Exam was performed for neurosurgical stereotactic  localization purposes and does not constitute a diagnostic study. At  least five separate intracranial enhancing masses are present with the  largest being located in the left cerebellum causing some obstructive  hydrocephalus. Scattered nonspecific white matter changes also noted.  The masses are described in body of report of MRI of the brain without  and with contrast performed on the same date.      Impression    IMPRESSION: Stereotactic MRI with contrast for neurosurgical  localization purposes.    SAMIRA HOANG MD   MR Brain w/o & w Contrast    Narrative    MRI BRAIN WITHOUT AND WITH CONTRAST  1/4/2021 1:23 PM    HISTORY: Breast  carcinoma. Intra-axial masses discovered following  mechanical fall.    TECHNIQUE:  Multiplanar, multisequence MRI of the brain without and  with 20 mL Gadavist     COMPARISON: None.    FINDINGS: Diffusion weighted images do not show any evidence for any  acute ischemic infarcts. There are at least 5 enhancing masses which  are all probably intra-axial, the largest mass in the superior left  cerebellum could possibly be extra-axial. The large left cerebellar  mass crosses the midline and measures 5.2 x 5.0 x 4.9 cm. It extends  anteriorly up to the left middle/cerebral peduncle and appears to be  involving the middle cerebral peduncle. This lesion is causing  significant mass effect on the superior aspect of the fourth ventricle  and inferior aqueduct resulting in obstructive hydrocephalus with some  transependymal flow. The second largest lesion is in the left parietal  lobe measuring 2.7 x 2.3 x 2.4 cm and is associated with surrounding  edema. There is also a 1 cm area of enhancement in the right frontal  parietal region, is 0.8 cm enhancing lesion in the left anterior  frontal lobe, and a 0.6 cm enhancing lesion in the anterior right  frontal lobe. Moderately extensive scattered white matter changes also  noted. Vascular structures are patent at the skull base.      Impression    IMPRESSION: Multiple intracranial enhancing masses which appear to be  intra-axial. The largest is in the left cerebellum extending across  midline and causing compression of the superior fourth ventricle and  inferior aqueduct resulting in some hydrocephalus. Findings are  suspicious for metastatic deposits.    SAMIRA HOANG MD   CT Chest/Abdomen/Pelvis w Contrast    Narrative    CT CHEST/ABDOMEN/PELVIS WITH CONTRAST January 4, 2021 2:11 PM    CLINICAL HISTORY: Breast cancer.    TECHNIQUE: CT scan of the chest, abdomen, and pelvis was performed  following injection of IV contrast. Multiplanar reformats were  obtained. Dose reduction  techniques were used.   CONTRAST: 82mL Isovue-370.    COMPARISON: None.    FINDINGS:   LUNGS AND PLEURA: Minimal subpleural fibrotic changes anterolaterally  on the right, possibly related to radiation therapy. No pulmonary  masses. 3 mm nodule in the anterior right lower lobe image 141 series  5. Tiny nodule in the lingula 183 series 5. No effusions.    MEDIASTINUM/AXILLAE: No lymphadenopathy. No thoracic aortic aneurysms.    HEPATOBILIARY: No significant mass or bile duct dilatation. No  calcified gallstones. Low dense lesion too small to characterize in  the central right lobe of the liver on image 53 series 2. No definite  enhancement.    PANCREAS: No significant mass, duct dilatation, or inflammatory  change.    SPLEEN: Normal size.    ADRENAL GLANDS: Minimal nodular thickening of both adrenal glands.    KIDNEYS/BLADDER: Multiple low dense lesions too small to characterize  likely cysts. Areas of possible cortical scarring seen in the left  kidney, question some left renal atrophy. No hydronephrosis  bilaterally.    BOWEL: No obstruction or inflammatory change.    PELVIC ORGANS: No pelvic masses.    ADDITIONAL FINDINGS: No ascites. Right mastectomy.    MUSCULOSKELETAL: Survey of the visualized bony structures demonstrates  no destructive bony lesions.      Impression    IMPRESSION:  1.  A few tiny pulmonary nodules are noted of uncertain clinical  significance.  2.  Minimal nodular thickening of both adrenal glands is nonspecific.  3.  Multiple probable cysts in the kidneys.  4.  No definite adenopathy or bony metastatic disease demonstrated.    JUNO DAN MD   NM Bone Scan Whole Body    Narrative    NUCLEAR MEDICINE BONE SCAN WHOLE BODY  1/5/2021 11:18 AM    HISTORY: Breast cancer. Recent fall.    TECHNIQUE:  28 mCi Tc99m MDP IV in left upper forearm.  Anterior and  posterior images. Selected oblique images.    COMPARISON:  Prior bone scan: None available.   Relevant imaging study: None.    FINDINGS:  Fractures at the posterior aspects of the right seventh and  eighth ribs. Increased activity over the lower sacrum, consistent with  fracturing. There are some scattered areas of presumed  degenerative/arthritic type activity. Increased activity at the  lateral aspect of the left knee. This could simply be degenerative,  but if the patient has acute pain in this area, a lateral tibial  plateau fracture cannot be excluded; corresponding radiographs could  be performed if indicated clinically. No bony metastatic pattern.      Impression    IMPRESSION:   1. Fractures of the right ribs and sacrum.  2. Increased activity at the lateral aspect of the left knee, as  above.

## 2021-01-06 NOTE — PLAN OF CARE
OT/PT: Order received and chart reviewed. Per EMR, pt scheduled for OR today. Therapy will continue to follow tomorrow, 1/7/2021.

## 2021-01-06 NOTE — PROGRESS NOTES
[facility]    Medicine Progress Note - Hospitalist Service       Date of Admission:  1/3/2021    Assessment & Plan       Jacque Bernstein is a 86 year old female admitted on 1/3/2021. Her past medical history is significant for HTN, GERD, CKD stage 2, OA, chemotherapy induced neuropathy, frequent falls, and history of right sided breast cancer who was directly admitted to [facility] due to incidental discovery of 2 intra-axial masses following a mechanical fall, suspected metastatic breast cancer. Transferred for Neurosurgical evaluation.      Incidental discovery of 2 intra-axial masses concerning for metastatic disease: Presented to [facility] ED after a mechanical fall which resulted in her striking her head on the headboard of her bed causing a persistent headache. Reports progressively worsening ataxia over the past few months with frequent falls. No chronic headaches, vision changes, or tinnitus. No reported increased confusion. Head CT revealed the 2 masses; larger mass is located in the cerebellum with mass effect and tonsillar herniation and associated mild hydrocephalus.  A 2 cm parietal mass with vasogenic edema was also noted.  With noted history of breast cancer these are concerning for metastatic disease.  Neurosurgery was called and advised starting dexamethasone and recommend transfer to [facility].    --Neurosurgery following, plan for OR 1/6/2020   --Continue dexamethasone 4 mg every 8 hours.   --[facility] and [facility] also on board, appreciate assistance    --Neuro checks every 4 hours  --Monitor for urinary retention, bladder scan and straight cath per protocol    --PT/OT/SW consulted      ESBL UTI: UA from the ED grossly abnormal with positive nitrites, large LE, WBC >100, moderate blood, 25-50 RBCs. Patient was initially started on ceftriaxone.    * Urine culture growing >100k ESBL   --Discontinued ceftriaxone and  started Meropenem on 1/5/2020      Hyperglycemia: Secondary to steroid administration above. A1C 5.6.   Blood sugars better controlled today   --Monitor   --Medium sliding scale insulin ordered   --BS per protocol   --Monitor for hypoglycemia     Recent Labs   Lab 01/06/21  0744 01/06/21  0406 01/05/21  2357 01/05/21  1856 01/05/21  1512 01/05/21  0834 01/04/21  1050 01/04/21  1050   GLC  --   --   --   --   --   --   --  241*   * 161* 170* 269* 100* 154*   < >  --     < > = values in this interval not displayed.     History of right sided breast cancer  Clinical staging from 2017: Stage IIIA (T3, N2a, M0).  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  S/p mastectomy, radiation and chemo therapy.    --Continue Arimidex 1 mg daily  --[facility] consulted, pt follows with Dr. Ho     HTN: Continue PTA lisinopril 20 mg daily with hold parameters     GERD: Continue PTA Pepcid 20 mg daily PRN     OA: PRN APAP available     Frequent Falls: Reports worsened over the past few months to the point of primarily using a wheelchair for ambulatory assistance.   --PT/OT post-op   --SW for discharge planning      CKD stage 2: Creatinine baseline around 1.0 with GFR in the 50-60's.    --Monitor     Chemotherapy induced neuropathy: Symptomatic management and not currently on gabapentin.    --PRN APAP     Osteopenia: Hold PTA Calcium supplements and resume at discharge.       COVID-19 screening, asymptomatic: Recent negative test on 12/18/2020.   --PCR ordered upon admit and negative        Diet: NPO per Anesthesia Guidelines for Procedure/Surgery Except for: Meds    DVT Prophylaxis: Pneumatic Compression Devices  Matthews Catheter: in place, indication: Strict 1-2 Hour I&O  Code Status: Full Code         Disposition Plan   Expected discharge: Pending clinical course     Entered: Iris Maldonado PA-C 01/06/2021, 9:38 AM     The patient's care was discussed with the Bedside Nurse, Care  Coordinator/ and Patient.    Iris Edmund Maldonado PA-C  Hospitalist Service  [facility]  Contact information available via [facility] Paging/Directory  _________________________________________________________    Interval History   Seen in the ICU. Afebrile. No acute events overnight. Denies headache. Reports double vision improved now that she had her glasses with her. Alert and oriented X4.     Data reviewed today: I reviewed all medications, new labs and imaging results over the last 24 hours. I personally reviewed no images or EKG's today.    Physical Exam   Vital Signs: Temp: 97.5  F (36.4  C) Temp src: Oral BP: (!) 167/75 Pulse: 57   Resp: 12 SpO2: 100 % O2 Device: None (Room air)    Weight: 151 lbs 10.82 oz    CONSTITUTIONAL: Pt laying in bed, dressed in hospital garb. Appears comfortable. Cooperative with interview.   HEENT: Normocephalic, atraumatic. Pupils equal, round, and reactive to light. EOMI,bilat without nystagmus. Negative for conjunctival redness or scleral icterus.  Oral mucosa pink and moist; negative for ulcerations, erythema, or exudates.  Dentition in good repair.   CARDIOVASCULAR: RRR, no murmurs, rubs, or extra heart sounds appreciated. Pulses +2/4 and regular in upper and lower extremities, bilaterally.   RESPIRATORY: No increased work of breathing. CTA, bilat; no wheezes, rales, or rhonchi appreciated.  GASTROINTESTINAL:  Abdomen soft, non-distended. BS auscultated in all four quadrants. Negative for tenderness to palpation.  No masses or organomegaly noted.  MUSCULOSKELETAL: Strength +5/5 in upper and lower extremities, bilaterally. No gross deformities noted. Normal muscle tone.   HEMATOLOGIC/LYMPHATIC/IMMUNOLOGIC:  Negative for lower extremity edema, bilaterally.  NEUROLOGIC: Alert and oriented to person, place, and time.  strength intact. CN's II-XII grossly intact. Cerebellar function intact per finger to nose with some apraxia of  LUE. Sensation equal and intact in upper and lower extremities, bilat.   SKIN: Warm, dry, intact. No jaundice noted. Negative for suspicious lesions, rashes, bruising, open sores or abrasions.     Data   Recent Labs   Lab 01/04/21  1050 01/03/21 2003   WBC 8.8  --    HGB 13.6  --    MCV 96  --      --    INR 1.06  --      --    POTASSIUM 4.3 4.5   CHLORIDE 103  --    CO2 25  --    BUN 28  --    CR 0.73  --    ANIONGAP 7  --    LADI 9.3  --    *  --    ALBUMIN 3.0*  --    PROTTOTAL 7.4  --    BILITOTAL 0.6  --    ALKPHOS 103  --    ALT 20  --    AST 13  --      Recent Results (from the past 24 hour(s))   NM Bone Scan Whole Body    Narrative    NUCLEAR MEDICINE BONE SCAN WHOLE BODY  1/5/2021 11:18 AM    HISTORY: Breast cancer. Recent fall.    TECHNIQUE:  28 mCi Tc99m MDP IV in left upper forearm.  Anterior and  posterior images. Selected oblique images.    COMPARISON:  Prior bone scan: None available.   Relevant imaging study: None.    FINDINGS: Fractures at the posterior aspects of the right seventh and  eighth ribs. Increased activity over the lower sacrum, consistent with  fracturing. There are some scattered areas of presumed  degenerative/arthritic type activity. Increased activity at the  lateral aspect of the left knee. This could simply be degenerative,  but if the patient has acute pain in this area, a lateral tibial  plateau fracture cannot be excluded; corresponding radiographs could  be performed if indicated clinically. No bony metastatic pattern.      Impression    IMPRESSION:   1. Fractures of the right ribs and sacrum.  2. Increased activity at the lateral aspect of the left knee, as  above.    JD FREY MD

## 2021-01-06 NOTE — BRIEF OP NOTE
"[facility]    Brief Operative Note    Pre-operative diagnosis: Brain metastasis (H) [C79.31]  Post-operative diagnosis Same as pre-operative diagnosis    Procedure: Procedure(s):  Midline posterior fossa stealth craniotomy and resection of brain metastasis  Surgeon: Surgeon(s) and Role:     * Jose Rojas MD - Primary     * River Awan PA-C - Assisting  Anesthesia: General   Estimated blood loss: 450 mL  Drains: None  Specimens:   ID Type Source Tests Collected by Time Destination   A : BRAIN METASTASIS WITH HISTORY OF BREAST CANCER Tissue Other SURGICAL PATHOLOGY EXAM Jose Rojas MD 1/6/2021  2:45 PM    B : BRAIN METASTASIS WITH HISTORY OF BREAST CANCER Tissue Other SURGICAL PATHOLOGY EXAM Jose Rojas MD 1/6/2021  4:54 PM      Findings:   None.  Complications: None.  Implants:   Implant Name Type Inv. Item Serial No.  Lot No. LRB No. Used Action   GRAFT DURAGEN 3X3\"  Bone/Tissue/Biologic GRAFT DURAGEN 3X3\"   [employer] [id number] N/A 1 Implanted   IMP SCR SYN MATRIX LOW PRO 1.5X04MM SELF DRILL [id number] [employer]allic [employer]/[place] IMP SCR SYN MATRIX LOW PRO 1.5X04MM SELF DRILL [id number]  SYNTHES-STRATEC 42 04 03JAN 2021 N/A 12 Implanted   IMP PLATE SYN PHOEBE HOLE COVER 17MM [id number] Metallic Hardware/[place] IMP PLATE SYN PHOEBE HOLE COVER 17MM [id number]  SYNTHES-STRATEC 42 04 03JAN 2021 N/A 2 Implanted     [id number]      "

## 2021-01-06 NOTE — ANESTHESIA PROCEDURE NOTES
Airway   Date/Time: 1/6/2021 1:40 PM   Patient location during procedure: OR    Staff -   Anesthesiologist:  Champ Langston MD  CRNA: Regina Jang APRN CRNA  Other Anesthesia Staff: Syed Ochoa  Performed By: SRNA and with CRNAs    Indications and Patient Condition  Indications for airway management: vanessa-procedural  Induction type:intravenousMask difficulty assessment: 2 - vent by mask + OA or adjuvant +/- NMBA    Final Airway Details  Final airway type: endotracheal airway  Successful airway:ETT - single  Endotracheal Airway Details   ETT size (mm): 7.0  Cuffed: yes  Successful intubation technique: direct laryngoscopy  Grade View of Cords: 1  Adjucts: stylet  Measured from: lips  Secured at (cm): 21  Secured with: pink tape  Bite block used: Soft    Post intubation assessment   Placement verified by: capnometry, equal breath sounds and chest rise   Number of attempts at approach: 1  Number of other approaches attempted: 0  Secured with:pink tape  Ease of procedure: easy  Dentition: Intact and Unchanged

## 2021-01-06 NOTE — ANESTHESIA PROCEDURE NOTES
ARTERIAL LINE PROCEDURE NOTE:      Staff -   Anesthesiologist:  Champ Langston MD  Performed By: Anesthesiologist   Pre-Procedure  Performed by Chapm Langston MD  Location: OR      Pre-Anesthestic Checklist: patient identified, IV checked, site marked, risks and benefits discussed, informed consent, monitors and equipment checked, pre-op evaluation and at physician/surgeon's request    Timeout  Correct Patient: Yes   Correct Procedure: Yes   Correct Site: Yes   Correct Laterality: Yes   Correct Position: Yes   Site Marked: Yes, N/A   .   Procedure Documentation  Procedure: arterial line    Supine  Insertion Site:radial.Skin infiltrated with 2 mL of 1% lidocaine. Injection technique: Seldinger Technique  .  .  Patient Prep/Sterile Barriers; all elements of maximal sterile barrier technique followed, mask, hat, sterile gown, sterile gloves, draped, hand hygiene, chlorhexidine gluconate and isopropyl alcohol    Assessment/Narrative    Catheter: 20 gauge, 1.75 in/4.5 cm quick cath (integral wire)      Tegaderm dressing used.    Arterial waveform: Yes IBP within 10% of NIBP: Yes

## 2021-01-06 NOTE — ANESTHESIA PREPROCEDURE EVALUATION
Anesthesia Pre-Procedure Evaluation    Patient: Jacque Bernstein   MRN: [id number] : 8/3/1934          Preoperative Diagnosis: Brain metastasis (H) [C79.31]    Procedure(s):  Midline posterior fossa stealth craniotomy and resection of brain metastasis    Past Medical History:   Diagnosis Date     Heart disease      History reviewed. No pertinent surgical history.    Anesthesia Evaluation     .             ROS/MED HX    ENT/Pulmonary:  - neg pulmonary ROS    (-) asthma, sleep apnea and recent URI   Neurologic:  - neg neurologic ROS    (-) seizures   Cardiovascular:     (+) hypertension----. : . . . :. .       METS/Exercise Tolerance:  >4 METS   Hematologic:  - neg hematologic  ROS       Musculoskeletal:   (+) arthritis,  -       GI/Hepatic:  - neg GI/hepatic ROS      (-) GERD   Renal/Genitourinary:     (+) chronic renal disease, type: CRI,       Endo:  - neg endo ROS    (-) obesity   Psychiatric:  - neg psychiatric ROS       Infectious Disease:  - neg infectious disease ROS       Malignancy:   (+) Malignancy (Brain metastasis) History of Breast          Other:                          Physical Exam  Normal systems: dental    Airway   Mallampati: II  TM distance: >3 FB  Neck ROM: full    Dental     Cardiovascular   Rhythm and rate: regular      Pulmonary             Lab Results   Component Value Date    WBC 8.8 2021    HGB 13.6 2021    HCT 41.7 2021     2021     2021    POTASSIUM 4.3 2021    CHLORIDE 103 2021    CO2 25 2021    BUN 28 2021    CR 0.73 2021     (H) 2021    LADI 9.3 2021    ALBUMIN 3.0 (L) 2021    PROTTOTAL 7.4 2021    ALT 20 2021    AST 13 2021    ALKPHOS 103 2021    BILITOTAL 0.6 2021    INR 1.06 2021       Preop Vitals  BP Readings from Last 3 Encounters:   21 (!) 167/75    Pulse Readings from Last 3 Encounters:   21 73      Resp Readings from Last 3  "Encounters:   01/06/21 14    SpO2 Readings from Last 3 Encounters:   01/06/21 97%      Temp Readings from Last 1 Encounters:   01/06/21 36.6  C (97.8  F) (Skin)    Ht Readings from Last 1 Encounters:   01/03/21 1.651 m (5' 5\")      Wt Readings from Last 1 Encounters:   01/06/21 68.8 kg (151 lb 10.8 oz)    Estimated body mass index is 25.24 kg/m  as calculated from the following:    Height as of this encounter: 1.651 m (5' 5\").    Weight as of this encounter: 68.8 kg (151 lb 10.8 oz).       Anesthesia Plan      History & Physical Review  History and physical reviewed and following examination; no interval change.    ASA Status:  2 .    NPO Status:  > 8 hours    Plan for General (ETT) with Propofol induction. Maintenance will be Balanced.    PONV prophylaxis:  Ondansetron (or other 5HT-3) and Dexamethasone or Solumedrol  Additional equipment: Arterial Line and Central Line        Postoperative Care  Postoperative pain management:  IV analgesics.      Consents  Anesthetic plan, risks, benefits and alternatives discussed with:  Patient..                 Champ Langston MD  "

## 2021-01-06 NOTE — PROGRESS NOTES
Patient alert and oriented x 4. Denies headache or  Nausea. Ataxia upper extremities continue. Double vision present.

## 2021-01-06 NOTE — PROGRESS NOTES
"[facility]    Neurosurgery Progress Note    Date of Service (when I saw the patient): 01/06/2021     Assessment & Plan   Jacque Bernstein is a 86 year old female who was admitted on 1/3/2021. She presented to [facility] with a recent hisotry of multiple falls and dizziness. A head CT revealed 2 intra-axial masses suspicious for metastatic disease with mass effect and tonsillar herniation as well as associated mild hydrocephalus.    Active Problems:    Brain mass    Assessment: intra-axial masses with history of breast cancer    Plan: To OR today with Dr. Rojas    I have discussed the following assessment and plan Dr. Rojas who is in agreement with initial plan and will follow up with further consultation recommendations.    Trixie Dawson CNP  [facility] Neurosurgery  [facility]  [address]  [address] [place] [id number]    Tel [phone]  Pager [phone]      Interval History   Stable.    Physical Exam   Temp: 97.5  F (36.4  C) Temp src: Oral BP: (!) 167/75 Pulse: 57   Resp: 12 SpO2: 100 % O2 Device: None (Room air)    Vitals:    01/04/21 0500 01/05/21 0617 01/06/21 0500   Weight: 163 lb (73.9 kg) 162 lb 7.7 oz (73.7 kg) 151 lb 10.8 oz (68.8 kg)     Vital Signs with Ranges  Temp:  [97.5  F (36.4  C)-98  F (36.7  C)] 97.5  F (36.4  C)  Pulse:  [57-95] 57  Resp:  [10-23] 12  BP: (132-167)/(58-84) 167/75  SpO2:  [95 %-100 %] 100 %  I/O last 3 completed shifts:  In: 1031.25 [P.O.:250; I.V.:781.25]  Out: 1300 [Urine:1300]     , Blood pressure (!) 167/75, pulse 57, temperature 97.5  F (36.4  C), temperature source Oral, resp. rate 12, height 5' 5\" (1.651 m), weight 151 lb 10.8 oz (68.8 kg), SpO2 100 %.  151 lbs 10.82 oz    Medications     lactated ringers 75 mL/hr at 01/06/21 0739       ceFAZolin  1 g Intravenous See Admin Instructions     ceFAZolin  2 g Intravenous Pre-Op/Pre-procedure x 1 dose     dexamethasone  4 mg Intravenous Q8H     insulin " aspart  1-6 Units Subcutaneous Q4H     lisinopril  20 mg Oral Daily     meropenem  1 g Intravenous Q8H     sodium chloride (PF)  3 mL Intracatheter Q8H       Data     CBC RESULTS:   Recent Labs   Lab Test 01/04/21  1050   WBC 8.8   RBC 4.36   HGB 13.6   HCT 41.7   MCV 96   MCH 31.2   MCHC 32.6   RDW 13.7        Basic Metabolic Panel:  No results found for: NA   Lab Results   Component Value Date    POTASSIUM 4.5 01/03/2021     No results found for: CHLORIDE  No results found for: LADI  No results found for: CO2  No results found for: BUN  No results found for: CR  No results found for: GLC  INR:  No results found for: INR

## 2021-01-06 NOTE — ANESTHESIA PROCEDURE NOTES
CENTRAL LINE INSERTION PROCEDURE NOTE:       Staff -   Anesthesiologist:  Champ Langston MD  Performed By: Anesthesiologist  Pre-Procedure  Performed by Champ Langston MD  Location: pre-op      Pre-Anesthestic Checklist: patient identified, IV checked, site marked, risks and benefits discussed, informed consent, monitors and equipment checked, pre-op evaluation and at physician/surgeon's request    Timeout  Correct Patient: Yes   Correct Procedure: Yes   Correct Site: Yes   Correct Laterality: Yes   Correct Position: Yes   Site Marked: Yes   .   Procedure Documentation   Procedure: central line  Position: Trendelenburg  Patient Prep/Sterile Barriers; chlorhexidine gluconate and isopropyl alcohol, maximum sterile barriers used during central venous catheter insertion      Insertion Site:internal jugular, right  . A permanent image is entered into the patient's record.   Skin infiltrated with 2 mL of 2% lidocaine.  Catheter: 9 Georgian, 10 cm (sheath introducer)  Assessment/Narrative    Catheter: 9 Georgian, 10 cm (sheath introducer)     Secured by suture  Tegaderm and Biopatch dressing used.  blood aspirated from all lumens  All lumens flushed: Yes    Comments:  A time out was preformed identifying the correct procedure, the correct location with the nursing staff.  The right neck was prepped with 2% chlorhexidine and draped with a full length sterile sheet in the usual fashion.  1% lidocaine was administered subcutaneously for local anesthesia.  The right internal jugular vein  was accessed under ultrasound guidance with an 18 gauge thin wall needle, a 1.75 inch catheter was advanced over the needle and the needle was removed. Normal venous pressure was confirmed using a fluid column technique. A wire was then advanced through the catheter and catheter was then removed. A 8 Pashto two lumen was advanced over the wire via the seldinger technique. Blood was withdrawn from all lumens and flushed with normal  saline.  The catheter was sutured in place and a sterile dressing was applied over the site prior to removal of drapes.     Ultrasound Interpretation, central venous    1. Ultrasound guidance was used to evaluate potential access sites.  2. Ultrasound was also used to verify the patency of the vessel specified above.    3. Ultrasound was used to visualize the needle entering the vessel.   4. The visualized structures were anatomically normal.  5. There were no apparent abnormal pathological findings.  6. A permanent ultrasound image was saved in the patient's record.    Champ Langston MD  5:08 PM

## 2021-01-06 NOTE — PLAN OF CARE
Pt is alert and oriented. Questions answered as to expectations of post op and recovery from surgery. Chlorhexadol bath and shampoo done this am. Vital signs stable and pt pain free this shift.

## 2021-01-07 NOTE — PROGRESS NOTES
CHART CHECK: Rounded on patient 1/6. Following surgery, pt remains intubated. Chart check today and defer cares to Intensivist while patient remains intubated. Will assume care upon extubation.     Iris Maldonado PA-C   [phone] (pager)

## 2021-01-07 NOTE — PLAN OF CARE
PT orders received & acknowledged. Chart reviewed. Per chart patient with craniotomy overnight and still somnolent this morning. Plan for MRI this AM. Will hold PT eval till further medical management and when patient appropriate to participate in therapy.

## 2021-01-07 NOTE — OP NOTE
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      POSTOPERATIVE DIAGNOSIS:  Metastatic breast cancer with brain metastasis and hydrocephalus.      PROCEDURES:     1.  Midline posterior fossa Stealth craniotomy and resection of brain metastasis.   2.  Intraoperative microdissection using operative microscope.      SURGEON:  Jose Rojas MD      ASSISTANT:  River Awan PA-C      ANESTHESIA:  General endotracheal anesthesia.      ESTIMATED BLOOD LOSS:  450 mL      INDICATIONS:  The patient is an 86-year-old female who presented with confusion and ataxia and was noted on brain MRI to have multiple brain metastases, particularly large posterior fossa metastasis which was causing significant brain compression and obstructive hydrocephalus.  She was brought to the operating room for resection of the tumor. Please note that River Awan PA-C's assistance was needed for positioning, retraction, suctioning, and closure.      DESCRIPTION OF PROCEDURE:  The patient was brought to the operating room, general endotracheal anesthesia was induced.  The patient was placed in the Hammon headholder rolled in the prone position with the chin tucked.  The EggCartel system was registered and used to plan the incision and craniotomy.  A midline exposure was performed and 2 gila holes were placed just below the transverse sinus.  A bone flap was then turned from the gila holes creating a midline posterior fossa craniotomy.  The dura head, which had been torn during the craniotomy, was flapped superiorly and an area of tumor was identified as expected, particularly in the left cerebellar hemisphere.  The microscope was sterilely draped and brought into the field and using serial dissection and decompression with the Sonopet, the tumor was serially debulked and dissected off the surrounding cerebellum until the entire tumor had been resected and a  clean brain margins appeared to be identified.  Frozen pathology also was consistent with her metastatic breast cancer diagnosis.  Once hemostasis was achieved, a piece of Duragen was tucked underneath the edges of the dural opening.  The dura was then tacked back into place.  The bone flap was plated back into place using the Lima low profile cranial plating system.  The fascia was closed with 0 interrupted Vicryl, 2-0 inverted interrupted Vicryl was used for subcutaneous layer and a 3-0 nylon was used for skin.  The patient was then rolled back in supine position, awakened, extubated and taken to the recovery room in good condition.         SELVIN MENEZES MD             D: 2021   T: 2021   MT: BRANDI      Name:     MIHAI GRAHAM   MRN:      [id number]        Account:        [id number]   :      1934           Procedure Date: 2021      Document: [id number]

## 2021-01-07 NOTE — OR NURSING
No response from Dr. Bob GUTIÉRREZ notified of pt condition.  Pt is minimally responsive to painful stimuli and withdraws equally on both sides.  VS remain stable.

## 2021-01-07 NOTE — ANESTHESIA PROCEDURE NOTES
Airway   Date/Time: 1/6/2021 9:07 PM   Patient location during procedure: OR    Staff -   CRNA: Jacqueline German APRN CRNA  Performed By: CRNA    Consent for Airway   Urgency: emergentConsent: The procedure was performed in an emergent situation.      Indications and Patient Condition  Indications for airway management: vanessa-procedural  Induction type:intravenousMask difficulty assessment: 2 - vent by mask + OA or adjuvant +/- NMBA    Final Airway Details  Final airway type: endotracheal airway  Successful airway:Single subglottic suction  Endotracheal Airway Details   ETT size (mm): 7.0  Cuffed: yes  Successful intubation technique: video laryngoscopy  Grade View of Cords: 1  Adjucts: stylet  Measured from: lips  Secured at (cm): 22  Secured with: pink tape  Bite block used: None    Post intubation assessment   Placement verified by: capnometry, equal breath sounds and chest rise   Number of attempts at approach: 1  Secured with:pink tape  Ease of procedure: easy  Dentition: Intact and Unchanged

## 2021-01-07 NOTE — PROGRESS NOTES
[facility] ICU RESPIRATORY NOTE    Date of Admission: 1/3/2021    Date of Intubation (most recent): reintubated 1/6/2021    Reason for Mechanical Ventilation: Airway Protection    Number of Days on Mechanical Ventilation:  1    Met Criteria for Pressure Support Trial: No    Reason for No Pressure Support Trial: Per MD    Significant Events Today: Was intubated in OR, ct done, possible mri.    ABG Results:Results for MIHAI GRAHAM (MRN [id number]) as of 1/7/2021 03:51   Ref. Range 1/6/2021 22:26   pH Arterial Latest Ref Range: 7.35 - 7.45 pH 7.29 (L)   pCO2 Arterial Latest Ref Range: 35 - 45 mm Hg 39   PO2 Arterial Latest Ref Range: 80 - 105 mm Hg 282 (H)   Bicarbonate Arterial Latest Ref Range: 21 - 28 mmol/L 19 (L)   O2 Sat Arterial Latest Ref Range: 92 - 100 % 100     Vent Settings: CMV 16 450 30% P5      Plan:  Continue with full support

## 2021-01-07 NOTE — ANESTHESIA PREPROCEDURE EVALUATION
Anesthesia Pre-Procedure Evaluation    Patient: Jacque Bernstein   MRN: [id number] : 8/3/1934          Preoperative Diagnosis: Brain metastasis (H) [C79.31]    Procedure(s):  Midline posterior fossa stealth craniotomy and resection of brain metastasis    Past Medical History:   Diagnosis Date     Heart disease      No past surgical history on file.    Anesthesia Evaluation     . Pt has had prior anesthetic.     No history of anesthetic complications          ROS/MED HX    ENT/Pulmonary:  - neg pulmonary ROS    (-) asthma, sleep apnea and recent URI   Neurologic:  - neg neurologic ROS    (-) seizures   Cardiovascular:     (+) hypertension----. : . . . :. .       METS/Exercise Tolerance:  >4 METS   Hematologic:  - neg hematologic  ROS       Musculoskeletal:   (+) arthritis,  -       GI/Hepatic:  - neg GI/hepatic ROS      (-) GERD   Renal/Genitourinary:     (+) chronic renal disease, type: CRI,       Endo:  - neg endo ROS    (-) obesity   Psychiatric:  - neg psychiatric ROS       Infectious Disease:  - neg infectious disease ROS       Malignancy:   (+) Malignancy (Brain metastasis) History of Breast          Other:                            Physical Exam  Normal systems: dental    Airway   Mallampati: II  TM distance: >3 FB  Neck ROM: full    Dental     Cardiovascular   Rhythm and rate: regular      Pulmonary             Lab Results   Component Value Date    WBC 8.8 2021    HGB 8.5 (L) 2021    HCT 41.7 2021     2021     2021    POTASSIUM 4.3 2021    CHLORIDE 103 2021    CO2 25 2021    BUN 28 2021    CR 0.73 2021     (H) 2021    LADI 9.3 2021    ALBUMIN 3.0 (L) 2021    PROTTOTAL 7.4 2021    ALT 20 2021    AST 13 2021    ALKPHOS 103 2021    BILITOTAL 0.6 2021    INR 1.06 2021       Preop Vitals  BP Readings from Last 3 Encounters:   21 119/51    Pulse Readings from Last 3  "Encounters:   01/07/21 51      Resp Readings from Last 3 Encounters:   01/07/21 18    SpO2 Readings from Last 3 Encounters:   01/07/21 100%      Temp Readings from Last 1 Encounters:   01/06/21 35.7  C (96.3  F) (Temporal)    Ht Readings from Last 1 Encounters:   01/03/21 1.651 m (5' 5\")      Wt Readings from Last 1 Encounters:   01/06/21 68.8 kg (151 lb 10.8 oz)    Estimated body mass index is 25.24 kg/m  as calculated from the following:    Height as of 1/3/21: 1.651 m (5' 5\").    Weight as of an earlier encounter on 1/6/21: 68.8 kg (151 lb 10.8 oz).       Anesthesia Plan      History & Physical Review  History and physical reviewed and following examination; no interval change.    ASA Status:  3 .    NPO Status:  > 8 hours    Plan for General (ETT) with Propofol induction. Maintenance will be Balanced.    PONV prophylaxis:  Ondansetron (or other 5HT-3) and Dexamethasone or Solumedrol  Additional equipment: Arterial Line and Central Line Was obtunded in recovery room. CT showed bleeding. Return to OR emergently.        Postoperative Care  Postoperative pain management:  IV analgesics.      Consents  Anesthetic plan, risks, benefits and alternatives discussed with:  Patient..                   Ayush Ring MD  "

## 2021-01-07 NOTE — PLAN OF CARE
Neuro: not following commands, pupils PERRL, purposeful movements x4 extremities  CV: tele SR c PACs; SBP goal <140 per neurosurgery  Resp: intubated, CMV  GI: ascencion feed placed today  : figueroa, good UP  Skin: mepilex to coccyx for protection, bruised  Lines: L brachial Art line  Access: R double lumen internal jugular, L hand PIV  Gtts: nicardipine, NS  Other: given blood x2 units today

## 2021-01-07 NOTE — PLAN OF CARE
Patient arrived to ICU around 0015 - unresponsive upon arrival. Now somnolent, inconsistently follows. Restless at times. EVD open at 10 per neuro orders. 4cc out from EVD overnight. Levo gtt titrated to maintain MAP - pressures labile, following cuff pressure more than lopez per MD due to low diastolic on lopez. NS 500cc bolus given per MD for low pressures. Precedex started for restlessness and pulling at tubes and lines, stopped and dilaudid given as needed. Insulin gtt. Adequate output via figueroa. Plan for MRI if patient stable. Continue to monitor.

## 2021-01-07 NOTE — CONSULTS
"[facility]    Stroke/Neurocritical Care Consult Note    Reason for Consult:  Brain mass      Chief Complaint: No chief complaint on file.       HPI  Jacque Bernstein is a 86 year old female with history of breast cancer who was admitted on 1/3 for a fall and was found to have two intracranial masses worrisome for metastases. She is now in the ICU post-op and NCC is consulted for assistance with management.    Impression  1. Intracranial masses, likely metastasis of known breast cancer, now s/p resection    2. Post-resection hyemorrhage now s/p redo craniotomy and hematoma evacuation    3. Encephalopathy, secondary to above and sedation effect, will continue to monitor as she gets closer to extubation. Low suspicion for seizure contributing.    Recommendations  Continue q2 hour neurochecks  Limit sedation as you are able to permit accurate exam  Defer steroid decision to NSG  Goal eunatremia 140-145  Goal SBP<140, PRN IV antihypertensives to accomplish this  TTE today to assess wide pulse pressure    Thank you for this consult. We will continue to follow.     The Stroke/Neurocritical Staff is Dr. Willson.    Sherin Tanner MD  Vascular Neurology Fellow  To page me or covering stroke neurology team member, click here: [facility]COM   Choose \"On Call\" tab at top, then search dropdown box for \"Neurology Adult\", select location, press Enter, then look for stroke/neuro ICU/telestroke.    _____________________________________________________    Past Medical History   Past Medical History:   Diagnosis Date     Heart disease      Past Surgical History   History reviewed. No pertinent surgical history.  Medications   Home Meds  Prior to Admission medications    Medication Sig Start Date End Date Taking? Authorizing Provider   acetaminophen (TYLENOL) 500 MG tablet Take 1,000 mg by mouth every 6 hours as needed   Yes Unknown, Entered By History   amoxicillin (AMOXIL) 500 MG capsule Take 2,000 mg by mouth " as needed Dental procedures 7/2/19  Yes Unknown, Entered By History   anastrozole (ARIMIDEX) 1 MG tablet Take 1 mg by mouth daily 12/8/20  Yes Unknown, Entered By History   B Complex Vitamins (VITAMIN B-COMPLEX) TABS Take 1 tablet by mouth daily   Yes Unknown, Entered By History   Calcium Carb-Ergocalciferol 500-200 MG-UNIT TABS Take 1 tablet by mouth daily   Yes Unknown, Entered By History   docusate sodium (DSS) 100 MG capsule Take 100 mg by mouth daily   Yes Unknown, Entered By History   lisinopril (ZESTRIL) 20 MG tablet Take 20 mg by mouth daily 12/8/20  Yes Unknown, Entered By History   melatonin 3 MG tablet Take 3 mg by mouth nightly as needed   Yes Unknown, Entered By History       Scheduled Meds    acetaminophen  975 mg Oral or Feeding Tube TID     chlorhexidine  15 mL Mouth/Throat Q12H     dexamethasone  4 mg Intravenous Q8H     docusate  100 mg Oral or Feeding Tube BID     insulin aspart  1-7 Units Subcutaneous TID AC     insulin aspart  1-5 Units Subcutaneous At Bedtime     lisinopril  20 mg Oral Daily     meropenem  1 g Intravenous Q8H     multivitamins w/minerals  15 mL Per Feeding Tube Daily     pantoprazole  40 mg Oral or Feeding Tube Daily     sodium chloride (PF)  3 mL Intracatheter Q8H     sodium chloride (PF)  3 mL Intracatheter Q8H       Infusion Meds    dexmedetomidine Stopped (01/07/21 0534)     dextrose       dextrose       niCARdipine 15 mg/hr (01/07/21 1546)     phenylephrine Stopped (01/07/21 0029)     sodium chloride 75 mL/hr at 01/07/21 1309       PRN Meds  [START ON 1/9/2021] acetaminophen, bisacodyl, calcium carbonate, dextrose, dextrose, glucose **OR** dextrose **OR** glucagon, glucose **OR** dextrose **OR** glucagon, glucose **OR** dextrose **OR** glucagon, fentaNYL, flumazenil, hydrALAZINE, HYDROmorphone, hydrOXYzine, labetalol, lidocaine 4%, lidocaine (buffered or not buffered), melatonin, naloxone, naloxone, ondansetron **OR** [DISCONTINUED] ondansetron, oxyCODONE IR, polyethylene  glycol, prochlorperazine **OR** prochlorperazine **OR** prochlorperazine, senna-docusate **OR** senna-docusate, senna-docusate **OR** [DISCONTINUED] senna-docusate, sodium chloride (PF)    Allergies   Allergies   Allergen Reactions     Sudafed [Pseudoephedrine] Anxiety     Family History   History reviewed. No pertinent family history.  Social History   Social History     Tobacco Use     Smoking status: Never Smoker     Smokeless tobacco: Never Used   Substance Use Topics     Alcohol use: Never     Frequency: Never     Drug use: Never       Review of Systems   Review of systems not obtained due to patient factors - intubation       PHYSICAL EXAMINATION   Temp:  [96.3  F (35.7  C)-98.2  F (36.8  C)] 98.1  F (36.7  C)  Pulse:  [49-93] 74  Resp:  [5-28] 14  BP: ()/() 182/107  MAP:  [5 mmHg-138 mmHg] 77 mmHg  Arterial Line BP: (5-209)/(5-86) 144/45  FiO2 (%):  [30 %-40 %] 30 %  SpO2:  [97 %-100 %] 100 %    General:  patient lying in bed without any acute distress    HEENT:  crani site c/d/i  Cardio:  RRR  Pulmonary:  no respiratory distress, on mechanical ventilation  Abdomen:  soft, non-tender, non-distended  Extremities:  no edema, peripheral pulses palpable  Skin:  intact, warm/dry     Neurologic  Mental Status:  Eyes closed, does not open to voice, resists passive opening, follows simple commands  Cranial Nerves:  Blinks to threat bilaterally, PERRL, EOMI, face symmetric  Motor:  normal muscle tone and bulk, no abnormal movements, able to move all limbs spontaneously, moves LUE purposeful antigravity, LLE minimal movement, RUE weakly squeezes, RLE wiggles toes to command  Reflexes:  2+ and symmetric throughout, no clonus, toes down-going  Sensory:  detects sensation in 4/4 extremities  Coordination:  not tested  Station/Gait:  deferred    Imaging  I personally reviewed all imaging; relevant findings per HPI.    Labs Data   CBC  Recent Labs   Lab 01/07/21  1330 01/07/21  1300 01/07/21  0625 01/07/21  0359  01/04/21  1050 01/04/21  1050   WBC  --   --   --  22.0*  --  8.8   RBC  --   --   --  2.58*  --  4.36   HGB 6.3* 6.1* 7.6* 8.1*   < > 13.6   HCT  --   --   --  25.4*  --  41.7   PLT  --   --   --  320  --  380    < > = values in this interval not displayed.     Basic Metabolic Panel   Recent Labs   Lab 01/07/21  0355 01/06/21  2226 01/04/21  1050    139 135   POTASSIUM 3.4 4.3 4.3   CHLORIDE 109  --  103   CO2 21  --  25   BUN 34*  --  28   CR 0.80  --  0.73   *  --  241*   LADI 7.7*  --  9.3     Liver Panel  Recent Labs   Lab 01/04/21  1050   PROTTOTAL 7.4   ALBUMIN 3.0*   BILITOTAL 0.6   ALKPHOS 103   AST 13   ALT 20     INR    Recent Labs   Lab Test 01/04/21  1050   INR 1.06      Lipid Profile  No lab results found.  A1C    Recent Labs   Lab Test 01/04/21  1050   A1C 5.6     Troponin I  No results for input(s): TROPI in the last 168 hours.

## 2021-01-07 NOTE — OR NURSING
MARLA Waldrop text notified that pt was able to be extubated, but is still not responding or waking.  VSS.

## 2021-01-07 NOTE — ANESTHESIA CARE TRANSFER NOTE
Patient: Jacque Bernstein    Procedure(s):  RIGHT FRONTAL EXTERNAL AND VENTRICULAR DRAIN  REDO MIDLINE POSTERIOR FOSSA CRANIOTOMY    Diagnosis: Pain [R52]  Diagnosis Additional Information: No value filed.    Anesthesia Type:   No value filed.     Note:  Airway :ETT and Ventilator  Patient transferred to:ICU  Comments: Verbal report given to ICU staff and RT. Patient will be transported to CT by them. Clinical monitors on and functioning. Vital signs are stable at this timeICU Handoff: Call for PAUSE to initiate/utilize ICU HANDOFF, Identified Patient, Identified Responsible Provider, Reviewed the Pertinent Medical History, Discussed Surgical Course, Reviewed Intra-OP Anesthesia Management and Issues during Anesthesia, Set Expectations for Post Procedure Period and Allowed Opportunity for Questions and Acknowledgement of Understanding      Vitals: (Last set prior to Anesthesia Care Transfer)    CRNA VITALS  1/6/2021 2306 - 1/6/2021 2350      1/6/2021             Pulse:  55    SpO2:  100 %    Resp Rate (observed):      Resp Rate (set):  8                Electronically Signed By: KANDICE Barrera CRNA  January 6, 2021  11:50 PM

## 2021-01-07 NOTE — PROGRESS NOTES
Brief follow up intensivist note    Compensated on ventilator at present on minimal support (30%, +5 PEEP).   She continues on steroids, and sliding scale added for glucose coverage (off insulin drip).   We will target SBP's 120-150 with phenylephrine and Nicardipine  CNS still moving extremities but not following commands at present and will support on vent today.   Case discussed with Neuro critical care and appreciate their help.   Will start enteral nutrition today with feeding tube position confirmed  ESBL UTI on Meropenem  Overall, compensated but critical.   I spent an addition 30 minutes of  Critical care time with her today    renuka BROWN  January 7, 2020

## 2021-01-07 NOTE — ANESTHESIA CARE TRANSFER NOTE
Patient: Jacque Bernstein    Procedure(s):  Midline posterior fossa stealth craniotomy and resection of brain metastasis    Diagnosis: Brain metastasis (H) [C79.31]  Diagnosis Additional Information: No value filed.    Anesthesia Type:   General     Note:  Airway :ETT  Patient transferred to:PACU  Comments: Neuromuscular blockade reversed after TOF 4/4, Patient not following commands adequately so T-piece placed on ETT.  Oxygen via T-piece on ETT at 15 liters per minute to PACU. Oxygen tubing connected to wall O2 in PACU, SpO2, NiBP, and EKG monitors and alarms on and functioning, Fransisco Hugger warmer connected to patient gown, report on patient's clinical status given to PACU RN, RN questions answered.   Handoff Report: Identifed the Patient, Identified the Reponsible Provider, Reviewed the pertinent medical history, Discussed the surgical course, Reviewed Intra-OP anesthesia mangement and issues during anesthesia, Set expectations for post-procedure period and Allowed opportunity for questions and acknowledgement of understanding      Vitals: (Last set prior to Anesthesia Care Transfer)    CRNA VITALS  1/6/2021 1725 - 1/6/2021 1807      1/6/2021             Resp Rate (set):  10                Electronically Signed By: KANDICE Martinez CRNA  January 6, 2021  6:07 PM

## 2021-01-07 NOTE — ANESTHESIA POSTPROCEDURE EVALUATION
Patient: Jacque Bernstein    Procedure(s):  Midline posterior fossa stealth craniotomy and resection of brain metastasis    Diagnosis:Brain metastasis (H) [C79.31]  Diagnosis Additional Information: No value filed.    Anesthesia Type:  General    Note:  Anesthesia Post Evaluation    Patient location during evaluation: bedside  Patient participation: Unable to participate in evaluation secondary to underlying medical condition  Level of consciousness: obtunded/minimal responses  Pain management: adequate  Airway patency: patent  Cardiovascular status: acceptable  Respiratory status: acceptable  Hydration status: acceptable  PONV: none and controlled     Anesthetic complications: None    Comments: Persistently obtunded.  Scheduled for post op ct scanning emergently.        Last vitals:  Vitals:    01/06/21 2020 01/06/21 2030 01/06/21 2040   BP:      Pulse: 75 75 68   Resp: 16 14 16   Temp:      SpO2: 99% 99% 99%         Electronically Signed By: Ayush Ring MD  January 6, 2021  10:11 PM

## 2021-01-07 NOTE — BRIEF OP NOTE
"   [facility]    Brief Operative Note    Pre-operative diagnosis: Intracerebral hematoma.  Post-operative diagnosis Same as pre-operative diagnosis    Procedure: Procedure(s):  RIGHT FRONTAL EXTERNAL AND VENTRICULAR DRAIN  REDO MIDLINE POSTERIOR FOSSA CRANIOTOMY  Surgeon: Surgeon(s) and Role:     * Jose Rojas MD - Primary     * Lisa Veloz PA-C - Assisting  Anesthesia: General   Estimated blood loss: 800 mL  Drains: None  Specimens: * No specimens in log *  Findings:   None.  Complications: None.  Implants:   Implant Name Type Inv. Item Serial No.  Lot No. LRB No. Used Action   IMP SCR SYN MATRIX LOW PRO 1.5X04MM SELF DRILL [id number] Metallic [employer]ware/[place] IMP SCR SYN MATRIX LOW PRO 1.5X04MM SELF DRILL [id number]  [employer] 42 [id number] 03JAN 2021 N/A 6 Explanted   GRAFT DURAGEN 3X3\"  Bone/Tissue/Biologic GRAFT DURAGEN 3X3\"   [employer] [id number] N/A 1 Explanted   GRAFT DURAGEN 3X3\"  Bone/Tissue/Biologic GRAFT DURAGEN 3X3\"   [employer] [id number] N/A 1 Implanted   IMP SCR SYN MATRIX LOW PRO 1.5X04MM SELF DRILL [id number] Metallic Hardware/[place] IMP SCR SYN MATRIX LOW PRO 1.5X04MM SELF DRILL [id number]  [employer] 42 [id number], [id number] N/A 6 Implanted       [id number]    "

## 2021-01-07 NOTE — CONSULTS
"CLINICAL NUTRITION SERVICES  -  ASSESSMENT NOTE      Recommendations Ordered by Registered Dietitian (RD): Isosource 1.5 at 15 mL/hr to provide:  540 kcal (9 kcal/kg), 24 g protein (0.4 g/kg), 42 g CHO, 4 g fiber, 274 mL H2O  Flushes 60 mL every 4 hours  Certavite daily    Future/Additional Recommendations: Recommend increase TF to eventual goal of Isosource 1.5 at 45 mL/hr to provide:  1620 kcal (27 kcal/kg), 73 g protein (1.2 g/kg), 190 g CHO, 16 g fiber, 821 mL H2O   Malnutrition: % Weight Loss:  None noted  % Intake:  Decreased intake does not meet criteria for malnutrition (day #2 NPO - was eating well prior to surgery)  Subcutaneous Fat Loss:  None observed  Muscle Loss:  None observed  Fluid Retention:  Mild - likely not nutritional     Malnutrition Diagnosis: Patient does not meet two of the above criteria necessary for diagnosing malnutrition        REASON FOR ASSESSMENT  Jacque Bernstein is a 86 year old female seen by Registered Dietitian for Provider Order - Registered Dietitian to Assess and Order TF per Medical Nutrition Therapy Protocol      NUTRITION HISTORY  - Unable to obtain nutrition history as patient intubated and not able to provide information.       CURRENT NUTRITION ORDERS  Diet Order:     NPO with plans for TF initiation today  Patient was on a low fat, low sodium diet from 1/3-1/5 and intake noted to be % of meals per flowsheets  Patient was made NPO on 1/6 0000 for surgery     Current Intake/Tolerance:  N/A      NUTRITION FOCUSED PHYSICAL ASSESSMENT FOR DIAGNOSING MALNUTRITION)  Yes              Observed:    No nutrition-related physical findings observed    Obtained from Chart/Interdisciplinary Team:  Edema 2+ in legs    ANTHROPOMETRICS  Height: 5' 5\"  Weight: 72.6 kg (160#)(1/7)  Body mass index is 26.63 kg/m   Weight Status:  Overweight BMI 25-29.9  IBW: 56.8 kg   % IBW: 128%  Weight History:   Wt Readings from Last 10 Encounters:   01/07/21 72.6 kg (160 lb 0.9 oz)   6/22/20: 74 " kg  1/20/20: 75.8 kg    No recent weight loss noted     LABS  -306 on Insulin drip     MEDICATIONS  Norepi drip  Insulin drip  IVF at 75 mL/hr      ASSESSED NUTRITION NEEDS PER APPROVED PRACTICE GUIDELINES:    Dosing Weight 60.8 kg (adjusted)  Estimated Energy Needs: 3623-6946 kcals (25-30 Kcal/Kg)  Justification: overweight  Estimated Protein Needs: 70-90 grams protein (1.2-1.5 g pro/Kg)  Justification: hypercatabolism with acute illness  Estimated Fluid Needs: 4256-6934  mL (1 mL/Kcal)  Justification: maintenance    MALNUTRITION:  % Weight Loss:  None noted  % Intake:  Decreased intake does not meet criteria for malnutrition (day #2 NPO - was eating well prior to surgery)  Subcutaneous Fat Loss:  None observed  Muscle Loss:  None observed  Fluid Retention:  Mild - likely not nutritional     Malnutrition Diagnosis: Patient does not meet two of the above criteria necessary for diagnosing malnutrition    NUTRITION DIAGNOSIS:  Inadequate protein-energy intake related to NPO with plans to start TF today as evidenced by meeting 0% needs     NUTRITION INTERVENTIONS  Recommendations / Nutrition Prescription  Isosource 1.5 at 15 mL/hr to provide:  540 kcal (9 kcal/kg), 24 g protein (0.4 g/kg), 42 g CHO, 4 g fiber, 274 mL H2O  Flushes 60 mL every 4 hours  Certavite daily     Recommend increase TF to eventual goal of Isosource 1.5 at 45 mL/hr to provide:  1620 kcal (27 kcal/kg), 73 g protein (1.2 g/kg), 190 g CHO, 16 g fiber, 821 mL H2O    Implementation  Nutrition education: Not appropriate at this time due to patient condition  EN Composition, EN Schedule, Feeding Tube Flush, and Multivitamin/Mineral:  Orders written to begin Isosource 1.5 at 15 mL/hr.  Flushes and Certavite as above.   Collaboration and Referral of Nutrition care:  Patient discussed today during interdisciplinary bedside rounds.     Nutrition Goals  Patient will meet nutrition needs within the next 24-48 hours      MONITORING AND  EVALUATION:  Progress towards goals will be monitored and evaluated per protocol and Practice Guidelines    Aaliyah Jameson RD, LD, CNSC   Clinical Dietitian - [facility]

## 2021-01-07 NOTE — PROGRESS NOTES
Reviewed MRI. Although there is still some continued mass effect, surgical resection of tumor looks good with improved mass effect.    EVD lowered to 5cm.    Patient purposeful in all extremities.    Updated family. Expect slow improvement. May be extubated today if looking good, or hopefully tomorrow.

## 2021-01-07 NOTE — ANESTHESIA POSTPROCEDURE EVALUATION
Patient: Jacque Bernstein    Procedure(s):  RIGHT FRONTAL EXTERNAL AND VENTRICULAR DRAIN  REDO MIDLINE POSTERIOR FOSSA CRANIOTOMY    Diagnosis:Pain [R52]  Diagnosis Additional Information: No value filed.    Anesthesia Type:  General    Note:  Anesthesia Post Evaluation    Patient location during evaluation: ICU  Patient participation: Unable to participate in evaluation secondary to underlying medical condition  Level of consciousness: obtunded/minimal responses  Pain management: unable to assess  Airway patency: patent  Cardiovascular status: acceptable  Respiratory status: acceptable and intubated  Hydration status: acceptable  PONV: unable to assess     Anesthetic complications: None    Comments: Direct transfer to ICU following OR        Last vitals:  Vitals:    01/07/21 0045 01/07/21 0100 01/07/21 0115   BP:      Pulse: 50 51 51   Resp: 10 11 18   Temp:      SpO2: 100% 100% 100%         Electronically Signed By: Ayush Ring MD  January 7, 2021  1:27 AM

## 2021-01-08 NOTE — PROGRESS NOTES
Chart reviewed: Pt continues to be intubated on Guanako and Precedex. Hospitalist will sign off. Happy to be contacted for assumption of care upon extubation and when pt is off above meds.

## 2021-01-08 NOTE — CONSULTS
Care Management Initial Consult    General Information  Assessment completed with: VM-chart review,    Type of CM/SW Visit: Initial Assessment    Primary Care Provider verified and updated as needed:     Readmission within the last 30 days: no previous admission in last 30 days      Reason for Consult: discharge planning  Advance Care Planning:            Communication Assessment  Patient's communication style: spoken language (English or Bilingual)    Hearing Difficulty or Deaf: no   Wear Glasses or Blind: no    Cognitive  Cognitive/Neuro/Behavioral: .WDL except  Level of Consciousness: lethargic  Arousal Level: arouses to voice, arouses to repeated stimulation  Orientation: other (see comments)(naila)  Mood/Behavior: restless  Best Language: (naila)  Speech: endotracheal tube    Living Environment:   People in home: spouse     Current living Arrangements: assisted living  [facility] Facility: [facility]   Able to return to prior arrangements: yes       Family/Social Support:  Care provided by:    Provides care for: no one  Marital Status:             Description of Support System:           Current Resources:   Skilled Home Care Services:    Community Resources:    Equipment currently used at home:    Supplies currently used at home:      Employment/Financial:  Employment Status:          Financial Concerns:             Lifestyle & Psychosocial Needs:        Socioeconomic History     Marital status:      Spouse name: Not on file     Number of children: Not on file     Years of education: Not on file     Highest education level: Not on file     Tobacco Use     Smoking status: Never Smoker     Smokeless tobacco: Never Used   Substance and Sexual Activity     Alcohol use: Never     Frequency: Never     Drug use: Never       Functional Status:  Prior to admission patient needed assistance:              Mental Health Status:          Chemical Dependency Status:                 Values/Beliefs:  Spiritual, Cultural Beliefs, Yazdanism Practices, Values that affect care:                 Additional Information:  Initil review per chart review.  No discontinue plans yet.  Pt extubated today.  CTS will continue to follow for discontinue planning    Saritha Perdomo RN

## 2021-01-08 NOTE — PROGRESS NOTES
"  [facility]    Stroke/Neurocritical Care Progress Note    Interval Events  Remains on the vent, off of nicardipine, now requiring pressors to maintain MAP. WBC quite elevated, possibly entirely because of steroids, but low threshold to pan-culture if he were to be febrile.    Impression  1. Intracranial masses, likely metastases of known breast cancer, now s/p resection     2. Post-resection hyemorrhage now s/p redo craniotomy and hematoma evacuation, EVD open at 5 above     3. Encephalopathy, secondary to above and sedation effect, will continue to monitor as she gets closer to extubation. Low suspicion for seizure contributing.    Recommendations  Extubation okay from our standpoint  EVD and steroids per NSG  Goal eunatremia  SBP goal <140    The Stroke/Neurocritical Care Staff is Dr. Willson.    Sherin Tanner MD  Vascular Neurology Fellow  To page me or covering stroke neurology team member, click here: [facility]COM   Choose \"On Call\" tab at top, then search dropdown box for \"Neurology Adult\", select location, press Enter, then look for stroke/neuro ICU/telestroke.    ______________________________________________________    Medications   Home Meds  Prior to Admission medications    Medication Sig Start Date End Date Taking? Authorizing Provider   acetaminophen (TYLENOL) 500 MG tablet Take 1,000 mg by mouth every 6 hours as needed   Yes Unknown, Entered By History   amoxicillin (AMOXIL) 500 MG capsule Take 2,000 mg by mouth as needed Dental procedures 7/2/19  Yes Unknown, Entered By History   anastrozole (ARIMIDEX) 1 MG tablet Take 1 mg by mouth daily 12/8/20  Yes Unknown, Entered By History   B Complex Vitamins (VITAMIN B-COMPLEX) TABS Take 1 tablet by mouth daily   Yes Unknown, Entered By History   Calcium Carb-Ergocalciferol 500-200 MG-UNIT TABS Take 1 tablet by mouth daily   Yes Unknown, Entered By History   docusate sodium (DSS) 100 MG capsule Take 100 mg by mouth daily   Yes Unknown, " Entered By History   lisinopril (ZESTRIL) 20 MG tablet Take 20 mg by mouth daily 12/8/20  Yes Unknown, Entered By History   melatonin 3 MG tablet Take 3 mg by mouth nightly as needed   Yes Unknown, Entered By History       Scheduled Meds    acetaminophen  975 mg Oral or Feeding Tube TID     chlorhexidine  15 mL Mouth/Throat Q12H     dexamethasone  4 mg Intravenous Q8H     docusate  100 mg Oral or Feeding Tube BID     insulin aspart  1-7 Units Subcutaneous TID AC     insulin aspart  1-5 Units Subcutaneous At Bedtime     lisinopril  20 mg Oral Daily     meropenem  1 g Intravenous Q8H     multivitamins w/minerals  15 mL Per Feeding Tube Daily     pantoprazole  40 mg Oral or Feeding Tube Daily     potassium & sodium phosphates  2 packet Oral or Feeding Tube TID     sodium chloride (PF)  3 mL Intracatheter Q8H     sodium chloride (PF)  3 mL Intracatheter Q8H       Infusion Meds    dexmedetomidine Stopped (01/08/21 0830)     dextrose       dextrose       niCARdipine Stopped (01/07/21 2300)     phenylephrine Stopped (01/08/21 0903)     sodium chloride 75 mL/hr at 01/08/21 0745       PRN Meds  [START ON 1/9/2021] acetaminophen, bisacodyl, calcium carbonate, dextrose, dextrose, glucose **OR** dextrose **OR** glucagon, glucose **OR** dextrose **OR** glucagon, glucose **OR** dextrose **OR** glucagon, fentaNYL, flumazenil, hydrALAZINE, HYDROmorphone, hydrOXYzine, labetalol, lidocaine 4%, lidocaine (buffered or not buffered), melatonin, naloxone, naloxone, ondansetron **OR** [DISCONTINUED] ondansetron, oxyCODONE IR, polyethylene glycol, prochlorperazine **OR** prochlorperazine **OR** prochlorperazine, senna-docusate **OR** senna-docusate, senna-docusate **OR** [DISCONTINUED] senna-docusate, sodium chloride (PF)       PHYSICAL EXAMINATION  Temp:  [97  F (36.1  C)-98.2  F (36.8  C)] 97.6  F (36.4  C)  Pulse:  [62-99] 69  Resp:  [5-24] 16  BP: (102-209)/() 131/59  MAP:  [52 mmHg-99 mmHg] 71 mmHg  Arterial Line BP:  ()/(31-56) 123/44  FiO2 (%):  [30 %] 30 %  SpO2:  [98 %-100 %] 99 %     General:  patient lying in bed without any acute distress    HEENT:  crani site c/d/i, R frontal EVD in place  Cardio:  RRR  Pulmonary:  no respiratory distress, on mechanical ventilation  Abdomen:  soft, non-tender, non-distended  Extremities:  no edema, peripheral pulses palpable  Skin:  intact, warm/dry      Neurologic  Mental Status:  Eyes closed, opens to command, maintains alertness, follows simple commands in 4/4 extremities  Cranial Nerves:  Blinks to threat bilaterally, PERRL, EOMI, L eye esotropia at rest, face symmetric  Motor:  normal muscle tone and bulk, no abnormal movements, able to move all limbs spontaneously, readily follows commands/purposeful RUE and RLE, wiggles fingers and toes with encouragement on L  Reflexes:  2+ and symmetric throughout, no clonus, toes down-going  Sensory:  detects sensation in 4/4 extremities  Coordination:  not tested  Station/Gait:  deferred    Imaging  I personally reviewed all imaging; relevant findings per HPI.     Lab Results Data   CBC  Recent Labs   Lab 01/08/21 0448 01/08/21  0108 01/07/21 2115 01/07/21  0355 01/07/21  0355 01/04/21  1050 01/04/21  1050   WBC 37.0*  --   --   --  22.0*  --  8.8   RBC 3.25*  --   --   --  2.58*  --  4.36   HGB 9.7* 9.2* 10.0*   < > 8.1*   < > 13.6   HCT 29.5*  --   --   --  25.4*  --  41.7     --   --   --  320  --  380    < > = values in this interval not displayed.     Basic Metabolic Panel    Recent Labs   Lab 01/08/21 0448 01/07/21  0355 01/06/21  2226 01/04/21  1050    140 139 135   POTASSIUM 3.7 3.4 4.3 4.3   CHLORIDE 116* 109  --  103   CO2 22 21  --  25   BUN 28 34*  --  28   CR 0.67 0.80  --  0.73   * 276*  --  241*   LADI 8.1* 7.7*  --  9.3     Liver Panel  Recent Labs   Lab 01/08/21  0448 01/04/21  1050   PROTTOTAL 5.2* 7.4   ALBUMIN 2.6* 3.0*   BILITOTAL 0.5 0.6   ALKPHOS 61 103   AST 13 13   ALT 13 20     INR    Recent  Labs   Lab Test 01/04/21  1050   INR 1.06      Lipid Profile  No lab results found.  A1C    Recent Labs   Lab Test 01/04/21  1050   A1C 5.6     Troponin I  No results for input(s): TROPI in the last 168 hours.

## 2021-01-08 NOTE — PLAN OF CARE
Patient remains on full vent support. EVD at 5 per neurosurg orders. Purposeful in all extremities, does not follow. Restless and agitated at times. Guanako gtt to maintain BP parameters. 500 LR bolus per MD order. Precedex gtt, titrated as tolerated. Dilaudid for pain management. TF at 15ml/hr. Matthews with adequate output. No BM. Continue to monitor.

## 2021-01-08 NOTE — PROGRESS NOTES
Chart check  01/08/2021    Breast cancer ER positive HER-2 positive patient is anastrozole.  New brain lesions are highly suspicious for metastatic breast cancer  Patient underwent resection of the brain lesion.   We are awaiting for pathology results    Oncology will see patient when pathology result is back  Please call with questions    KANDICE Bennett [facility]

## 2021-01-08 NOTE — PROGRESS NOTES
[facility]    Neurosurgery  Daily Post-Op Note    Assessment & Plan   Procedure(s):  RIGHT FRONTAL EXTERNAL AND VENTRICULAR DRAIN  REDO MIDLINE POSTERIOR FOSSA CRANIOTOMY   2 Days Post-Op  Doing well.  Weaning vent and sedation this am.     Plan:  -Advance activity as tolerated  -Continue supportive and symptomatic treatment  -will leave EVD at current level of 5 cm.       Celestino HERNANDES Sawyer    Interval History   Stable.  Doing well.  Improving slowly.  Pain is reasonably controlled.  No fevers.     Physical Exam   Temp: 97.6  F (36.4  C) Temp src: Axillary BP: 131/59 Pulse: 94   Resp: 13 SpO2: 100 % O2 Device: Mechanical Ventilator    Vitals:    01/06/21 0500 01/07/21 0015 01/08/21 0000   Weight: 68.8 kg (151 lb 10.8 oz) 72.6 kg (160 lb 0.9 oz) 72.9 kg (160 lb 11.5 oz)     Vital Signs with Ranges  Temp:  [97  F (36.1  C)-98.1  F (36.7  C)] 97.6  F (36.4  C)  Pulse:  [60-99] 94  Resp:  [5-29] 13  BP: (102-192)/() 131/59  MAP:  [52 mmHg-99 mmHg] 71 mmHg  Arterial Line BP: ()/(31-55) 125/47  FiO2 (%):  [30 %] 30 %  SpO2:  [98 %-100 %] 100 %  I/O last 3 completed shifts:  In: 5237.33 [I.V.:3406.92; NG/GT:485; IV Piggyback:500]  Out: 1444 [Urine:1255; Drains:189]    Sedated lightly, responds to verbal.   Moves all extremities equally.      Incision: CDI      Medications     dexmedetomidine Stopped (01/08/21 0830)     dextrose       dextrose       niCARdipine Stopped (01/07/21 2300)     phenylephrine Stopped (01/08/21 0903)     sodium chloride 75 mL/hr at 01/08/21 0745        acetaminophen  975 mg Oral or Feeding Tube TID     chlorhexidine  15 mL Mouth/Throat Q12H     dexamethasone  4 mg Intravenous Q8H     docusate  100 mg Oral or Feeding Tube BID     insulin aspart  1-7 Units Subcutaneous TID AC     insulin aspart  1-5 Units Subcutaneous At Bedtime     lisinopril  20 mg Oral Daily     meropenem  1 g Intravenous Q8H     multivitamins w/minerals  15 mL Per Feeding Tube Daily      pantoprazole  40 mg Oral or Feeding Tube Daily     potassium & sodium phosphates  2 packet Oral or Feeding Tube TID     sodium chloride (PF)  3 mL Intracatheter Q8H     sodium chloride (PF)  3 mL Intracatheter Q8H           Celestino Fisher PA-C  [facility]  [address]  [address] [place] [address]    Tel [phone]  Pager [phone]

## 2021-01-08 NOTE — PROVIDER NOTIFICATION
Md updated of increased ectopy and HR. Lisinopril given this am, per parameters after mini off.   Order for 12.5mg metoprolol, once received.   Will admin once verified by pharm.

## 2021-01-08 NOTE — PROGRESS NOTES
[facility] ICU RESPIRATORY NOTE     Date of Admission: 1/3/2021     Date of Intubation (most recent): reintubated 1/6/2021     Reason for Mechanical Ventilation: Airway Protection     Number of Days on Mechanical Ventilation: 1     Met Criteria for Pressure Support Trial: No     Reason for No Pressure Support Trial: Per MD     Significant Events Today: MRI today     ABG Results:Results for MIHAI GRAHAM (MRN [id number]) as of 1/7/2021 03:51    Ref. Range 1/6/2021 22:26   pH Arterial Latest Ref Range: 7.35 - 7.45 pH 7.29 (L)   pCO2 Arterial Latest Ref Range: 35 - 45 mm Hg 39   PO2 Arterial Latest Ref Range: 80 - 105 mm Hg 282 (H)   Bicarbonate Arterial Latest Ref Range: 21 - 28 mmol/L 19 (L)   O2 Sat Arterial Latest Ref Range: 92 - 100 % 100      Vent Settings: CMV 16 450 30% P5        Plan:  Continue with full support

## 2021-01-08 NOTE — PROGRESS NOTES
Critical Care Progress Note      01/08/2021    Name: Jacque Bernstein MRN#: [id number]   Age: 86 year old YOB: 1934     Hsptl Day# 5  ICU DAY #    MV DAY #             Problem List:   Principal Problem:    Brain metastasis (H)  Active Problems:    Brain mass      1. Metastatic breast cancer  2. S/P resection of brain lesion, complicated by bleeding into would and requiring a 2nd operation- she was comfortable on vent and moving all 4 extremities (weakly) to command; non-verbal after extubation and still quite lethargic   3. Acute respiratory failure - she did well on a breathing trial today and tolerated extubation. We will focus on pulmonary hygiene as able and increasing mobility. She is down to 2-4 liters of NC oxygen.   4. Hyperglycemia - insulin adjusted and lantus added   5. HTN- monitor closely; metoprolol added to regimen.   6. Nutrition- enteral nutrition until able to take PO's  7. S/P SHAQ- creatinine 0.67 at present.   8. GERD - protonix   Overall, acute and critical earlier and now improving after extubation.              Summary/Hospital Course:           Assessment and plan :     Jacque Bernstein IS a 86 year old female admitted on 1/3/2021 for acute resp failure .   I have personally reviewed the daily labs, imaging studies, cultures and discussed the case with referring physician and consulting physicians.     My assessment and plan by system for this patient is as follows:    Neurology/Psychiatry:   1. Still quite lethargic but slowly improving     Cardiovascular:   1.Hemodynamics - well compensated now     Pulmonary/Ventilator Management:   1. Extubated; focus on pulmonary hygiene     GI and Nutrition :   1. Enteral nutrition     Renal/Fluids/Electrolytes:   1. Normal function at present     Infectious Disease:   1. No significant issue    Endocrine:   1. Glucose regimen  Adjusted     Hematology/Oncology:   1. Compensated      IV/Access:   1. Venous access -   2. Arterial access -    3.  Plan  - central access required and necessary      ICU Prophylaxis:   1. DVT: Hep Subq/ LMWH/mechanical  2. VAP: HOB 30 degrees, chlorhexidine rinse  3. Stress Ulcer: PPI/H2 blocker  4. Restraints: Nonviolent soft two point restraints required and necessary for patient safety and continued cares and good effect as patient continues to pull at necessary lines, tubes despite education and distraction. Will readdress daily.   5. IV Access - central access required and necessary for continued patient cares  6. Feeding - enteral   7. Family Update:   8. Disposition - ICU care         Key goals for next 24 hours:   1. extubtated   2.  3.               Interim History:              Key Medications:       acetaminophen  975 mg Oral or Feeding Tube TID     chlorhexidine  15 mL Mouth/Throat Q12H     dexamethasone  4 mg Intravenous Q8H     docusate  100 mg Oral or Feeding Tube BID     insulin aspart  1-10 Units Subcutaneous TID AC     insulin aspart  1-7 Units Subcutaneous At Bedtime     insulin glargine  10 Units Subcutaneous At Bedtime     lisinopril  20 mg Oral Daily     meropenem  1 g Intravenous Q8H     metoprolol tartrate  12.5 mg Oral or Feeding Tube BID     multivitamins w/minerals  15 mL Per Feeding Tube Daily     pantoprazole  40 mg Oral or Feeding Tube Daily     potassium & sodium phosphates  2 packet Oral or Feeding Tube TID     sodium chloride (PF)  3 mL Intracatheter Q8H     sodium chloride (PF)  3 mL Intracatheter Q8H       dexmedetomidine Stopped (01/08/21 0830)     dextrose       niCARdipine Stopped (01/07/21 2300)     phenylephrine Stopped (01/08/21 0903)     sodium chloride 75 mL/hr at 01/08/21 0745               Physical Examination:   Temp:  [97  F (36.1  C)-98.1  F (36.7  C)] 97.6  F (36.4  C)  Pulse:  [60-99] 89  Resp:  [11-29] 12  BP: (102-188)/() 139/68  MAP:  [52 mmHg-99 mmHg] 65 mmHg  Arterial Line BP: ()/(18-55) 127/34  FiO2 (%):  [30 %] 30 %  SpO2:  [98 %-100 %] 100  %    Intake/Output Summary (Last 24 hours) at 1/8/2021 1258  Last data filed at 1/8/2021 1000  Gross per 24 hour   Intake 5247.68 ml   Output 1500 ml   Net 3747.68 ml     Wt Readings from Last 4 Encounters:   01/08/21 72.9 kg (160 lb 11.5 oz)     Arterial Line BP: ()/(18-55) 127/34  MAP:  [52 mmHg-99 mmHg] 65 mmHg  BP - Mean:  [] 106  Ventilation Mode: CPAP/PS  (Continuous positive airway pressure with Pressure Support)  FiO2 (%): 30 %  Rate Set (breaths/minute): 16 breaths/min  Tidal Volume Set (mL): 450 mL  PEEP (cm H2O): 5 cmH2O  Pressure Support (cm H2O): 5 cmH2O  Oxygen Concentration (%): 30 %  Resp: 12    Recent Labs   Lab 01/08/21 0448 01/07/21  0355 01/06/21  2226   PH 7.40 7.37 7.29*   PCO2 34* 34* 39   PO2 150* 162* 282*   HCO3 21 20* 19*   O2PER 30 30  --        GEN: no acute distress; comfortable on vent  HEENT: head ncat, sclera anicteric, OP patent, trachea midline   PULM: unlabored synchronous with vent, clear anteriorly    CV/COR: RRR S1S2 no gallop,  No rub, no murmur  ABD: soft nontender, hypoactive bowel sounds, no mass  EXT:  Edema   warm  NEURO: grossly intact- moves everything buthol week  SKIN: no obvious rash  LINES: clean, dry intact         Data:   All data and imaging reviewed     ROUTINE ICU LABS (Last four results)  CMP  Recent Labs   Lab 01/08/21 0448 01/07/21  0355 01/07/21  0206 01/06/21  2226 01/04/21  1050    140  --  139 135   POTASSIUM 3.7 3.4  --  4.3 4.3   CHLORIDE 116* 109  --   --  103   CO2 22 21  --   --  25   ANIONGAP 5 10  --   --  7   * 276*  --   --  241*   BUN 28 34*  --   --  28   CR 0.67 0.80  --   --  0.73   GFRESTIMATED 80 67  --   --  74   GFRESTBLACK >90 78  --   --  86   LADI 8.1* 7.7*  --   --  9.3   MAG 2.2  --  2.0  --   --    PHOS 1.4*  --  4.2  --   --    PROTTOTAL 5.2*  --   --   --  7.4   ALBUMIN 2.6*  --   --   --  3.0*   BILITOTAL 0.5  --   --   --  0.6   ALKPHOS 61  --   --   --  103   AST 13  --   --   --  13   ALT 13  --   --    --  20     CBC  Recent Labs   Lab 01/08/21  0448 01/08/21  0108 01/07/21  2115 01/07/21  1650 01/07/21  0355 01/07/21  0355 01/04/21  1050 01/04/21  1050   WBC 37.0*  --   --   --   --  22.0*  --  8.8   RBC 3.25*  --   --   --   --  2.58*  --  4.36   HGB 9.7* 9.2* 10.0* 6.6*   < > 8.1*   < > 13.6   HCT 29.5*  --   --   --   --  25.4*  --  41.7   MCV 91  --   --   --   --  98  --  96   MCH 29.8  --   --   --   --  31.4  --  31.2   MCHC 32.9  --   --   --   --  31.9  --  32.6   RDW 18.0*  --   --   --   --  13.8  --  13.7     --   --   --   --  320  --  380    < > = values in this interval not displayed.     INR  Recent Labs   Lab 01/04/21  1050   INR 1.06     Arterial Blood Gas  Recent Labs   Lab 01/08/21  0448 01/07/21  0355 01/06/21  2226   PH 7.40 7.37 7.29*   PCO2 34* 34* 39   PO2 150* 162* 282*   HCO3 21 20* 19*   O2PER 30 30  --        All cultures:  No results for input(s): CULT in the last 168 hours.  No results found for this or any previous visit (from the past 24 hour(s)).    MD Myron    Billing: This patient is critically ill: Yes. Total critical care time today 35 min.

## 2021-01-08 NOTE — PLAN OF CARE
OT/PT: Per discussion with RN, pt is not appropriate for therapy today. Therapy will continue to follow.

## 2021-01-08 NOTE — PROGRESS NOTES
1053: Pt extubated to aerosol mask. Weak attempt to vocalize name. Weak cough. Minimal oral secretions.   Restraints discontinued following vent liberation.

## 2021-01-08 NOTE — PROGRESS NOTES
[facility] ICU RESPIRATORY NOTE        Date of Admission: 1/3/2021    Date of Intubation (most recent): 1/6/2021    Reason for Mechanical Ventilation: AW protection     Number of Days on Mechanical Ventilation:  2    Met Criteria for Spontaneous Breathing Trial: No     Reason for No Spontaneous Breathing Trial: Per MD     Significant Events Today: none overnight     ABG Results:   Recent Labs   Lab 01/07/21  0355 01/06/21  2226   PH 7.37 7.29*   PCO2 34* 39   PO2 162* 282*   HCO3 20* 19*   O2PER 30  --        Current Vent Settings: Ventilation Mode: CMV/AC  (Continuous Mandatory Ventilation/ Assist Control)  FiO2 (%): 30 %  Rate Set (breaths/minute): 16 breaths/min  Tidal Volume Set (mL): 450 mL  PEEP (cm H2O): 5 cmH2O  Oxygen Concentration (%): 30 %  Resp: 16    Plan: Continue on full ventilator support.     Mckinley Castro, RT

## 2021-01-08 NOTE — PLAN OF CARE
Neuro: not following commands, pupils PERRL, purposeful movements x4 extremities  CV: tele SR c PACs; SBP goal <140 per neurosurgery  Resp: intubated, CMV  GI: ascencion feed placed today  : figueroa, good UP  Skin: mepilex to coccyx for protection, bruised  Lines: L brachial Art line  Access: R double lumen internal jugular, L hand PIV  Gtts: nicardipine, NS  Other: given blood x2 units today; MRI done today

## 2021-01-08 NOTE — PROGRESS NOTES
Extubation Note    Successful completion of SBT (Yes or No):Yes PS 5/5 for 2 hrs  Extubation time:1053    Patient assessment:  Lung sounds:Diminished  Stridor Present (Yes or No):No  Patient tolerance:Good    Oxygen device:  30% Aerosol mask  SpO2:100%    Plan:Will cont to monitor.  1/8/2021  Trixie Joiner, RT

## 2021-01-09 NOTE — PROGRESS NOTES
"   01/09/21 0955   Quick Adds   Type of Visit Initial PT Evaluation   Living Environment   Living Environment Comments Pt groggy, a bit confused, unsure of subjective given by pt: Lives in a house with her \"kids,\" and uses a walker. States she goes for walks when she can.    Disability/Function   Fall history within last six months yes   Change in Functional Status Since Onset of Current Illness/Injury yes   General Information   Onset of Illness/Injury or Date of Surgery 01/06/21   Referring Physician River Awan PA-C   Patient/Family Therapy Goals Statement (PT) None stated.   Pertinent History of Current Problem (include personal factors and/or comorbidities that impact the POC) 87 yo female adm to OSH with frequent falls, tx to [facility] for further management after imaging revealed Intracranial masses x2, dt metastatic breast cancer, s/p craniotomy with resection of mets on 1/6/21. PMH includes R side mastectomy secondary to breast CA.    Existing Precautions/Restrictions   (EVD, pt also with contact prec ESBL)   General Observations Brachial art line on the L, NG, EVD, central line, multiple IVEDs.   Cognition   Orientation Status (Cognition) person;place   Affect/Mental Status (Cognition) confused;flat/blunted affect   Follows Commands (Cognition) 75-90% accuracy;follows one-step commands   Cognitive Status Comments Pt speech is thick, mouth is dry, difficult to understand at times.  Able to tell therapist where she was, 5 minlater unable to tell MD where she was; still following commands thesame.   Integumentary/Edema   Integumentary/Edema Comments Demonstrates pitting edema in the hands and feet.   Posture    Posture Forward head position;Kyphosis;Protracted shoulders   Posture Comments Significant posterior pelvic tilt insitting.   Range of Motion (ROM)   ROM Comment B LEs WFL as observed in supine and sitting; B UEs weak but WFL via PROM.   Strength   Strength Comments Demonstrages gloabal " weakness, proximal weakness> distal.    Bed Mobility   Comment (Bed Mobility) Sup>sit Mod A x 2.    Transfers   Transfer Safety Comments Attempted to stand, pt fearful, partial stand with Max A x 2.    Gait/Stairs (Locomotion)   Comment (Gait/Stairs) Unable to trial secondary to weakness and fatigue.   Balance   Balance Comments Sitting balance with circumduction of the trunk, pt admits to dizziness, RN present tomanage EVD, keeping ICP <20.   Sensory Examination   Sensory Perception Comments Per chart nerupathy from previous chemo pt unable to state hands and feet or just feet?   Coordination   Coordination Comments Weakness limits attempts at finger to nose, pt with weakness vs dysmetria with reaching to the face to itch, brushing hair away from face.   Muscle Tone   Muscle Tone Comments Generally low tone,    Clinical Impression   Criteria for Skilled Therapeutic Intervention yes, treatment indicated   PT Diagnosis (PT) Unable to ambulate   Influenced by the following impairments Weakness, fatigue, decreased balance, decreased activity tolerance, some confusion   Functional limitations due to impairments Decreased functional independence   Clinical Presentation Stable/Uncomplicated   Clinical Presentation Rationale see MR   Clinical Decision Making (Complexity) low complexity   Therapy Frequency (PT) Daily   Predicted Duration of Therapy Intervention (days/wks) 1 week   Planned Therapy Interventions (PT) balance training;bed mobility training;gait training;neuromuscular re-education;patient/family education;postural re-education;ROM (range of motion);stair training;strengthening;stretching;transfer training   Risk & Benefits of therapy have been explained evaluation/treatment results reviewed;care plan/treatment goals reviewed;risks/benefits reviewed;current/potential barriers reviewed;participants voiced agreement with care plan;participants included;patient   PT Discharge Planning    PT Discharge Recommendation  (DC Rec) [facility]-Motivated patient will benefit from intensive, interdisciplinary therapy.  Anticipate will be able to tolerate 3 hours of therapy per day   PT Rationale for DC Rec Pt motivated to participate, seemingly independent with use of walker and some assist at baseline (?if lives in PIPO or House with familly?). Works hard and participates 100%.   PT Brief overview of current status  Rolling L and R Min A. Sup>sit Mod A x 2. Sitting balance CGA to Min A at all times. Partial stand (bottom did not fully clear the bed) Max A x 2. Unable to take steps today. RN presnet to manage EVD.   Total Evaluation Time   Total Evaluation Time (Minutes) 10

## 2021-01-09 NOTE — PROGRESS NOTES
Neurosurgery progress note:    POD#3 s/p midline posterior fossa craniotomy and resection of brain metastasis with return to OR for evacuation of hematoma and placement of EVD.    No issues overnight, EVD remains at 5 cm, ICP stable.  VSS   She's sleeping upon entering room but wakes up to verbal stimuli and follows commands with upper and lower extremities.  Incision C/D/I    PLAN:  Continue EVD at 5 cm today.  Likely increase to 10 cm tomorrow.  Continue PT/OT.    Lisa Veloz MPAS  [facility]  [address]  [address] [place] [id number]    Tel [phone]  Pager [phone]

## 2021-01-09 NOTE — PROGRESS NOTES
"[facility]    Hospitalist Progress Note    Assessment & Plan   Jacque Bernstein is a 86 year old female with PMHx of hypertension, stage II CKD, GERD, hx of R breast cancer with resultant peripheral neuropathy dt chemotherapy, GERD, OA and frequent falls who was admitted from [place] ED on 1/3/2021 for further evaluation after she was incidentally found to have 2 intracranial masses after a mechanical fall with findings concerning for metastatic breast cancer.     Intracranial masses x2, dt metastatic breast cancer, s/p craniotomy with resection of mets on 1/6/21  Cerebellar hematoma, s/p R frontal EVD placement 1/6/21  Presented to [facility] ED after a mechanical fall where she struck her head on the headboard of her bed resulting in a persistent headache. Had endorsed worsening ataxia over the past few months with frequent falls. Head CT in the ED showed 2 masses, larger mass was located in the cerebellum with mass effect and tonsillar herniation with associated mild hydrocephalus and 2cm parietal mass with vasogenic edema was also noted. Given hx of breast cancer, findings were concerning for metastatic disease. Was started on dexamethasone on admission. Seen by neurosurgery and taken to the OR on 1/6/21 for midline posterior fossa Stealth craniotomy and resection of brain metastasis. Pathology subsequently returned positive for \"metastatic carcinoma consistent with prior breast primary malignancy\". Postop, she developed a cerebellar hematoma requiring she go back to the OR (also on 1/6) for evacuation of hematoma and placement of a R frontal external ventricular drain. Remained extubated post-procedures and was requiring Precedex and phenylephrine gtts. These were weaned and patient was extubated on 1/8/21. Briefly needed nicardipine gtt from BP management but this was weaned on 1/9.    -- ongoing postop leukocytosis (WBC nl on admission; suspect dt steroids, recent surgery) but " otherwise afebrile  -- cont EVD, mgmt per neurosurgery  -- goal SBP <140 -- lisinopril 20mg daily and metoprolol 12.5mg BID resumed on 1/8 once extubated, titrate doses as needed, can also resume nicardipine gtt if needed  -- conts on dexamethasone 4mg IV q8h  -- neurochecks per protocol  -- otherwise cont cares per neurosurgery, neurocritical care and oncology  -- PT/OT, SLP    Hx of right sided breast cancer, now with findings of metastatic disease as above  Peripheral neuropathy dt prior chemotherapy, manages with Tylenol  Clinical staging from 2017: Stage IIIA (T3, N2a, M0).  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    S/p mastectomy, radiation and chemo therapy.  Had been maintained on Arimidex 1mg daily. Follows with [facility] (Dr. Ho)  -- ongoing mgmt per oncology    Steroid-induced hyperglycemia  Noted this stay, given initiation of dexamethasone. A1C 5.6, no hx of DM.   -- has basal/bolus insulin regimen ordered and is getting TFs  -- cont Lantus 10U HS and high dose sliding scale insulin, adjust doses further as needed    Recent Labs   Lab 01/09/21  0822 01/09/21  0541 01/08/21  2049 01/08/21  1658 01/08/21  1231 01/08/21  0820 01/08/21  0448 01/07/21  2113 01/07/21  0355 01/07/21  0355 01/04/21  1050 01/04/21  1050   GLC  --  188*  --   --   --   --  287*  --   --  276*  --  241*   *  --  169* 206* 244* 261*  --  277*   < >  --    < >  --     < > = values in this interval not displayed.       Hypertension  Home meds: lisinopril 20mg po daily  Elevated BPs this stay likely dt surgery, steroids. Adjusting meds as needed.   Goal SBP <140 per neuro  -- briefly required nicardipine gtt overnight, now off  -- cont lisinopril and metoprolol as above    Stage II CKD  Baseline Cr is around 1.   Renal function remains stable thus far    Uncomplicated UTI dt ESBL  UA in ED was grossly abnl. UC ultimately grew >100k ESBL ecoli. Had been placed on ceftriaxone initially, changed to  meropenem on 1/5 after culture results known  -- conts on meropenem (d#5)    Acute Blood Loss Anemia  Secondary to surgery, cerebellar hematoma.   Hgb stable at 9-10      GERD  Chronic and stable on PPI    Hypernatremia, mild  Na slowly trending up in recent days. 147 this AM. Has NS @75ml/h.   -- change IVFs to 1/2NS and can cont @75ml/h  -- repeat BMP in AM     FEN: no IVFs, lytes otherwise stable, has TFs per nutritionist  Tubes / lines: EVD, central line, art line, TF, figueroa  DVT Prophylaxis: PCDs  Code Status: Full Code    Disposition: Cont monitoring in ICU today given EVD.      Leonela Gauthier    Interval History   Seen this morning. More alert. MM quite dry but is able to tell me her name, some confusion as to location and year but was able to tell therapy earlier that she was in the hospital. No specific complaints presently. No reported cp/sob/cough, abd pain/n/v     -Data reviewed today: I reviewed all new labs and imaging results over the last 24 hours. I personally reviewed no images or EKG's today.    Physical Exam   Temp: 99.8  F (37.7  C) Temp src: Axillary BP: (!) 141/60 Pulse: 113   Resp: 17 SpO2: 96 % O2 Device: None (Room air) Oxygen Delivery: 2 LPM  Vitals:    01/07/21 0015 01/08/21 0000 01/09/21 0500   Weight: 72.6 kg (160 lb 0.9 oz) 72.9 kg (160 lb 11.5 oz) 69.4 kg (153 lb)     Vital Signs with Ranges  Temp:  [97.6  F (36.4  C)-99.8  F (37.7  C)] 99.8  F (37.7  C)  Pulse:  [] 113  Resp:  [8-28] 17  BP: (139-141)/(60-68) 141/60  MAP:  [53 mmHg-94 mmHg] 70 mmHg  Arterial Line BP: ()/(18-61) 127/40  FiO2 (%):  [30 %] 30 %  SpO2:  [96 %-100 %] 96 %  I/O last 3 completed shifts:  In: 3415.41 [I.V.:2415.41; NG/GT:640]  Out: 1769 [Urine:1565; Drains:204]    Constitutional: Resting comfortably, alert, oriented to self, able to follow basic commands, NAD  Respiratory: CTAB, no wheeze/rales/rhonchi, no increased work of breathing  Cardiovascular: HRRR, no MGR, no LE edema  GI: S, NT,  ND, +BS  Skin/Integumen: warm/dry, scattered ecchymoses  Other:      Medications     dexmedetomidine Stopped (01/08/21 0830)     dextrose       niCARdipine Stopped (01/09/21 0035)     phenylephrine Stopped (01/08/21 0903)     sodium chloride 75 mL/hr at 01/08/21 2053       acetaminophen  975 mg Oral or Feeding Tube TID     dexamethasone  4 mg Intravenous Q8H     docusate  100 mg Oral or Feeding Tube BID     insulin aspart  1-10 Units Subcutaneous TID AC     insulin aspart  1-7 Units Subcutaneous At Bedtime     insulin glargine  10 Units Subcutaneous At Bedtime     lisinopril  20 mg Oral Daily     meropenem  1 g Intravenous Q8H     metoprolol tartrate  12.5 mg Oral or Feeding Tube BID     multivitamins w/minerals  15 mL Per Feeding Tube Daily     pantoprazole  40 mg Oral or Feeding Tube Daily     potassium & sodium phosphates  1 packet Oral TID       Data   Recent Labs   Lab 01/09/21  0541 01/08/21  2253 01/08/21  0448 01/07/21  0355 01/07/21  0355 01/04/21  1050 01/04/21  1050   WBC 32.1*  --  37.0*  --  22.0*  --  8.8   HGB 9.9* 9.5* 9.7*   < > 8.1*   < > 13.6   MCV 91  --  91  --  98  --  96     --  248  --  320  --  380   INR  --   --   --   --   --   --  1.06   *  --  143  --  140   < > 135   POTASSIUM 3.8  --  3.7  --  3.4   < > 4.3   CHLORIDE 119*  --  116*  --  109  --  103   CO2 22  --  22  --  21  --  25   BUN 24  --  28  --  34*  --  28   CR 0.63  --  0.67  --  0.80  --  0.73   ANIONGAP 6  --  5  --  10  --  7   LADI 8.2*  --  8.1*  --  7.7*  --  9.3   *  --  287*  --  276*  --  241*   ALBUMIN  --   --  2.6*  --   --   --  3.0*   PROTTOTAL  --   --  5.2*  --   --   --  7.4   BILITOTAL  --   --  0.5  --   --   --  0.6   ALKPHOS  --   --  61  --   --   --  103   ALT  --   --  13  --   --   --  20   AST  --   --  13  --   --   --  13    < > = values in this interval not displayed.       No results found for this or any previous visit (from the past 24 hour(s)).

## 2021-01-09 NOTE — PROGRESS NOTES
"   01/09/21 1040   General Information   Onset of Illness/Injury or Date of Surgery 01/03/21   Referring Physician Chepe Barone MD   Patient/Family Therapy Goal Statement (SLP) water   Pertinent History of Current Problem \"Jacque Bernstein is a 86 year old female with PMHx of hypertension, stage II CKD, GERD, hx of R breast cancer with resultant peripheral neuropathy dt chemotherapy, GERD, OA and frequent falls who was admitted from [facility] on 1/3/2021 for further evaluation after she was incidentally found to have 2 intracranial masses after a mechanical fall with findings concerning for metastatic breast cancer. Cerebellar hematoma, s/p R frontal EVD placement 1/6/21\" No documented or reported history of  SLP services.    General Observations Pt fatigued, but following and motivated for session   Past History of Dysphagia None reported or documented   Type of Evaluation   Type of Evaluation Swallow Evaluation   Oral Motor   Oral Musculature anomalies present   Mucosal Quality dry;sticky  (?thrush)   Facial Symmetry (Oral Motor)   Right Side Facial Asymmetry moderate impairment   Bilateral Facial Asymmetry right side   Vocal Quality/Secretion Management (Oral Motor)   Vocal Quality (Oral Motor) breathy;harsh   Secretion Management (Oral Motor) WNL   General Swallowing Observations   Current Diet/Method of Nutritional Intake (General Swallowing Observations, NIS) NPO;nasogastric tube (NG)   Respiratory Support (General Swallowing Observations) nasal cannula   Swallowing Evaluation Clinical swallow evaluation   Clinical Swallow Evaluation   Feeding Assistance dependent   Esophageal Phase of Swallow   Esophageal comments hx of GERD   Swallowing Recommendations   Diet Consistency Recommendations dysphagia level 1 (pureed);nectar-thick liquids   Supervision Level for Intake 1:1 supervision needed   Mode of Delivery Recommendations liquids via spoon only;slow rate of intake;place food on left side of " mouth;bolus size, small   Swallowing Maneuver Recommendations alternate food and liquid intake;extra swallow   Monitoring/Assistance Required (Eating/Swallowing) check mouth frequently for oral residue/pocketing;stop eating activities when fatigue is present;monitor for cough or change in vocal quality with intake   Recommended Feeding/Eating Techniques (Swallow Eval) maintain upright sitting position for eating;maintain upright posture during/after eating for 30 minutes;provide 6 smaller meals throughout day;provide oral hygiene prior to intake   Comment, Swallowing Recommendations SLP: Pt presents with moderate dysphagia on clinical swallow evaluation. Right facial droop with right sided weakness of oral motor exam. Pt able to follow all commands, however, kept her eyes shut the majority of the session and appeared to have dysarthria/aphasia, though limited interaction. Pt accepted ice chips x5 with no difficulty. Initially tolerating sips of thin liquids, however, immediate cough with larger gulp and high risk of silent aspiration. Improved oral control and timing with nectar thick liquids by spoon with no overt s/sx of aspiration. Occasional double swallows with applesauce and pt reported globus sensation with NG tube. Pt quickly fatigued. Recommend: dysphagia diet level 1 and nectar thick liquid by spoon.    General Therapy Interventions   Planned Therapy Interventions Dysphagia Treatment   Dysphagia treatment Modified diet education;Instruction of safe swallow strategies   SLP Therapy Assessment/Plan   Criteria for Skilled Therapeutic Interventions Met (SLP Eval) yes   SLP Diagnosis Moderate dysphagia   Rehab Potential (SLP Eval) good, to achieve stated therapy goals   Therapy Frequency (SLP Eval) daily   Predicted Duration of Therapy Intervention (SLP Eval) 1 week   Therapy Plan Review/Discharge Plan (SLP)   Therapy Plan Review (SLP) evaluation/treatment results reviewed;patient   SLP Discharge Planning     SLP Discharge Recommendation (DC Rec) Acute Rehab Center-Motivated patient will benefit from intensive, interdisciplinary therapy.  Anticipate will be able to tolerate 3 hours of therapy per day   SLP Rationale for DC Rec Pt well below baseline and will need SLP services for swallow and speech/lang   SLP Brief overview of current status  Start dysphagia diet level 1 and nectar thick liquids by teaspoon with assist with intake    Total Evaluation Time   Total Evaluation Time (Minutes) 15

## 2021-01-09 NOTE — PROGRESS NOTES
"  [facility]    Stroke/Neurocritical Care Progress Note    Interval Events  Extubated and doing well. Final path pending. EVD remains open, 204 ml out in the past 24 hours.    Impression  1. Intracranial masses, likely metastases of known breast cancer, now s/p resection, await path     2. Post-resection hemorrhage now s/p redo craniotomy and hematoma evacuation, EVD open at 5 above     3. Encephalopathy, resolved    4. Leukocytosis- likely stress demargination in steroid use, monitor and recommend panCx including CSF from EVD if febrile    Recommendations  EVD and steroids per NSG  Goal eunatremia  SBP goal <140  PT/OT/SLP  Daily WBC    The Stroke/Neurocritical Care Staff is Dr. Willson.    Sherin Tanner MD  Vascular Neurology Fellow  To page me or covering stroke neurology team member, click here: [facility]   Choose \"On Call\" tab at top, then search dropdown box for \"Neurology Adult\", select location, press Enter, then look for stroke/neuro ICU/telestroke.    ______________________________________________________    Medications   Home Meds  Prior to Admission medications    Medication Sig Start Date End Date Taking? Authorizing Provider   acetaminophen (TYLENOL) 500 MG tablet Take 1,000 mg by mouth every 6 hours as needed   Yes Unknown, Entered By History   amoxicillin (AMOXIL) 500 MG capsule Take 2,000 mg by mouth as needed Dental procedures 7/2/19  Yes Unknown, Entered By History   anastrozole (ARIMIDEX) 1 MG tablet Take 1 mg by mouth daily 12/8/20  Yes Unknown, Entered By History   B Complex Vitamins (VITAMIN B-COMPLEX) TABS Take 1 tablet by mouth daily   Yes Unknown, Entered By History   Calcium Carb-Ergocalciferol 500-200 MG-UNIT TABS Take 1 tablet by mouth daily   Yes Unknown, Entered By History   docusate sodium (DSS) 100 MG capsule Take 100 mg by mouth daily   Yes Unknown, Entered By History   lisinopril (ZESTRIL) 20 MG tablet Take 20 mg by mouth daily 12/8/20  Yes Unknown, " Entered By History   melatonin 3 MG tablet Take 3 mg by mouth nightly as needed   Yes Unknown, Entered By History       Scheduled Meds    acetaminophen  975 mg Oral or Feeding Tube TID     dexamethasone  4 mg Intravenous Q8H     docusate  100 mg Oral or Feeding Tube BID     insulin aspart  1-10 Units Subcutaneous TID AC     insulin aspart  1-7 Units Subcutaneous At Bedtime     insulin glargine  10 Units Subcutaneous At Bedtime     lisinopril  20 mg Oral Daily     meropenem  1 g Intravenous Q8H     metoprolol tartrate  12.5 mg Oral or Feeding Tube BID     multivitamins w/minerals  15 mL Per Feeding Tube Daily     pantoprazole  40 mg Oral or Feeding Tube Daily     potassium & sodium phosphates  1 packet Oral TID       Infusion Meds    dexmedetomidine Stopped (01/08/21 0830)     dextrose       niCARdipine Stopped (01/09/21 0035)     phenylephrine Stopped (01/08/21 0903)     sodium chloride 75 mL/hr at 01/08/21 2053       PRN Meds  acetaminophen, bisacodyl, calcium carbonate, dextrose, glucose **OR** dextrose **OR** glucagon, glucose **OR** dextrose **OR** glucagon, fentaNYL, hydrALAZINE, HYDROmorphone, hydrOXYzine, labetalol, lidocaine 4%, lidocaine (buffered or not buffered), melatonin, ondansetron **OR** [DISCONTINUED] ondansetron, oxyCODONE IR, polyethylene glycol, prochlorperazine **OR** prochlorperazine **OR** prochlorperazine, senna-docusate **OR** senna-docusate, senna-docusate **OR** [DISCONTINUED] senna-docusate       PHYSICAL EXAMINATION  Temp:  [97.6  F (36.4  C)-99.8  F (37.7  C)] 99.8  F (37.7  C)  Pulse:  [] 113  Resp:  [8-28] 17  BP: (139-141)/(60-68) 141/60  MAP:  [53 mmHg-94 mmHg] 70 mmHg  Arterial Line BP: ()/(18-61) 127/40  FiO2 (%):  [30 %] 30 %  SpO2:  [96 %-100 %] 96 %     General:  patient lying in bed without any acute distress    HEENT:  [facility] site c/d/i, R frontal EVD in place  Cardio:  RRR  Pulmonary:  no respiratory distress, on mechanical ventilation  Abdomen:  soft, non-tender,  non-distended  Extremities:  no edema, peripheral pulses palpable  Skin:  intact, warm/dry      Neurologic  Mental Status:  Eyes closed, opens to voice, maintains alertness and attentiveness, oriented to self, month, year, and circumstance. Language is sparse and largely fluent with some unintelligible phrases, follows simple commands.  Cranial Nerves:  VFF, PERRL, EOMI, L eye esotropia at rest, face symmetric, tongue midline, moderately dysarthric  Motor:  normal muscle tone and bulk, no abnormal movements, antigravity in BUEs and LLE, drift in RLE  Reflexes:  2+ and symmetric throughout, no clonus, toes down-going  Sensory:  detects sensation in 4/4 extremities  Coordination:  not tested  Station/Gait:  deferred    Imaging  I personally reviewed all imaging; relevant findings per HPI.     Lab Results Data   CBC  Recent Labs   Lab 01/09/21  0541 01/08/21  2253 01/08/21  0448 01/07/21  0355 01/07/21  0355   WBC 32.1*  --  37.0*  --  22.0*   RBC 3.34*  --  3.25*  --  2.58*   HGB 9.9* 9.5* 9.7*   < > 8.1*   HCT 30.3*  --  29.5*  --  25.4*     --  248  --  320    < > = values in this interval not displayed.     Basic Metabolic Panel    Recent Labs   Lab 01/09/21  0541 01/08/21  0448 01/07/21  0355   * 143 140   POTASSIUM 3.8 3.7 3.4   CHLORIDE 119* 116* 109   CO2 22 22 21   BUN 24 28 34*   CR 0.63 0.67 0.80   * 287* 276*   LADI 8.2* 8.1* 7.7*     Liver Panel  Recent Labs   Lab 01/08/21  0448 01/04/21  1050   PROTTOTAL 5.2* 7.4   ALBUMIN 2.6* 3.0*   BILITOTAL 0.5 0.6   ALKPHOS 61 103   AST 13 13   ALT 13 20     INR    Recent Labs   Lab Test 01/04/21  1050   INR 1.06      Lipid Profile  No lab results found.  A1C    Recent Labs   Lab Test 01/04/21  1050   A1C 5.6     Troponin I  No results for input(s): TROPI in the last 168 hours.

## 2021-01-09 NOTE — PLAN OF CARE
Pt improving steadily through day, with clearer speech and mobility of extremities. Sleeping between cares, follows commands.   Mild c/o pain, improved with ice to posterior head and prn dilaudid.   Transitioned to 2L nc, TF remains at goal. Pt does pull at keofeed occasionally.   Adequate UO, no BM.   [place] weaned off this am, nicardipine started this afternoon after no improvement of BP from hydralazine and labetolol doses.   ICP 1-11, CSF output 0-35ml clear to clear/pink tinge. Incisions intact, posterior site tender to touch.   CVC and art line in place. Art becoming positional as pt moving more.   Vick, son updated x2 via phone. Questions answered.

## 2021-01-09 NOTE — PROGRESS NOTES
Patient improving neurologically, able to follow commands, speech is improving but still whispers, and has improved in motor skills with better strength. Nicardipine was stopped and BP remains stable. HR shows sinus tach with occasional PAC's and PVC's. Bed bath complete without any complications. Respiratory, patient has a weak productive cough, clear lung sounds. Small, formed BM and adequate urine output. Cares were explained. Educated patient on importance of not pulling NG tubing.

## 2021-01-10 NOTE — PLAN OF CARE
Patient remains in ICU overnight. Ventric draining 0-4 ml/hr, ICP ranges 8-16. Neurologically unchanged - followed commands and oriented to  self/place/situation. Went into SVT early this AM - see previous note. Good UOP overnight. Labetalol given x1 for BP >150. No contact with family overnight.

## 2021-01-10 NOTE — PROGRESS NOTES
01/10/21 1354   Quick Adds   Type of Visit Initial Occupational Therapy Evaluation   Living Environment   People in home spouse  (Otf)   Current Living Arrangements assisted living   Living Environment Comments pt states she lives w/ her , Otf, who helps her with toileting and shower transfers (states she has a shower w/ grab bars and a chair). Pt states she and Otf share household tasks.    Self-Care   Activity/Exercise/Self-Care Comment Pt states she uses a 4WW for mobility   Disability/Function   Fall history within last six months yes   Change in Functional Status Since Onset of Current Illness/Injury yes   General Information   Onset of Illness/Injury or Date of Surgery 01/10/21   Referring Physician River Awan PA-C   Patient/Family Therapy Goal Statement (OT) none specifically stated   Additional Occupational Profile Info/Pertinent History of Current Problem Jacque Bernstein is a 86 year old female with PMHx of hypertension, stage II CKD, GERD, hx of R breast cancer with resultant peripheral neuropathy dt chemotherapy, GERD, OA and frequent falls who was admitted from [place] ED on 1/3/2021 for further evaluation after she was incidentally found to have 2 intracranial masses after a mechanical fall with findings concerning for metastatic breast cancer. Pt found to have intracranial masses x2, dt metastatic breast cancer, s/p craniotomy with resection of mets on 1/6/21, cerebellar hematoma, s/p R frontal EVD placement 1/6/21   Cognitive Status Examination   Affect/Mental Status (Cognitive) low arousal/lethargic   Follows Commands follows one-step commands;50-74% accuracy   Cognitive Status Comments pt oriented to correct month and year, knew she was in the hospital but not which one. Pt one day off on day of the week   Visual Perception   Impact of Vision Impairment on Function (Vision) more difficulty tracking to R side   Pain Assessment   Patient Currently in Pain No   Range of  Motion Comprehensive   Comment, General Range of Motion LUE shoulder flexion WFL, RUE flexion limited to 90 degrees   Coordination   Coordination Comments decreased bilaterally, slightly weaker on R   Bed Mobility   Comment (Bed Mobility) Mod A sup>sit. Max A sit>sup   Balance   Balance Comments Impaired sitting balance EOB   Activities of Daily Living   BADL Assessment/Intervention bathing;upper body dressing;lower body dressing;grooming;toileting   Bathing Assessment/Intervention   [place] Level (Bathing) maximum assist (25% patient effort)   Upper Body Dressing Assessment/Training   [place] Level (Upper Body Dressing) moderate assist (50% patient effort)   Lower Body Dressing Assessment/Training   [place] Level (Lower Body Dressing) dependent (less than 25% patient effort)   Grooming Assessment/Training   [place] Level (Grooming) moderate assist (50% patient effort)   Toileting   [place] Level (Toileting) dependent (less than 25% patient effort)   Clinical Impression   Criteria for Skilled Therapeutic Interventions Met (OT) yes   OT Diagnosis dec ind/safety w/ ADL's and mobility   OT Problem List-Impairments impacting ADL balance;cognition;mobility;strength;range of motion (ROM);coordination   Assessment of Occupational Performance 1-3 Performance Deficits   Identified Performance Deficits dec ind/safety w/ bed mobility, transfers, mobility, dressing, g/h, toileting, feeding   Planned Therapy Interventions (OT) IADL retraining;ADL retraining;progressive activity/exercise;home program guidelines;ROM   Clinical Decision Making Complexity (OT) moderate complexity   Therapy Frequency (OT) Daily   Predicted Duration of Therapy 7 days   Risks and Benefits of Treatment have been explained. Yes   Patient, Family & other staff in agreement with plan of care Yes   OT Discharge Planning    OT Discharge Recommendation (DC Rec) [facility]-Motivated patient will benefit from intensive,  interdisciplinary therapy.  Anticipate will be able to tolerate 3 hours of therapy per day   OT Rationale for DC Rec pt is far below functional baseline and will benefit from intensive therapy approach at ARU   Total Evaluation Time (Minutes)   Total Evaluation Time (Minutes) 8

## 2021-01-10 NOTE — PROGRESS NOTES
Neurosurgery progress note:     POD#4 s/p midline posterior fossa craniotomy and resection of brain metastasis with return to OR for evacuation of hematoma and placement of EVD.     No issues overnight, EVD remains at 5 cm, Ventric draining 0-4 ml/hr, ICP ranges 8-16  VSS   She's sleeping upon entering room and just received Dilaudid, so quite somnolent. Nurse notes follows commands well.  Incision C/D/I    Ventriculostomy with clear drainage.     Head CT today:                                                                IMPRESSION:  1. Interval development of bilateral extra-axial fluid collections  with small ventricular size. This may be related to some over shunting  or due to persistent mass effect on the distal aqueduct and fourth  ventricle.  2. Persistent mass effect on the fourth ventricle and distal aqueduct  due to edema and blood products.  3. Mild interval decrease in blood products in the posterior fossa and  along the tentorium and extra-axial spaces.  4. 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 It is unchanged  from MRI dated 1/7/2021.    PLAN:  Increase EVD to 10 cm.  Decrease Decadron to 4 mg BID  Continue PT/OT.     Discussed with Dr. Rojas.    Lisa Veloz, MPAS  [facility] Neurosurgery  [address]  [address] [place] [id number]     Tel [phone]  Pager [phone]

## 2021-01-10 NOTE — PROGRESS NOTES
"  [facility]    Stroke/Neurocritical Care Progress Note    Interval Events  Runs of SVT overnight. Neurologically stable. ICPs 2-15. Will possibly raise from 5 to 10 cm above EAM today.    Impression  1. Intracranial masses, likely metastases of known breast cancer, now s/p resection, await path     2. Post-resection hemorrhage now s/p redo craniotomy and hematoma evacuation, EVD open at 5 above     3. Encephalopathy, resolved    4. Leukocytosis- downtrending, likely stress demargination in steroid use, monitor and recommend panCx including CSF from EVD if febrile    Recommendations  EVD and steroids per NSG  Goal eunatremia  SBP goal <140  PT/OT/SLP  Daily WBC    The Stroke/Neurocritical Care Staff is Dr. iWllson.    Sherin Tanner MD  Vascular Neurology Fellow  To page me or covering stroke neurology team member, click here: [email or web address]   Choose \"On Call\" tab at top, then search dropdown box for \"Neurology Adult\", select location, press Enter, then look for stroke/neuro ICU/telestroke.    ______________________________________________________    Medications   Home Meds  Prior to Admission medications    Medication Sig Start Date End Date Taking? Authorizing Provider   acetaminophen (TYLENOL) 500 MG tablet Take 1,000 mg by mouth every 6 hours as needed   Yes Unknown, Entered By History   amoxicillin (AMOXIL) 500 MG capsule Take 2,000 mg by mouth as needed Dental procedures 7/2/19  Yes Unknown, Entered By History   anastrozole (ARIMIDEX) 1 MG tablet Take 1 mg by mouth daily 12/8/20  Yes Unknown, Entered By History   B Complex Vitamins (VITAMIN B-COMPLEX) TABS Take 1 tablet by mouth daily   Yes Unknown, Entered By History   Calcium Carb-Ergocalciferol 500-200 MG-UNIT TABS Take 1 tablet by mouth daily   Yes Unknown, Entered By History   docusate sodium (DSS) 100 MG capsule Take 100 mg by mouth daily   Yes Unknown, Entered By History   lisinopril (ZESTRIL) 20 MG tablet Take 20 mg by mouth daily " 12/8/20  Yes Unknown, Entered By History   melatonin 3 MG tablet Take 3 mg by mouth nightly as needed   Yes Unknown, Entered By History       Scheduled Meds    acetaminophen  975 mg Oral or Feeding Tube TID     dexamethasone  4 mg Intravenous Q8H     docusate  100 mg Oral or Feeding Tube BID     insulin aspart  1-10 Units Subcutaneous TID AC     insulin aspart  1-7 Units Subcutaneous At Bedtime     insulin glargine  15 Units Subcutaneous At Bedtime     lisinopril  20 mg Oral Daily     meropenem  1 g Intravenous Q8H     metoprolol tartrate  25 mg Oral or Feeding Tube BID     multivitamins w/minerals  15 mL Per Feeding Tube Daily     pantoprazole  40 mg Oral or Feeding Tube Daily       Infusion Meds    dexmedetomidine Stopped (01/08/21 0830)     dextrose       niCARdipine Stopped (01/09/21 0035)     phenylephrine Stopped (01/08/21 0903)     NaCl 75 mL/hr at 01/10/21 0005       PRN Meds  acetaminophen, bisacodyl, calcium carbonate, dextrose, glucose **OR** dextrose **OR** glucagon, fentaNYL, hydrALAZINE, HYDROmorphone, hydrOXYzine, labetalol, lidocaine 4%, lidocaine (buffered or not buffered), melatonin, ondansetron **OR** [DISCONTINUED] ondansetron, oxyCODONE IR, polyethylene glycol, prochlorperazine **OR** prochlorperazine **OR** prochlorperazine, senna-docusate **OR** senna-docusate, senna-docusate **OR** [DISCONTINUED] senna-docusate       PHYSICAL EXAMINATION  Temp:  [97.5  F (36.4  C)-99.1  F (37.3  C)] 97.8  F (36.6  C)  Pulse:  [] 156  Resp:  [8-21] 12  BP: (125-161)/() 161/81  MAP:  [83 mmHg-88 mmHg] 84 mmHg  Arterial Line BP: (130-145)/(48-52) 130/52  SpO2:  [96 %-100 %] 100 %     General:  patient lying in bed without any acute distress    HEENT:  crani site c/d/i, R frontal EVD in place  Cardio:  RRR  Pulmonary:  no respiratory distress, on mechanical ventilation  Abdomen:  soft, non-tender, non-distended  Extremities:  no edema, peripheral pulses palpable  Skin:  intact, warm/dry       Neurologic  Mental Status:  Eyes closed, opens to voice, maintains alertness and attentiveness, oriented to self, month, year, and circumstance. Language is sparse and largely fluent with some unintelligible phrases, follows simple commands.  Cranial Nerves:  VFF, PERRL, EOMI, L eye esotropia at rest, face symmetric, tongue midline, moderately dysarthric  Motor:  normal muscle tone and bulk, no abnormal movements, antigravity in BUEs and LLE, drift in RLE  Reflexes:  2+ and symmetric throughout, no clonus, toes down-going  Sensory:  detects sensation in 4/4 extremities  Coordination:  not tested  Station/Gait:  deferred    Imaging  I personally reviewed all imaging; relevant findings per HPI.     Lab Results Data   CBC  Recent Labs   Lab 01/09/21  1600 01/09/21  0541 01/08/21  2253 01/08/21  0448 01/07/21  0355 01/07/21  0355   WBC  --  32.1*  --  37.0*  --  22.0*   RBC  --  3.34*  --  3.25*  --  2.58*   HGB 9.5* 9.9* 9.5* 9.7*   < > 8.1*   HCT  --  30.3*  --  29.5*  --  25.4*   PLT  --  263  --  248  --  320    < > = values in this interval not displayed.     Basic Metabolic Panel    Recent Labs   Lab 01/10/21  0517 01/09/21  0541 01/08/21  0448   * 147* 143   POTASSIUM 4.1 3.8 3.7   CHLORIDE 115* 119* 116*   CO2 26 22 22   BUN 23 24 28   CR 0.57 0.63 0.67   * 188* 287*   LADI 8.4* 8.2* 8.1*     Liver Panel  Recent Labs   Lab 01/08/21  0448 01/04/21  1050   PROTTOTAL 5.2* 7.4   ALBUMIN 2.6* 3.0*   BILITOTAL 0.5 0.6   ALKPHOS 61 103   AST 13 13   ALT 13 20     INR    Recent Labs   Lab Test 01/04/21  1050   INR 1.06      Lipid Profile  No lab results found.  A1C    Recent Labs   Lab Test 01/04/21  1050   A1C 5.6     Troponin I  No results for input(s): TROPI in the last 168 hours.

## 2021-01-10 NOTE — PROGRESS NOTES
SVT - 0600    Patient in SVT more often than not for last ~10 minutes, in report told patient will occasionally go into short runs of SVT. 12 lead performed - confirming SVT rates 170s. BP stable. Morning labs unremarkable. Hospitalists paged.     Addendum - 0610    Dr. Ortiz called back - deferred to house officer. House officer paged. Awaiting return call.    Addendum - 0620    Michael with house officer ordered 500 ml LR bolus, PRN metoprolol, and 1g Ca gluc. Will continue to monitor.

## 2021-01-10 NOTE — PROGRESS NOTES
Patient sleeping most of the day. Dilaudid given 1x for pain. Matthews with good urine output. Multiple loose bowel movements today. Worked with speech therapy and PT today. Updated  and son over the phone. Patient now oriented to place and situation. ventric with clear drainage.

## 2021-01-10 NOTE — PROVIDER NOTIFICATION
Hospitalist cross cover:    Paged regarding tachycardia, SVT on 12 lead computer read but can't see it in Epic yet. Currently normotensive, asymptomatic otherwise. Electrolytes normal.     Interventions:  -- 500 mL LR  -- 5 mg IV metoprolol, PRN q 15 x 3 doses  -- 1 g IV calcium gluconate  -- TSH/T4    Please page with additional concerns,    KANDICE Goodwin, CNP  Hospitalist - House [facility]  Text Page  [phone])

## 2021-01-10 NOTE — PROGRESS NOTES
"[facility]    Hospitalist Progress Note    Assessment & Plan   Jacque Bernstein is a 86 year old female with PMHx of hypertension, stage II CKD, GERD, hx of R breast cancer with resultant peripheral neuropathy dt chemotherapy, GERD, OA and frequent falls who was admitted from [place] ED on 1/3/2021 for further evaluation after she was incidentally found to have 2 intracranial masses after a mechanical fall with findings concerning for metastatic breast cancer.     Intracranial masses x2, dt metastatic breast cancer, s/p craniotomy with resection of mets on 1/6/21  Cerebellar hematoma, s/p R frontal EVD placement 1/6/21  Presented to [facility] ED after a mechanical fall where she struck her head on the headboard of her bed resulting in a persistent headache. Had endorsed worsening ataxia over the past few months with frequent falls. Head CT in the ED showed 2 masses, larger mass was located in the cerebellum with mass effect and tonsillar herniation with associated mild hydrocephalus and 2cm parietal mass with vasogenic edema was also noted. Given hx of breast cancer, findings were concerning for metastatic disease. Was started on dexamethasone on admission. Seen by neurosurgery and taken to the OR on 1/6/21 for midline posterior fossa Stealth craniotomy and resection of brain metastasis. Pathology subsequently returned positive for \"metastatic carcinoma consistent with prior breast primary malignancy\". Postop, she developed a cerebellar hematoma requiring she go back to the OR (also on 1/6) for evacuation of hematoma and placement of a R frontal external ventricular drain. Remained extubated post-procedures and was requiring Precedex and phenylephrine gtts. These were weaned and patient was extubated on 1/8/21. Briefly needed nicardipine gtt from BP management but this was weaned on 1/9.    -- EVD mgmt per neurosurgery -- to have repeat head CT this morning per neurosurgery  -- conts on " dexamethasone 4mg IV q8h   -- ongoing postop leukocytosis (WBC nl on admission; suspect dt steroids, recent surgery) but otherwise afebrile  -- goal SBP <140    -- neurochecks per protocol  -- otherwise cont cares per neurosurgery, neurocritical care and oncology  -- PT/OT following  -- okay'd for DD1 w/nectar thick liquids per SLP on 1/9 but minimal oral intake thus far dt intermittent fatigue/lethargy    Hx of right sided breast cancer, now with findings of metastatic disease as above  Peripheral neuropathy dt prior chemotherapy, manages with Tylenol  Clinical staging from 2017: Stage IIIA (T3, N2a, M0).  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    S/p mastectomy, radiation and chemo therapy.  Had been maintained on Arimidex 1mg daily. Follows with [facility] (Dr. Ho)  -- ongoing mgmt per oncology    Episodic Asymptomatic SVT  Had PACs and periods of ectopy on 1/9. New onset SVT on 1/10 AM. BPs stable and patient was asymptomatic.   Given 500ml LR bolus, calcium gluconate, IV Lopressor and AM oral metoprolol early.  Lytes (Mag, K) stable, TSH low but FT4 nl, no hx of thyroid disease and hard to interpret in setting of acute illness.   Recent echo on 1/7 showed EF 65-70% with indeterminate diastolic dysfunction and normal wall motion, mild pulmonary hypertension, mild aortic stenosis.  -- no improvement after IV Lopressor x3 and increased oral metoprolol (given 25mg this AM) -- may need to increase metoprolol further  -- unclear if central line contributing? -- will remove today once peripheral IV access obtained  -- cardiology consulted, recs appreciated  -- cont telemetry    0930 Addendum:  Discussed with Dr. Diaz -- will cont to uptitrate bb. Given an additional dose of metoprolol 25mg po x1 now.    Hypertension  Home meds: lisinopril 20mg po daily  Elevated BPs this stay likely dt surgery, steroids. Adjusting meds as needed. Lisinopril 20mg daily and metoprolol 12.5mg BID were resumed on   once extubated. Briefly required nicardipine gtt on  PM but was weaned off after a few hours  Goal SBP <140 per neuro (verbal per RN was to keep -150)  -- cont lisinopril 20mg daily and metoprolol 25mg BID as above -- may need to titrate bb further  -- consider addition of amlodipine if still needing frequent IV prns bit will address     Stage II CKD  Baseline Cr is around 1.   Renal function remains stable thus far    Steroid-induced hyperglycemia  Noted this stay, given initiation of dexamethasone. A1C 5.6, no hx of DM.   Was maintained on insulin gtt in immediate postop period while intubated, transitioned to basal/bolus insulin regimen on . Had been getting TFs, okay'd for modified diet on .    Recent Labs   Lab 01/10/21  0517 21  2215 21  1608 21  1213 21  0822 21  0541 21  2049 21  1658 21  0448 21  0448 21  0355 21  0355 21  1050 21  1050   *  --   --   --   --  188*  --   --   --  287*  --  276*  --  241*   BGM  --  207* 174* 174* 159*  --  169* 206*   < >  --    < >  --    < >  --     < > = values in this interval not displayed.       -- add Lantus 8U AM and increase Lantus to 15U HS   -- cont high dose SSI    Uncomplicated UTI dt ESBL  UA in ED was grossly abnl. UC ultimately grew >100k ESBL ecoli. Had been placed on ceftriaxone initially, changed to meropenem on  after culture results known  -- conts on meropenem (d#6) -- anticipate 7d course of treatment should be sufficient    Acute Blood Loss Anemia  Secondary to surgery, cerebellar hematoma.   Hgb stable at 9-10      GERD  Chronic and stable on PPI    Hypernatremia, mild  Na slowly trending up postop. Peaked at 147 on . IVFs changed from NS to 1/2NS and continued at 75ml/h on .   -- Na improvin -- 145 this morning  -- repeat BMP in AM     FEN: IVFs as above, lytes otherwise stable, TFs per nutritionist and  Tubes / lines: EVD, TF,  [place]; central line -- okay'd to remove on 1/10  DVT Prophylaxis: PCDs  Code Status: Full Code    Disposition: Cont monitoring in ICU today given EVD.  Cards to help address rhythm issues today. Adjust insulin, BP meds. Discharge date unclear, pending postop course. Therapy recommending ARU stay.     Leonela Gauthier    Interval History    Overnight events noted -- episode of SVT this morning, refractory to IV Lopressor and increased oral metoprolol. Patient asymptomatic. Seen this morning. Resting comfortably. HRs 150s during my visit. Patient easily rouses to name. Denies complaints including cp/palpitations, sob/cough, abd pain/n/v. No reported headache. Answers basic questions -- able to tell me her name, needs some prompting on location and year. Able to follow simple commands. TFs continue as she hasn't been able to stay awake long enough to steadily take po.    -Data reviewed today: I reviewed all new labs and imaging results over the last 24 hours. I personally reviewed no images or EKG's today.    Physical Exam   Temp: 97.8  F (36.6  C) Temp src: Oral BP: (!) 134/91 Pulse: 151   Resp: 11 SpO2: 99 % O2 Device: None (Room air)    Vitals:    01/08/21 0000 01/09/21 0500 01/10/21 0000   Weight: 72.9 kg (160 lb 11.5 oz) 69.4 kg (153 lb) 68.2 kg (150 lb 5.7 oz)     Vital Signs with Ranges  Temp:  [97.5  F (36.4  C)-99.8  F (37.7  C)] 97.8  F (36.6  C)  Pulse:  [] 151  Resp:  [8-21] 11  BP: (125-155)/() 134/91  MAP:  [70 mmHg-88 mmHg] 84 mmHg  Arterial Line BP: (127-145)/(40-52) 130/52  SpO2:  [96 %-100 %] 99 %  I/O last 3 completed shifts:  In: 2940 [I.V.:2000; NG/GT:580]  Out: 2570 [Urine:2465; Drains:105]    Constitutional: Resting comfortably, easily rouses to name, oriented to self, able to follow basic commands, NAD  Respiratory: CTAB, no wheeze/rales/rhonchi, no increased work of breathing  Cardiovascular: HR tachycardic but regular, no MGR, no LE edema  GI: S, NT, ND, +BS  Skin/Integumen:  warm/dry, scattered ecchymoses, scalp incision c/d/i  Other:      Medications     dexmedetomidine Stopped (01/08/21 0830)     dextrose       niCARdipine Stopped (01/09/21 0035)     phenylephrine Stopped (01/08/21 0903)     NaCl 75 mL/hr at 01/10/21 0005       acetaminophen  975 mg Oral or Feeding Tube TID     calcium gluconate  1 g Intravenous Once     dexamethasone  4 mg Intravenous Q8H     docusate  100 mg Oral or Feeding Tube BID     insulin aspart  1-10 Units Subcutaneous TID AC     insulin aspart  1-7 Units Subcutaneous At Bedtime     insulin glargine  10 Units Subcutaneous At Bedtime     lisinopril  20 mg Oral Daily     meropenem  1 g Intravenous Q8H     metoprolol tartrate  12.5 mg Oral Once     metoprolol tartrate  25 mg Oral or Feeding Tube BID     multivitamins w/minerals  15 mL Per Feeding Tube Daily     pantoprazole  40 mg Oral or Feeding Tube Daily       Data   Recent Labs   Lab 01/10/21  0517 01/09/21  1600 01/09/21  0541 01/08/21  2253 01/08/21  0448 01/07/21  0355 01/07/21  0355 01/04/21  1050 01/04/21  1050   WBC  --   --  32.1*  --  37.0*  --  22.0*  --  8.8   HGB  --  9.5* 9.9* 9.5* 9.7*   < > 8.1*   < > 13.6   MCV  --   --  91  --  91  --  98  --  96   PLT  --   --  263  --  248  --  320  --  380   INR  --   --   --   --   --   --   --   --  1.06   *  --  147*  --  143  --  140   < > 135   POTASSIUM 4.1  --  3.8  --  3.7  --  3.4   < > 4.3   CHLORIDE 115*  --  119*  --  116*  --  109  --  103   CO2 26  --  22  --  22  --  21  --  25   BUN 23  --  24  --  28  --  34*  --  28   CR 0.57  --  0.63  --  0.67  --  0.80  --  0.73   ANIONGAP 4  --  6  --  5  --  10  --  7   LADI 8.4*  --  8.2*  --  8.1*  --  7.7*  --  9.3   *  --  188*  --  287*  --  276*  --  241*   ALBUMIN  --   --   --   --  2.6*  --   --   --  3.0*   PROTTOTAL  --   --   --   --  5.2*  --   --   --  7.4   BILITOTAL  --   --   --   --  0.5  --   --   --  0.6   ALKPHOS  --   --   --   --  61  --   --   --  103   ALT  --    --   --   --  13  --   --   --  20   AST  --   --   --   --  13  --   --   --  13    < > = values in this interval not displayed.       No results found for this or any previous visit (from the past 24 hour(s)).

## 2021-01-10 NOTE — CONSULTS
"[facility]    Cardiology Consultation     Date of Admission:  1/3/2021    Assessment & Plan   Jacque Bernstein is a 86 year old female who was admitted on 1/3/2021. I was asked to see the patient for atrial tachycardia.  The patient has multiple systemic stressors with metastatic brain cancer, status post craniotomy with resection of metastasis on 1/6/2021, cerebral hematoma status post evacuation, ESBL urine infection, anemia.    In the setting of all the systemic stressors the patient has atrial tachycardia.  An atrial atrial tachycardia at times appears concerning for SVT versus 2-1 atrial flutter.    Up titration of AV carola blockade agents has been somewhat beneficial however more recently the patient has gone back into rapid ventricular response.    I believe will be quite challenging to control her atrial arrhythmia in the setting of all the systemic stressors however we can add diltiazem as a potential additional therapeutic agent.    I had a conversation with the son.  We discussed the patient is in a critical condition at this point.  I brought up the concept of potential cardiac pacing should the patient become bradycardic with our treatments as well as cardioversion if she were to become unstable.  At this time the patient is listed full code and we will pursue all measures.  However, given the multiple medical comorbidities including metastatic brain cancer and advanced age I am unsure if all of these advanced measures would ultimately be within the patient's goals of care.  The son did say that the patient did not want \"heroic\" measures as documented on a previous goals of care document.  However the exact nature of this is unclear and the family would like to further discuss these concepts tonight.    I do think it would be very beneficial for all the teams involved to discuss, from their perspective, the patient's goals of care.    #1 SVT versus 2-1 atrial flutter  #2  " Metastatic brain cancer presumed breast source  #3  ESBL UTI  #4  Recent craniotomy and resection of brain metastasis with cerebral hematoma and repeat evacuation with invasive surgery  #5  History of hypertension, recent hypertensive urgency requiring IV medications    Plan:  Add diltiazem drip with titration as needed, it will be difficult to control her heart rate most likely given all of the outlined above issues    We can decrease lisinopril to make room for further AV carola blockade if this is required    Continue metoprolol 25 mg twice daily with up titration as tolerated for rate control    Continue goals of care discussion, patient currently full code    Flavio Diaz MD     Code Status    Full Code    Reason for Consult   Reason for consult: SVT    Primary Care Physician   Sawyer Chou    Chief Complaint   SVT    History is obtained from the medical record.  The patient was not able to interact much.    History of Present Illness   Jacque Bernstein is a 86 year old female who was admitted on 1/3/2021. I was asked to see the patient for atrial tachycardia.  The patient has multiple systemic stressors with metastatic brain cancer, status post craniotomy with resection of metastasis on 1/6/2021, cerebral hematoma status post evacuation, ESBL urine infection, anemia.    In the setting of all the systemic stressors the patient has atrial tachycardia.  An atrial atrial tachycardia at times appears concerning for SVT versus 2-1 atrial flutter.    Up titration of AV carola blockade agents has been somewhat beneficial however more recently the patient has gone back into rapid ventricular response.    Patient had an echocardiogram a few days ago that was essentially normal.    Patient is currently not anticoagulated due to bleeding in the brain.    Patient was not very interactive during my encounter    Past Medical History   I have reviewed this patient's medical history and updated it with pertinent  information if needed.   Past Medical History:   Diagnosis Date     Heart disease        Past Surgical History   I have reviewed this patient's surgical history and updated it with pertinent information if needed.  History reviewed. No pertinent surgical history.    Prior to Admission Medications   Prior to Admission Medications   Prescriptions Last Dose Informant Patient Reported? Taking?   B Complex Vitamins (VITAMIN B-COMPLEX) TABS 1/2/2021 at Unknown time  Yes Yes   Sig: Take 1 tablet by mouth daily   Calcium Carb-Ergocalciferol 500-200 MG-UNIT TABS 1/2/2021 at Unknown time  Yes Yes   Sig: Take 1 tablet by mouth daily   acetaminophen (TYLENOL) 500 MG tablet prn  Yes Yes   Sig: Take 1,000 mg by mouth every 6 hours as needed   amoxicillin (AMOXIL) 500 MG capsule prn  Yes Yes   Sig: Take 2,000 mg by mouth as needed Dental procedures   anastrozole (ARIMIDEX) 1 MG tablet 1/2/2021 at Unknown time  Yes Yes   Sig: Take 1 mg by mouth daily   docusate sodium (DSS) 100 MG capsule 1/2/2021 at Unknown time  Yes Yes   Sig: Take 100 mg by mouth daily   lisinopril (ZESTRIL) 20 MG tablet 1/2/2021 at Unknown time  Yes Yes   Sig: Take 20 mg by mouth daily   melatonin 3 MG tablet prn  Yes Yes   Sig: Take 3 mg by mouth nightly as needed      Facility-Administered Medications: None     Allergies   Allergies   Allergen Reactions     Sudafed [Pseudoephedrine] Anxiety       Social History   I have reviewed this patient's social history and updated it with pertinent information if needed. Jacque Bernstein  reports that she has never smoked. She has never used smokeless tobacco. She reports that she does not drink alcohol or use drugs.    Family History   I have reviewed this patient's family history and updated it with pertinent information if needed.   History reviewed. No pertinent family history.    Review of Systems   Unable to obtain review of system as patient is sleeping and has recent brain surgery    Physical Exam   Temp: 97.8  F  (36.6  C) Temp src: Axillary BP: 117/79 Pulse: 156   Resp: 11 SpO2: 100 % O2 Device: None (Room air)    Vital Signs with Ranges  Temp:  [97.5  F (36.4  C)-97.9  F (36.6  C)] 97.8  F (36.6  C)  Pulse:  [] 156  Resp:  [7-21] 11  BP: (117-161)/() 117/79  SpO2:  [96 %-100 %] 100 %  150 lbs 5.66 oz    GENERAL APPEARANCE: No acute distress, postoperative from brain surgery  SKIN: Inspection of the skin reveals no rashes, ulcerations, warm, dry  NECK: Supple and symmetric.   Normal JVP  LUNGS: Clear breath sounds throughout bilaterally, no wheezes, no rhonchi  CARDIOVASCULAR: S1, S2, tachycardic rate and regular rhythm without any murmurs, gallops, rubs. The carotid pulses were normal and 2+ bilaterally without bruits. Peripheral pulses were 2+ and symmetric.  ABDOMEN: Soft, non-tender, non-distended with normal bowel sounds.  No ascites noted.  EXTREMITIES: No edema.  NEUROLOGIC:  Sleeping postoperative from brain surgery      Data   Results for orders placed or performed during the hospital encounter of 01/03/21 (from the past 24 hour(s))   Glucose by meter   Result Value Ref Range    Glucose 207 (H) 70 - 99 mg/dL   Magnesium   Result Value Ref Range    Magnesium 2.4 (H) 1.6 - 2.3 mg/dL   Phosphorus (AM Draw)   Result Value Ref Range    Phosphorus 2.7 2.5 - 4.5 mg/dL   Basic metabolic panel   Result Value Ref Range    Sodium 145 (H) 133 - 144 mmol/L    Potassium 4.1 3.4 - 5.3 mmol/L    Chloride 115 (H) 94 - 109 mmol/L    Carbon Dioxide 26 20 - 32 mmol/L    Anion Gap 4 3 - 14 mmol/L    Glucose 204 (H) 70 - 99 mg/dL    Urea Nitrogen 23 7 - 30 mg/dL    Creatinine 0.57 0.52 - 1.04 mg/dL    GFR Estimate 84 >60 mL/min/[1.73_m2]    GFR Estimate If Black >90 >60 mL/min/[1.73_m2]    Calcium 8.4 (L) 8.5 - 10.1 mg/dL   CBC with platelets differential   Result Value Ref Range    WBC 23.4 (H) 4.0 - 11.0 10e9/L    RBC Count 3.12 (L) 3.8 - 5.2 10e12/L    Hemoglobin 9.1 (L) 11.7 - 15.7 g/dL    Hematocrit 29.8 (L) 35.0 - 47.0  %    MCV 96 78 - 100 fl    MCH 29.2 26.5 - 33.0 pg    MCHC 30.5 (L) 31.5 - 36.5 g/dL    RDW 18.1 (H) 10.0 - 15.0 %    Platelet Count 276 150 - 450 10e9/L    Diff Method Automated Method     % Neutrophils 91.7 %    % Lymphocytes 2.7 %    % Monocytes 4.0 %    % Eosinophils 0.0 %    % Basophils 0.2 %    % Immature Granulocytes 1.4 %    Nucleated RBCs 0 0 /100    Absolute Neutrophil 21.4 (H) 1.6 - 8.3 10e9/L    Absolute Lymphocytes 0.6 (L) 0.8 - 5.3 10e9/L    Absolute Monocytes 0.9 0.0 - 1.3 10e9/L    Absolute Eosinophils 0.0 0.0 - 0.7 10e9/L    Absolute Basophils 0.0 0.0 - 0.2 10e9/L    Abs Immature Granulocytes 0.3 0 - 0.4 10e9/L    Absolute Nucleated RBC 0.0    TSH with free T4 reflex   Result Value Ref Range    TSH 0.09 (L) 0.40 - 4.00 mU/L   T4 free   Result Value Ref Range    T4 Free 1.03 0.76 - 1.46 ng/dL   EKG 12-lead, tracing only   Result Value Ref Range    Interpretation ECG Click View Image link to view waveform and result    Glucose by meter   Result Value Ref Range    Glucose 223 (H) 70 - 99 mg/dL   CT Head w/o Contrast    Narrative    CT SCAN OF THE HEAD WITHOUT CONTRAST   1/10/2021 10:01 AM     HISTORY: Postoperative  follow-up. Prior posterior fossa surgery for  metastatic disease. Hydrocephalus.    TECHNIQUE:  Axial images of the head and coronal reformations without  IV contrast material.  Radiation dose for this scan was reduced using  automated exposure control, adjustment of the mA and/or kV according  to patient size, or iterative reconstruction technique.    COMPARISON: 1/6/2021.    FINDINGS: There has been prior posterior fossa craniectomy procedure  and prior right frontal gila hole for placement of a ventricular  catheter into the ventricular system. Since the prior exam, there has  been development of bilateral extra-axial fluid collections measuring  up to 1.4 cm on the right and 1.2 cm and the left when measuring on  coronal images. The ventricles are small in size. Blood products  are  again seen in the posterior fossa and along the tentorium but they  have diminished since the prior exam. There is also low density in the  central cerebellar tissue in the operative bed region which is  presumably due to edema. Fourth ventricle continues to be small and  compressed in appearance by blood products in presumed edema. There is  no evidence for any supratentorial infarct. There is a left parietal  mass with surrounding edema unchanged from prior MRI. Some edema also  seen in the left frontal lobe at site of an additional known  metastatic deposit. Smaller known right frontal metastatic deposit not  readily appreciated on this study.      Impression    IMPRESSION:  1. Interval development of bilateral extra-axial fluid collections  with small ventricular size. This may be related to some over shunting  or due to persistent mass effect on the distal aqueduct and fourth  ventricle.  2. Persistent mass effect on the fourth ventricle and distal aqueduct  due to edema and blood products.  3. Mild interval decrease in blood products in the posterior fossa and  along the tentorium and extra-axial spaces.  4. 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 It is unchanged  from MRI dated 1/7/2021.    SAMIRA HOANG MD   Glucose by meter   Result Value Ref Range    Glucose 151 (H) 70 - 99 mg/dL

## 2021-01-11 NOTE — PROGRESS NOTES
Patient in SVT most of the day. Converted to SR for about 20 minutes at 1600, then back to SVT. Diltiazem gtt started at 1700. Patient sat at the edge of the bed with OT and RN assist. Very dizzy, unable to support self sitting. evd now open at 10cm above EAM. ICP 4-12 today, csf 0-6 mL/ hour.

## 2021-01-11 NOTE — PROGRESS NOTES
"[facility]    Hospitalist Progress Note    Assessment & Plan   Jacque Bernstein is a 86 year old female with PMHx of hypertension, stage II CKD, GERD, hx of R breast cancer with resultant peripheral neuropathy dt chemotherapy, GERD, OA and frequent falls who was admitted from [place] ED on 1/3/2021 for further evaluation after she was incidentally found to have 2 intracranial masses after a mechanical fall with findings concerning for metastatic breast cancer.     Intracranial masses x2, dt metastatic breast cancer, s/p craniotomy with resection of mets on 1/6/21  Cerebellar hematoma, s/p R frontal EVD placement 1/6/21  Presented to [facility] ED after a mechanical fall where she struck her head on the headboard of her bed resulting in a persistent headache. Had endorsed worsening ataxia over the past few months with frequent falls. Head CT in the ED showed 2 masses, larger mass was located in the cerebellum with mass effect and tonsillar herniation with associated mild hydrocephalus and 2cm parietal mass with vasogenic edema was also noted. Given hx of breast cancer, findings were concerning for metastatic disease. Was started on dexamethasone on admission. Seen by neurosurgery and taken to the OR on 1/6/21 for midline posterior fossa Stealth craniotomy and resection of brain metastasis. Pathology subsequently returned positive for \"metastatic carcinoma consistent with prior breast primary malignancy\". Postop, she developed a cerebellar hematoma requiring she go back to the OR (also on 1/6) for evacuation of hematoma and placement of a R frontal external ventricular drain. Remained extubated post-procedures and was requiring Precedex and phenylephrine gtts. These were weaned and patient was extubated on 1/8/21. Briefly needed nicardipine gtt from BP management but this was weaned on 1/9.    -- EVD mgmt per neurosurgery, increased to 10mm yesterday due to imaging. Plan for repeat CT head " tomorrow.  -- conts on dexamethasone 4mg IV BID  -- ongoing postop leukocytosis improving likely stress and steroid induced  -- goal SBP <140 , PRN hydralazine available  -- cont cares per neurosurgery, neurocritical care and oncology  -- PT/OT following  -- okay'd for DD1 w/nectar thick liquids per SLP on 1/9 but minimal oral intake thus far dt intermittent fatigue/lethargy she was started on trickle feeds via NGT    Hx of right sided breast cancer, now with findings of metastatic disease as above  Peripheral neuropathy dt prior chemotherapy, manages with Tylenol  Clinical staging from 2017: Stage IIIA (T3, N2a, M0).  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    S/p mastectomy, radiation and chemo therapy.  Had been maintained on Arimidex 1mg daily. Follows with [facility] (Dr. Ho)  -- ongoing mgmt per oncology, oncology planning to update family today on overall prognosis and goals    Episodic Asymptomatic SVT  Had PACs and periods of ectopy on 1/9. New onset SVT on 1/10 AM. BPs stable and patient was asymptomatic.   Given 500ml LR bolus, calcium gluconate, IV Lopressor and AM oral metoprolol early.  Lytes (Mag, K) stable, TSH low but FT4 nl, no hx of thyroid disease and hard to interpret in setting of acute illness.   Recent echo on 1/7 showed EF 65-70% with indeterminate diastolic dysfunction and normal wall motion, mild pulmonary hypertension, mild aortic stenosis.  -- cardiology consulted and favors amiodarone right now with metoprolol, no AC due to above  -- weaning off dilt  -- cont telemetry    Hypertension  Home meds: lisinopril 20mg po daily  Elevated BPs this stay likely dt surgery, steroids. Briefly required nicardipine gtt on 1/8 PM but was weaned off after a few hours  Goal SBP <140 per neuro (verbal per RN was to keep -150)  -lisinopril dced to assist with BP room for SVT management  -PRN hydralazine available    Stage II CKD  Baseline Cr is around 1.   Renal function  remains stable thus far    Steroid-induced hyperglycemia  Noted this stay, given initiation of dexamethasone. A1C 5.6, no hx of DM.   Was maintained on insulin gtt in immediate postop period while intubated, transitioned to basal/bolus insulin regimen on 1/8. Had been getting TFs, okay'd for modified diet on 1/9.    Recent Labs   Lab 01/11/21  0818 01/11/21  0600 01/11/21  0252 01/10/21  2207 01/10/21  1707 01/10/21  1322 01/10/21  0819 01/10/21  0517 01/09/21  0541 01/09/21  0541 01/08/21  0448 01/08/21  0448 01/07/21  0355 01/07/21  0355   GLC  --  193*  --   --   --   --   --  204*  --  188*  --  287*  --  276*   *  --  201* 188* 228* 151* 223*  --    < >  --    < >  --    < >  --     < > = values in this interval not displayed.       -- continue current insulin today and suspect her BG to improve as we wean steroids    Uncomplicated UTI dt ESBL  UA in ED was grossly abnl. UC ultimately grew >100k ESBL ecoli. Had been placed on ceftriaxone initially, changed to meropenem on 1/5 after culture results known  -- conts on meropenem (d#7) -- anticipate 7d course of treatment should be sufficient    Acute Blood Loss Anemia  Secondary to surgery, cerebellar hematoma.   Hgb stable at 9-10      GERD  Chronic and stable on PPI    Hypernatremia, resolved  -change 1/2 NS to LR while patient has low intake     FEN: IVFs as above, lytes otherwise stable, TFs per nutritionist  Tubes / lines: EVD, TF, figueroa; central line -- okay'd to remove on 1/10  DVT Prophylaxis: PCDs  Code Status: Full Code    Disposition: Cont monitoring in ICU today given EVD. Discharge date unclear, pending postop course. Therapy recommending ARU stay.     Olivia Tineo    Interval History    Patient up in bed. Opens eyes to her names and reports she has chronic pain all over. Follows commands with decreased gripe strength and some garbled speech. Drain remains in place. Discussed with bedside nurse.    -Data reviewed today: I reviewed all new  labs and imaging results over the last 24 hours. I personally reviewed CT imaging from yesterday with drain in place.    Physical Exam   Temp: 97.1  F (36.2  C) Temp src: Axillary BP: 135/67 Pulse: 67   Resp: 11 SpO2: 99 % O2 Device: None (Room air)    Vitals:    01/09/21 0500 01/10/21 0000 01/11/21 0115   Weight: 69.4 kg (153 lb) 68.2 kg (150 lb 5.7 oz) 70.3 kg (154 lb 15.7 oz)     Vital Signs with Ranges  Temp:  [97.1  F (36.2  C)-97.9  F (36.6  C)] 97.1  F (36.2  C)  Pulse:  [] 67  Resp:  [7-19] 11  BP: (102-158)/() 135/67  SpO2:  [97 %-100 %] 99 %  I/O last 3 completed shifts:  In: 3161.37 [I.V.:1981.37; NG/GT:820]  Out: 1991 [Urine:1925; Drains:66]    Constitutional: Resting comfortably, easily rouses to name, able to follow basic commands, NAD  Respiratory: CTAB, no wheeze/rales/rhonchi, no increased work of breathing  Cardiovascular: RRR no MGR, no LE edema  GI: S, NT, ND, +BS  Skin/Integumen: warm/dry, scattered ecchymoses, scalp incision c/d/i  Neuro: able to move all extremities on command, decreased gripe strength 4/5 bilaterally    Medications     dextrose       dilTIAZem HCl-Sodium Chloride 10 mg/hr (01/11/21 0900)     NaCl 75 mL/hr at 01/10/21 2338       acetaminophen  975 mg Oral or Feeding Tube TID     amiodarone  400 mg Oral BID     dexamethasone  4 mg Intravenous Q12H     docusate  100 mg Oral or Feeding Tube BID     insulin aspart  1-10 Units Subcutaneous TID AC     insulin aspart  1-7 Units Subcutaneous At Bedtime     insulin glargine  15 Units Subcutaneous At Bedtime     insulin glargine  8 Units Subcutaneous QAM AC     meropenem  1 g Intravenous Q8H     multivitamins w/minerals  15 mL Per Feeding Tube Daily     nystatin  500,000 Units Swish & Spit 4x Daily     pantoprazole  40 mg Oral or Feeding Tube Daily     potassium & sodium phosphates  1 packet Oral TID       Data   Recent Labs   Lab 01/11/21  0600 01/10/21  0517 01/09/21  1600 01/09/21  0541 01/08/21  0448 01/08/21  0448   WBC  20.3* 23.4*  --  32.1*  --  37.0*   HGB 10.6* 9.1* 9.5* 9.9*   < > 9.7*   MCV 92 96  --  91  --  91    276  --  263  --  248    145*  --  147*  --  143   POTASSIUM 4.3 4.1  --  3.8  --  3.7   CHLORIDE 109 115*  --  119*  --  116*   CO2 23 26  --  22  --  22   BUN 22 23  --  24  --  28   CR 0.48* 0.57  --  0.63  --  0.67   ANIONGAP 6 4  --  6  --  5   LADI 8.4* 8.4*  --  8.2*  --  8.1*   * 204*  --  188*  --  287*   ALBUMIN  --   --   --   --   --  2.6*   PROTTOTAL  --   --   --   --   --  5.2*   BILITOTAL  --   --   --   --   --  0.5   ALKPHOS  --   --   --   --   --  61   ALT  --   --   --   --   --  13   AST  --   --   --   --   --  13    < > = values in this interval not displayed.       No results found for this or any previous visit (from the past 24 hour(s)).

## 2021-01-11 NOTE — PLAN OF CARE
POD 5. Pt is disoriented to time and intermittently to place/situation. Neuros unchanged, pt follows, garbled speech, BLEs weak 3/5. CMS ex +2-3 dependent edema. Pt frequently in/out of SVT-SB. HR high 50s to low 150s. PRN hydralazine given x1 for SBP>150. EVD open 10cm water with ICP between 6-9, CSF with clear drainage between 1-6ml/hr. Matthews adequate UOP. No BM. C/o incisional and generalized pain relieved with Oxycodone given x1 and sched Tylenol. PIV IVF Dilt gtt at 5mg/hr, 1/2 NS at 75ml/hr. . Discharge pending.

## 2021-01-11 NOTE — PROGRESS NOTES
Service Date: 2021      SUBJECTIVE:  Mrs. Bernstein is an 86-year-old female with breast cancer. ER positive and HER-2/matthew positive.  The patient was admitted after a fall.  Brain MRI revealed multiple brain metastases.  Resection of left cerebellar lesion was done.  Pathology reveals metastatic carcinoma, consistent with breast primary.      The patient is still in the ICU.  I spoke to the nurse.  The patient is confused.  She is not having any fever.      PHYSICAL EXAMINATION:   GENERAL:  The patient is confused.  Not in any distress.   The rest of the systems not examined.      LABORATORY DATA:  Reviewed.      ASSESSMENT:   1.  An 86-year-old female with metastatic breast cancer with multiple brain metastases.   2.  Leukocytosis from dexamethasone.   3.  Anemia.      PLAN:   1.  The patient has metastatic breast cancer with multiple brain metastases.      The patient is recovering from surgery.  After she has recovered, she will meet with her primary oncologist.      The patient will need brain radiation.  After radiation, she will also need systemic treatment. The patient follows up with Dr. Ho in [facility].  After discharge, she will follow up with him.     2.  No acute intervention from Oncology during this hospitalization.  Message left for patient's son, Vick to call me.         TEENA PATTERSON MD             D: 2021   T: 2021   MT: [id number]      Name:     MIHAI BERNSTEIN   MRN:      [id number]        Account:      [id number]   :      1934           Service Date: 2021      Document: [id number]

## 2021-01-11 NOTE — CONSULTS
"Electrophysiology/ Cardiology- Consult Note         H&P and Plan:     Reason for consult: SVT/a flutter    History of present illness: 86-year old lady with a history of hypergtension, breast cancer and intracranial masses (presumed metastatic disease), who underwent intracranial resection on 1/6/2021.  Post procedure period was complicated by intracranial hemorrhage requiring evacuation.  During hospital stay, she developed UTI associated with ESBL and paroxysmal tachycardia (atrial tachycardia versus a flutter). EP was consult to help with management.    EKG/telemetry were reviewed.  Tachycardia is compatible with A. tach/atrial flutter.  Patient is going in and out of tachycardia in the last 24 hours and not to be experienced termination of tachycardia during my physical exam.  She is currently on a combination of metoprolol and diltiazem drip.    Previous studies:  -Echocardiogram (1/7/2021): Normal V function.  EF estimated 65-70%.  There was mild AS.    Plan:  1.  Paroxysmal atrial tachycardia/flutter.  She is back to normal rhythm.  I favor rhythm control strategy with amiodarone for now.  I recommend the following:  -Continue metoprolol.  -Amiodarone load (Amiodarone 400 mg PO BID for 4 days, then decrease to 400 mg PO daily for 4 days, and then decrease to 200 mg PO daily).  -Continue diltiazem drip as needed to control heart rate.    Frandy Fragoso MD    Physical Exam:  Vitals: /69   Pulse 152   Temp 97.1  F (36.2  C) (Axillary)   Resp 11   Ht 1.651 m (5' 5\")   Wt 70.3 kg (154 lb 15.7 oz)   SpO2 97%   BMI 25.79 kg/m        Intake/Output Summary (Last 24 hours) at 1/11/2021 0857  Last data filed at 1/11/2021 0700  Gross per 24 hour   Intake 2701.37 ml   Output 1834 ml   Net 867.37 ml     Vitals:    01/07/21 0015 01/08/21 0000 01/09/21 0500 01/10/21 0000   Weight: 72.6 kg (160 lb 0.9 oz) 72.9 kg (160 lb 11.5 oz) 69.4 kg (153 lb) 68.2 kg (150 lb 5.7 oz)    01/11/21 0115   Weight: 70.3 kg (154 lb " 15.7 oz)       Constitutional:  AAO x3.  Pt is in NAD.  HEAD: Normocephalic.  SKIN: Skin normal color, texture and turgor with no lesions or eruptions.  Eyes: PERRL, EOMI.  ENT:  Supple, normal JVP. No lymphadenopathy or thyroid enlargement.  Chest:  Decrease breath sound in base B/L  Cardiac: RRR, normal  S1 and S2.  No murmurs rubs or gallop.    Abdomen:  Normal BS.  Soft, non-tender and non-distended.  No rebound or guarding.    Extremities:  Pedious pulses palpable B/L.  No LE edema noticed.   Neurological: Aphasia and left-sided weakness noticed.       Review of Systems:  Complete review of system is otherwise negative with the exception of what was described above.     CURRENT MEDICATIONS:    acetaminophen  975 mg Oral or Feeding Tube TID     dexamethasone  4 mg Intravenous Q12H     docusate  100 mg Oral or Feeding Tube BID     insulin aspart  1-10 Units Subcutaneous TID AC     insulin aspart  1-7 Units Subcutaneous At Bedtime     insulin glargine  15 Units Subcutaneous At Bedtime     insulin glargine  8 Units Subcutaneous QAM AC     meropenem  1 g Intravenous Q8H     metoprolol tartrate  25 mg Oral or Feeding Tube BID     multivitamins w/minerals  15 mL Per Feeding Tube Daily     nystatin  500,000 Units Swish & Spit 4x Daily     pantoprazole  40 mg Oral or Feeding Tube Daily     potassium & sodium phosphates  1 packet Oral TID     PRN Meds: acetaminophen, bisacodyl, calcium carbonate, dextrose, glucose **OR** dextrose **OR** glucagon, fentaNYL, hydrALAZINE, HYDROmorphone, hydrOXYzine, labetalol, lidocaine 4%, lidocaine (buffered or not buffered), melatonin, ondansetron **OR** [DISCONTINUED] ondansetron, oxyCODONE IR, polyethylene glycol, prochlorperazine **OR** prochlorperazine **OR** prochlorperazine, senna-docusate **OR** senna-docusate, senna-docusate **OR** [DISCONTINUED] senna-docusate    ALLERGIES     Allergies   Allergen Reactions     Sudafed [Pseudoephedrine] Anxiety       PAST MEDICAL HISTORY:  Past  Medical History:   Diagnosis Date     Heart disease        PAST SURGICAL HISTORY:  History reviewed. No pertinent surgical history.    FAMILY HISTORY:  History reviewed. No pertinent family history.    SOCIAL HISTORY:  Social History     Socioeconomic History     Marital status:      Spouse name: None     Number of children: None     Years of education: None     Highest education level: None   Occupational History     None   Social Needs     Financial resource strain: None     Food insecurity     Worry: None     Inability: None     Transportation needs     Medical: None     Non-medical: None   Tobacco Use     Smoking status: Never Smoker     Smokeless tobacco: Never Used   Substance and Sexual Activity     Alcohol use: Never     Frequency: Never     Drug use: Never     Sexual activity: None   Lifestyle     Physical activity     Days per week: None     Minutes per session: None     Stress: None   Relationships     Social connections     Talks on phone: None     Gets together: None     Attends Jain service: None     Active member of club or organization: None     Attends meetings of clubs or organizations: None     Relationship status: None     Intimate partner violence     Fear of current or ex partner: None     Emotionally abused: None     Physically abused: None     Forced sexual activity: None   Other Topics Concern     Parent/sibling w/ CABG, MI or angioplasty before 65F 55M? Not Asked   Social History Narrative     None         Recent Lab Results:  Recent Labs   Lab 01/11/21  0600 01/10/21  0517 01/09/21  1600 01/09/21  0541 01/08/21  0448 01/08/21  0448 01/04/21  1050 01/04/21  1050   WBC 20.3* 23.4*  --  32.1*  --  37.0*   < > 8.8   HGB 10.6* 9.1* 9.5* 9.9*   < > 9.7*   < > 13.6   MCV 92 96  --  91  --  91   < > 96    276  --  263  --  248   < > 380   INR  --   --   --   --   --   --   --  1.06    145*  --  147*  --  143   < > 135   POTASSIUM 4.3 4.1  --  3.8  --  3.7   < > 4.3    CHLORIDE 109 115*  --  119*  --  116*   < > 103   CO2 23 26  --  22  --  22   < > 25   BUN 22 23  --  24  --  28   < > 28   CR 0.48* 0.57  --  0.63  --  0.67   < > 0.73   ANIONGAP 6 4  --  6  --  5   < > 7   LADI 8.4* 8.4*  --  8.2*  --  8.1*   < > 9.3   * 204*  --  188*  --  287*   < > 241*   ALBUMIN  --   --   --   --   --  2.6*  --  3.0*   PROTTOTAL  --   --   --   --   --  5.2*  --  7.4   BILITOTAL  --   --   --   --   --  0.5  --  0.6   ALKPHOS  --   --   --   --   --  61  --  103   ALT  --   --   --   --   --  13  --  20   AST  --   --   --   --   --  13  --  13    < > = values in this interval not displayed.

## 2021-01-11 NOTE — PROGRESS NOTES
"[facility]    Neurosurgery Progress Note    Date of Service (when I saw the patient): 01/11/2021     Assessment & Plan   Jacque Bernstein is a 86 year old female who was admitted on 1/3/2021. She is s/p midline posterior fossa craniotomy and resection of brain metastasis with return to OR for evacuation of hematoma and placement of EVD. Today she was seen lying in bed. She is alert and follows commands. EVD at 10 ICP 6-9 with 1-6 out per hour.    Principal Problem:    Brain metastasis (H)    Assessment: s/p posterior fossa craniotomy and EVD placement    Plan:   -Continue EVD at 10  -Head CT tomorrow morning  -Decadron 4mg BID  -Continue PT/OT    I have discussed the following assessment and plan Dr. Rojas who is in agreement with initial plan and will follow up with further consultation recommendations.    Trixie Dawson CNP  [facility] Neurosurgery  [facility]  [address]  [address] [place] [id number]    Tel [phone]  Pager [phone]      Interval History   Stable.    Physical Exam   Temp: 97.1  F (36.2  C) Temp src: Axillary BP: 134/76 Pulse: 67   Resp: 10 SpO2: 98 % O2 Device: None (Room air)    Vitals:    01/09/21 0500 01/10/21 0000 01/11/21 0115   Weight: 153 lb (69.4 kg) 150 lb 5.7 oz (68.2 kg) 154 lb 15.7 oz (70.3 kg)     Vital Signs with Ranges  Temp:  [97.1  F (36.2  C)-97.9  F (36.6  C)] 97.1  F (36.2  C)  Pulse:  [] 67  Resp:  [7-19] 10  BP: (102-158)/() 134/76  SpO2:  [97 %-100 %] 98 %  I/O last 3 completed shifts:  In: 3161.37 [I.V.:1981.37; NG/GT:820]  Out: 1991 [Urine:1925; Drains:66]     , Blood pressure 134/76, pulse 67, temperature 97.1  F (36.2  C), temperature source Axillary, resp. rate 10, height 5' 5\" (1.651 m), weight 154 lb 15.7 oz (70.3 kg), SpO2 98 %.  154 lbs 15.73 oz  HEENT:  Normocephalic.  PERRLA.  EOM s intact.    Heart:  No peripheral edema  Lungs:  No SOB  Skin:  Warm and dry, good capillary " refill. Incision and EVD site intact.  Extremities:  Good radial and dorsalis pedis pulses bilaterally, no edema, cyanosis or clubbing.    NEUROLOGICAL EXAMINATION:   Mental status:  Alert and Oriented x 3, speech is fluent.  Cranial nerves:  II-XII intact.   Motor:  NAGEL      Medications     dextrose       dilTIAZem HCl-Sodium Chloride 10 mg/hr (01/11/21 0900)     NaCl 75 mL/hr at 01/10/21 2338       acetaminophen  975 mg Oral or Feeding Tube TID     amiodarone  400 mg Oral BID     dexamethasone  4 mg Intravenous Q12H     docusate  100 mg Oral or Feeding Tube BID     insulin aspart  1-10 Units Subcutaneous TID AC     insulin aspart  1-7 Units Subcutaneous At Bedtime     insulin glargine  15 Units Subcutaneous At Bedtime     insulin glargine  8 Units Subcutaneous QAM AC     meropenem  1 g Intravenous Q8H     multivitamins w/minerals  15 mL Per Feeding Tube Daily     nystatin  500,000 Units Swish & Spit 4x Daily     pantoprazole  40 mg Oral or Feeding Tube Daily     potassium & sodium phosphates  1 packet Oral TID       Data     CBC RESULTS:   Recent Labs   Lab Test 01/11/21  0600   WBC 20.3*   RBC 3.58*   HGB 10.6*   HCT 33.0*   MCV 92   MCH 29.6   MCHC 32.1   RDW 16.9*        Basic Metabolic Panel:  Lab Results   Component Value Date     01/11/2021      Lab Results   Component Value Date    POTASSIUM 4.3 01/11/2021     Lab Results   Component Value Date    CHLORIDE 109 01/11/2021     Lab Results   Component Value Date    LADI 8.4 01/11/2021     Lab Results   Component Value Date    CO2 23 01/11/2021     Lab Results   Component Value Date    BUN 22 01/11/2021     Lab Results   Component Value Date    CR 0.48 01/11/2021     Lab Results   Component Value Date     01/11/2021     INR:  Lab Results   Component Value Date    INR 1.06 01/04/2021

## 2021-01-11 NOTE — PROGRESS NOTES
CLINICAL NUTRITION SERVICES - REASSESSMENT NOTE      Recommendations Ordered by Registered Dietitian (RD):   - Begin advancing TF today while PO intake minimal  - Advance TF Isosource 1.5 to 30 mL/hr this morning. After 8 hours, advance to goal rate of 45 mL/hr (replace Phos as needed w/ advancements)  - Isosource 1.5 at 45 mL/hr will provide 1620 kcal (27 kcal/kg), 73 g protein (1.2 g/kg), 190 g CHO, 16 g fiber, 821 mL H2O  - Will add magic cup supplements PRN  - Cancel Not Appropriate for Room Service status (RN will order food PRN)    Future/Additional Recommendations:   - Continuation of TF pending PO intakes. Justify wean of TF when pt able to take ~60% estimated nutrition needs orally    Malnutrition: (1/11)  % Weight Loss:  None noted  % Intake:  </= 50% for >/= 5 days (severe malnutrition) -- EN reliant, below goal rate  Subcutaneous Fat Loss:  None observed  Muscle Loss:  None observed  Fluid Retention:  Mild - likely not nutritional     Malnutrition Diagnosis: Patient does not meet two of the above criteria necessary for diagnosing malnutrition at this time        EVALUATION OF PROGRESS TOWARD GOALS   Diet:  DD2, NTL per SLP this AM  Not Appropriate for Room Service status (pt will received automatic meals TID)    Nutrition Support:  Trickle TF started 1/7 as outlined below ~  Nutrition Support Enteral:  Type of Feeding Tube: NDT  Enteral Frequency:  Continuous  Enteral Regimen: Isosource 1.5 at 15 mL/hr  Total Enteral Provisions: 540 kcal (9 kcal/kg), 24 g protein (0.4 g/kg), 42 g CHO, 4 g fiber, 274 mL H2O  - Meets <100% DRIs --> Certavite added daily   Free Water Flush: 60 mL q 4 hrs       Intake/Tolerance:    Pt now day 5 limited nutrition intake, received OK in rounds to begin advancing TF  Labs reviewed: Na 138, K 4.3, Mg 2.2, Phos 2.2 (L)  Recent Labs   Lab 01/11/21  0818 01/11/21  0600 01/11/21  0252 01/10/21  2207 01/10/21  1707 01/10/21  1322 01/10/21  0819 01/10/21  0517 01/09/21  0541  01/09/21  0541 01/08/21  0448 01/08/21  0448 01/07/21  0355 01/07/21  0355   GLC  --  193*  --   --   --   --   --  204*  --  188*  --  287*  --  276*   *  --  201* 188* 228* 151* 223*  --    < >  --    < >  --    < >  --     < > = values in this interval not displayed.     Medications reviewed: decadron, lantus 8 units in morning and 15 units at bedtime, neutra-phos, colace held, levophed off 1/7, phenylephrine off 1/8, NaCl IVF at 75 mL/hr  Stooling: BM x5 on 1/9, BM x1 1/10   Wt: 70.3 kg  Vitals:    01/07/21 0015 01/08/21 0000 01/09/21 0500 01/10/21 0000   Weight: 72.6 kg (160 lb 0.9 oz) 72.9 kg (160 lb 11.5 oz) 69.4 kg (153 lb) 68.2 kg (150 lb 5.7 oz)    01/11/21 0115   Weight: 70.3 kg (154 lb 15.7 oz)       ASSESSED NUTRITION NEEDS:  Dosing Weight 60.8 kg (adjusted)  Estimated Energy Needs: 8507-9613 kcals (25-30 Kcal/Kg)  Justification: overweight  Estimated Protein Needs: 70-90 grams protein (1.2-1.5 g pro/Kg)  Justification: hypercatabolism with acute illness  Estimated Fluid Needs: 6907-2559  mL (1 mL/Kcal)  Justification: maintenance      NEW FINDINGS:   1/9 SLP eval = moderate dysphagia; Recommend: dysphagia diet level 1 and nectar thick liquid by spoon  1/11 SLP eval = advanced diet to dysphagia diet level 2 and nectar thickened liquids     Previous Goals:   Patient will meet nutrition needs within the next 24-48 hours    Evaluation: Met    Previous Nutrition Diagnosis:   Inadequate protein-energy intake related to NPO with plans to start TF today as evidenced by meeting 0% needs   Evaluation: No change, updated below       MALNUTRITION  % Weight Loss:  None noted  % Intake:  </= 50% for >/= 5 days (severe malnutrition) -- EN reliant, below goal rate  Subcutaneous Fat Loss:  None observed  Muscle Loss:  None observed  Fluid Retention:  Mild - likely not nutritional     Malnutrition Diagnosis: Patient does not meet two of the above criteria necessary for diagnosing malnutrition at this time      CURRENT NUTRITION DIAGNOSIS  Inadequate protein-energy intake related to slow diet advancements 2/2 dysphagia, required TF for nutrition as evidenced by limited PO consumed thus far and TF continues at trickle rate meeting <25% estimated needs     INTERVENTIONS  Recommendations / Nutrition Prescription  Advance TF today towards goal  Magic cup supplements PRN     Implementation  EN Schedule, Medical Food Supplement: as above  Collaboration and Referral of Nutrition care: pt was discussed during ICU rounds     Goals  EN to meet % estimated needs in <24 hrs while PO intake minimal       MONITORING AND EVALUATION:  Progress towards goals will be monitored and evaluated per protocol and Practice Guidelines      Jackie Spangler RD, LD  Clinical Dietitian

## 2021-01-11 NOTE — PROGRESS NOTES
"  [facility]    Stroke/Neurocritical Care Progress Note    Interval Events  Continued runs of SVT overnight. Neurologically stable. EVD raised to 10 above, ICPs 4-10, 65 out in past 24 hours. Thirsty.    Impression  1. Intracranial masses, final pathology carcinoma consistent with known breast primary     2. Post-resection hemorrhage now s/p redo craniotomy and hematoma evacuation, EVD open at 10 above     3. Encephalopathy, resolved    4. Leukocytosis- downtrending, likely stress demargination in steroid use, monitor and recommend panCx including CSF from EVD if febrile    Her neurologic prognosis from a posterior fossa decompression may involve some degree of vertigo/dizziness/ataxia but largely these patients do well with rehab. Taking her other medical problems into account, it seems if she can stabilize from those other concerns, it is likely she will maintain a fair to good quality of life going forward, so there should be consideration of treatment as needed to afford her that chance for rehab.    Recommendations  EVD and steroids per NSG  Goal eunatremia  SBP goal <140  PT/OT/SLP  Daily WBC    Vascular Neurology will sign off at this time. Please do not hesitate to contact us with questions or concerns, should they arise. We are available for family conferences if necessary.    The Stroke/Neurocritical Care Staff is Dr. Aponte.    Sherin Tanner MD  Vascular Neurology Fellow  To page me or covering stroke neurology team member, click here: [facility]COM   Choose \"On Call\" tab at top, then search dropdown box for \"Neurology Adult\", select location, press Enter, then look for stroke/neuro ICU/telestroke.    ______________________________________________________    Medications   Home Meds  Prior to Admission medications    Medication Sig Start Date End Date Taking? Authorizing Provider   acetaminophen (TYLENOL) 500 MG tablet Take 1,000 mg by mouth every 6 hours as needed   Yes Unknown, Entered " By History   amoxicillin (AMOXIL) 500 MG capsule Take 2,000 mg by mouth as needed Dental procedures 7/2/19  Yes Unknown, Entered By History   anastrozole (ARIMIDEX) 1 MG tablet Take 1 mg by mouth daily 12/8/20  Yes Unknown, Entered By History   B Complex Vitamins (VITAMIN B-COMPLEX) TABS Take 1 tablet by mouth daily   Yes Unknown, Entered By History   Calcium Carb-Ergocalciferol 500-200 MG-UNIT TABS Take 1 tablet by mouth daily   Yes Unknown, Entered By History   docusate sodium (DSS) 100 MG capsule Take 100 mg by mouth daily   Yes Unknown, Entered By History   lisinopril (ZESTRIL) 20 MG tablet Take 20 mg by mouth daily 12/8/20  Yes Unknown, Entered By History   melatonin 3 MG tablet Take 3 mg by mouth nightly as needed   Yes Unknown, Entered By History       Scheduled Meds    acetaminophen  975 mg Oral or Feeding Tube TID     amiodarone  400 mg Oral BID     dexamethasone  4 mg Intravenous Q12H     docusate  100 mg Oral or Feeding Tube BID     insulin aspart  1-10 Units Subcutaneous TID AC     insulin aspart  1-7 Units Subcutaneous At Bedtime     insulin glargine  15 Units Subcutaneous At Bedtime     insulin glargine  8 Units Subcutaneous QAM AC     meropenem  1 g Intravenous Q8H     multivitamins w/minerals  15 mL Per Feeding Tube Daily     nystatin  500,000 Units Swish & Spit 4x Daily     pantoprazole  40 mg Oral or Feeding Tube Daily     potassium & sodium phosphates  1 packet Oral TID       Infusion Meds    dextrose       dilTIAZem HCl-Sodium Chloride Stopped (01/11/21 1218)     lactated ringers 75 mL/hr at 01/11/21 1309       PRN Meds  acetaminophen, bisacodyl, calcium carbonate, dextrose, glucose **OR** dextrose **OR** glucagon, fentaNYL, hydrALAZINE, HYDROmorphone, hydrOXYzine, labetalol, lidocaine 4%, lidocaine (buffered or not buffered), melatonin, ondansetron **OR** [DISCONTINUED] ondansetron, oxyCODONE IR, polyethylene glycol, prochlorperazine **OR** prochlorperazine **OR** prochlorperazine,  senna-docusate **OR** senna-docusate, senna-docusate **OR** [DISCONTINUED] senna-docusate       PHYSICAL EXAMINATION  Temp:  [97.1  F (36.2  C)-97.9  F (36.6  C)] 97.1  F (36.2  C)  Pulse:  [] 64  Resp:  [8-19] 14  BP: (102-158)/() 139/60  SpO2:  [97 %-100 %] 99 %     General:  patient lying in bed without any acute distress    HEENT:  crani site c/d/i, R frontal EVD in place  Cardio:  RRR  Pulmonary:  no respiratory distress, on mechanical ventilation  Abdomen:  soft, non-tender, non-distended  Extremities:  no edema, peripheral pulses palpable  Skin:  intact, warm/dry      Neurologic  Mental Status:  Eyes closed, opens to voice, maintains alertness and attentiveness, oriented to self, month, year, and circumstance. Language is fluent and coherent with intact comprehension of complex commands, naming   Cranial Nerves:  VFF, PERRL, EOMI, L eye esotropia at rest, face symmetric, tongue midline, moderately dysarthric  Motor:  normal muscle tone and bulk, no abnormal movements, antigravity in BUEs and LLE, drift in RLE  Reflexes:  2+ and symmetric throughout, no clonus, toes down-going  Sensory:  detects sensation in 4/4 extremities  Coordination:  not tested  Station/Gait:  deferred    Imaging  I personally reviewed all imaging; relevant findings per HPI.     Lab Results Data   CBC  Recent Labs   Lab 01/11/21  0600 01/10/21  0517 01/09/21  1600 01/09/21  0541   WBC 20.3* 23.4*  --  32.1*   RBC 3.58* 3.12*  --  3.34*   HGB 10.6* 9.1* 9.5* 9.9*   HCT 33.0* 29.8*  --  30.3*    276  --  263     Basic Metabolic Panel    Recent Labs   Lab 01/11/21  0600 01/10/21  0517 01/09/21  0541    145* 147*   POTASSIUM 4.3 4.1 3.8   CHLORIDE 109 115* 119*   CO2 23 26 22   BUN 22 23 24   CR 0.48* 0.57 0.63   * 204* 188*   LADI 8.4* 8.4* 8.2*     Liver Panel  Recent Labs   Lab 01/08/21  0448   PROTTOTAL 5.2*   ALBUMIN 2.6*   BILITOTAL 0.5   ALKPHOS 61   AST 13   ALT 13     INR    Recent Labs   Lab Test  01/04/21  1050   INR 1.06      Lipid Profile  No lab results found.  A1C    Recent Labs   Lab Test 01/04/21  1050   A1C 5.6     Troponin I  No results for input(s): TROPI in the last 168 hours.

## 2021-01-12 NOTE — PROGRESS NOTES
"[facility]    Neurosurgery Progress Note    Date of Service (when I saw the patient): 01/12/2021     Assessment & Plan   Jacque Bernstein is a 86 year old female who was admitted on 1/3/2021. She is s/p midline posterior fossa craniotomy and resection of brain metastasis with return to OR for evacuation of hematoma and placement of EVD. Today she was seen lying in bed. She is lethargic and follows commands with slight LUE weakness. EVD at 10 ICP 7-14 with 0-5 out per hour.    Principal Problem:    Brain metastasis (H)    Assessment: s/p posterior fossa craniotomy and EVD placement    Plan:   -EVD to 15, order placed, no need for antibiotic  -BP parameter loosened to <160  -Decadron 4mg BID  -Continue PT/OT    I have discussed the following assessment and plan Dr. Rojas who is in agreement with initial plan and will follow up with further consultation recommendations.    Trixie Dawson CNP  [facility] Neurosurgery  [facility]  [address] [place] [address]  [address] [place] [address]    Tel [phone]  Pager [phone]      Interval History   Stable.    Physical Exam   Temp: 97.1  F (36.2  C) Temp src: Oral BP: (!) 154/81 Pulse: 97   Resp: 8 SpO2: 97 % O2 Device: None (Room air)    Vitals:    01/10/21 0000 01/11/21 0115 01/12/21 0024   Weight: 150 lb 5.7 oz (68.2 kg) 154 lb 15.7 oz (70.3 kg) 158 lb 1.1 oz (71.7 kg)     Vital Signs with Ranges  Temp:  [97  F (36.1  C)-97.6  F (36.4  C)] 97.1  F (36.2  C)  Pulse:  [] 97  Resp:  [8-24] 8  BP: (116-192)/() 154/81  SpO2:  [95 %-99 %] 97 %  I/O last 3 completed shifts:  In: 3829.25 [I.V.:1889.25; NG/GT:1145]  Out: 1224 [Urine:1170; Drains:54]     , Blood pressure (!) 154/81, pulse 97, temperature 97.1  F (36.2  C), temperature source Oral, resp. rate 8, height 5' 5\" (1.651 m), weight 158 lb 1.1 oz (71.7 kg), SpO2 97 %.  158 lbs 1.12 oz  HEENT:  Normocephalic.  PERRLA.  EOM s intact.    Heart:  No peripheral " edema  Lungs:  No SOB  Skin:  Warm and dry, good capillary refill. Incision and EVD site intact.  Extremities:  Good radial and dorsalis pedis pulses bilaterally, no edema, cyanosis or clubbing.    NEUROLOGICAL EXAMINATION:   Mental status:  Lethargic and follows commands.  Cranial nerves:  II-XII intact.   Motor:  NAGEL      Medications     dextrose       dilTIAZem HCl-Sodium Chloride Stopped (01/11/21 1218)       acetaminophen  975 mg Oral or Feeding Tube TID     amiodarone  400 mg Oral BID     carvedilol  6.25 mg Oral BID w/meals     dexamethasone  4 mg Intravenous Q12H     docusate  100 mg Oral or Feeding Tube BID     insulin aspart  1-10 Units Subcutaneous TID AC     insulin aspart  1-7 Units Subcutaneous At Bedtime     insulin glargine  15 Units Subcutaneous At Bedtime     insulin glargine  8 Units Subcutaneous QAM AC     lisinopril  10 mg Oral Daily     multivitamins w/minerals  15 mL Per Feeding Tube Daily     nystatin  500,000 Units Swish & Spit 4x Daily     pantoprazole  40 mg Oral or Feeding Tube Daily     potassium & sodium phosphates  1 packet Oral TID       Data     CBC RESULTS:   Recent Labs   Lab Test 01/11/21  0600   WBC 20.3*   RBC 3.58*   HGB 10.6*   HCT 33.0*   MCV 92   MCH 29.6   MCHC 32.1   RDW 16.9*        Basic Metabolic Panel:  Lab Results   Component Value Date     01/11/2021      Lab Results   Component Value Date    POTASSIUM 4.3 01/11/2021     Lab Results   Component Value Date    CHLORIDE 109 01/11/2021     Lab Results   Component Value Date    LADI 8.4 01/11/2021     Lab Results   Component Value Date    CO2 23 01/11/2021     Lab Results   Component Value Date    BUN 22 01/11/2021     Lab Results   Component Value Date    CR 0.48 01/11/2021     Lab Results   Component Value Date     01/11/2021     INR:  Lab Results   Component Value Date    INR 1.06 01/04/2021

## 2021-01-12 NOTE — PROVIDER NOTIFICATION
Provider Notification    Notified Person Name:  MD Gauthier (hospitalist)    Notification Date/Time:  1/12/21 0630    Notification Interaction:  paged    Purpose of Notification: continuous high blood pressure. Systolic 160-180. Despite being given PRN labetalol and Hydralazine and pain medications

## 2021-01-12 NOTE — PLAN OF CARE
Lethargic  arouses to voice, oriented to place only hospital, garbled speech, follows commands . Perrl at 3 mm. Some dizziness and headache and aches all over.  Pain 7 ith dilaudid and oxycodone.  BP needing apresoline and labatolol prn.  BP parameter changed to < 160 , ICP raised to 15 mm, no change in status.  No drainage since 1230 when EVD level increased. Afebrile, lungs clear, good urine output.

## 2021-01-12 NOTE — PROGRESS NOTES
"EP- Cardiology Progress Note           Assessment and Plan:     86-yo F with a Hx of HTN, breast CA and intracranial metastatic disease, who underwent intracranial resection on 1/6/2021.  Post-op was complicated by intracranial ICH requiring evacuation, ESBL-UTI and paroxysmal tachycardia (Atach versus A-flutter).     Events overnight: Continues to have paroxymsal episodes of SVT but she tolerates it well.    Previous studies:  -Echo (1/7/2021): Normal V function.  EF estimated 65-70%.  There was mild AS.     Plan:  1.  PATach.    She is tolerating anemia well and heart rate is well controlled.  Recommendations:   -Continue Amiodarone load (Amiodarone 400 mg PO BID for 4 days, then decrease to 400 mg PO daily for 4 days, and then decrease to 200 mg PO daily).  -Continue oral metoprolol.    -Wean off diltiazem drip.    We will sign off.  Patient should follow-up in cardiology clinic in 1-2 months.    Physical Exam:  Vitals: BP (!) 167/81 (BP Location: Left arm)   Pulse 80   Temp 97.1  F (36.2  C) (Oral)   Resp 11   Ht 1.651 m (5' 5\")   Wt 71.7 kg (158 lb 1.1 oz)   SpO2 97%   BMI 26.30 kg/m        Intake/Output Summary (Last 24 hours) at 1/12/2021 0816  Last data filed at 1/12/2021 0700  Gross per 24 hour   Intake 3459.25 ml   Output 1119 ml   Net 2340.25 ml     Vitals:    01/08/21 0000 01/09/21 0500 01/10/21 0000 01/11/21 0115   Weight: 72.9 kg (160 lb 11.5 oz) 69.4 kg (153 lb) 68.2 kg (150 lb 5.7 oz) 70.3 kg (154 lb 15.7 oz)    01/12/21 0024   Weight: 71.7 kg (158 lb 1.1 oz)       Constitutional:  .  Pt is in NAD.  HEAD: Normocephalic.  SKIN: Skin normal color, texture and turgor with no lesions or eruptions.  Eyes: PERRL, EOMI.  ENT:  Supple, normal JVP. No lymphadenopathy or thyroid enlargement.  Chest:  Decrease breath sound in base B/L.   Cardiac:  IIR, variable sound of S1 and Normal S2. No murmurs rubs or gallop.    Abdomen:  Normal BS.  Soft, non-tender and non-distended.  No rebound or guarding.  "   Extremities:  Pedious pulses palpable B/L.  No LE edema noticed.   Neurological: L-sided weakness and aphasia.                            Review of Systems:   As per subjective, otherwise 5 systems reviewed and negative.         Medications:          acetaminophen  975 mg Oral or Feeding Tube TID     amiodarone  400 mg Oral BID     dexamethasone  4 mg Intravenous Q12H     docusate  100 mg Oral or Feeding Tube BID     insulin aspart  1-10 Units Subcutaneous TID AC     insulin aspart  1-7 Units Subcutaneous At Bedtime     insulin glargine  15 Units Subcutaneous At Bedtime     insulin glargine  8 Units Subcutaneous QAM AC     multivitamins w/minerals  15 mL Per Feeding Tube Daily     nystatin  500,000 Units Swish & Spit 4x Daily     pantoprazole  40 mg Oral or Feeding Tube Daily     potassium & sodium phosphates  1 packet Oral TID     PRN Meds: acetaminophen, bisacodyl, calcium carbonate, dextrose, glucose **OR** dextrose **OR** glucagon, fentaNYL, hydrALAZINE, HYDROmorphone, hydrOXYzine, labetalol, lidocaine 4%, lidocaine (buffered or not buffered), melatonin, ondansetron **OR** [DISCONTINUED] ondansetron, oxyCODONE IR, polyethylene glycol, prochlorperazine **OR** prochlorperazine **OR** prochlorperazine, senna-docusate **OR** senna-docusate, senna-docusate **OR** [DISCONTINUED] senna-docusate             Data:     Recent Labs   Lab 01/12/21  0616 01/11/21  0600 01/10/21  0517 01/08/21  0448 01/08/21  0448   WBC 17.6* 20.3* 23.4*   < > 37.0*   HGB 10.9* 10.6* 9.1*   < > 9.7*   MCV 94 92 96   < > 91    304 276   < > 248    138 145*   < > 143   POTASSIUM 4.4 4.3 4.1   < > 3.7   CHLORIDE 106 109 115*   < > 116*   CO2 25 23 26   < > 22   BUN 28 22 23   < > 28   CR 0.58 0.48* 0.57   < > 0.67   ANIONGAP 5 6 4   < > 5   LADI 8.6 8.4* 8.4*   < > 8.1*   * 193* 204*   < > 287*   ALBUMIN  --   --   --   --  2.6*   PROTTOTAL  --   --   --   --  5.2*   BILITOTAL  --   --   --   --  0.5   ALKPHOS  --   --   --    --  61   ALT  --   --   --   --  13   AST  --   --   --   --  13    < > = values in this interval not displayed.

## 2021-01-12 NOTE — PROGRESS NOTES
"ventric remains open at 10cm. Patient seen by multiple services today and worked with speech and PT. Patient later had coughing when eating lunch with RN assist (coughed with nectar thick liquids). Her SVT resolved around 11am- prior to that was going back and forth between that and SR. Son and  updated today by RN. Son would like an update from neurocrit tomorrow please. Son attempted to return dr degroot's call today as well, left a message with the office. Family unclear who is \"running the show\" between all of the MD groups.   "

## 2021-01-12 NOTE — PLAN OF CARE
End of Shift Nursing Summary    Neuro:  Patient following commands; neuros unchanged overnight.Vent opened at 10cm    Pain: occasional pain treated effectively with tylenol and oxycodone.  CV:  Goal systolic BP below 150. Labetalol and hydralazine were needed    Pulm: Breathing RA.  GI/: Matthews with good output. Nectar thick diet but remained NPO overnight. TF at goal 45ml/h  Skin: scattered bruising. Small amount of edema present on recent surgery incisions; no significant increase over night  Additional: patient in significant pain while in CT and on trip back. PRN pain medications given and tolerated well.

## 2021-01-12 NOTE — PROGRESS NOTES
"[facility]    Hospitalist Progress Note    Assessment & Plan   Jacque Bernstein is a 86 year old female with PMHx of hypertension, stage II CKD, GERD, hx of R breast cancer with resultant peripheral neuropathy dt chemotherapy, GERD, OA and frequent falls who was admitted from [facility] on 1/3/2021 for further evaluation after she was incidentally found to have 2 intracranial masses after a mechanical fall with findings concerning for metastatic breast cancer.     Intracranial masses x2, dt metastatic breast cancer, s/p craniotomy with resection of mets on 1/6/21  Cerebellar hematoma, s/p R frontal EVD placement 1/6/21  Presented to [facility] ED after a mechanical fall where she struck her head on the headboard of her bed resulting in a persistent headache. Had endorsed worsening ataxia over the past few months with frequent falls. Head CT in the ED showed 2 masses, larger mass was located in the cerebellum with mass effect and tonsillar herniation with associated mild hydrocephalus and 2cm parietal mass with vasogenic edema was also noted. Given hx of breast cancer, findings were concerning for metastatic disease. Was started on dexamethasone on admission. Seen by neurosurgery and taken to the OR on 1/6/21 for midline posterior fossa Stealth craniotomy and resection of brain metastasis. Pathology subsequently returned positive for \"metastatic carcinoma consistent with prior breast primary malignancy\". Postop, she developed a cerebellar hematoma requiring she go back to the OR (also on 1/6) for evacuation of hematoma and placement of a R frontal external ventricular drain. Remained extubated post-procedures and was requiring Precedex and phenylephrine gtts. These were weaned and patient was extubated on 1/8/21. Briefly needed nicardipine gtt from BP management but this was weaned on 1/9.    -- EVD mgmt per neurosurgery, repeat CT head with some minimal changes.  -- conts on dexamethasone " 4mg IV BID, management per neurosurgey  -- ongoing postop leukocytosis improving likely stress and steroid induced  -- goal SBP <150 , PRN hydralazine and labetalol available, will change metoprolol to coreg today for elevated BP this morning difficult to control with PRNs  -- PT/OT following  -- okay'd for DD2 w/nectar thick liquids per SLP on 1/9 but minimal oral intake thus far dt intermittent fatigue/lethargy she was started on trickle feeds via NGT which was increased yesterday. Continue to encourage PO intake as much as possible    Hx of right sided breast cancer, now with findings of metastatic disease as above  Peripheral neuropathy dt prior chemotherapy, manages with Tylenol  Clinical staging from 2017: Stage IIIA (T3, N2a, M0).  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    S/p mastectomy, radiation and chemo therapy.  Had been maintained on Arimidex 1mg daily. Follows with [facility] (Dr. Ho)  -- ongoing mgmt per oncology    Episodic Asymptomatic SVT  Had PACs and periods of ectopy on 1/9. New onset SVT on 1/10 AM. BPs stable and patient was asymptomatic.   Given 500ml LR bolus, calcium gluconate, IV Lopressor and AM oral metoprolol early.  Lytes (Mag, K) stable, TSH low but FT4 nl, no hx of thyroid disease and hard to interpret in setting of acute illness.   Recent echo on 1/7 showed EF 65-70% with indeterminate diastolic dysfunction and normal wall motion, mild pulmonary hypertension, mild aortic stenosis.  -- cardiology consulted and favors amiodarone right now with metoprolol, no AC due to above  -- SVT episodes improved on above regime, will change metoprolol to coreg for better BP control  -- cont telemetry    Hypertension  Home meds: lisinopril 20mg po daily  Elevated BPs this stay likely dt surgery, steroids. Briefly required nicardipine gtt on 1/8 PM but was weaned off after a few hours  Goal SBP <150 per neuro  -change metoprolol to coreg and likely add back on lisinopril for  difficult to control pressures (previously dced when in SVT)  -continue PRNSs    Stage II CKD  Baseline Cr is around 1.   Renal function remains stable thus far    Steroid-induced hyperglycemia  Noted this stay, given initiation of dexamethasone. A1C 5.6, no hx of DM.   Was maintained on insulin gtt in immediate postop period while intubated, transitioned to basal/bolus insulin regimen on 1/8. Had been getting TFs, okay'd for modified diet on 1/9.    Recent Labs   Lab 01/12/21  0817 01/12/21  0616 01/11/21  2007 01/11/21  1621 01/11/21  1252 01/11/21  0818 01/11/21  0600 01/11/21  0252 01/10/21  0517 01/10/21  0517 01/09/21  0541 01/09/21  0541 01/08/21  0448 01/08/21  0448 01/07/21  0355 01/07/21  0355   GLC  --  187*  --   --   --   --  193*  --   --  204*  --  188*  --  287*  --  276*   *  --  212* 242* 167* 178*  --  201*   < >  --    < >  --    < >  --    < >  --     < > = values in this interval not displayed.       -- continue current insulin today and suspect her BG to improve as we wean steroids    Uncomplicated UTI dt ESBL  UA in ED was grossly abnl. UC ultimately grew >100k ESBL ecoli. Had been placed on ceftriaxone initially, changed to meropenem on 1/5 after culture results known  -- conts on meropenem (d#7) -- anticipate 7d course of treatment should be sufficient  -- will discontinue meropenem today for her UTI    Acute Blood Loss Anemia  Secondary to surgery, cerebellar hematoma.   Hgb stable at 9-10      GERD  Chronic and stable on PPI    Hypernatremia, resolved  -now on TF with free water flushes, discontinue IVF     FEN: TFs per nutritionist  Tubes / lines: EVD, TF, figueroa  DVT Prophylaxis: PCDs  Code Status: Full Code    Disposition: Cont monitoring in ICU today given EVD. Discharge date unclear, pending postop course. Therapy recommending ARU stay.     Olivia Tineo    Interval History    Patient sleepy this morning having just received IV dilaudid. Comfortable. Nursing had just  performed assessment and patient moving extremities.     Spoke with neurosurgery and bedside nurse about the patient.    -Data reviewed today: I reviewed all new labs and imaging results over the last 24 hours. I personally reviewed CT imaging from this morning with minimal changes.    Physical Exam   Temp: 97.1  F (36.2  C) Temp src: Oral BP: (!) 154/81 Pulse: 97   Resp: 8 SpO2: 97 % O2 Device: None (Room air)    Vitals:    01/10/21 0000 01/11/21 0115 01/12/21 0024   Weight: 68.2 kg (150 lb 5.7 oz) 70.3 kg (154 lb 15.7 oz) 71.7 kg (158 lb 1.1 oz)     Vital Signs with Ranges  Temp:  [97  F (36.1  C)-97.6  F (36.4  C)] 97.1  F (36.2  C)  Pulse:  [] 97  Resp:  [8-24] 8  BP: (116-192)/() 154/81  SpO2:  [95 %-99 %] 97 %  I/O last 3 completed shifts:  In: 3829.25 [I.V.:1889.25; NG/GT:1145]  Out: 1224 [Urine:1170; Drains:54]    Constitutional: Resting comfortably, easily rouses to name, able to follow basic commands, NAD  Respiratory: CTAB, no wheeze/rales/rhonchi, no increased work of breathing  Cardiovascular: RRR no MGR, no LE edema  GI: S, NT, ND, +BS  Skin/Integumen: warm/dry, scattered ecchymoses, scalp incision c/d/i  Neuro: able to move all extremities on command, decreased gripe strength 4/5 bilaterally    Medications     dextrose       dilTIAZem HCl-Sodium Chloride Stopped (01/11/21 1218)       acetaminophen  975 mg Oral or Feeding Tube TID     amiodarone  400 mg Oral BID     amLODIPine  5 mg Oral Daily     dexamethasone  4 mg Intravenous Q12H     docusate  100 mg Oral or Feeding Tube BID     insulin aspart  1-10 Units Subcutaneous TID AC     insulin aspart  1-7 Units Subcutaneous At Bedtime     insulin glargine  15 Units Subcutaneous At Bedtime     insulin glargine  8 Units Subcutaneous QAM AC     multivitamins w/minerals  15 mL Per Feeding Tube Daily     nystatin  500,000 Units Swish & Spit 4x Daily     pantoprazole  40 mg Oral or Feeding Tube Daily     potassium & sodium phosphates  1 packet Oral  TID       Data   Recent Labs   Lab 01/12/21  0616 01/11/21  0600 01/10/21  0517 01/08/21  0448 01/08/21  0448   WBC 17.6* 20.3* 23.4*   < > 37.0*   HGB 10.9* 10.6* 9.1*   < > 9.7*   MCV 94 92 96   < > 91    304 276   < > 248    138 145*   < > 143   POTASSIUM 4.4 4.3 4.1   < > 3.7   CHLORIDE 106 109 115*   < > 116*   CO2 25 23 26   < > 22   BUN 28 22 23   < > 28   CR 0.58 0.48* 0.57   < > 0.67   ANIONGAP 5 6 4   < > 5   LADI 8.6 8.4* 8.4*   < > 8.1*   * 193* 204*   < > 287*   ALBUMIN  --   --   --   --  2.6*   PROTTOTAL  --   --   --   --  5.2*   BILITOTAL  --   --   --   --  0.5   ALKPHOS  --   --   --   --  61   ALT  --   --   --   --  13   AST  --   --   --   --  13    < > = values in this interval not displayed.       Recent Results (from the past 24 hour(s))   CT Head w/o Contrast    Narrative    EXAM: CT HEAD W/O CONTRAST  LOCATION: [facility]  DATE/TIME: 1/12/2021 4:37 AM    INDICATION: Status post craniotomy with EVD placement.    COMPARISON: 1/10/2021.    TECHNIQUE: Routine CT head without intravenous contrast. Multiplanar reformats. Dose reduction techniques were used.    FINDINGS:  INTRACRANIAL CONTENTS: Right frontal approach ventriculostomy catheter terminates in the right frontal horn. There has been decompression of the left lateral ventricle with slight expansion of the right lateral ventricle but no evidence for   hydrocephalus. Bilateral low-density subdural fluid collections are again noted. The low-density collection overlying the right cerebral hemisphere is slightly larger.. Stable postoperative changes to the posterior fossa with cerebellar and subarachnoid   hemorrhage. Left parietal mass with associated edema is unchanged. Left frontal metastatic lesion is again noted. Smaller metastasis in the right cerebral hemisphere is not well seen on this exam.     VISUALIZED ORBITS/SINUSES/MASTOIDS: No intraorbital abnormality. No paranasal sinus mucosal disease. No  middle ear or mastoid effusion.    BONES/SOFT TISSUES: No acute abnormality.      Impression    IMPRESSION:  1.  Slight decompression of the left lateral ventricle when compared to prior with slight increase in size of the right lateral ventricle. No evidence for hydrocephalus on this exam.    2.  Slight interval increase in size of the low-density subdural collection overlying the right cerebral hemisphere.    3.  Smaller low-density subdural collection overlying the left cerebral hemisphere is unchanged.    4.  Unchanged postoperative changes in the posterior fossa.

## 2021-01-12 NOTE — PROGRESS NOTES
Message left for son Vick to call me at .    Vick called back.  Spoke to him and updated him on his mother's diagnosis and prognosis.    Also spoke to patient's primary oncologist Dr.Puneet Ho.

## 2021-01-13 NOTE — PLAN OF CARE
SLP - RN was consulted this pm.  Given pt fatigue, plan to cancel swallow Tx.  Will follow 5x/week as indicated.

## 2021-01-13 NOTE — PLAN OF CARE
Garbled speech, sometimes hard to do fine motor  with the arms. Lethargic to awake. Follows commands with some prompting at times.  EVD at 15 cm, minimal drainage ICP 1 - 10, clear drainage. Await  CT head results. Phos ordered per protocol. SBP under control with added Oral meds.

## 2021-01-13 NOTE — PROGRESS NOTES
"[facility]    Hospitalist Progress Note    Assessment & Plan   Jacque Bernstein is a 86 year old female with PMHx of hypertension, stage II CKD, GERD, hx of R breast cancer with resultant peripheral neuropathy dt chemotherapy, GERD, OA and frequent falls who was admitted from [place] ED on 1/3/2021 for further evaluation after she was incidentally found to have 2 intracranial masses after a mechanical fall with findings concerning for metastatic breast cancer.     Intracranial masses x2, dt metastatic breast cancer, s/p craniotomy with resection of mets on 1/6/21  Cerebellar hematoma, s/p R frontal EVD placement 1/6/21  Presented to [facility] ED after a mechanical fall where she struck her head on the headboard of her bed resulting in a persistent headache. Had endorsed worsening ataxia over the past few months with frequent falls. Head CT in the ED showed 2 masses, larger mass was located in the cerebellum with mass effect and tonsillar herniation with associated mild hydrocephalus and 2cm parietal mass with vasogenic edema was also noted. Given hx of breast cancer, findings were concerning for metastatic disease. Was started on dexamethasone on admission. Seen by neurosurgery and taken to the OR on 1/6/21 for midline posterior fossa Stealth craniotomy and resection of brain metastasis. Pathology subsequently returned positive for \"metastatic carcinoma consistent with prior breast primary malignancy\". Postop, she developed a cerebellar hematoma requiring she go back to the OR (also on 1/6) for evacuation of hematoma and placement of a R frontal external ventricular drain. Remained extubated post-procedures and was requiring Precedex and phenylephrine gtts. These were weaned and patient was extubated on 1/8/21. Briefly needed nicardipine gtt from BP management but this was weaned on 1/9.    -- EVD mgmt per neurosurgery, repeat CT head with some minimal changes, another scheduled for later " today.  -- conts on dexamethasone 4mg IV BID, management per neurosurgey  -- goal SBP <160 , PRN hydralazine and labetalol available, adding back on oral BP to assist with control  -- PT/OT following  -- reduced to DD1 w/honey thick liquids per SLP on 1/12 due to increasing lethargy. SLP continues to follow and encourage PO intake.   -- TF per nutrition at goal rate    Hx of right sided breast cancer, now with findings of metastatic disease as above  Peripheral neuropathy dt prior chemotherapy, manages with Tylenol  Clinical staging from 2017: Stage IIIA (T3, N2a, M0).  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    S/p mastectomy, radiation and chemo therapy.  Had been maintained on Arimidex 1mg daily. Follows with [facility] (Dr. Ho)  -- Dr Cazares spoke with her primary oncology yesterday and updated son on overall care. Family would like patient to get out this acute incident and focus on getting to meet with her outpatient oncology to discuss overall goals.    Episodic Asymptomatic SVT  Had PACs and periods of ectopy on 1/9. New onset SVT on 1/10 AM. BPs stable and patient was asymptomatic.   Given 500ml LR bolus, calcium gluconate, IV Lopressor and AM oral metoprolol early.  Lytes (Mag, K) stable, TSH low but FT4 nl, no hx of thyroid disease and hard to interpret in setting of acute illness.   Recent echo on 1/7 showed EF 65-70% with indeterminate diastolic dysfunction and normal wall motion, mild pulmonary hypertension, mild aortic stenosis.  -- cardiology following and started on amiodarone with BB, no AC due to above    Hypertension  Home meds: lisinopril 20mg po daily  Elevated BPs this stay likely dt surgery, steroids. Briefly required nicardipine gtt on 1/8 PM but was weaned off after a few hours  Goal SBP <150 per neuro  -change metoprolol to coreg and likely add back on lisinopril for difficult to control pressures (previously dced when in SVT), increased coreg 01/13  -continue  PRNSs    Stage II CKD  Baseline Cr is around 1.   Renal function remains stable thus far    Steroid-induced hyperglycemia  Noted this stay, given initiation of dexamethasone. A1C 5.6, no hx of DM.   Was maintained on insulin gtt in immediate postop period while intubated, transitioned to basal/bolus insulin regimen on 1/8. Had been getting TFs, okay'd for modified diet on 1/9.    Recent Labs   Lab 01/13/21  0745 01/12/21  2356 01/12/21  2104 01/12/21  1631 01/12/21  1201 01/12/21  0817 01/12/21  0616 01/11/21 2007 01/11/21  1621 01/11/21  0600 01/11/21  0600 01/10/21  0517 01/10/21  0517 01/09/21  0541 01/09/21  0541   GLC  --  191*  --  207*  --   --  187*  --   --   --  193*  --  204*  --  188*   *  --  127*  --  196* 182*  --  212* 242*   < >  --    < >  --    < >  --     < > = values in this interval not displayed.   -- increasing nightly glargine as TF rate has increased, continue AM glargine and SS    Uncomplicated UTI dt ESBL  UA in ED was grossly abnl. UC ultimately grew >100k ESBL ecoli. Had been placed on ceftriaxone initially, changed to meropenem on 1/5 after culture results known  -- completed 7 day course with refugio 01/12    Acute Blood Loss Anemia  Secondary to surgery, cerebellar hematoma.   Hgb stable at 9-10  -cbc pending today      GERD  Chronic and stable on PPI    Hypernatremia, resolved     FEN: TFs per nutritionist  Tubes / lines: EVD, TF, figueroa  DVT Prophylaxis: PCDs  Code Status: Full Code    Disposition: Cont monitoring in ICU today given EVD. Discharge date unclear, pending postop course. Therapy recommending ARU stay.     Olivia Tineo    Interval History    Patient more awake today than previously. Garbled speech but able to understand. AOx3 Denies pain. Updated on what brought her to the hospital. She denies issues breathing and appears comfortable. Moving all extremities with weakness in upper extremity.    Spoke with neurosurgery and bedside nurse about the patient.    -Data  reviewed today: I reviewed all new labs and imaging results over the last 24 hours.     Physical Exam   Temp: 97.9  F (36.6  C) Temp src: Oral BP: (!) 143/63 Pulse: 84   Resp: 9 SpO2: 97 % O2 Device: None (Room air)    Vitals:    01/11/21 0115 01/12/21 0024 01/13/21 0600   Weight: 70.3 kg (154 lb 15.7 oz) 71.7 kg (158 lb 1.1 oz) 74.7 kg (164 lb 10.9 oz)     Vital Signs with Ranges  Temp:  [96.9  F (36.1  C)-99.1  F (37.3  C)] 97.9  F (36.6  C)  Pulse:  [65-98] 84  Resp:  [7-13] 9  BP: (131-196)/() 143/63  SpO2:  [96 %-98 %] 97 %  I/O last 3 completed shifts:  In: 1800 [NG/GT:720]  Out: 1414 [Urine:1375; Drains:39]    Constitutional: Resting comfortably, easily rouses to name, able to follow basic commands, NAD  Respiratory: crackles in bases bilaterally,  no increased work of breathing  Cardiovascular: RRR no MGR, trace edema throughout upper and lower extremities  GI: S, NT, ND, +BS  Skin/Integumen: warm/dry, scattered ecchymoses, scalp incision c/d/i  Neuro: able to move all extremities on command, decreased gripe strength 4/5 bilaterally    Medications     dextrose       dilTIAZem HCl-Sodium Chloride Stopped (01/11/21 1218)       acetaminophen  975 mg Oral or Feeding Tube TID     amiodarone  400 mg Oral BID     carvedilol  12.5 mg Oral BID w/meals     dexamethasone  4 mg Intravenous Q12H     docusate  100 mg Oral or Feeding Tube BID     insulin aspart  1-10 Units Subcutaneous TID AC     insulin aspart  1-7 Units Subcutaneous At Bedtime     insulin glargine  15 Units Subcutaneous At Bedtime     insulin glargine  8 Units Subcutaneous QAM AC     lisinopril  10 mg Oral Daily     multivitamins w/minerals  15 mL Per Feeding Tube Daily     nystatin  500,000 Units Swish & Spit 4x Daily     pantoprazole  40 mg Oral or Feeding Tube Daily     sodium chloride (PF)  3 mL Intracatheter Q8H       Data   Recent Labs   Lab 01/12/21  2356 01/12/21  1631 01/12/21  0616 01/11/21  0600 01/10/21  0517 01/08/21  0448  01/08/21  0448   WBC  --   --  17.6* 20.3* 23.4*   < > 37.0*   HGB  --   --  10.9* 10.6* 9.1*   < > 9.7*   MCV  --   --  94 92 96   < > 91   PLT  --   --  367 304 276   < > 248    138 136 138 145*   < > 143   POTASSIUM 5.1 4.8 4.4 4.3 4.1   < > 3.7   CHLORIDE 106 106 106 109 115*   < > 116*   CO2 25 25 25 23 26   < > 22   BUN 33* 28 28 22 23   < > 28   CR 0.56 0.57 0.58 0.48* 0.57   < > 0.67   ANIONGAP 3 7 5 6 4   < > 5   LADI 8.6 8.4* 8.6 8.4* 8.4*   < > 8.1*   * 207* 187* 193* 204*   < > 287*   ALBUMIN  --   --   --   --   --   --  2.6*   PROTTOTAL  --   --   --   --   --   --  5.2*   BILITOTAL  --   --   --   --   --   --  0.5   ALKPHOS  --   --   --   --   --   --  61   ALT  --   --   --   --   --   --  13   AST  --   --   --   --   --   --  13    < > = values in this interval not displayed.       No results found for this or any previous visit (from the past 24 hour(s)).

## 2021-01-13 NOTE — PLAN OF CARE
Lethargic, arouses to voice. Speech garbled and slow. Disoriented to time and situation. Follows commands. Pupils equal and reactive. C/o headache Dilaudid and oxycodone effective. EVD at 15 cm, CSF 0-4ml an hour. Labetalol and hydralazine given x1 to maintain SBP < 160. Other VSS on RA. TF infusing at goal rate or 45ml/hr. Matthews patent with adequate output.

## 2021-01-13 NOTE — PROGRESS NOTES
Neurosurgery progress note    Postoperative day 7 status post midline posterior fossa craniotomy with resection of brain metastasis, final pathology consistent with metastatic breast cancer  Postoperative day 7 status post right frontal EVD placement, midline posterior fossa craniotomy and evacuation of hematoma    Patient states she feels a little bit better today, RN reports speech is still garbled at times.  EVD set to 15, CSF draining 0 to 4 mL an hour.  Patient denies headache    Exam    Alert  Follows commands  Ataxia in bilateral upper extremities  Left upper extremity is weaker than right  Moving both lower extremities and feet  EVD incision and insertion site clean dry and intact  Posterior midline incision site clean dry and intact    Plan    Decadron 4 mg twice daily  Continue EVD at 15  Continue therapies  Head CT this afternoon     Addendum:    Head CT findings are stable when compared to prior     1. Stable hypodense subdural collections along the cerebral  convexities bilaterally.  2. Stable minimal extra-axial hemorrhage along both leaves of the  tentorium and within the quadrigeminal plate cistern again noted. No  evidence for new or increasing hemorrhage.  3. Midline occipital craniotomy again noted.  4. Diffuse cerebral volume loss and cerebral white matter changes  consistent with chronic small vessel ischemic disease.

## 2021-01-14 NOTE — PLAN OF CARE
More Sleepy this afternoon.  ICP now 13 , clamped , CTM.  Garbled speech, up to chair x1.  Matthews out at 1100, not voided yet.  Afebrile, using IS with a lot of prompting.

## 2021-01-14 NOTE — PROGRESS NOTES
"[facility]    Neurosurgery Progress Note    Date of Service (when I saw the patient): 01/14/2021     Assessment & Plan   Jacque Bernstein is a 86 year old female who was admitted on 1/3/2021. She is s/p midline posterior fossa craniotomy and resection of brain metastasis with return to OR for evacuation of hematoma and placement of EVD. Today she was seen sitting up in the chair. She is alert and follows commands with slight LUE weakness. EVD at 15 with 0-5 out per hour.    Principal Problem:    Brain metastasis (H)    Assessment: s/p posterior fossa craniotomy and EVD placement    Plan:   -Clamp EVD today, no need for antibiotic  -BP <160  -Decadron 4mg BID  -Continue PT/OT  -Repeat head CT tomorrow    I have discussed the following assessment and plan Dr. Rojas who is in agreement with initial plan and will follow up with further consultation recommendations.    Trixie Dawson CNP  [facility] Neurosurgery  [facility]  [address]  [address] [place] [id number]    Tel [phone]  Pager [phone]      Interval History   Stable.    Physical Exam   Temp: 97.2  F (36.2  C) Temp src: Axillary BP: (!) 151/56 Pulse: 82   Resp: 11 SpO2: 97 % O2 Device: None (Room air)    Vitals:    01/12/21 0024 01/13/21 0600 01/14/21 0600   Weight: 71.7 kg (158 lb 1.1 oz) 74.7 kg (164 lb 10.9 oz) 73.1 kg (161 lb 2.5 oz)     Vital Signs with Ranges  Temp:  [97.2  F (36.2  C)-98.7  F (37.1  C)] 97.2  F (36.2  C)  Pulse:  [56-86] 82  Resp:  [7-24] 11  BP: (127-179)/(55-82) 151/56  SpO2:  [95 %-98 %] 97 %  I/O last 3 completed shifts:  In: 1590 [NG/GT:600]  Out: 1488 [Urine:1435; Drains:53]     , Blood pressure (!) 151/56, pulse 82, temperature 97.2  F (36.2  C), temperature source Axillary, resp. rate 11, height 1.651 m (5' 5\"), weight 73.1 kg (161 lb 2.5 oz), SpO2 97 %.  161 lbs 2.5 oz  HEENT:  Normocephalic.  PERRLA.  EOM s intact.    Heart:  No peripheral edema  Lungs:  " No SOB  Skin:  Warm and dry, good capillary refill. Incision and EVD site intact.  Extremities:  Good radial and dorsalis pedis pulses bilaterally, no edema, cyanosis or clubbing.    NEUROLOGICAL EXAMINATION:   Mental status:  Alert and follows commands.  Cranial nerves:  II-XII intact.   Motor:  NAGEL      Medications     dextrose       dilTIAZem HCl-Sodium Chloride Stopped (01/11/21 1218)       acetaminophen  975 mg Oral or Feeding Tube TID     amiodarone  400 mg Oral BID     carvedilol  12.5 mg Oral BID w/meals     dexamethasone  4 mg Intravenous Q12H     docusate  100 mg Oral or Feeding Tube BID     insulin aspart  1-10 Units Subcutaneous TID AC     insulin aspart  1-7 Units Subcutaneous At Bedtime     insulin glargine  20 Units Subcutaneous At Bedtime     insulin glargine  8 Units Subcutaneous QAM AC     lisinopril  10 mg Oral Daily     multivitamins w/minerals  15 mL Per Feeding Tube Daily     nystatin  500,000 Units Swish & Spit 4x Daily     pantoprazole  40 mg Oral or Feeding Tube Daily     sodium chloride (PF)  3 mL Intracatheter Q8H       Data     CBC RESULTS:   Recent Labs   Lab Test 01/11/21  0600   WBC 20.3*   RBC 3.58*   HGB 10.6*   HCT 33.0*   MCV 92   MCH 29.6   MCHC 32.1   RDW 16.9*        Basic Metabolic Panel:  Lab Results   Component Value Date     01/11/2021      Lab Results   Component Value Date    POTASSIUM 4.3 01/11/2021     Lab Results   Component Value Date    CHLORIDE 109 01/11/2021     Lab Results   Component Value Date    LADI 8.4 01/11/2021     Lab Results   Component Value Date    CO2 23 01/11/2021     Lab Results   Component Value Date    BUN 22 01/11/2021     Lab Results   Component Value Date    CR 0.48 01/11/2021     Lab Results   Component Value Date     01/11/2021     INR:  Lab Results   Component Value Date    INR 1.06 01/04/2021

## 2021-01-14 NOTE — PROGRESS NOTES
CLINICAL NUTRITION SERVICES - REASSESSMENT NOTE      Recommendations Ordered by Registered Dietitian (RD):   Multivitamin/mineral supplement therapy - Cancel Certavite as no longer needed (TF at goal)   Future/Additional Recommendations:   When intake becomes more appreciable, could consider nocturnal TF to stimulate appetite and intake during the day.   Malnutrition:  (1/11)  % Weight Loss:  None noted  % Intake:  </= 50% for >/= 5 days (severe malnutrition) -- EN reliant, below goal rate  Subcutaneous Fat Loss:  None observed  Muscle Loss:  None observed  Fluid Retention:  Mild - likely not nutritional      Malnutrition Diagnosis: Patient does not meet two of the above criteria necessary for diagnosing malnutrition at this time        EVALUATION OF PROGRESS TOWARD GOALS   Diet:  DD1 with honey thick liquids + Magic cup with meals    Nutrition Support:  TF was increased on 1/11 from 15 mL/hr --> 30 mL/hr and then after 8 hrs increased to goal 45 mL/hr as below:    Nutrition Support Enteral:  Type of Feeding Tube:  Nasoduodenal  Enteral Frequency:  Continuous  Enteral Regimen:  Isosource 1.5 at 45 mL/hr  Total Enteral Provisions: 1620 kcal (27 kcal/kg), 73 g protein (1.2 g/kg), 190 g CHO, 16 g fiber, 821 mL H2O  Free Water Flush:  60 mL every 4 hrs  Certavite daily    Intake/Tolerance:    Last recorded stool 1/12 (Colace for bowel program).  Labs noted and acceptable.  BGM:  177, 201, 175, 196, 163, 254 - Pt on High Resistant sliding scale insulin + Lantus 8 Units in am and 20 Units at HS  I/O:  1590/1488.  Wt:  73.1 kg (up 0.5 kg since admit).  Pt with 2+ mild generalized edema.    ASSESSED NUTRITION NEEDS:  Dosing Weight 60.8 kg (adjusted for overweight)  Estimated Energy Needs: 2065-5640 kcals (25-30 Kcal/Kg)  Justification: overweight  Estimated Protein Needs: 70-90 grams protein (1.2-1.5 g pro/Kg)  Justification: hypercatabolism with acute illness    NEW FINDINGS:   1/14:  SLP = Patient tolerating DDL 1 with  honey thick liquids via the spoon and with swallow strategies. Improved timing of her swallow response today. Continue on the DDL 1 with honey thick liquids. Does fatique quickly so stop feeding if fatigue is noted. Upright, slow pace and alternate liquids/solids    Previous Goals (1/11):   EN to meet % estimated needs in <24 hrs while PO intake minimal   Evaluation: Met    Previous Nutrition Diagnosis (1/11):   Inadequate protein-energy intake related to slow diet advancements 2/2 dysphagia, required TF for nutrition as evidenced by limited PO consumed thus far and TF continues at trickle rate meeting <25% estimated needs   Evaluation:  Resolved    CURRENT NUTRITION DIAGNOSIS  Inadequate oral intake related to dysphagia and lethargy as evidenced by poor oral intake and continued TF reliance    INTERVENTIONS  Recommendations / Nutrition Prescription  Continue same TF and supplements as above.    When intake becomes more appreciable, could consider nocturnal TF to stimulate appetite and intake during the day.    Implementation  Collaboration and Referral of Nutrition care - Pt was discussed during ICU interdisciplinary rounds this morning  Multivitamin/mineral supplement therapy - Cancel Certavite as no longer needed (TF at goal)    Goals  TF will continue to meet % estimated needs while poor oral intake    MONITORING AND EVALUATION:  Progress towards goals will be monitored and evaluated per protocol and Practice Guidelines    Debby Tapia, RD, LD, CNSC

## 2021-01-14 NOTE — PROGRESS NOTES
Pt's son, Vick, called and I provided a pt update and answered questions (based off the report I received from the handoff RN as this was change of shift). Phone held up to pt's ear for a few minutes and Vick spoke to pt as well.

## 2021-01-14 NOTE — PLAN OF CARE
[facility]   Intensive Care Unit   Nursing Note    Vital signs stable during the evening, but a bit elevated so PRN BP meds given.  PERRL.  Moves all extremities.  Follows commands. Speech garbled, new baseline. Pt on room air. Matthews output good.  Labs noted.  Continue to monitor closely.      ROUTINE IP LABS (Last four results)  BMP  Recent Labs   Lab 01/13/21  0921 01/12/21  2356 01/12/21  1631 01/12/21  0616    134 138 136   POTASSIUM 5.2 5.1 4.8 4.4   CHLORIDE 105 106 106 106   LADI 8.4* 8.6 8.4* 8.6   CO2 26 25 25 25   BUN 32* 33* 28 28   CR 0.56 0.56 0.57 0.58   * 191* 207* 187*     CBC  Recent Labs   Lab 01/13/21  0921 01/12/21  0616 01/11/21  0600 01/10/21  0517   WBC 17.5* 17.6* 20.3* 23.4*   RBC 3.76* 3.72* 3.58* 3.12*   HGB 11.0* 10.9* 10.6* 9.1*   HCT 34.6* 35.1 33.0* 29.8*   MCV 92 94 92 96   MCH 29.3 29.3 29.6 29.2   MCHC 31.8 31.1* 32.1 30.5*   RDW 16.2* 16.8* 16.9* 18.1*    367 304 276       Damian Grubbs RN

## 2021-01-14 NOTE — PLAN OF CARE
Lethargic, arouses to voice. Restless/moaning at times. Speech garbled and slow. Disoriented to time and occasionally situation. Follows commands. Pupils equal and reactive. Oxycodone and Dilaudid for pain. EVD at 15 cm, CSF 0-5ml an hour. Hydralazine given x1 to maintain SBP < 160. Other VSS on RA. TF infusing at goal rate or 45ml/hr. Matthews patent with adequate output.

## 2021-01-14 NOTE — PROGRESS NOTES
"[facility]    Hospitalist Progress Note    Assessment & Plan   Jacque Bernstein is a 86 year old female with PMHx of hypertension, stage II CKD, GERD, hx of R breast cancer with resultant peripheral neuropathy dt chemotherapy, GERD, OA and frequent falls who was admitted from [facility] ED on 1/3/2021 for further evaluation after she was incidentally found to have 2 intracranial masses after a mechanical fall with findings concerning for metastatic breast cancer.     Intracranial masses x2, dt metastatic breast cancer, s/p craniotomy with resection of mets on 1/6/21  Cerebellar hematoma, s/p R frontal EVD placement 1/6/21  Presented to [facility] ED after a mechanical fall where she struck her head on the headboard of her bed resulting in a persistent headache. Had endorsed worsening ataxia over the past few months with frequent falls. Head CT in the ED showed 2 masses, larger mass was located in the cerebellum with mass effect and tonsillar herniation with associated mild hydrocephalus and 2cm parietal mass with vasogenic edema was also noted. Given hx of breast cancer, findings were concerning for metastatic disease. Was started on dexamethasone on admission. Seen by neurosurgery and taken to the OR on 1/6/21 for midline posterior fossa Stealth craniotomy and resection of brain metastasis. Pathology subsequently returned positive for \"metastatic carcinoma consistent with prior breast primary malignancy\". Postop, she developed a cerebellar hematoma requiring she go back to the OR (also on 1/6) for evacuation of hematoma and placement of a R frontal external ventricular drain. Remained extubated post-procedures and was requiring Precedex and phenylephrine gtts. These were weaned and patient was extubated on 1/8/21. Briefly needed nicardipine gtt from BP management but this was weaned on 1/9.    -- EVD mgmt per neurosurgery, planning to clamp today.  -- conts on dexamethasone 4mg IV BID, " management per neurosurgey  -- goal SBP <160 , PRN hydralazine and labetalol available, BP improving  -- PT/OT following  -- reduced to DD1 w/honey thick liquids per SLP on 1/12 due to increasing lethargy. SLP continues to follow and encourage PO intake. May need to discuss PEG tube placement if no improvement.  -- TF per nutrition at goal rate    Hx of right sided breast cancer, now with findings of metastatic disease as above  Peripheral neuropathy dt prior chemotherapy, manages with Tylenol  Clinical staging from 2017: Stage IIIA (T3, N2a, M0).  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    S/p mastectomy, radiation and chemo therapy.  Had been maintained on Arimidex 1mg daily. Follows with [facility] (Dr. Ho)  -- Dr Cazares spoke with her primary oncology 01/12 and updated son on overall care. Family would like patient to get out this acute incident and focus on getting to meet with her outpatient oncology to discuss overall goals  - if patient does not progress much further may need to revisit discussion above    Episodic Asymptomatic SVT  Had PACs and periods of ectopy on 1/9. New onset SVT on 1/10 AM. BPs stable and patient was asymptomatic.   Given 500ml LR bolus, calcium gluconate, IV Lopressor and AM oral metoprolol early.  Lytes (Mag, K) stable, TSH low but FT4 nl, no hx of thyroid disease and hard to interpret in setting of acute illness.   Recent echo on 1/7 showed EF 65-70% with indeterminate diastolic dysfunction and normal wall motion, mild pulmonary hypertension, mild aortic stenosis.  -- cardiology started on amiodarone with BB, no AC due to above  -- HR stable since starting above    Hypertension  Home meds: lisinopril 20mg po daily  Elevated BPs this stay likely dt surgery, steroids. Briefly required nicardipine gtt on 1/8 PM but was weaned off after a few hours  Goal SBP <150 per neuro  -change metoprolol to coreg and add back on lisinopril for difficult to control pressures,  increased coreg 01/13  -BP improving on current regime  -continue PRNSs    Stage II CKD  Baseline Cr is around 1.   Renal function remains stable thus far    Steroid-induced hyperglycemia  Noted this stay, given initiation of dexamethasone. A1C 5.6, no hx of DM.   Was maintained on insulin gtt in immediate postop period while intubated, transitioned to basal/bolus insulin regimen on 1/8. Had been getting TFs, okay'd for modified diet on 1/9.    Recent Labs   Lab 01/14/21  0809 01/14/21  0739 01/14/21  0036 01/13/21  2136 01/13/21  1926 01/13/21  1720 01/13/21  1204 01/13/21  0921 01/13/21  0745 01/12/21  2356 01/12/21  2104 01/12/21  1631   *  --  212*  --  196*  --   --  177*  --  191*  --  207*   BGM  --  254*  --  163*  --  175* 201*  --  253*  --  127*  --    -- continue BID glargine  -- will increase sliding scale insulin for BG that remains elevated    Uncomplicated UTI dt ESBL  UA in ED was grossly abnl. UC ultimately grew >100k ESBL ecoli. Had been placed on ceftriaxone initially, changed to meropenem on 1/5 after culture results known  -- completed 7 day course with refugio 01/12    Acute Blood Loss Anemia  Secondary to surgery, cerebellar hematoma.   Hgb stable at 9-10      GERD  Chronic and stable on PPI    Hypernatremia, resolved     FEN: TFs per nutritionist  Tubes / lines: EVD, TF, figueroa  DVT Prophylaxis: PCDs  Code Status: Full Code    Disposition: Cont monitoring in ICU given EVD. Discharge date unclear, pending postop course. Therapy recommending ARU stay.     Olivia Tineo    Interval History    Patient remains awake and alert but very lethargic with garbled speech. Able to follow commands answer questions. No major pain. No change in breathing. No GI issues however no BM in several days.    Called and left  for son.    -Data reviewed today: I reviewed all new labs and imaging results over the last 24 hours. I personally reviewed CT head from yesterday.     Physical Exam   Temp: 97.2  F  (36.2  C) Temp src: Axillary BP: (!) 151/56 Pulse: 82   Resp: 11 SpO2: 97 % O2 Device: None (Room air)    Vitals:    01/12/21 0024 01/13/21 0600 01/14/21 0600   Weight: 71.7 kg (158 lb 1.1 oz) 74.7 kg (164 lb 10.9 oz) 73.1 kg (161 lb 2.5 oz)     Vital Signs with Ranges  Temp:  [97.2  F (36.2  C)-98.7  F (37.1  C)] 97.2  F (36.2  C)  Pulse:  [56-86] 82  Resp:  [7-24] 11  BP: (127-179)/(55-82) 151/56  SpO2:  [95 %-98 %] 97 %  I/O last 3 completed shifts:  In: 1590 [NG/GT:600]  Out: 1488 [Urine:1435; Drains:53]    Constitutional: Resting comfortably, easily rouses to name, able to follow basic commands, NAD  Respiratory: no crackles, no increased work of breathing  Cardiovascular: RRR no MGR, trace edema throughout upper and lower extremities  GI: S, NT, ND, +BS  Skin/Integumen: warm/dry, scattered ecchymoses, scalp incision c/d/i  Neuro: able to move all extremities on command, EOMI    Medications     dextrose       dilTIAZem HCl-Sodium Chloride Stopped (01/11/21 1218)       acetaminophen  975 mg Oral or Feeding Tube TID     amiodarone  400 mg Oral BID     carvedilol  12.5 mg Oral BID w/meals     dexamethasone  4 mg Intravenous Q12H     docusate  100 mg Oral or Feeding Tube BID     insulin aspart  1-10 Units Subcutaneous TID AC     insulin aspart  1-7 Units Subcutaneous At Bedtime     insulin glargine  20 Units Subcutaneous At Bedtime     insulin glargine  8 Units Subcutaneous QAM AC     lisinopril  10 mg Oral Daily     multivitamins w/minerals  15 mL Per Feeding Tube Daily     nystatin  500,000 Units Swish & Spit 4x Daily     pantoprazole  40 mg Oral or Feeding Tube Daily     sodium chloride (PF)  3 mL Intracatheter Q8H       Data   Recent Labs   Lab 01/14/21  0809 01/14/21  0036 01/13/21  1926 01/13/21 0921 01/12/21 0616 01/12/21 0616 01/11/21  0600 01/08/21 0448 01/08/21 0448   WBC  --   --   --  17.5*  --  17.6* 20.3*   < > 37.0*   HGB  --   --   --  11.0*  --  10.9* 10.6*   < > 9.7*   MCV  --   --   --  92   --  94 92   < > 91   PLT  --   --   --  384  --  367 304   < > 248    133 135 136   < > 136 138   < > 143   POTASSIUM 5.0 5.0 4.8 5.2   < > 4.4 4.3   < > 3.7   CHLORIDE 103 102 103 105   < > 106 109   < > 116*   CO2 28 27 28 26   < > 25 23   < > 22   BUN 35* 36* 36* 32*   < > 28 22   < > 28   CR 0.58 0.64 0.65 0.56   < > 0.58 0.48*   < > 0.67   ANIONGAP 3 4 4 5   < > 5 6   < > 5   LADI 8.3* 8.2* 8.1* 8.4*   < > 8.6 8.4*   < > 8.1*   * 212* 196* 177*   < > 187* 193*   < > 287*   ALBUMIN  --   --   --   --   --   --   --   --  2.6*   PROTTOTAL  --   --   --   --   --   --   --   --  5.2*   BILITOTAL  --   --   --   --   --   --   --   --  0.5   ALKPHOS  --   --   --   --   --   --   --   --  61   ALT  --   --   --   --   --   --   --   --  13   AST  --   --   --   --   --   --   --   --  13    < > = values in this interval not displayed.       Recent Results (from the past 24 hour(s))   CT Head w/o Contrast    Narrative    CT OF THE HEAD WITHOUT CONTRAST 1/13/2021 2:05 PM     COMPARISON: Head CT 1/12/2021.    HISTORY: Follow up CT.    TECHNIQUE: 5 mm thick axial CT images of the head were acquired  without IV contrast material.    FINDINGS: Hypodense subdural collections along the cerebral  convexities bilaterally again noted. Hyperdense thin subdural  hemorrhage along both leaves of the tentorium again noted without  change. Small amount of hyperdense hemorrhage in the quadrigeminal  plate cistern again noted. Vasogenic edema in the posterior aspect of  the left cerebral hemisphere again noted likely related to the  intra-axial enhancing mass at the medial aspect of the left parietal  lobe noted on the comparison MRI from 1/4/2021.    There is moderate diffuse cerebral volume loss. There are extensive  confluent areas of decreased density in the cerebral white matter  bilaterally that are consistent with sequela of chronic small vessel  ischemic. The ventricles and basal cisterns are within normal  limits  in configuration given the degree of cerebral volume loss.  A  ventriculostomy catheter placed through a right frontal approach with  its tip in the right lateral ventricle again noted. There is no  midline shift.    No evidence for recent ischemic infarct.    The visualized paranasal sinuses are well-aerated. There is no  mastoiditis. There are no fractures of the visualized bones. Midline  occipital craniotomy again noted.      Impression    IMPRESSION:   1. Stable hypodense subdural collections along the cerebral  convexities bilaterally.  2. Stable minimal extra-axial hemorrhage along both leaves of the  tentorium and within the quadrigeminal plate cistern again noted. No  evidence for new or increasing hemorrhage.  3. Midline occipital craniotomy again noted.  4. Diffuse cerebral volume loss and cerebral white matter changes  consistent with chronic small vessel ischemic disease.      Radiation dose for this scan was reduced using automated exposure  control, adjustment of the mA and/or kV according to patient size, or  iterative reconstruction technique    CARLENE WADE MD

## 2021-01-15 PROBLEM — S06.5XAA SUBDURAL HEMATOMA (H): Status: ACTIVE | Noted: 2021-01-01

## 2021-01-15 NOTE — PLAN OF CARE
Noted patient is NPO for pending possible bur hole drainage this afternoon.  Will follow when appropriate for PO trials.  I can be reached via Match[employer] until 1500 if this plan changes and patient can have PO today.

## 2021-01-15 NOTE — PLAN OF CARE
Shift Summary    Neuro: Alert to self. Moves all extremities. Garbled speech. Inconsistently following commands. EVD clamped overnight. ICP consistently 12-17 overnight  Cardiac: SB. Hydralazine to maintain BP <160.   Pulmonary: RA.   GI/: Purewick in place. No BM. TF at goal.  Skin: Incision CDI.   Pain: Dilaudid, oxy, and tylenol for pain.

## 2021-01-15 NOTE — PROGRESS NOTES
Spoke to patient's son about proposed left bur hole for the subdural collection.    Overall I think that it might actually provide some benefit to her overall neurologic status and help improve her recovery, but but we discussed that there certainly is no certainty around this.    We talked about options including surgery, a wait-and-see approach, or perhaps a more palliative direction.  At this point he is going to talk to his family before we make a final decision for surgery and will contact me as soon as he is done so.  Tentatively we are on the schedule for left bur hole drainage later this afternoon.

## 2021-01-15 NOTE — PROGRESS NOTES
SPIRITUAL HEALTH SERVICES  SPIRITUAL ASSESSMENT Progress Note  [facility] ICU     REFERRAL SOURCE: Lengthy Hospital Stay    I shared a brief visit with patient Lauren today, [facility] Intern Lady was also present for the visit.    I introduced myself and role. Lauren was unable to communicate much. She shares she is doing okay. I shared words of encouragement, support and peace to her. She nodded that she received this.     PLAN: I will plan to check in with family next week. [facility] remains available.     Marisela Peralta  Associate    Phone: [phone]  Pager: [phone]

## 2021-01-15 NOTE — PROVIDER NOTIFICATION
"Txt-pg x-coverage for neurosurgery: \"346, Nor, Cla: Notifying that ICPs maintaining 12-17 over last 2 hrs. Prior range 2-11. Pt did have headache + pain, but resolved. Ventric clamped since 11am. [facility] [id number]. LORI Kunz.\"    Call back:  - No new orders at this time, however, if ICP sustains 20 or greater, then notify neuro surgery  "

## 2021-01-15 NOTE — PROGRESS NOTES
"[facility]    Hospitalist Progress Note    Assessment & Plan   Jacque Bernstein is a 86 year old female with PMHx of hypertension, stage II CKD, GERD, hx of R breast cancer with resultant peripheral neuropathy dt chemotherapy, GERD, OA and frequent falls who was admitted from [place] ED on 1/3/2021 for further evaluation after she was incidentally found to have 2 intracranial masses after a mechanical fall with findings concerning for metastatic breast cancer.     Intracranial masses x2, dt metastatic breast cancer, s/p craniotomy with resection of mets on 1/6/21  Cerebellar hematoma, s/p R frontal EVD placement 1/6/21  Presented to [facility] ED after a mechanical fall where she struck her head on the headboard of her bed resulting in a persistent headache. Had endorsed worsening ataxia over the past few months with frequent falls. Head CT in the ED showed 2 masses, larger mass was located in the cerebellum with mass effect and tonsillar herniation with associated mild hydrocephalus and 2cm parietal mass with vasogenic edema was also noted. Given hx of breast cancer, findings were concerning for metastatic disease. Was started on dexamethasone on admission. Seen by neurosurgery and taken to the OR on 1/6/21 for midline posterior fossa Stealth craniotomy and resection of brain metastasis. Pathology subsequently returned positive for \"metastatic carcinoma consistent with prior breast primary malignancy\". Postop, she developed a cerebellar hematoma requiring she go back to the OR (also on 1/6) for evacuation of hematoma and placement of a R frontal external ventricular drain. Remained extubated post-procedures and was requiring Precedex and phenylephrine gtts. These were weaned and patient was extubated on 1/8/21. Briefly needed nicardipine gtt from BP management but this was weaned on 1/9.    -- EVD mgmt per neurosurgery, they clamped yesterday and ICP progressively increased throughout the " day. CT head this AM with increasing mass effect and midline shift>per nursing NSGY in discussion to take patient back to the OR this AM  -- as of this AM, conts on dexamethasone 4mg IV BID, management per neurosurgey  -- goal SBP <160 , PRN hydralazine and labetalol available  -- PT/OT following  -- DD1 w/honey thick liquids per SLP.  SLP continues to follow and encourage PO intake as able. May need to discuss PEG tube placement once over acute issues, this will play role into goals of care discussion  -- TF per nutrition at goal rate, NPO now    Hx of right sided breast cancer, now with findings of metastatic disease as above  Peripheral neuropathy dt prior chemotherapy, manages with Tylenol  Clinical staging from 2017: Stage IIIA (T3, N2a, M0).  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    S/p mastectomy, radiation and chemo therapy.  Had been maintained on Arimidex 1mg daily. Follows with [facility] (Dr. Ho)  -- Dr Cazares spoke with her primary oncology 01/12 and updated son on overall care. Family would like patient to get out this acute incident and focus on getting to meet with her outpatient oncology to discuss overall goals    Goals of Care  -I spoke with son 01/14 and updated him on plan prior to changes seen today and need for OR. Goal of family is to see patient improve from this stay but he is aware that we may get to a point where there is not a good outcome and ultimately we cannot change her metastatic cancer diagnosis. He said in past patient does not want prolonged suffering. He said he will start discussion with family. Will follow plan for patient today form NSGY.    Episodic Asymptomatic SVT  Had PACs and periods of ectopy on 1/9. New onset SVT on 1/10 AM. BPs stable and patient was asymptomatic.   Given 500ml LR bolus, calcium gluconate, IV Lopressor and AM oral metoprolol early.  Lytes (Mag, K) stable, TSH low but FT4 nl, no hx of thyroid disease and hard to interpret in  setting of acute illness.   Recent echo on 1/7 showed EF 65-70% with indeterminate diastolic dysfunction and normal wall motion, mild pulmonary hypertension, mild aortic stenosis.  -- cardiology started on amiodarone with BB, no AC due to above  -- HR stable since starting above  -- cards signed off    Hypertension  Home meds: lisinopril 20mg po daily  Elevated BPs this stay likely dt surgery, steroids. Briefly required nicardipine gtt on 1/8 PM but was weaned off after a few hours  -changed metoprolol to coreg and add back on lisinopril for difficult to control pressures, increased coreg 01/13. Increased lisinopril 01/15  -BP improving on current regime  -continue PRNSs    Stage II CKD  Baseline Cr is around 1.   Renal function remains stable thus far    Steroid-induced hyperglycemia  Noted this stay, given initiation of dexamethasone. A1C 5.6, no hx of DM.   Was maintained on insulin gtt in immediate postop period while intubated, transitioned to basal/bolus insulin regimen on 1/8. Had been getting TFs, okay'd for modified diet on 1/9.    Recent Labs   Lab 01/15/21  0730 01/15/21  0204 01/14/21  2127 01/14/21  1550 01/14/21  1203 01/14/21  0809 01/14/21  0739 01/14/21  0036 01/13/21  1926 01/13/21  1926 01/13/21  0921 01/13/21  0921 01/12/21  2356 01/12/21  2356 01/12/21  1631 01/12/21  1631   GLC  --   --   --   --   --  234*  --  212*  --  196*  --  177*  --  191*  --  207*   * 138* 213* 243* 227*  --  254*  --    < >  --    < >  --    < >  --    < >  --     < > = values in this interval not displayed.   -- continue BID glargine  -- on high resistance sliding scale with BG still in ~200 range, no change today due to patient being NPO now however would likely benefit from additional scheduled insulin    Uncomplicated UTI dt ESBL  UA in ED was grossly abnl. UC ultimately grew >100k ESBL ecoli. Had been placed on ceftriaxone initially, changed to meropenem on 1/5 after culture results known  -- completed 7  day course with refugio 01/12    Acute Blood Loss Anemia  Secondary to surgery, cerebellar hematoma.   Hgb stable at 9-10      GERD  Chronic and stable on PPI    Hypernatremia, resolved     FEN: TFs per nutritionist  Tubes / lines: EVD, TF, figueroa  DVT Prophylaxis: PCDs  Code Status: Full Code    Disposition: Cont monitoring in ICU given EVD. Discharge date unclear, pending postop course. Therapy recommending ARU stay.     Olivia Tineo    Interval History    Patient with more garbled speech and somnolent this AM but does wake up to my questions. Denies pain but no other further hx obtained. Per nursing NSGY may take patient back to OR today for increasing mass effect and midline shift on CT head this AM.    Spoke with son yesterday and bedside nurse this AM.    -Data reviewed today: I reviewed all new labs and imaging results over the last 24 hours. I personally reviewed CT head from this AM with midline shift.     Physical Exam   Temp: 96.4  F (35.8  C) Temp src: Axillary BP: (!) 149/66 Pulse: 59   Resp: 8 SpO2: 99 % O2 Device: None (Room air)    Vitals:    01/13/21 0600 01/14/21 0600 01/15/21 0400   Weight: 74.7 kg (164 lb 10.9 oz) 73.1 kg (161 lb 2.5 oz) 73.3 kg (161 lb 9.6 oz)     Vital Signs with Ranges  Temp:  [96.4  F (35.8  C)-97.9  F (36.6  C)] 96.4  F (35.8  C)  Pulse:  [54-96] 59  Resp:  [8-25] 8  BP: (106-179)/(56-75) 149/66  SpO2:  [95 %-100 %] 99 %  I/O last 3 completed shifts:  In: 1903 [P.O.:5; I.V.:3; NG/GT:860]  Out: 816 [Urine:810; Drains:6]    Constitutional: Resting comfortably, easily rouses to name, does not follow commands  Respiratory: no crackles, no increased work of breathing  Cardiovascular: RRR no MGR, trace edema throughout upper and lower extremities  GI: S, NT, ND, +BS  Skin/Integumen: warm/dry, scattered ecchymoses, scalp incision c/d/i  Neuro: does not follow commands, withdraws to pain    Medications     dextrose         acetaminophen  975 mg Oral or Feeding Tube TID     amiodarone   400 mg Oral BID     carvedilol  12.5 mg Oral BID w/meals     dexamethasone  4 mg Intravenous Q12H     docusate  100 mg Oral or Feeding Tube BID     insulin aspart  1-10 Units Subcutaneous TID AC     insulin aspart  1-7 Units Subcutaneous At Bedtime     insulin glargine  20 Units Subcutaneous At Bedtime     insulin glargine  8 Units Subcutaneous QAM AC     [START ON 1/16/2021] lisinopril  20 mg Oral Daily     nystatin  500,000 Units Swish & Spit 4x Daily     pantoprazole  40 mg Oral or Feeding Tube Daily     polyethylene glycol  17 g Oral Daily     potassium & sodium phosphates  1 packet Oral TID     sodium chloride (PF)  3 mL Intracatheter Q8H       Data   Recent Labs   Lab 01/15/21  0543 01/14/21  0809 01/14/21  0036 01/13/21  1926 01/13/21  0921 01/12/21  0616 01/12/21  0616 01/11/21  0600   WBC  --   --   --   --  17.5*  --  17.6* 20.3*   HGB  --   --   --   --  11.0*  --  10.9* 10.6*   MCV  --   --   --   --  92  --  94 92   PLT  --   --   --   --  384  --  367 304   NA  --  134 133 135 136   < > 136 138   POTASSIUM 5.2 5.0 5.0 4.8 5.2   < > 4.4 4.3   CHLORIDE  --  103 102 103 105   < > 106 109   CO2  --  28 27 28 26   < > 25 23   BUN  --  35* 36* 36* 32*   < > 28 22   CR  --  0.58 0.64 0.65 0.56   < > 0.58 0.48*   ANIONGAP  --  3 4 4 5   < > 5 6   LADI  --  8.3* 8.2* 8.1* 8.4*   < > 8.6 8.4*   GLC  --  234* 212* 196* 177*   < > 187* 193*    < > = values in this interval not displayed.       Recent Results (from the past 24 hour(s))   CT Head w/o Contrast    Narrative    EXAM: CT HEAD W/O CONTRAST  LOCATION: [facility]  DATE/TIME: 1/15/2021 5:53 AM    INDICATION: Follow-up subdural fluid collections.  COMPARISON: 1/13/2021  TECHNIQUE: Routine CT Head without IV contrast. Multiplanar reformats. Dose reduction techniques were used.    FINDINGS:  INTRACRANIAL CONTENTS: Right frontal approach ventriculostomy catheter terminates along the right frontal horn. There is new slight dilatation of the right  lateral ventricle and right temporal horn worrisome for a trapped ventricle. Bilateral low-density   subdural fluid collections are again noted. This has significantly enlarged on the left measuring up to 21 mm in radial thickness overlying the left frontal lobe, previously 15 mm in the same locale. This produces considerable mass effect on the brain   parenchyma and results in a 14 mm left to right midline shift. The low-density subdural fluid collection overlying the right cerebral hemisphere has decreased in size measuring 9 mm in radial thickness, previously 12 mm. Vasogenic edema related to an   underlying mass in the left parietal/occipital region. Stable postoperative changes to the cerebellum with overlying craniotomy.    VISUALIZED ORBITS/SINUSES/MASTOIDS: No intraorbital abnormality. No paranasal sinus mucosal disease. No middle ear or mastoid effusion.    BONES/SOFT TISSUES: Midline suboccipital craniotomy.      Impression    IMPRESSION:  1.  Significant enlargement of the low-density subdural fluid collection overlying the left cerebral hemisphere now measuring up to 21 mm in radial thickness, previously 15 mm.  2.  Decreased size of the low-density subdural fluid collection overlying the right cerebral hemisphere, now measuring 9 mm in radial thickness, previously 12 mm.  3.  There is considerable mass effect on the current study with a new 14 mm left to right midline shift.  4.  Early trapped right lateral ventricle/temporal horn.  5.  Remainder unchanged.    Exam discussed with Dr. Piña at 6:18 AM.

## 2021-01-15 NOTE — PROGRESS NOTES
Spoke with PA from neurosx. Order given to make pt NPO and hold TF now. Suspend today's scheduled therapies.Pt might go back to OR later with .

## 2021-01-15 NOTE — ANESTHESIA PREPROCEDURE EVALUATION
Anesthesia Pre-Procedure Evaluation    Patient: Jacque Bernstein   MRN: [id number] : 8/3/1934          Preoperative Diagnosis: Subdural hematoma (H) [S06.5X9A]    Procedure(s):  Left gila hole for subdural hematoma.    Past Medical History:   Diagnosis Date     Heart disease      Past Surgical History:   Procedure Laterality Date     CRANIOTOMY N/A 2021    Procedure: REDO MIDLINE POSTERIOR FOSSA CRANIOTOMY;  Surgeon: Jose Rojas MD;  Location:  OR     OPTICAL TRACKING SYSTEM CRANIOTOMY, EXCISE TUMOR, COMBINED N/A 2021    Procedure: Midline posterior fossa stealth craniotomy and resection of brain metastasis;  Surgeon: Jose Rojas MD;  Location:  OR     VENTRICULOSTOMY Right 2021    Procedure: RIGHT FRONTAL EXTERNAL AND VENTRICULAR DRAIN;  Surgeon: Jose Rojas MD;  Location:  OR     Allergies   Allergen Reactions     Sudafed [Pseudoephedrine] Anxiety     Prior to Admission medications    Medication Sig Start Date End Date Taking? Authorizing Provider   acetaminophen (TYLENOL) 500 MG tablet Take 1,000 mg by mouth every 6 hours as needed   Yes Unknown, Entered By History   amoxicillin (AMOXIL) 500 MG capsule Take 2,000 mg by mouth as needed Dental procedures 19  Yes Unknown, Entered By History   anastrozole (ARIMIDEX) 1 MG tablet Take 1 mg by mouth daily 20  Yes Unknown, Entered By History   B Complex Vitamins (VITAMIN B-COMPLEX) TABS Take 1 tablet by mouth daily   Yes Unknown, Entered By History   Calcium Carb-Ergocalciferol 500-200 MG-UNIT TABS Take 1 tablet by mouth daily   Yes Unknown, Entered By History   docusate sodium (DSS) 100 MG capsule Take 100 mg by mouth daily   Yes Unknown, Entered By History   lisinopril (ZESTRIL) 20 MG tablet Take 20 mg by mouth daily 20  Yes Unknown, Entered By History   melatonin 3 MG tablet Take 3 mg by mouth nightly as needed   Yes Unknown, Entered By History     ECG: Supraventricular tachycardia with occasional Premature  ventricular complexes  Low voltage QRS  ST & T wave abnormality, consider inferior ischemia  Abnormal ECG  When compared with ECG of 10-MIGNON-2021 05:42,  No significant change was found    ECHO 1-7-21: Interpretation Summary     Mild valvular aortic stenosis. Not hemodynamically significant.  No evidence of endocarditis seen in this study.  Right ventricular systolic pressure is elevated, consistent with mild  pulmonary hypertension.  The visual ejection fraction is estimated at 65-70%.     Left Ventricle  The left ventricle is normal in size. There is normal left ventricular wall  thickness. Left ventricular systolic function is normal. Left ventricular  diastolic function is indeterminate. The visual ejection fraction is estimated  at 65-70%. Intracavity gradient present likely due to borderline hyperdynamic  ventricle. Normal left ventricular wall motion. No evidence of left  ventricular mass or tumors.     Right Ventricle  The right ventricle is normal in structure, function and size.     Atria  Normal left atrial size. Right atrial size is normal.     Mitral Valve  The mitral valve leaflets are mildly thickened.     Tricuspid Valve  Normal tricuspid valve. There is trace tricuspid regurgitation. Right  ventricular systolic pressure is elevated, consistent with mild pulmonary  hypertension.     Aortic Valve  Mild valvular aortic stenosis.     Pulmonic Valve  The pulmonic valve is not well seen, but is grossly normal.     Vessels  The aortic root is normal size. The inferior vena cava is normal.     Pericardium  The pericardium appears normal.    Rhythm  Sinus rhythm was noted.      Anesthesia Evaluation     . Pt has had prior anesthetic.     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 : . . . :. .      (-) CAD, WARE, arrhythmias, valvular problems/murmurs and dyslipidemia   METS/Exercise Tolerance:  >4 METS   Hematologic:     (+) Anemia, -      Musculoskeletal:   (+) arthritis,  -       GI/Hepatic:  - neg GI/hepatic ROS      (-) GERD and liver disease   Renal/Genitourinary:     (+) chronic renal disease, type: CRI,       Endo:  - neg endo ROS    (-) Type I DM, Type II DM, thyroid disease and obesity   Psychiatric:  - neg psychiatric ROS       Infectious Disease:  - neg infectious disease ROS      (-) Recent Fever   Malignancy:   (+) Malignancy (Brain metastasis) History of Breast          Other:                          Physical Exam  Normal systems: cardiovascular and pulmonary    Airway   Mallampati: II  TM distance: >3 FB  Neck ROM: full    Dental   (+) missing    Cardiovascular   Rhythm and rate: regular and normal      Pulmonary             Lab Results   Component Value Date    WBC 17.5 (H) 01/13/2021    HGB 11.0 (L) 01/13/2021    HCT 34.6 (L) 01/13/2021     01/13/2021     01/14/2021    POTASSIUM 5.2 01/15/2021    CHLORIDE 103 01/14/2021    CO2 28 01/14/2021    BUN 35 (H) 01/14/2021    CR 0.58 01/14/2021     (H) 01/14/2021    LADI 8.3 (L) 01/14/2021    PHOS 2.2 (L) 01/15/2021    MAG 2.2 01/15/2021    ALBUMIN 2.6 (L) 01/08/2021    PROTTOTAL 5.2 (L) 01/08/2021    ALT 13 01/08/2021    AST 13 01/08/2021    ALKPHOS 61 01/08/2021    BILITOTAL 0.5 01/08/2021    INR 1.06 01/04/2021    TSH 0.09 (L) 01/10/2021    T4 1.03 01/10/2021       Preop Vitals  BP Readings from Last 3 Encounters:   01/15/21 (!) 142/69    Pulse Readings from Last 3 Encounters:   01/15/21 52      Resp Readings from Last 3 Encounters:   01/15/21 8    SpO2 Readings from Last 3 Encounters:   01/15/21 98%      Temp Readings from Last 1 Encounters:   01/15/21 35.8  C (96.5  F) (Axillary)    Ht Readings from Last 1 Encounters:   01/03/21 1.651 m (5' 5\")      Wt Readings from " "Last 1 Encounters:   01/15/21 73.3 kg (161 lb 9.6 oz)    Estimated body mass index is 26.89 kg/m  as calculated from the following:    Height as of this encounter: 1.651 m (5' 5\").    Weight as of this encounter: 73.3 kg (161 lb 9.6 oz).       Anesthesia Plan      History & Physical Review      ASA Status:  4 .    NPO Status:  > 8 hours    Plan for General with Propofol induction. Maintenance will be Balanced.    PONV prophylaxis:  Ondansetron (or other 5HT-3) and Dexamethasone or Solumedrol  Additional equipment: Videolaryngoscope        Postoperative Care  Postoperative pain management:  Multi-modal analgesia.      Consents  Anesthetic plan, risks, benefits and alternatives discussed with:  Patient..                 Rita Soriano MD  "

## 2021-01-15 NOTE — PLAN OF CARE
Shift 1585-3466: VSS. Neuro: Disorientated x 4, follows inconsistently w/ commands, garbled speech; Ventric in place (clamped): leveled above the EAM, drip chamber 15 cm, ICP range: 2-17.  Pulm: Lungs: clear in upper lobes, diminished in bases bilaterally, on RA. GI/: BS present, BM; Purewick in place but mostly unsuccessful in collection--urine occurrences charted. Access: PIV x 1. Pain managed w/ PRN Oxycodone, PRN IV Dilaudid, scheduled Tylenol.      New:   - ICP trending up and maintaining around 12-17 towards end of shift--notified neuro surgery  - Blood glucose remains elevated in 200's on sliding scale, lantus  - Pt up in chair for 2 hrs

## 2021-01-16 NOTE — OP NOTE
Procedure Date: 01/15/2021      PREOPERATIVE DIAGNOSIS:  Left subdural hematoma.      POSTOPERATIVE DIAGNOSIS:  Left subdural hematoma.      PROCEDURES:  Left gila hole drainage of subdural hematoma.      SURGEON:  Selvin Menezes MD      ASSISTANT:  Celestino Fisher PA-C      ANESTHESIA:  General endotracheal anesthesia.      ESTIMATED BLOOD LOSS:  4 mL.      INDICATIONS:  The patient is an 86-year-old female who has recently undergone posterior fossa craniotomy for a large posterior fossa metastasis, followed by evacuation of hematoma and ventriculostomy placement.  During the postoperative period, she appeared to develop a left-sided chronic subdural hematoma with significant brain compression and midline shift.  Given this, she was brought to the operating room for gila hole drainage on the left for evacuation of the hematoma. Please note that Celestino Fisher PA-C's assistance was needed for positioning, retraction, suctioning, and closure.      DESCRIPTION OF PROCEDURE:  The patient was brought to the operating room, and general endotracheal anesthesia was induced.  The patient was positioned supine on the [place] horseshoe.  The left side of the head was prepped and draped in sterile fashion.  A linear incision was made over Kocher's point and a single gila hole created.  The dura was coagulated and opened, and pressurized fluid looking like old crankcase oil was evacuated.  The subdural space was copiously irrigated and then a 10-Bulgarian round BIANCA placed through a separate stab incision.  A slotted gila hole cover was placed over the gila hole.  The galea was closed with 2-0 inverted interrupted Vicryl and staples used for skin.  A drain stitch was placed.  The patient was then awakened, extubated and taken to the recovery room in good condition.         SELVIN MENEZES MD             D: 01/15/2021   T: 01/15/2021   MT: CARLOS      Name:     MIHAI GRAHAM   MRN:      [id number]        Account:        [id number]    :      1934           Procedure Date: 01/15/2021      Document: [id number]

## 2021-01-16 NOTE — PROGRESS NOTES
"[facility]    Hospitalist Progress Note    Assessment & Plan     Jacque Bernstein is a 86 year old female with PMHx of hypertension, stage II CKD, GERD, hx of R breast cancer with resultant peripheral neuropathy dt chemotherapy, GERD, OA and frequent falls who was admitted from [place] ED on 1/3/2021 for further evaluation after she was incidentally found to have 2 intracranial masses after a mechanical fall with findings concerning for metastatic breast cancer.      Intracranial masses x2, dt metastatic breast cancer, s/p craniotomy with resection of mets on 1/6/21  Cerebellar hematoma, s/p R frontal EVD placement 1/6/21  S/p LEFT PHOEBE HOLE DRAINAGE OF SUBDURAL HEMATOMA 1/15.   Presented to [facility] ED after a mechanical fall where she struck her head on the headboard of her bed resulting in a persistent headache. Had endorsed worsening ataxia over the past few months with frequent falls. Head CT in the ED showed 2 masses, larger mass was located in the cerebellum with mass effect and tonsillar herniation with associated mild hydrocephalus and 2cm parietal mass with vasogenic edema was also noted. Given hx of breast cancer, findings were concerning for metastatic disease. Was started on dexamethasone on admission. Seen by neurosurgery and taken to the OR on 1/6/21 for midline posterior fossa Stealth craniotomy and resection of brain metastasis. Pathology subsequently returned positive for \"metastatic carcinoma consistent with prior breast primary malignancy\". Postop, she developed a cerebellar hematoma requiring she go back to the OR (also on 1/6) for evacuation of hematoma and placement of a R frontal external ventricular drain. Remained extubated post-procedures and was requiring Precedex and phenylephrine gtts. These were weaned and patient was extubated on 1/8/21. Briefly needed nicardipine gtt from BP management but this was weaned on 1/9.      --1/15: EVD mgmt per neurosurgery, " they clamped yesterday and ICP progressively increased throughout the day. CT head this AM with increasing mass effect and midline shift>per nursing NSGY in discussion to take patient back to the OR. neurosurgery discusssed with family. S/p LEFT PHOEBE HOLE DRAINAGE OF SUBDURAL HEMATOMA 1/15. Pod 1  --CT head 1/16:                                                                  IMPRESSION:  1.  Decreased pneumocephalus along the anterior left frontal lobe. Decreased associated mass effect with near-complete resolution of left-to-right midline shift, measuring 1 mm.  2.  Otherwise, no significant change.    Doing better post surgery. More alert and following commands. Nl tele  OnTFs, drain in place  Updated pt's son. Ok for picc line  Plan;   -EVD remains clamped  -advance activity as tolerated.   -cont current pain control measures per surgery  -- conts on dexamethasone 4mg IV BID, management per neurosurgey  -- goal SBP <160 , PRN hydralazine and labetalol available  -- PT/OT following  -- DD1 w/honey thick liquids per SLP.  SLP continues to follow and encourage PO intake as able. May need to discuss PEG tube placement once over acute issues, this will play role into goals of care discussion  -- TF per nutrition at goal rate, NPO now    Addendum; keep npo per speech. Pt tracking RN, following commands. More alert than on 1/15. +aphasia at baseline  Will reduce lantus dosing in anticipation of neurosurgery decreasing decadron to once daily tomorrow. Case discussed with surgery     Hx of right sided breast cancer, now with findings of metastatic disease as above  Peripheral neuropathy dt prior chemotherapy, manages with Tylenol  Clinical staging from 2017: Stage IIIA (T3, N2a, M0).  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    S/p mastectomy, radiation and chemo therapy.  Had been maintained on Arimidex 1mg daily. Follows with [facility] (Dr. Ho)  -- Dr Cazares spoke with her primary oncology 01/12  and updated son on overall care. Family would like patient to get out this acute incident and focus on getting to meet with her outpatient oncology to discuss overall goals       Urinary retention  Bladder scan 400cc.   Straight cath, follow pvr q shift, follow for need of figueroa, fluids stopped.     Goals of Care  -I spoke with son 01/14 and updated him on plan prior to changes seen today and need for OR. Goal of family is to see patient improve from this stay but he is aware that we may get to a point where there is not a good outcome and ultimately we cannot change her metastatic cancer diagnosis. He said in past patient does not want prolonged suffering. He said he will start discussion with family. Will follow plan for patient today form NSGY.     Episodic Asymptomatic SVT  Had PACs and periods of ectopy on 1/9. New onset SVT on 1/10 AM. BPs stable and patient was asymptomatic.   Given 500ml LR bolus, calcium gluconate, IV Lopressor and AM oral metoprolol early.  Lytes (Mag, K) stable, TSH low but FT4 nl, no hx of thyroid disease and hard to interpret in setting of acute illness.   Recent echo on 1/7 showed EF 65-70% with indeterminate diastolic dysfunction and normal wall motion, mild pulmonary hypertension, mild aortic stenosis.  -- cardiology started on amiodarone with BB, no AC due to above  -- HR stable since starting above  -- cards signed off  - follow tsh--> nl     Hypertension  Home meds: lisinopril 20mg po daily  Elevated BPs this stay likely dt surgery, steroids. Briefly required nicardipine gtt on 1/8 PM but was weaned off after a few hours  -changed metoprolol to coreg and add back on lisinopril for difficult to control pressures, increased coreg 01/13. Increased lisinopril 01/15  -good control this am    -BP improving on current regime, discuss bp goal with neurosurgery  -continue PRNSs   goal SBP <160 , PRN hydralazine and labetalol available    Addendum; low nl sbp, will reduce lisinopril to 10mg  every day.     Stage II CKD  Baseline Cr is around 1.   Renal function remains stable thus far     Steroid-induced hyperglycemia  Noted this stay, given initiation of dexamethasone. A1C 5.6, no hx of DM.   Was maintained on insulin gtt in immediate postop period while intubated, transitioned to basal/bolus insulin regimen on 1/8. Had been getting TFs, okay'd for modified diet on 1/9.  --200 range.     Plan;    -- continue BID glargine- adjust as needed.   -- on high resistance sliding scale with BG still in ~200 range,   -on TFs     Uncomplicated UTI dt ESBL  UA in ED was grossly abnl. UC ultimately grew >100k ESBL ecoli. Had been placed on ceftriaxone initially, changed to meropenem on 1/5 after culture results known  afebrile  -- completed 7 day course with refugio 01/12     Acute Blood Loss Anemia  Secondary to surgery, cerebellar hematoma.   Hgb stable at 9-10, hb pending      GERD  Chronic and stable on PPI     Access:   Ok per son for picc line  Tenuous peripheral iv. Unable to draw labs  Consult for picc line placement.     Hypernatremia, resolved     FEN: TFs per nutritionist  Tubes / lines: EVD, TF, figueroa  DVT Prophylaxis: PCDs  Code Status: Full Code     Disposition: Cont monitoring in ICU given EVD. Discharge date unclear, pending postop course. Therapy recommending ARU stay.      Discussed care plan with RN, pt's son    Arash SILVA MD Kelsi  Text Page  (7am to 6pm)  Interval History   Per RN, more awake and follows commands today  Nl tele  No acute concerns other than unable to obtain labs and tenuous peripheral IV    -Data reviewed today: I reviewed all new labs and imaging results over the last 24 hours. I personally reviewed labs and imaging last 24 hours.     Physical Exam   Temp: 96.8  F (36  C) Temp src: Axillary BP: (!) 146/72 Pulse: 66   Resp: 10 SpO2: 100 % O2 Device: None (Room air) Oxygen Delivery: 6 LPM  Vitals:    01/14/21 0600 01/15/21 0400 01/16/21 0543   Weight: 73.1 kg (161 lb 2.5  oz) 73.3 kg (161 lb 9.6 oz) 73.1 kg (161 lb 2.5 oz)     Vital Signs with Ranges  Temp:  [95.5  F (35.3  C)-96.8  F (36  C)] 96.8  F (36  C)  Pulse:  [48-71] 66  Resp:  [8-23] 10  BP: (101-191)/(47-87) 146/72  SpO2:  [96 %-100 %] 100 %  I/O last 3 completed shifts:  In: 795 [I.V.:250; NG/GT:195]  Out: 634 [Urine:430; Drains:200; Blood:4]    Constitutional: In bed,   HEENT: sutures noted, drain in place, feeding tube in plae  Respiratory: Breathing easily, CTAB  Cardiovascular: RRR no r/g/m  GI: soft, nt, nd  Skin/Integumen: no rash or edema  Neuro: follows simple commands, attempts to say name, baseline L facial droop, baseline expressive aphasia. Moves all 4 extremities  Other:      Medications     dextrose       sodium chloride         acetaminophen  975 mg Oral or Feeding Tube Q8H     amiodarone  400 mg Oral BID     carvedilol  12.5 mg Oral BID w/meals     dexamethasone  4 mg Intravenous Q12H     docusate  100 mg Oral or Feeding Tube BID     sennosides  5-10 mL Oral or Feeding Tube BID    And     docusate   mg Oral or Feeding Tube BID     insulin aspart  1-10 Units Subcutaneous TID AC     insulin aspart  1-7 Units Subcutaneous At Bedtime     insulin glargine  20 Units Subcutaneous At Bedtime     insulin glargine  8 Units Subcutaneous QAM AC     lisinopril  20 mg Oral Daily     nystatin  500,000 Units Swish & Spit 4x Daily     pantoprazole  40 mg Oral or Feeding Tube Daily     polyethylene glycol  17 g Oral Daily     sodium chloride (PF)  3 mL Intracatheter Q8H       Data   Recent Labs   Lab 01/15/21  0543 01/14/21  0809 01/14/21  0036 01/13/21  1926 01/13/21  0921 01/12/21  0616 01/12/21  0616 01/11/21  0600   WBC  --   --   --   --  17.5*  --  17.6* 20.3*   HGB  --   --   --   --  11.0*  --  10.9* 10.6*   MCV  --   --   --   --  92  --  94 92   PLT  --   --   --   --  384  --  367 304   NA  --  134 133 135 136   < > 136 138   POTASSIUM 5.2 5.0 5.0 4.8 5.2   < > 4.4 4.3   CHLORIDE  --  103 102 103 105   < >  106 109   CO2  --  28 27 28 26   < > 25 23   BUN  --  35* 36* 36* 32*   < > 28 22   CR  --  0.58 0.64 0.65 0.56   < > 0.58 0.48*   ANIONGAP  --  3 4 4 5   < > 5 6   LADI  --  8.3* 8.2* 8.1* 8.4*   < > 8.6 8.4*   GLC  --  234* 212* 196* 177*   < > 187* 193*    < > = values in this interval not displayed.       Imaging:   Recent Results (from the past 24 hour(s))   CT Head w/o Contrast    Narrative    EXAM: CT HEAD W/O CONTRAST  LOCATION: [facility]  DATE/TIME: 1/15/2021 11:47 PM    INDICATION: Intracranial hemorrhage, known, follow up. See the clinical information for interpreting provider.  COMPARISON: 01/15/2021 at 5:55 AM  TECHNIQUE: Routine CT Head without IV contrast. Multiplanar reformats. Dose reduction techniques were used.    FINDINGS:  INTRACRANIAL CONTENTS: Interval placement of a left-sided subdural drain with evacuation of the previously seen low-density left-sided subdural fluid collection. This fluid collection has been near completely evacuated. There is a moderate amount of gas   overlying the left frontal lobe measuring approximately 20 mm in thickness. Overall mass effect is improved. There is a 6 mm left to right midline shift, previously 14 mm. Stable small low-density right subdural fluid collection. Stable positioning of   the right frontal approach ventriculostomy catheter. Slight decompression of the right lateral ventricle. No CT evidence of acute infarct. Again seen is some vasogenic edema at the left parietal lobe and postoperative changes in the posterior fossa.    VISUALIZED ORBITS/SINUSES/MASTOIDS: No intraorbital abnormality. No paranasal sinus mucosal disease. No middle ear or mastoid effusion.    BONES/SOFT TISSUES: No acute abnormality.      Impression    IMPRESSION:  1.  Interval evacuation of the low-density subdural collection overlying the left cerebral hemisphere via a left sided subdural drain. This collection has been completely evacuated. There is a moderate  amount of gas overlying the left frontal lobe   measuring approximately 20 mm in thickness.  2.  Improved mass effect with a 6 mm left to right midline shift, previously 14 mm.  3.  Slight decompression of the right lateral ventricle.  4.  Otherwise unchanged.   CT Head w/o Contrast    Narrative    EXAM: CT HEAD WITHOUT CONTRAST  LOCATION: [facility]  DATE/TIME: 01/16/2021, 5:17 AM    INDICATION: Follow-up hemorrhage status post left-sided gila hole.  COMPARISON: 01/15/2021.  TECHNIQUE: Routine CT Head without IV contrast. Multiplanar reformats. Dose reduction techniques were used.    FINDINGS:  INTRACRANIAL CONTENTS: Redemonstrated right frontal ventriculostomy catheter and left-sided subdural drain. The volume of pneumocephalus along the left frontal lobe has decreased since the prior exam. There is decreased mass effect in the left frontal   lobe. Trace residual left-to-right midline shift, measuring approximately 1 mm. The fluid collection along the left cerebral convexity has nearly resolved, with a small amount of fluid along the posterior convexity that measures approximately 2 mm in   thickness. The low-density collection along the right cerebral hemisphere is stable. The ventricles are stable in size. Redemonstrated vasogenic edema in the left parietal lobe. Redemonstrated postoperative changes of the bilateral cerebellar   hemispheres. Posterior fossa mass effect is stable.     VISUALIZED ORBITS/SINUSES/MASTOIDS: Prior bilateral cataract surgery. Visualized portions of the orbits are otherwise unremarkable. No paranasal sinus mucosal disease. No middle ear or mastoid effusion.    BONES/SOFT TISSUES: Redemonstrated suboccipital craniotomy.      Impression    IMPRESSION:  1.  Decreased pneumocephalus along the anterior left frontal lobe. Decreased associated mass effect with near-complete resolution of left-to-right midline shift, measuring 1 mm.  2.  Otherwise, no significant change.

## 2021-01-16 NOTE — PROVIDER NOTIFICATION
Neurosurgery paged on neuro changes of eye disconjugate, obtunded, and L facial droop after unable to keep SBP within parameters for few hours after surgery. No new orders for now. Continue to monitor and page if further neuro changes per Neurosurgery PA. Will continue to monitor.

## 2021-01-16 NOTE — OR NURSING
Called Dr Soriano regarding elevated blood pressures, orders received, will try to get systolic pressures back to 150's.  Will continue to monitor.

## 2021-01-16 NOTE — ANESTHESIA POSTPROCEDURE EVALUATION
Patient: Jacque Bernstein    Procedure(s):  LEFT PHOEBE HOLE DRAINAGE OF SUBDURAL HEMATOMA    Diagnosis:Subdural hematoma (H) [S06.5X9A]  Diagnosis Additional Information: No value filed.    Anesthesia Type:  General    Note:  Anesthesia Post Evaluation    Patient location during evaluation: PACU  Patient participation: Able to fully participate in evaluation  Level of consciousness: awake and alert  Pain management: adequate  Airway patency: patent  Cardiovascular status: acceptable, hemodynamically stable and blood pressure returned to baseline  Respiratory status: acceptable  Hydration status: acceptable  PONV: none     Anesthetic complications: None          Last vitals:  Vitals:    01/15/21 2100 01/15/21 2115 01/15/21 2130   BP: (!) 170/70 (!) 159/71 (!) 182/76   Pulse: 54 53 56   Resp: 16 13 10   Temp:      SpO2: 97% 96% (!) 88%         Electronically Signed By: Rita Soriano MD  January 15, 2021  9:40 PM

## 2021-01-16 NOTE — PLAN OF CARE
Q 2 neuro check. No neuro change in this shift. Still lethargic but arouses to voice and follows command. CT from this am showed midline shift and taken to OR by MD Rojas. Currently in PACU being recovered.NPO this 0830. Sinus roseanna. One time prn hydralazine used to keep systolic less than 160. Making ok UOP. Blood sugar covered with sliding scale insulin. Family updated with POC. Will monitor.

## 2021-01-16 NOTE — PLAN OF CARE
Neuro: alert. following most commands. Pupil equal and reactive, conjugate. L facial droop. CT completed at beginning of shift for concern of neuro change, repeat scheduled CT for 0500 completed as well.   Pulm: LS clear. On RA  CV: SB-SR, Hydralazine effective, given once to keep systolic < 150 (per neurosurg)  : Incontinent bladder.   GI: back on TF via NG, at goal of 45 ml/hr  Extremities: purposeful movements, can squeeze fingers and wiggle toes  Lines: PIVx1 RUE.   Skin: Pale tone. Scattered bruises. Grossly intact, except redness on vanessa-area.   Family: No phone call this shift.   Plan: Awaiting for family decision on ongoing cares.

## 2021-01-16 NOTE — PROCEDURES
[facility]    Double Lumen PICC Placement    Date/Time: 1/16/2021 12:16 PM  Performed by: Heaven Odonnell RN  Authorized by: Arash Dolan MD   Indications: vascular access (lab draws)    UNIVERSAL PROTOCOL   Site Marked: Yes  Prior Images Obtained and Reviewed:  Yes  Required items: Required blood products, implants, devices and special equipment available    Patient identity confirmed:  Arm band and hospital-assigned identification number  NA - No sedation, light sedation, or local anesthesia  Confirmation Checklist:  Patient's identity using two indicators, relevant allergies, procedure was appropriate and matched the consent or emergent situation and correct equipment/implants were available  Time out: Immediately prior to the procedure a time out was called    Universal Protocol: the Joint Commission Universal Protocol was followed    Preparation: Patient was prepped and draped in usual sterile fashion           ANESTHESIA    Anesthesia: Local infiltration  Local Anesthetic:  Lidocaine 1% without epinephrine      SEDATION    Patient Sedated: No        Preparation: skin prepped with ChloraPrep  Skin prep agent: skin prep agent completely dried prior to procedure  Sterile barriers: maximum sterile barriers were used: cap, mask, sterile gown, sterile gloves, and large sterile sheet  Hand hygiene: hand hygiene performed prior to central venous catheter insertion  Type of line used: Power PICC  Catheter type: double lumen  Lumen type: valved  Catheter size: 5 Fr  Brand: Bard  Lot number: [id number]  Placement method: MST  Number of attempts: 2  Successful placement: yes  Orientation: left  Location: brachial vein (lateral)  Arm circumference: adults 10 cm  Extremity circumference: 27  Visible catheter length: 3  Internal length: 41 cm  Total catheter length: 44  Dressing and securement: chlorhexidine disc applied, securement device, sterile dressing applied and blood cleaned  with CHG  Post procedure assessment: blood return through all ports (ECG Tip Confirmation/Ok to use)  PROCEDURE   Patient Tolerance:  Patient tolerated the procedure well with no immediate complications     Attempted LUE basilic vein x 1, able to access vein, but unavailable to thread wire.  Brachial accessed and picc inserted without difficulty.

## 2021-01-16 NOTE — OR NURSING
Blood Pressures 180/80, MDA notified. 10 mg hydralazine ordered and given. Repeated dose at 1950.

## 2021-01-16 NOTE — PROGRESS NOTES
7p-11p    Patient arrived from PACU at 2030.     Neuro: Lethargy. Not following commands. Pupil equal and reactive. Dysconjugate. L facial droop.  Pulm: LS clear. On RA  CV: SB w/ hypertension. Hydralazine effective   : Incontinent bladder.   GI: back on TF via NG.  Extremities: localized movements  Lines: PIVx1 on LUE.   Skin: Pale tone. Scattered bruises. Grossly intact, except redness on vanessa-area.   Family: No phone call this shift.   Plan: Awaiting for family decision on ongoing cares.

## 2021-01-16 NOTE — PROGRESS NOTES
BP out of parameters. No IV access upon arrival from PACU. Multiple attempts without success. Hospitalist paged to see if oral BP meds appropriate

## 2021-01-16 NOTE — BRIEF OP NOTE
[facility]    Brief Operative Note    Pre-operative diagnosis: Subdural hematoma (H) [S06.5X9A]  Post-operative diagnosis Same as pre-operative diagnosis    Procedure: Procedure(s):  LEFT PHOEBE HOLE DRAINAGE OF SUBDURAL HEMATOMA  Surgeon: Surgeon(s) and Role:     * Jose Rojas MD - Primary     * Celestino Fisher PA-C - Assisting  Anesthesia: General   Estimated blood loss: 4 mL  Drains: BIANCA Drain in subdural space  Specimens: * No specimens in log *  Findings:   None.  Complications: None.  Implants:   Implant Name Type Inv. Item Serial No.  Lot No. [id number] No. Used Action   PLATE FOR SHUNT 17MM Metallic Hardware/Jenner PLATE FOR SHUNT 17MM  SYNTHES-STRATEC [id number] Left 1 Implanted   IMP SCR SYN MATRIX LOW PRO 1.5X04MM SELF DRILL [id number] Metallic Hardware/Jenner IMP SCR SYN MATRIX LOW PRO 1.5X04MM SELF DRILL [id number]  SYNTHES-STRATEC 41 [id number] Left 1 Implanted     [id number]

## 2021-01-16 NOTE — PROGRESS NOTES
Neuro: Lethargy. Follow commands. Very slurred speech. L facial droop. Pupil equal and reactive  Pulm: Expiratory wheezes. On RA  CV: SR w/ soft BPs  : Retention. Straight cathx1. Incontinent bladder.  GI: On TF via NG. NPO due to facial droop and lethargy  Extremities: Purposeful movements on LUE. Localized movements on RUE and BLE  Lines: PICC placed on LUE  Skin: Scattered bruises. Redness on vanessa-area. Coccyx intact.   Family: Son and  updated via phone  Plan: Continue to monitor and support.

## 2021-01-16 NOTE — ANESTHESIA CARE TRANSFER NOTE
Patient: Jacque Bernstein    Procedure(s):  LEFT PHOEBE HOLE DRAINAGE OF SUBDURAL HEMATOMA    Diagnosis: Subdural hematoma (H) [S06.5X9A]  Diagnosis Additional Information: No value filed.    Anesthesia Type:   General     Note:  Airway :Face Mask  Patient transferred to:PACU  Comments: Neuromuscular blockade reversed after TOF 4/4, spontaneous respirations, adequate tidal volumes, followed commands to voice, oropharynx suctioned with soft flexible catheter, extubated atraumatically, extubated with suction, airway patent after extubation.  Oxygen via facemask at 4 liters per minute to PACU. Oxygen tubing connected to wall O2 in PACU, SpO2, NiBP, and EKG monitors and alarms on and functioning, report on patient's clinical status given to PACU RN.   Handoff Report: Identifed the Patient, Identified the Reponsible Provider, Reviewed the pertinent medical history, Discussed the surgical course, Reviewed Intra-OP anesthesia mangement and issues during anesthesia, Set expectations for post-procedure period and Allowed opportunity for questions and acknowledgement of understanding      Vitals: (Last set prior to Anesthesia Care Transfer)    CRNA VITALS  1/15/2021 1813 - 1/15/2021 1905      1/15/2021             Resp Rate (set):  10                Electronically Signed By: KANDICE Short CRNA  January 15, 2021  7:05 PM

## 2021-01-16 NOTE — PROGRESS NOTES
[facility]    Neurosurgery  Daily Post-Op Note    Assessment & Plan   Procedure(s):  LEFT PHOEBE HOLE DRAINAGE OF SUBDURAL HEMATOMA   1 Day Post-Op  Awakens to voice, good hand squeeze bilaterally. She mostly groans in response to questions, shakes head to indicate she is not in pain.  Head CT stable this am.   BIANCA had 80mL overnight.   Plan:  -Advance activity as tolerated  -Continue supportive and symptomatic treatment  -Pain control measures  -will leave drain today  EVD remains clamped.     Celestino GRETA Castillonn    Interval History   Stable.     Physical Exam   Temp: 96.8  F (36  C) Temp src: Axillary BP: 129/71 Pulse: 65   Resp: 9 SpO2: 98 % O2 Device: None (Room air) Oxygen Delivery: 6 LPM  Vitals:    01/14/21 0600 01/15/21 0400 01/16/21 0543   Weight: 73.1 kg (161 lb 2.5 oz) 73.3 kg (161 lb 9.6 oz) 73.1 kg (161 lb 2.5 oz)     Vital Signs with Ranges  Temp:  [95.5  F (35.3  C)-96.8  F (36  C)] 96.8  F (36  C)  Pulse:  [48-65] 65  Resp:  [8-23] 9  BP: (101-191)/(47-87) 129/71  SpO2:  [96 %-100 %] 98 %  I/O last 3 completed shifts:  In: 795 [I.V.:250; NG/GT:195]  Out: 634 [Urine:430; Drains:200; Blood:4]    Alert and oriented.  Moves all extremities equally.      Incision: CDi      Medications     dextrose       sodium chloride          acetaminophen  975 mg Oral or Feeding Tube Q8H     amiodarone  400 mg Oral BID     carvedilol  12.5 mg Oral BID w/meals     dexamethasone  4 mg Intravenous Q12H     docusate  100 mg Oral or Feeding Tube BID     sennosides  5-10 mL Oral or Feeding Tube BID    And     docusate   mg Oral or Feeding Tube BID     insulin aspart  1-10 Units Subcutaneous TID AC     insulin aspart  1-7 Units Subcutaneous At Bedtime     insulin glargine  20 Units Subcutaneous At Bedtime     insulin glargine  8 Units Subcutaneous QAM AC     lisinopril  20 mg Oral Daily     nystatin  500,000 Units Swish & Spit 4x Daily     pantoprazole  40 mg Oral or Feeding Tube Daily     polyethylene  glycol  17 g Oral Daily     potassium & sodium phosphates  1 packet Oral TID     sodium chloride (PF)  3 mL Intracatheter Q8H           Celestino Fisher PA-C  [facility]  [address] [address]    Tel [phone]  Pager [phone]

## 2021-01-17 NOTE — PLAN OF CARE
Neuro: alert. following most commands. Pupil equal and reactive, conjugate. L facial droop improved.   Pulm: LS clear. On RA  CV: SR, SBP remains < 140 with no prns required. 250 ml of 500 ml bolus given for soft BPs, pt responded well and bolus was stopped once SBP went above 140.  : Incontinent bladder, required straight cath x 1 for .   GI:  TF via NG, at goal of 45 ml/hr, tolerating well.  Extremities: purposeful movements, can squeeze fingers and wiggle toes  Lines: PICC SL   Skin: Scattered bruises. Grossly intact, except redness on vanessa-area.   Family: No phone call this shift.   Plan: Awaiting for family decision on ongoing cares.

## 2021-01-17 NOTE — PROGRESS NOTES
Hospitalist Dinesh Cover    Called for /42 with MAP 55. Given some oxycodone earlier this evening. Some moaning, seems to still be in pain. Have been backing off BP meds. Will give 500ml bolus tonight over 4h. Monitor BPs.     Leonela Gauthier, DO  Internal Medicine - Hospitalist  1/16/2021  10:00 PM

## 2021-01-17 NOTE — PROGRESS NOTES
"[facility]    Hospitalist Progress Note    Assessment & Plan     Jacque Bernstein is a 86 year old female with PMHx of hypertension, stage II CKD, GERD, hx of R breast cancer with resultant peripheral neuropathy dt chemotherapy, GERD, OA and frequent falls who was admitted from [place] ED on 1/3/2021 for further evaluation after she was incidentally found to have 2 intracranial masses after a mechanical fall with findings concerning for metastatic breast cancer.      Intracranial masses x2, dt metastatic breast cancer, s/p craniotomy with resection of mets on 1/6/21  Cerebellar hematoma, s/p R frontal EVD placement 1/6/21  S/p LEFT PHOEBE HOLE DRAINAGE OF SUBDURAL HEMATOMA 1/15.   Presented to [facility] ED after a mechanical fall where she struck her head on the headboard of her bed resulting in a persistent headache. Had endorsed worsening ataxia over the past few months with frequent falls. Head CT in the ED showed 2 masses, larger mass was located in the cerebellum with mass effect and tonsillar herniation with associated mild hydrocephalus and 2cm parietal mass with vasogenic edema was also noted. Given hx of breast cancer, findings were concerning for metastatic disease. Was started on dexamethasone on admission. Seen by neurosurgery and taken to the OR on 1/6/21 for midline posterior fossa Stealth craniotomy and resection of brain metastasis. Pathology subsequently returned positive for \"metastatic carcinoma consistent with prior breast primary malignancy\". Postop, she developed a cerebellar hematoma requiring she go back to the OR (also on 1/6) for evacuation of hematoma and placement of a R frontal external ventricular drain. Remained extubated post-procedures and was requiring Precedex and phenylephrine gtts. These were weaned and patient was extubated on 1/8/21. Briefly needed nicardipine gtt from BP management but this was weaned on 1/9.      --1/15: EVD mgmt per neurosurgery, " they clamped yesterday and ICP progressively increased throughout the day. CT head this AM with increasing mass effect and midline shift>per nursing NSGY in discussion to take patient back to the OR. neurosurgery discusssed with family. S/p LEFT PHOEBE HOLE DRAINAGE OF SUBDURAL HEMATOMA 1/15. Pod 1  --CT head 1/16:                                                                  IMPRESSION:  1.  Decreased pneumocephalus along the anterior left frontal lobe. Decreased associated mass effect with near-complete resolution of left-to-right midline shift, measuring 1 mm.  2.  Otherwise, no significant change.    Doing better post surgery. More alert and following commands. Nl tele  OnTFs, drain in place  - more awake, using simple words with RN      Plan;   -EVD remains clamped  -advance activity as tolerated.   -cont current pain control measures per surgery  -- conts on dexamethasone 4mg IV BID, management per neurosurgey- may decrease dose today, adjusted lantus dosing  -- goal SBP <160 , PRN hydralazine and labetalol available  -- PT/OT following  -- DD1 w/honey thick liquids per SLP.  SLP continues to follow and encourage PO intake as able. May need to discuss PEG tube placement once over acute issues, this will play role into goals of care discussion  -- TF per nutrition at goal rate, NPO now  -speech following. Remains npo  -meeting with PT- seen 1/17       Hx of right sided breast cancer, now with findings of metastatic disease as above  Peripheral neuropathy dt prior chemotherapy, manages with Tylenol  Clinical staging from 2017: Stage IIIA (T3, N2a, M0).  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    S/p mastectomy, radiation and chemo therapy.  Had been maintained on Arimidex 1mg daily. Follows with [facility] (Dr. Ho)  -- Dr Cazares spoke with her primary oncology 01/12 and updated son on overall care. Family would like patient to get out this acute incident and focus on getting to meet with  her outpatient oncology to discuss overall goals       Urinary retention  Bladder scan 400cc X 2 on 1/16.   Matthews placed 1/17 given persistently elevated PvR/straight cath volumes above 400cc.     Goals of Care  -I spoke with son 01/14 and updated him on plan prior to changes seen today and need for OR. Goal of family is to see patient improve from this stay but he is aware that we may get to a point where there is not a good outcome and ultimately we cannot change her metastatic cancer diagnosis. He said in past patient does not want prolonged suffering. He said he will start discussion with family.      Episodic Asymptomatic SVT  Had PACs and periods of ectopy on 1/9. New onset SVT on 1/10 AM. BPs stable and patient was asymptomatic.   Given 500ml LR bolus, calcium gluconate, IV Lopressor and AM oral metoprolol early.  Lytes (Mag, K) stable, TSH low but FT4 nl, no hx of thyroid disease and hard to interpret in setting of acute illness.   Recent echo on 1/7 showed EF 65-70% with indeterminate diastolic dysfunction and normal wall motion, mild pulmonary hypertension, mild aortic stenosis.  -- cardiology started on amiodarone with BB, no AC due to above  -- HR stable since starting above  -- cards signed off  - follow tsh--> nl  -nl tele     Hypertension  Home meds: lisinopril 20mg po daily  Elevated BPs this stay likely dt surgery, steroids. Briefly required nicardipine gtt on 1/8 PM but was weaned off after a few hours  -changed metoprolol to coreg and add back on lisinopril for difficult to control pressures, increased coreg 01/13. Increased lisinopril 01/15  -good control this am    -BP improving on current regime, discuss bp goal with neurosurgery  -continue PRNSs   goal SBP <160 , PRN hydralazine and labetalol available  -fluid bolus for low nl sbp 90s/- 1/16 with good response  -decreased Lisinopril to 10mg every day on 1/17  - follow bmp, has high nl K level, follow    Stage II CKD  Baseline Cr is around 1.    Renal function remains stable thus far  Follow high nl K     Steroid-induced hyperglycemia  Noted this stay, given initiation of dexamethasone. A1C 5.6, no hx of DM.   Was maintained on insulin gtt in immediate postop period while intubated, transitioned to basal/bolus insulin regimen on 1/8. Had been getting TFs, okay'd for modified diet on 1/9.  --200 range.     Plan;    -- continue BID glargine- decreased dose with likely reduction in decadron dose today  -- on med resistance sliding scale q4 hours as pt on continuous TFs.       Uncomplicated UTI dt ESBL  UA in ED was grossly abnl. UC ultimately grew >100k ESBL ecoli. Had been placed on ceftriaxone initially, changed to meropenem on 1/5 after culture results known  afebrile  -- completed 7 day course with refugio 01/12     Acute Blood Loss Anemia  Secondary to surgery, cerebellar hematoma.   Hgb stable at 9-11, hb pending      GERD  Chronic and stable on PPI     Access:   picc line placed 1/16. Site looks fone    Hypernatremia, resolved     FEN: TFs per nutritionist, follow labs  Tubes / lines: EVD, TF, figueroa removed and following pvr  DVT Prophylaxis: PCDs  Code Status: Full Code     Disposition: Cont monitoring in ICU given EVD- may be able to transfer today. Discharge date unclear, pending postop course. Therapy recommending ARU stay.      Discussed care plan with RN, pt's son    Arash SILVA MD [place]  Text Page  (7am to 6pm)  Interval History   250 ml fluid bolus with good response overnight for sbp 90s/-  Per RN, cont to be more awake, respond with simple words at times  pvr 400cc X2 yesterday.     -Data reviewed today: I reviewed all new labs and imaging results over the last 24 hours. I personally reviewed labs and imaging last 24 hours.     Physical Exam   Temp: 97  F (36.1  C) Temp src: Axillary BP: 134/60 Pulse: 68   Resp: 14 SpO2: 98 % O2 Device: None (Room air)    Vitals:    01/15/21 0400 01/16/21 0543 01/17/21 0400   Weight: 73.3 kg (161 lb 9.6  oz) 73.1 kg (161 lb 2.5 oz) 73.6 kg (162 lb 4.1 oz)     Vital Signs with Ranges  Temp:  [95.7  F (35.4  C)-97  F (36.1  C)] 97  F (36.1  C)  Pulse:  [60-74] 68  Resp:  [8-27] 14  BP: ()/(42-81) 134/60  SpO2:  [94 %-99 %] 98 %  I/O last 3 completed shifts:  In: 1960 [NG/GT:630; IV Piggyback:250]  Out: 1720 [Urine:1475; Drains:245]    Constitutional: In chair  HEENT: sutures noted, drain in place, feeding tube in place  Respiratory: Breathing easily, CTAB  Cardiovascular: RRR no r/g/m  GI: soft, nt, nd  Skin/Integumen: no rash or edema  Neuro: moans as usual, follows simple commands,  baseline L facial droop, baseline expressive aphasia. Unable to assess BUE. , moves BLEs      Medications     dextrose         acetaminophen  975 mg Oral or Feeding Tube Q8H     amiodarone  400 mg Oral BID     carvedilol  12.5 mg Oral BID w/meals     dexamethasone  4 mg Intravenous Q12H     docusate  100 mg Oral or Feeding Tube BID     sennosides  5-10 mL Oral or Feeding Tube BID    And     docusate   mg Oral or Feeding Tube BID     insulin aspart  1-7 Units Subcutaneous TID AC     insulin aspart  1-5 Units Subcutaneous At Bedtime     insulin glargine  15 Units Subcutaneous At Bedtime     lisinopril  10 mg Oral Daily     nystatin  500,000 Units Swish & Spit 4x Daily     pantoprazole  40 mg Oral or Feeding Tube Daily     polyethylene glycol  17 g Oral Daily     sodium chloride (PF)  10 mL Intracatheter Q8H       Data   Recent Labs   Lab 01/17/21  0754 01/16/21  1220 01/15/21  0543 01/14/21  0809 01/13/21  0921 01/13/21  0921 01/12/21  0616 01/12/21  0616   WBC  --  22.3*  --   --   --  17.5*  --  17.6*   HGB  --  11.2*  --   --   --  11.0*  --  10.9*   MCV  --  92  --   --   --  92  --  94   PLT  --  533*  --   --   --  384  --  367    132*  --  134   < > 136   < > 136   POTASSIUM 5.0 4.8 5.2 5.0   < > 5.2   < > 4.4   CHLORIDE 100 98  --  103   < > 105   < > 106   CO2 30 29  --  28   < > 26   < > 25   BUN 52* 46*   --  35*   < > 32*   < > 28   CR 0.74 0.70  --  0.58   < > 0.56   < > 0.58   ANIONGAP 3 5  --  3   < > 5   < > 5   LADI 8.0* 8.3*  --  8.3*   < > 8.4*   < > 8.6   * 202*  --  234*   < > 177*   < > 187*    < > = values in this interval not displayed.       Imaging:   No results found for this or any previous visit (from the past 24 hour(s)).

## 2021-01-17 NOTE — PROGRESS NOTES
[facility]    Neurosurgery  Daily Post-Op Note    Assessment & Plan   Procedure(s):  LEFT PHOEBE HOLE DRAINAGE OF SUBDURAL HEMATOMA   2 Days Post-Op  More alert today, following simple commands. Moving purposefully.   BIANCA drained 70 mL overnight.   Plan:  -Advance activity as tolerated  -Continue supportive and symptomatic treatment  -Start or continue physical therapy  -will discuss discontinuing EVD with team today.     Celestino Fisher    Interval History   Stable.  Doing well.  Improving slowly.  Pain is reasonably controlled.  No fevers.     Physical Exam   Temp: 97  F (36.1  C) Temp src: Axillary BP: 116/52 Pulse: 72   Resp: 13 SpO2: 98 % O2 Device: None (Room air)    Vitals:    01/15/21 0400 01/16/21 0543 01/17/21 0400   Weight: 73.3 kg (161 lb 9.6 oz) 73.1 kg (161 lb 2.5 oz) 73.6 kg (162 lb 4.1 oz)     Vital Signs with Ranges  Temp:  [95.7  F (35.4  C)-97  F (36.1  C)] 97  F (36.1  C)  Pulse:  [60-74] 72  Resp:  [8-27] 13  BP: ()/(42-81) 116/52  SpO2:  [94 %-99 %] 98 %  I/O last 3 completed shifts:  In: 1960 [NG/GT:630; IV Piggyback:250]  Out: 1720 [Urine:1475; Drains:245]    Alert and following commands.  Moves all extremities .    Incision: CDI      Medications     dextrose          acetaminophen  975 mg Oral or Feeding Tube Q8H     amiodarone  400 mg Oral BID     carvedilol  12.5 mg Oral BID w/meals     dexamethasone  4 mg Intravenous Q12H     docusate  100 mg Oral or Feeding Tube BID     sennosides  5-10 mL Oral or Feeding Tube BID    And     docusate   mg Oral or Feeding Tube BID     insulin aspart  1-6 Units Subcutaneous Q4H     insulin glargine  15 Units Subcutaneous At Bedtime     lisinopril  10 mg Oral Daily     nystatin  500,000 Units Swish & Spit 4x Daily     pantoprazole  40 mg Oral or Feeding Tube Daily     polyethylene glycol  17 g Oral Daily     sodium chloride (PF)  10 mL Intracatheter Q8H           Celestino Fisher PA-C  [facility]  [place]  [address]  [address]  [place] [address]    Tel [phone]  Pager [phone]

## 2021-01-18 NOTE — PROGRESS NOTES
Neuro: Follow commands. Neuro exam limited due to weakness. Pupil equal and reactive. R hemiparesis. L facial droop. Expressive aphagia.   Pulm: Diminished LS. On RA.   CV: SR. BP managed with prn hydralazine and labetalol.   : Matthews placed due to retention  GI: On TF via NG. BM active. Incontinent BMs.   Extremities: Purposeful movements on BUE. Localized movements on BLE  Skin: Scattered bruises. Incisions on head. EVD and BIANCA in place.   Lines: PICC on LUE  Family:  updated via phone  Plan: Continue to monitor and support.

## 2021-01-18 NOTE — PLAN OF CARE
Periods during the day when she seemed more alert, very inconsistent in following commands, little spontaneous movement of lower extremities, skin turgor poor, every  wrinkle in the sheet leaves deep lines.  Cries out with neck pain during turning,  Augustine discontinued by NP, EVD remains clamped.  Issues with multiple episodes of diarrhea after frequent laxatives to promote BM, rectal pouch applied.  Spouse Otf updated this am and this afternoon, aware of POC.

## 2021-01-18 NOTE — PLAN OF CARE
Neuro: alert. following most commands. Pupil equal and reactive, conjugate. L facial droop improved.   Pulm: LS clear. On RA  CV: SR, SBP remains < 140 with no prns required.   : figueroa in place with adequate output.  GI:  TF via NG, at goal of 45 ml/hr, tolerating well.  Extremities: purposeful movements, can squeeze fingers and wiggle toes  Lines: PICC SL   Skin: Scattered bruises. Grossly intact, except redness on vanessa-area.   Family: No phone call this shift.   Plan: Awaiting for family decision on ongoing cares.

## 2021-01-18 NOTE — PROGRESS NOTES
"[facility]    Hospitalist Progress Note    Assessment & Plan     Jacque Bernstein is a 86 year old female with PMHx of hypertension, stage II CKD, GERD, hx of R breast cancer with resultant peripheral neuropathy dt chemotherapy, GERD, OA and frequent falls who was admitted from [place] ED on 1/3/2021 for further evaluation after she was incidentally found to have 2 intracranial masses after a mechanical fall with findings concerning for metastatic breast cancer.      Intracranial masses x2, dt metastatic breast cancer, s/p craniotomy with resection of mets on 1/6/21  Cerebellar hematoma, s/p R frontal EVD placement 1/6/21  S/p LEFT PHOEBE HOLE DRAINAGE OF SUBDURAL HEMATOMA 1/15.   Presented to [facility] ED after a mechanical fall where she struck her head on the headboard of her bed resulting in a persistent headache. Had endorsed worsening ataxia over the past few months with frequent falls. Head CT in the ED showed 2 masses, larger mass was located in the cerebellum with mass effect and tonsillar herniation with associated mild hydrocephalus and 2cm parietal mass with vasogenic edema was also noted. Given hx of breast cancer, findings were concerning for metastatic disease. Was started on dexamethasone on admission. Seen by neurosurgery and taken to the OR on 1/6/21 for midline posterior fossa Stealth craniotomy and resection of brain metastasis. Pathology subsequently returned positive for \"metastatic carcinoma consistent with prior breast primary malignancy\". Postop, she developed a cerebellar hematoma requiring she go back to the OR (also on 1/6) for evacuation of hematoma and placement of a R frontal external ventricular drain. Remained extubated post-procedures and was requiring Precedex and phenylephrine gtts. These were weaned and patient was extubated on 1/8/21. Briefly needed nicardipine gtt from BP management but this was weaned on 1/9.      --1/15: EVD mgmt per neurosurgery, " they clamped yesterday and ICP progressively increased throughout the day. CT head this AM with increasing mass effect and midline shift>per nursing NSGY in discussion to take patient back to the OR. neurosurgery discusssed with family. S/p LEFT PHOEBE HOLE DRAINAGE OF SUBDURAL HEMATOMA 1/15. Pod 1  --CT head 1/16:                                                                  IMPRESSION:  1.  Decreased pneumocephalus along the anterior left frontal lobe. Decreased associated mass effect with near-complete resolution of left-to-right midline shift, measuring 1 mm.  2.  Otherwise, no significant change.    Doing better post surgery. More alert and following commands. Nl tele  OnTFs, drain in place  - more awake overall since surgery, using simple words rarely and consistently following simple commands. Remains npo  Today- stable night. No new issues. Stable neuro exam      Plan;   - Continue EVD for now per surgery  -Repeat head CT tomorrow and possible discontinuation of EVD pending results tomorrow per surgery  -advance activity as tolerated.   -cont current pain control measures per surgery  -- conts on dexamethasone 4mg IV BID, management per neurosurgey- may decrease dose, adjusted lantus dosing  -- goal SBP <160 , PRN hydralazine and labetalol available  -- PT/OT following  -- DD1 w/honey thick liquids per SLP.  SLP continues to follow and encourage PO intake as able. May need to discuss PEG tube placement once over acute issues, this will play role into goals of care discussion  -- TF per nutrition at goal rate, NPO now  -speech following. Remains npo  -meeting with PT- seen 1/17       Hx of right sided breast cancer, now with findings of metastatic disease as above  Peripheral neuropathy dt prior chemotherapy, manages with Tylenol  Clinical staging from 2017: Stage IIIA (T3, N2a, M0).  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    S/p mastectomy, radiation and chemo therapy.  Had been maintained  on Arimidex 1mg daily. Follows with [facility] (Dr. Ho)  -- Dr Cazares spoke with her primary oncology 01/12 and updated son on overall care. Family would like patient to get out this acute incident and focus on getting to meet with her outpatient oncology to discuss overall goals       Urinary retention- folely placed 1/17  Bladder scan 400cc X 2 on 1/16.   Matthews placed 1/17 ,given persistently elevated PvR/straight cath volumes above 400cc.     Goals of Care  -I spoke with son 01/14 and updated him on plan prior to changes seen today and need for OR. Goal of family is to see patient improve from this stay but he is aware that we may get to a point where there is not a good outcome and ultimately we cannot change her metastatic cancer diagnosis. He said in past patient does not want prolonged suffering. He said he will start discussion with family.   -Discussed care plan with pt's son and spouse on 1/7, for now following how pt doing post op, they have realistic expectations and understand overall guarded prognosis.     Episodic Asymptomatic SVT  Had PACs and periods of ectopy on 1/9. New onset SVT on 1/10 AM. BPs stable and patient was asymptomatic.   Given 500ml LR bolus, calcium gluconate, IV Lopressor and AM oral metoprolol early.  Lytes (Mag, K) stable, TSH low but FT4 nl, no hx of thyroid disease and hard to interpret in setting of acute illness.   Recent echo on 1/7 showed EF 65-70% with indeterminate diastolic dysfunction and normal wall motion, mild pulmonary hypertension, mild aortic stenosis.  -- cardiology started on amiodarone with BB, no AC due to above  -- HR stable since starting above  -- cards signed off  - follow tsh--> nl, follow TSH outpatient while on amiodorone  -nl telemetry  -outpatient cardiology follow up sVT and amiodorone dosing     Hypertension  Home meds: lisinopril 20mg po daily  Elevated BPs this stay likely dt surgery, steroids. Briefly required nicardipine gtt on 1/8 PM but was  weaned off after a few hours  -changed metoprolol to coreg and add back on lisinopril for difficult to control pressures, increased coreg 01/13. Increased lisinopril 01/15  -running low nl 1/6, -fluid bolus for low nl sbp 90s/- 1/16 with good response, lisinopril dose decreased  -continue PRNSs   goal SBP <160 , PRN hydralazine and labetalol available  -decreased Lisinopril to 10mg every day on 1/17  - follow bmp, has high nl K level, follow  -sbp at goal    Stage II CKD  Baseline Cr is around 1.   Renal function remains stable thus far  Follow high nl K  Good UO     Steroid-induced hyperglycemia  Noted this stay, given initiation of dexamethasone. A1C 5.6, no hx of DM.   Was maintained on insulin gtt in immediate postop period while intubated, transitioned to basal/bolus insulin regimen on 1/8. Had been getting TFs, okay'd for modified diet on 1/9.  --200 range.     Plan;    -- continue glargine- decreased dose with likely reduction in decadron dose. Adjust as needed.   -- on med resistance sliding scale q4 hours as pt on continuous TFs.       Uncomplicated UTI dt ESBL  UA in ED was grossly abnl. UC ultimately grew >100k ESBL ecoli. Had been placed on ceftriaxone initially, changed to meropenem on 1/5 after culture results known  afebrile  -- completed 7 day course with refugio 01/12     Acute Blood Loss Anemia  Secondary to surgery, cerebellar hematoma.   Hgb stable at 9-11, hb pending      GERD  Chronic and stable on PPI     Access:   picc line placed 1/16. Site looks fone    Hypernatremia, resolved     FEN: TFs per nutritionist, follow labs  Tubes / lines: EVD, TF, figueroa removed and following pvr  DVT Prophylaxis: PCDs  Code Status: Full Code     Disposition: Cont monitoring in ICU given EVD- may be able to transfer today. Discharge date unclear, pending postop course. Therapy recommending ARU stay.      Discussed care plan with RN, pt's son and spouse 1/17    Arash Dolan MD  Text Page  (7am to  6pm)  Interval History   Stable night  No new issues.   Pt Unable to provide hx    -Data reviewed today: I reviewed all new labs and imaging results over the last 24 hours. I personally reviewed labs and imaging last 24 hours.     Physical Exam   Temp: 96.8  F (36  C) Temp src: Axillary BP: 139/72 Pulse: 71   Resp: 19 SpO2: 99 % O2 Device: None (Room air)    Vitals:    01/16/21 0543 01/17/21 0400 01/18/21 0200   Weight: 73.1 kg (161 lb 2.5 oz) 73.6 kg (162 lb 4.1 oz) 72.4 kg (159 lb 9.8 oz)     Vital Signs with Ranges  Temp:  [96.8  F (36  C)-98.9  F (37.2  C)] 96.8  F (36  C)  Pulse:  [59-76] 71  Resp:  [8-23] 19  BP: ()/() 139/72  SpO2:  [95 %-99 %] 99 %  I/O last 3 completed shifts:  In: 1800 [NG/GT:720]  Out: 1595 [Urine:1330; Drains:265]    Constitutional: In bed  HEENT: sutures noted, drain in place, feeding tube in place,   Respiratory: Breathing easily, CTAB  Cardiovascular: RRR no r/g/m  GI: soft, nt, nd  Skin/Integumen: no rash or edema  Neuro: moans as usual, follows simple commands,  baseline L facial droop, baseline expressive aphasia. Unable to assess BUE. , moves BLEs      Medications     dextrose         acetaminophen  975 mg Oral or Feeding Tube Q8H     amiodarone  400 mg Oral BID     carvedilol  12.5 mg Oral BID w/meals     dexamethasone  4 mg Intravenous Q12H     docusate  100 mg Oral or Feeding Tube BID     sennosides  5-10 mL Oral or Feeding Tube BID    And     docusate   mg Oral or Feeding Tube BID     insulin aspart  1-6 Units Subcutaneous Q4H     insulin glargine  15 Units Subcutaneous At Bedtime     lisinopril  10 mg Oral Daily     nystatin  500,000 Units Swish & Spit 4x Daily     pantoprazole  40 mg Oral or Feeding Tube Daily     polyethylene glycol  17 g Oral Daily     sodium chloride (PF)  10 mL Intracatheter Q8H     sodium phosphate  9 mmol Intravenous Once       Data   Recent Labs   Lab 01/18/21  0427 01/17/21  0754 01/16/21  1220 01/13/21  0921 01/13/21  0921  01/12/21 0616 01/12/21 0616   WBC  --   --  22.3*  --  17.5*  --  17.6*   HGB  --   --  11.2*  --  11.0*  --  10.9*   MCV  --   --  92  --  92  --  94   PLT  --   --  533*  --  384  --  367    133 132*   < > 136   < > 136   POTASSIUM 5.2 5.0 4.8   < > 5.2   < > 4.4   CHLORIDE 103 100 98   < > 105   < > 106   CO2 30 30 29   < > 26   < > 25   BUN 57* 52* 46*   < > 32*   < > 28   CR 0.81 0.74 0.70   < > 0.56   < > 0.58   ANIONGAP 2* 3 5   < > 5   < > 5   LADI 8.0* 8.0* 8.3*   < > 8.4*   < > 8.6   * 146* 202*   < > 177*   < > 187*    < > = values in this interval not displayed.       Imaging:   No results found for this or any previous visit (from the past 24 hour(s)).

## 2021-01-18 NOTE — PROGRESS NOTES
[facility]    Neurosurgery Progress Note    Date of Service (when I saw the patient): 01/18/2021     Assessment & Plan   Jacque Bernstein is a 86 year old female who was admitted on 1/3/2021. She is s/p crani for tumor and subsequent left gila hole drainage of subdural hematoma. Today she was seen lying in bed. She denies pain. She is able to follow simple commands and moves all extremities equally. BIANCA drain with 70 out overnight, clear fluid. Incision intact.    Principal Problem:    Brain metastasis (H)    Assessment: s/p crani for tumor and gila hole for SDH    Plan:   -Will plan to discontinue BIANCA drain today  -Continue EVD for now  -Repeat head CT tomorrow and possible discontinuation of EVD pending results  -Continue supportive and symptomatic treatment    ADDENDUM:  BIANCA drain was removed. Drain site closed with suture.  Tip of drain intact upon removal.  Site appears clean without erythema, fluctuation, or induration.  Patient tolerated procedure without difficulty. Site with dressing.          I have discussed the following assessment and plan Dr. Rojas who is in agreement with initial plan and will follow up with further consultation recommendations.    Trixie Dawson CNP  [facility] Neurosurgery  [facility]  [address]  [address] [place] [id number]    Tel [phone]  Pager [phone]      Interval History   Stable.    Physical Exam   Temp: 96.8  F (36  C) Temp src: Axillary BP: 139/72 Pulse: 71   Resp: 19 SpO2: 99 % O2 Device: None (Room air)    Vitals:    01/16/21 0543 01/17/21 0400 01/18/21 0200   Weight: 161 lb 2.5 oz (73.1 kg) 162 lb 4.1 oz (73.6 kg) 159 lb 9.8 oz (72.4 kg)     Vital Signs with Ranges  Temp:  [96.8  F (36  C)-98.9  F (37.2  C)] 96.8  F (36  C)  Pulse:  [59-76] 71  Resp:  [8-23] 19  BP: ()/() 139/72  SpO2:  [95 %-99 %] 99 %  I/O last 3 completed shifts:  In: 1800 [NG/GT:720]  Out: 1595 [Urine:1330;  "Drains:265]     , Blood pressure 139/72, pulse 71, temperature 96.8  F (36  C), temperature source Axillary, resp. rate 19, height 5' 5\" (1.651 m), weight 159 lb 9.8 oz (72.4 kg), SpO2 99 %.  159 lbs 9.81 oz  HEENT:  Normocephalic.  PERRLA.   Heart:  No peripheral edema  Lungs:  No SOB  Skin:  Warm and dry, good capillary refill. Incision intact, BIANCA and EVD intact.  Extremities:  Good radial and dorsalis pedis pulses bilaterally, no edema, cyanosis or clubbing.    NEUROLOGICAL EXAMINATION:   Mental status:  Alert and follows simple commands.  Motor:  Moves all extremities equally.    Medications     dextrose         acetaminophen  975 mg Oral or Feeding Tube Q8H     amiodarone  400 mg Oral BID     carvedilol  12.5 mg Oral BID w/meals     dexamethasone  4 mg Intravenous Q12H     docusate  100 mg Oral or Feeding Tube BID     sennosides  5-10 mL Oral or Feeding Tube BID    And     docusate   mg Oral or Feeding Tube BID     insulin aspart  1-6 Units Subcutaneous Q4H     insulin glargine  15 Units Subcutaneous At Bedtime     lisinopril  10 mg Oral Daily     nystatin  500,000 Units Swish & Spit 4x Daily     pantoprazole  40 mg Oral or Feeding Tube Daily     polyethylene glycol  17 g Oral Daily     sodium chloride (PF)  10 mL Intracatheter Q8H       Data     CBC RESULTS:   Recent Labs   Lab Test 01/16/21  1220   WBC 22.3*   RBC 3.83   HGB 11.2*   HCT 35.1   MCV 92   MCH 29.2   MCHC 31.9   RDW 15.7*   *     Basic Metabolic Panel:  Lab Results   Component Value Date     01/18/2021      Lab Results   Component Value Date    POTASSIUM 5.2 01/18/2021     Lab Results   Component Value Date    CHLORIDE 103 01/18/2021     Lab Results   Component Value Date    LADI 8.0 01/18/2021     Lab Results   Component Value Date    CO2 30 01/18/2021     Lab Results   Component Value Date    BUN 57 01/18/2021     Lab Results   Component Value Date    CR 0.81 01/18/2021     Lab Results   Component Value Date     " 01/18/2021     INR:  Lab Results   Component Value Date    INR 1.06 01/04/2021

## 2021-01-19 NOTE — PROGRESS NOTES
"[facility]    Hospitalist Progress Note    Assessment & Plan     Jacque Bernstein is a 86 year old female with PMHx of hypertension, stage II CKD, GERD, hx of R breast cancer with resultant peripheral neuropathy dt chemotherapy, GERD, OA and frequent falls who was admitted from [place] ED on 1/3/2021 for further evaluation after she was incidentally found to have 2 intracranial masses after a mechanical fall with findings concerning for metastatic breast cancer.      Intracranial masses x2, dt metastatic breast cancer, s/p craniotomy with resection of mets on 1/6/21  Cerebellar hematoma, s/p R frontal EVD placement 1/6/21  S/p LEFT PHOEBE HOLE DRAINAGE OF SUBDURAL HEMATOMA 1/15.   Presented to [facility] ED after a mechanical fall where she struck her head on the headboard of her bed resulting in a persistent headache. Had endorsed worsening ataxia over the past few months with frequent falls. Head CT in the ED showed 2 masses, larger mass was located in the cerebellum with mass effect and tonsillar herniation with associated mild hydrocephalus and 2cm parietal mass with vasogenic edema was also noted. Given hx of breast cancer, findings were concerning for metastatic disease. Was started on dexamethasone on admission. Seen by neurosurgery and taken to the OR on 1/6/21 for midline posterior fossa Stealth craniotomy and resection of brain metastasis. Pathology subsequently returned positive for \"metastatic carcinoma consistent with prior breast primary malignancy\". Postop, she developed a cerebellar hematoma requiring she go back to the OR (also on 1/6) for evacuation of hematoma and placement of a R frontal external ventricular drain. Remained extubated post-procedures and was requiring Precedex and phenylephrine gtts. These were weaned and patient was extubated on 1/8/21. Briefly needed nicardipine gtt from BP management but this was weaned on 1/9.      --1/15: EVD mgmt per neurosurgery, " they clamped yesterday and ICP progressively increased throughout the day. CT head this AM with increasing mass effect and midline shift>per nursing NSGY in discussion to take patient back to the OR. neurosurgery discusssed with family. S/p LEFT PHOEBE HOLE DRAINAGE OF SUBDURAL HEMATOMA 1/15. Pod 1  --CT head 1/16:                                                                  IMPRESSION:  1.  Decreased pneumocephalus along the anterior left frontal lobe. Decreased associated mass effect with near-complete resolution of left-to-right midline shift, measuring 1 mm.  2.  Otherwise, no significant change.    Doing better post surgery. More alert and following commands. Nl tele  OnTFs, drain in place  - more awake overall since surgery, using simple words rarely and consistently following simple commands. Remains npo  - Head CT 1/19:   1.  Interval removal of the left-sided subdural drainage catheter with development of a  CSF density extra-axial fluid now measuring up to 1.6 cm. Previously there is a pocket of gas overlying the left frontal lobe in the extra-axial space which is   slightly decreased in size.  2.  Left to right midline shift now measures 0.8 centimeters, previously 0.3 cm.  3.  Slight interval increase in extra-axial CSF density fluid along the right cerebral convexity now measuring 1.5 cm, previously 1.35 centimeters.   4.  Stable EVD catheter with interval decrease in size of the ventricular system.  5.  Postoperative changes of a suboccipital craniotomy with evolving encephalomalacia and blood product involving the posterior fossa.     Afebrile. Routine wbc up to 39.6. no diarrhea or focal infectious symptoms  On decadron bid    Plan;   -call out to surgery to discuss plan  - Continue EVD for now per surgery  -Repeat head CT tomorrow and possible discontinuation of EVD pending results tomorrow per surgery  -advance activity as tolerated.   -cont current pain control measures per surgery  -- conts on  dexamethasone 4mg IV BID, management per neurosurgey- may decrease dose, adjusted lantus dosing  -- goal SBP <160 , PRN hydralazine and labetalol available  -- PT/OT following  -- DD1 w/honey thick liquids per SLP.  SLP continues to follow and encourage PO intake as able. May need to discuss PEG tube placement once over acute issues, this will play role into goals of care discussion  -- TF per nutrition at goal rate, NPO now  -speech following. Remains npo  -meeting with PT- seen 1/17     Leukocytosis  Possible abd pain  Has had leukocytisis since admission  On steroids.   Wbc 8---> 37 (post op) on 1/8 then down to 17 leandro--> 22---> 39 over several days  Afebrile. Neuro status not worse but better since surgery  Iv site nl. No rash  No pulmonary issues. Nl sats. No diarrhea  Some abd pain on exam-pt seems uncomfortable on abd exam. Seems new  leukomoid response 2/2 recent surgery, steroids likely contributing. Similar response postop earlier in stay rule out intraabdominal process.   Plan;  Hold tFS, daily cbc, check CT CAP  Will talk with surgery including steroid dose    Hx of right sided breast cancer, now with findings of metastatic disease as above  Peripheral neuropathy dt prior chemotherapy, manages with Tylenol  Clinical staging from 2017: Stage IIIA (T3, N2a, M0).  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    S/p mastectomy, radiation and chemo therapy.  Had been maintained on Arimidex 1mg daily. Follows with [facility] (Dr. Ho)  -- Dr Cazares spoke with her primary oncology 01/12 and updated son on overall care. Family would like patient to get out this acute incident and focus on getting to meet with her outpatient oncology to discuss overall goals       Urinary retention- folely placed 1/17  Bladder scan 400cc X 2 on 1/16.   Matthews placed 1/17 ,given persistently elevated PvR/straight cath volumes above 400cc.       Goals of Care  -I spoke with son 01/14 and updated him on plan prior to  changes seen today and need for OR. Goal of family is to see patient improve from this stay but he is aware that we may get to a point where there is not a good outcome and ultimately we cannot change her metastatic cancer diagnosis. He said in past patient does not want prolonged suffering. He said he will start discussion with family.   -Discussed care plan with pt's son and spouse on 1/7, for now following how pt doing post op, they have realistic expectations and understand overall guarded prognosis.     Episodic Asymptomatic SVT  Had PACs and periods of ectopy on 1/9. New onset SVT on 1/10 AM. BPs stable and patient was asymptomatic.   Given 500ml LR bolus, calcium gluconate, IV Lopressor and AM oral metoprolol early.  Lytes (Mag, K) stable, TSH low but FT4 nl, no hx of thyroid disease and hard to interpret in setting of acute illness.   Recent echo on 1/7 showed EF 65-70% with indeterminate diastolic dysfunction and normal wall motion, mild pulmonary hypertension, mild aortic stenosis.  -- cardiology started on amiodarone with BB, no AC due to above  -- HR stable since starting above  -- cards signed off  - follow tsh--> nl, follow TSH outpatient while on amiodorone  -nl telemetry  -outpatient cardiology follow up sVT and amiodorone dosing  -per cardiology recs, will reduce amiodorone from 400mg bid to 200mg every day. Was on 400mg bid dose for 8 days. Will skip 400mg every day for 4 days step.   ekg to check qtc interval---nsr, no ischemic changes, nl qtc  -check  LFTs       Hypertension  Home meds: lisinopril 20mg po daily  Elevated BPs this stay likely dt surgery, steroids. Briefly required nicardipine gtt on 1/8 PM but was weaned off after a few hours  -changed metoprolol to coreg and add back on lisinopril for difficult to control pressures, increased coreg 01/13. Increased lisinopril 01/15  -running low nl 1/6, -fluid bolus for low nl sbp 90s/- 1/16 with good response, lisinopril dose  decreased  -continue PRNSs   goal SBP <160 , PRN hydralazine and labetalol available  -decreased Lisinopril to 10mg every day on 1/17  - follow bmp, has high nl K level, follow  -sbp slightly hypertensive. Consider adding norvasc if needed. No increase in lisinopril given high nl K levels    Stage II CKD  Baseline Cr is around 1.   Renal function remains stable thus far  Follow high nl K  Good UO  Nl bmp, daily bmp     Steroid-induced hyperglycemia  Noted this stay, given initiation of dexamethasone. A1C 5.6, no hx of DM.   Was maintained on insulin gtt in immediate postop period while intubated, transitioned to basal/bolus insulin regimen on 1/8. Had been getting TFs, okay'd for modified diet on 1/9.  --200 range.     Plan;   -follow bS while npo for CT, may need dextrose fluids   -- continue glargine- decreased dose with likely reduction in decadron dose. Adjust as needed.   -- on med resistance sliding scale q4 hours as pt on continuous TFs.       Uncomplicated UTI dt ESBL  UA in ED was grossly abnl. UC ultimately grew >100k ESBL ecoli. Had been placed on ceftriaxone initially, changed to meropenem on 1/5 after culture results known  afebrile  -- completed 7 day course with refugio 01/12     Acute Blood Loss Anemia  Secondary to surgery, cerebellar hematoma.   Hgb stable at 9-11, hb pending      GERD  Chronic and stable on PPI     Access:   picc line placed 1/16. Site looks fone    Hypernatremia, resolved     FEN: TFs per nutritionist, follow labs  Tubes / lines: EVD, TF, figueroa removed and following pvr  DVT Prophylaxis: PCDs      Code Status/advanced directives  1/19. Discussed care plan and goals of care with patient's son and  yesterday and with pt's son today. See note from Dr. Rojas on 1/19.   They wish for patient to be DNR/DNI.   At this time, No ICU pressors, invassive procedures or surgeries but would consider ICU if clinical change related to neurosurgery.   They inquired about hospice care.  Discussed hospice care in detail with son.      Disposition: Cont monitoring in ICU given EVD- may be able to transfer today. Discharge date unclear, pending postop course. Therapy recommending ARU stay.      Discussed care plan with RN, pt's son and spouse 1/17    Arash Dolan MD  Text Page  (7am to 6pm)  Interval History   Stable night. Stable neuros  Head cT this am -changes communicated to neurosurgery  Wbc 39  Nodded yes when asked of had HA  Following commands       -Data reviewed today: I reviewed all new labs and imaging results over the last 24 hours. I personally reviewed labs and imaging last 24 hours.     Physical Exam   Temp: 96.8  F (36  C) Temp src: Axillary BP: (!) 161/79(patient just back from CT will recheck and medicate if need ) Pulse: 67   Resp: 12 SpO2: 95 % O2 Device: None (Room air)    Vitals:    01/17/21 0400 01/18/21 0200 01/19/21 0400   Weight: 73.6 kg (162 lb 4.1 oz) 72.4 kg (159 lb 9.8 oz) 71.8 kg (158 lb 4.6 oz)     Vital Signs with Ranges  Temp:  [96.8  F (36  C)-97.6  F (36.4  C)] 96.8  F (36  C)  Pulse:  [58-80] 67  Resp:  [0-34] 12  BP: (114-173)/(48-87) 161/79  SpO2:  [85 %-99 %] 95 %  I/O last 3 completed shifts:  In: 1715 [NG/GT:680]  Out: 1925 [Urine:1815; Drains:110]    Constitutional: In bed, moaning at times as usual  HEENT: sutures noted, drain in place, feeding tube in place,   Respiratory: Breathing easily, CTAB  Cardiovascular: RRR no r/g/m  GI: soft, ND, some tenderness with exam. ? Guarding. Seems soft, pt seems uncomfortable with abd exam  Skin/Integumen: no rash or edema, picc line site and wound seem fine  Neuro: moans as usual, follows simple commands,  baseline L facial droop, baseline expressive aphasia. Unable to assess BUE. , moves BLEs      Medications     dextrose         amiodarone  200 mg Oral or FT or NG tube Daily     carvedilol  12.5 mg Oral BID w/meals     dexamethasone  4 mg Intravenous Q12H     docusate  100 mg Oral or Feeding Tube BID      sennosides  5-10 mL Oral or Feeding Tube BID    And     docusate   mg Oral or Feeding Tube BID     insulin aspart  1-6 Units Subcutaneous Q4H     insulin glargine  15 Units Subcutaneous At Bedtime     lisinopril  10 mg Oral Daily     nystatin  500,000 Units Swish & Spit 4x Daily     pantoprazole  40 mg Oral or Feeding Tube Daily     polyethylene glycol  17 g Oral Daily     sodium chloride (PF)  10 mL Intracatheter Q8H     sodium phosphate  15 mmol Intravenous Once       Data   Recent Labs   Lab 01/19/21  0400 01/18/21  0427 01/17/21  0754 01/16/21  1220 01/13/21  0921 01/13/21  0921   WBC 39.6*  --   --  22.3*  --  17.5*   HGB 9.6*  --   --  11.2*  --  11.0*   MCV 94  --   --  92  --  92     --   --  533*  --  384    135 133 132*   < > 136   POTASSIUM 5.2 5.2 5.0 4.8   < > 5.2   CHLORIDE 101 103 100 98   < > 105   CO2 30 30 30 29   < > 26   BUN 44* 57* 52* 46*   < > 32*   CR 0.66 0.81 0.74 0.70   < > 0.56   ANIONGAP 3 2* 3 5   < > 5   LADI 8.1* 8.0* 8.0* 8.3*   < > 8.4*   * 207* 146* 202*   < > 177*    < > = values in this interval not displayed.       Imaging:   Recent Results (from the past 24 hour(s))   CT Head w/o Contrast    Narrative    EXAM: CT HEAD W/O CONTRAST  LOCATION: [facility]  DATE/TIME: 1/19/2021 5:44 AM    INDICATION: Brain mass or lesion. Follow-up hemorrhage status post left-sided gila hole.  COMPARISON: 01/16/2021.  TECHNIQUE: Routine CT Head without IV contrast. Multiplanar reformats. Dose reduction techniques were used.    FINDINGS:  INTRACRANIAL CONTENTS: Again seen are postsurgical changes of a suboccipital craniotomy with a prominent region of encephalomalacia involving the cerebellum, not significantly changed when compared to prior exam.     Additionally there is a persistent right frontal EVD catheter with tip terminating in the anterior horn of the right lateral ventricle. There has been interval decrease in size of the ventricular system when  compared to prior examination.     Since prior examination there is been interval removal of the left sided subdural drainage catheter. There are bilateral CSF density extra-axial fluid collections overlying the bilateral cerebral convexities, left greater than right. The CSF density   collection on the left is noted measures up to 1.6 cm. A focal gas-filled collection over the left cerebral convexity measures up to 1.6 cm, previously 1.96 cm. Since prior exam, the amount of gas overlying the left frontal lobe has decreased. The CSF   density collection overlying the right cerebral convexity measures up to 1.5 cm, previously 1.35 cm. Left to right midline shift measures up to 0.8 cm, previously 0.3 cm.    Mild local mass effect is noted along the left lateral ventricle and left cerebral hemisphere.     There is persistent unchanged vasogenic edema involving the left parietal lobe.     No acute large territory infarction is identified.     Evolving blood product is noted in the region of the left quadrigeminal plate cistern, not increased when compared to prior exam. Stable appearing subarachnoid blood product within the posterior fossa. Decreasing trace subdural blood product along the   bilateral tentorial leaflets.        VISUALIZED ORBITS/SINUSES/MASTOIDS: Prior right cataract surgery. Visualized portions of the orbits are otherwise unremarkable. No paranasal sinus mucosal disease. No middle ear or mastoid effusion.    BONES/SOFT TISSUES: No acute abnormality.      Impression    IMPRESSION:  1.  Interval removal of the left-sided subdural drainage catheter with development of a  CSF density extra-axial fluid now measuring up to 1.6 cm. Previously there is a pocket of gas overlying the left frontal lobe in the extra-axial space which is   slightly decreased in size.  2.  Left to right midline shift now measures 0.8 centimeters, previously 0.3 cm.  3.  Slight interval increase in extra-axial CSF density fluid along  the right cerebral convexity now measuring 1.5 cm, previously 1.35 centimeters.   4.  Stable EVD catheter with interval decrease in size of the ventricular system.  5.  Postoperative changes of a suboccipital craniotomy with evolving encephalomalacia and blood product involving the posterior fossa.        Dr. Sathya Valentino discussed results with Dr. Diaz on 1/19/2021 632AM.

## 2021-01-19 NOTE — CONSULTS
Consult Date:  01/19/2021      GASTROENTEROLOGY CONSULTATION       CHIEF COMPLAINT:  Left-sided abdominal pain and abnormal CT findings.      HISTORY OF PRESENT ILLNESS:  We were asked to see the patient by her primary physician, Dr. Arash Dolan for further evaluation of the above.  The patient is an 86-year-old woman who has a past medical history significant for hypertension, stage II chronic kidney disease, GERD and history of right breast cancer with peripheral neuropathy, osteoarthritis, who was admitted to [facility] after frequent falls.  After her last fall on 01/03/2021, she was found to have 2 intracranial masses concerning for metastatic breast cancer.  At that point, the patient was transferred to [facility] where she underwent a craniotomy with resection of mets on 01/06/2021, evacuation of cerebellar hematoma, then on 01/15/2021 by gila hole.      Since admission, she has been on high-dose Decadron for treatment of her cerebral edema related to the above.      Yesterday evening, she was noted to have more discomfort in her right abdomen and an elevated white count.  Therefore, a CT of the abdomen was obtained and revealed diffuse bowel wall thickening involving the distal descending and sigmoid colon as well as the rectum related to infectious or inflammatory etiology.  Degenerative changes noted through the lumbar and thoracic spine and small bilateral pleural effusions and moderate subcutaneous edema around the pelvis.      In questioning the patient, she is able to answer yes or no to questions.  She says no to chest pain, but yes to abdominal pain and we are able, through the yes/no answers, to localize her discomfort more on the left than the right.  She certainly has bowel sounds and no evidence of surgical abdomen.      The patient is unable to give me any additional answers to questions.  Therefore, the rest of the information was obtained from the chart.      REVIEW OF SYSTEMS:   Not appropriate.      PAST MEDICAL HISTORY:  Significant for her hypertension, GERD, chronic kidney disease II, breast cancer now with mets to the brain, osteoarthritis.      PAST SURGICAL HISTORY:  Includes right breast cancer resection and now a craniotomy and gila hole.      SOCIAL HISTORY:  She lives with her , does not smoke, no alcohol.      FAMILY HISTORY:  Unobtainable.      PHYSICAL EXAMINATION:   GENERAL:  She is an elderly white female in no apparent distress.   VITAL SIGNS:  Blood pressure is 157/70, pulse is 62.   HEENT:  Head is atraumatic, normocephalic.   SKIN:  Without evidence of jaundice or rash.  Sclerae appear nonicteric.   HEART:  S1, S2.   LUNGS:  In her supine position appeared clear to auscultation.   ABDOMEN:  Soft with bowel sounds and she is passing gas with tenderness in the left abdomen, left upper quadrant and left lower quadrant and the pelvic area.  No rebound and no guarding.  I was unable to appreciate any masses or hepatosplenomegaly.   EXTREMITIES:  Right and left lower extremity without pitting edema.   NEUROLOGIC:  She answers appropriately yes and no, but really could not assess beyond that.   PSYCHIATRIC:  No evidence of pressured speech or flattened affect.      LABORATORY DATA:  Electrolytes were normal, BUN was 44 with creatinine 0.66, albumin is 1.8.  Liver functions are normal.  White count is 39.6, hemoglobin 9.6.  There was evidence of a left shift on her white count yesterday with an increased neutrophil count.  CT as above with diffuse wall thickening in the descending and sigmoid colon.      ASSESSMENT AND PLAN:   Colitis.  The patient appears by the x-ray to have colitis in the left colon and some left-sided abdominal tenderness.  The differential diagnosis has to include infectious, especially Clostridium difficile here in the hospital, ischemic and less likely malignancy or inflammatory bowel disease.  At this time, we suggest stool studies, especially if  she has diarrhea do not need to hold her tube feedings.  She is malnourished and I think nutrition is of the utmost importance.  If the stool studies are negative and she continues to be symptomatic with an elevated white count, a flexible sigmoidoscopy or colonoscopy could be ordered if the family feels it is appropriate for further workup.      At this time, we will follow very closely and order further testing according to the ICU and family's wishes.      Thank you for asking us to participate in her care.         JIMMY MULLEN MD             D: 2021   T: 2021   MT: ANJALI      Name:     MIHAI GRAHAM   MRN:      [id number]        Account:       [id number]   :      1934           Consult Date:  2021      Document: [id number]

## 2021-01-19 NOTE — PROGRESS NOTES
Patient stable today. Plan to remove EVD.    She follows commands, and will answer simple questions. Dysarthric speech.    Spoke to her son today about her condition and to discuss goals of care. Although I do think she has made some slow progress, I am concerned that her ability to recover at this point may be offset by the potential progression of the cancer (brain metastases).    We discussed goals of care and the son and her  are actively considering what path we should take going forward. I do not think than an urgent decision is necessary, but encouraged them to consider what her goals would be and whether we should change course to a more palliative approach.

## 2021-01-19 NOTE — PROGRESS NOTES
GI Note (consult dictated)    86 year old hospitalized after fall and noted to have 2 intra-axial brain masses concerning for metastatic breast CA.  Now s/p craniotomy and subsequent hematoma drainge who now has abd pain and CT with evidence of wall thickening in distal descending and sigmoid colon.    Patient can nod yes and no to questions and is able to localize pain to left abdomen  No evidence of surgical abdomen  Passing gas , loose stool yesterday but non today. No bloody stools    A/P  Abd pain with edema noted in the left colon by CT   Differential includes infectious, edema related to low albumin   or ischemia. Less likely malignancy although cannot find a colonoscopy report.    Stool studies to rule out infection  Colonoscopy or flex if more aggressive intervention is required.  Continue TF because patient is malnourished.    Will continue to follow to plan further evaluation as needed.    Halima Roman MD  [facility]  Office

## 2021-01-19 NOTE — PLAN OF CARE
Neuro: Patient follows inconsistently.  Garbled speech. EVD remains clamped. CT complete this AM.   Pulm:  LS CTA, on room air.   Cardiac:  NSR, hydralazine given x 1 PRN HTN >160   GI:  No stools overnight   : Matthews in place, good UOP.   Skin: see epic flow sheets. Turned Left to Right q 2hrs and PRN.     Replacing phos this am.  Will need recheck per orderset.

## 2021-01-19 NOTE — PROVIDER NOTIFICATION
Neuro Surgery paged regarding changes noted on CT imaging as notified by Radiologist.  See radiology notes for details.

## 2021-01-19 NOTE — PLAN OF CARE
SLP - RN was consulted this am.  Unable to provide swallow Tx due to medical status and pt fatigue.  Will follow.

## 2021-01-19 NOTE — PROGRESS NOTES
[facility]    Neurosurgery  Daily Post-Op Note    Assessment & Plan   Procedure(s):  LEFT PHOEBE HOLE DRAINAGE OF SUBDURAL HEMATOMA   4 Days Post-Op  Continues to be more alert and following commands.  WBCs were 39.6 today.  Discussed with hospitalist.  She has some abdominal tenderness, at least seems to be guarded with palpation.  They are obtaining CT of the abdomen.  BIANCA drain was pulled yesterday.  EVD remains intact, and is clamped.  Plan:  -Advance activity as tolerated  -Continue supportive and symptomatic treatment  -Will leave EVD clamped for now.    Celestino Fisher    Interval History   Stable.    Physical Exam   Temp: 96.8  F (36  C) Temp src: Axillary BP: (!) 161/79(patient just back from CT will recheck and medicate if need ) Pulse: 67   Resp: 12 SpO2: 95 % O2 Device: None (Room air)    Vitals:    01/17/21 0400 01/18/21 0200 01/19/21 0400   Weight: 73.6 kg (162 lb 4.1 oz) 72.4 kg (159 lb 9.8 oz) 71.8 kg (158 lb 4.6 oz)     Vital Signs with Ranges  Temp:  [96.8  F (36  C)-97.6  F (36.4  C)] 96.8  F (36  C)  Pulse:  [58-73] 67  Resp:  [8-34] 12  BP: (126-173)/(48-87) 161/79  SpO2:  [85 %-99 %] 95 %  I/O last 3 completed shifts:  In: 1715 [NG/GT:680]  Out: 1925 [Urine:1815; Drains:110]    Alert and following simple commands.          Medications     dextrose          amiodarone  200 mg Oral or FT or NG tube Daily     carvedilol  12.5 mg Oral BID w/meals     dexamethasone  4 mg Intravenous Q12H     docusate  100 mg Oral or Feeding Tube BID     sennosides  5-10 mL Oral or Feeding Tube BID    And     docusate   mg Oral or Feeding Tube BID     insulin aspart  1-6 Units Subcutaneous Q4H     insulin glargine  15 Units Subcutaneous At Bedtime     lisinopril  10 mg Oral Daily     nystatin  500,000 Units Swish & Spit 4x Daily     pantoprazole  40 mg Oral or Feeding Tube Daily     polyethylene glycol  17 g Oral Daily     sodium chloride (PF)  10 mL Intracatheter Q8H     sodium phosphate   15 mmol Intravenous Once           Celestino Fisher PA-C  [facility]  [address]  [address]  [place] [address]    Tel [phone]  Pager [phone]

## 2021-01-19 NOTE — PLAN OF CARE
PT: Attempted to see pt in AM, pt very lethargic, not responding to verbal stimuli or sternal rub. Unable to engage in meaningful therapy session this AM

## 2021-01-20 NOTE — PROGRESS NOTES
"[facility]    Hospitalist Progress Note    Assessment & Plan     Jacque Bernstein is a 86 year old female with PMHx of hypertension, stage II CKD, GERD, hx of R breast cancer with resultant peripheral neuropathy dt chemotherapy, GERD, OA and frequent falls who was admitted from [place] ED on 1/3/2021 for further evaluation after she was incidentally found to have 2 intracranial masses after a mechanical fall with findings concerning for metastatic breast cancer.      Intracranial masses x2, dt metastatic breast cancer, s/p craniotomy with resection of mets on 1/6/21  Cerebellar hematoma, s/p R frontal EVD placement 1/6/21  S/p LEFT PHOEBE HOLE DRAINAGE OF SUBDURAL HEMATOMA 1/15.   Presented to [facility] ED after a mechanical fall where she struck her head on the headboard of her bed resulting in a persistent headache. Had endorsed worsening ataxia over the past few months with frequent falls. Head CT in the ED showed 2 masses, larger mass was located in the cerebellum with mass effect and tonsillar herniation with associated mild hydrocephalus and 2cm parietal mass with vasogenic edema was also noted. Given hx of breast cancer, findings were concerning for metastatic disease. Was started on dexamethasone on admission. Seen by neurosurgery and taken to the OR on 1/6/21 for midline posterior fossa Stealth craniotomy and resection of brain metastasis. Pathology subsequently returned positive for \"metastatic carcinoma consistent with prior breast primary malignancy\". Postop, she developed a cerebellar hematoma requiring she go back to the OR (also on 1/6) for evacuation of hematoma and placement of a R frontal external ventricular drain. Remained extubated post-procedures and was requiring Precedex and phenylephrine gtts. These were weaned and patient was extubated on 1/8/21. Briefly needed nicardipine gtt from BP management but this was weaned on 1/9.      --1/15: EVD mgmt per neurosurgery, " they clamped yesterday and ICP progressively increased throughout the day. CT head this AM with increasing mass effect and midline shift>per nursing NSGY in discussion to take patient back to the OR. neurosurgery discusssed with family. S/p LEFT PHOEBE HOLE DRAINAGE OF SUBDURAL HEMATOMA 1/15. Pod 1  --CT head 1/16:                                                                  IMPRESSION:  1.  Decreased pneumocephalus along the anterior left frontal lobe. Decreased associated mass effect with near-complete resolution of left-to-right midline shift, measuring 1 mm.  2.  Otherwise, no significant change.    Doing better post surgery. More alert and following commands. Nl tele  OnTFs, drain in place  - more awake overall since surgery, using simple words rarely and consistently following simple commands. Remains npo  - Head CT 1/19:   1.  Interval removal of the left-sided subdural drainage catheter with development of a  CSF density extra-axial fluid now measuring up to 1.6 cm. Previously there is a pocket of gas overlying the left frontal lobe in the extra-axial space which is   slightly decreased in size.  2.  Left to right midline shift now measures 0.8 centimeters, previously 0.3 cm.  3.  Slight interval increase in extra-axial CSF density fluid along the right cerebral convexity now measuring 1.5 cm, previously 1.35 centimeters.   4.  Stable EVD catheter with interval decrease in size of the ventricular system.  5.  Postoperative changes of a suboccipital craniotomy with evolving encephalomalacia and blood product involving the posterior fossa.     Afebrile. Routine wbc up to 39.6. no diarrhea or focal infectious symptoms  On decadron bid    Plan;   -transfer to floor  -drain and EVD removed.   -advance activity as tolerated.   -prn pain oxycodone  -- conts on dexamethasone 4mg IV BID per surgery,   -- goal SBP <160 , PRN hydralazine and labetalol available  -- PT/OT following  -- npo.  SLP continues to follow  and encourage PO intake as able. May need to discuss PEG tube placement once over acute issues, this will play role into goals of care discussion.   -- TF per nutrition at goal rate, NPO now  -speech following. Remains npo  -meeting with PT- seen 1/17  -made DNR/DNI on 1/19.   -talked with Dr. Cazares of [facility] oncology today and they will talk to pt's family tomorrow 1/21    Updated pt's son 1/20. He states the family of pt are all on the same page regarding their wishes for the patient. They continue to consider comfort care as an option. Pt's son can discuss further in the coming 1-2 days with neurosurgery team, oncology.      Leukocytosis-multifactorial 2/2 stress reaction (surgery)/steroids, colitis  Colitis per CT abd 1/19  abd pain on exam 1/19  Has had leukocytisis since admission  On steroids.   Wbc 8---> 37 (post op) on 1/8 then down to 17 leandro--> 22---> 39 over several days  Afebrile. Neuro status not worse but better since surgery  Iv site nl. No rash  No pulmonary issues. Nl sats. No diarrhea  Some abd pain on exam-pt seems uncomfortable on abd exam. Seems new  leukomoid response 2/2 recent surgery, steroids likely contributing. Similar response postop earlier in stay rule out intraabdominal process.   -CT abd 1/19: showed colitis:   1.  Diffuse bowel wall thickening in the distal descending and sigmoid  colon, as well as the rectum, are likely related to an infectious or  inflammatory proctocolitis.  2.  Small bilateral pleural effusions are new since the previous exam.  3.  Moderate subcutaneous edema about the pelvis is new since the  previous exam    -seen by [facility] 1/19  Consider colonoscopy if family wishes to pursue. Hold on this for now  -wbc down and NT abd exam today  - no diarrhea. Tolerating TFs  - family clear that they do not want invassive cares at this time  Updated family on clinical status,care plan and cT results 1/19 1/20- stable neuro status since last surgery. Alert, follows simple  commands. Nods yes to certain simple questions    Plan;  -cont TFs per discussion with GI  Cont current steroid dose per surgery  [facility] consulted and following  Follow labs  Follow for diarrhea      Hx of right sided breast cancer, now with findings of metastatic disease as above  Peripheral neuropathy dt prior chemotherapy, manages with Tylenol  Clinical staging from 2017: Stage IIIA (T3, N2a, M0).  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    S/p mastectomy, radiation and chemo therapy.  Had been maintained on Arimidex 1mg daily. Follows with  Oncology (Dr. Ho)  -- Dr Cazares spoke with her primary oncology 01/12 and updated son on overall care. Family would like patient to get out this acute incident and focus on getting to meet with her outpatient oncology to discuss overall goals   -talked with Dr. Cazares of  oncology today and they will talk to pt's family tomorrow 1/21    Urinary retention- folely placed 1/17  Bladder scan 400cc X 2 on 1/16.   Matthews placed 1/17 ,given persistently elevated PvR/straight cath volumes above 400cc.         Episodic Asymptomatic SVT  Had PACs and periods of ectopy on 1/9. New onset SVT on 1/10 AM. BPs stable and patient was asymptomatic.   Given 500ml LR bolus, calcium gluconate, IV Lopressor and AM oral metoprolol early.  Lytes (Mag, K) stable, TSH low but FT4 nl, no hx of thyroid disease and hard to interpret in setting of acute illness.   Recent echo on 1/7 showed EF 65-70% with indeterminate diastolic dysfunction and normal wall motion, mild pulmonary hypertension, mild aortic stenosis.  -- cardiology started on amiodarone with BB, no AC due to above  -- HR stable since starting above  -- cards signed off  - follow tsh--> nl, follow TSH outpatient while on amiodorone  -nl telemetry  -outpatient cardiology follow up sVT and amiodorone dosing  -per cardiology recs, will reduce amiodorone from 400mg bid to 200mg every day. Was on 400mg bid dose for 8 days. Will  skip 400mg every day for 4 days step and have on amiodorone 200mg qd  ekg to check qtc interval---nsr, no ischemic changes, nl qtc  -LFTs nl       Hypertension  Home meds: lisinopril 20mg po daily  Elevated BPs this stay likely dt surgery, steroids. Briefly required nicardipine gtt on 1/8 PM but was weaned off after a few hours  -changed metoprolol to coreg and add back on lisinopril for difficult to control pressures, increased coreg 01/13. Increased lisinopril 01/15  -running low nl 1/6, -fluid bolus for low nl sbp 90s/- 1/16 with good response, lisinopril dose decreased  -continue PRNSs   goal SBP <160 , PRN hydralazine and labetalol available  -decreased Lisinopril to 10mg every day on 1/17  - follow bmp, has high nl K level, follow  -sbp slightly hypertensive. Consider adding norvasc if needed. No increase in lisinopril given high nl K levels    Stage II CKD  Baseline Cr is around 1.   Renal function remains stable thus far  Follow high nl K  Good UO  Na down to 132 if correct for BS, runs low nl  Am bmp.      Steroid-induced hyperglycemia  Noted this stay, given initiation of dexamethasone. A1C 5.6, no hx of DM.   Was maintained on insulin gtt in immediate postop period while intubated, transitioned to basal/bolus insulin regimen on 1/8. Had been getting TFs, okay'd for modified diet on 1/9.  --200 range.     Plan;   -follow bS while npo for CT, may need dextrose fluids   -- increase lantus to 17 units at bedtime. Adjust if steroid dose reduced.   -- on med resistance sliding scale q4 hours as pt on continuous TFs.       Uncomplicated UTI dt ESBL  UA in ED was grossly abnl. UC ultimately grew >100k ESBL ecoli. Had been placed on ceftriaxone initially, changed to meropenem on 1/5 after culture results known  afebrile  -- completed 7 day course with refugio 01/12     Acute Blood Loss Anemia  Secondary to surgery, cerebellar hematoma.   Hgb stable at 9-11,      GERD  Chronic and stable on PPI     Access:    picc line placed 1/16. Site looks fone    Hypernatremia, resolved     FEN: TFs per nutritionist, follow labs, npo per speech  Tubes / lines: EVD, TF, figueroa removed and following pvr  DVT Prophylaxis: PCDs      Code Status/advanced directives  Goals of Care  -spoke with son 01/14 and updated him on plan prior to changes seen today and need for OR. Goal of family is to see patient improve from this stay but he is aware that we may get to a point where there is not a good outcome and ultimately we cannot change her metastatic cancer diagnosis. He said in past patient does not want prolonged suffering. He said he will start discussion with family.   -Discussed care plan with pt's son and spouse on 1/7, for now following how pt doing post op, they have realistic expectations and understand overall guarded prognosis.      1/19. Discussed care plan and goals of care with patient's son and  yesterday and with pt's son today. See note from Dr. Rojas on 1/19.   They wish for patient to be DNR/DNI.   At this time, No ICU pressors, invassive procedures or surgeries but would consider ICU if clinical change related to neurosurgery.   They inquired about hospice care. Discussed hospice care in detail with son.      ---talked with Dr. Cazares of [facility] today and they will talk to pt's family tomorrow 1/21    Disposition: Cont monitoring in ICU given EVD- may be able to transfer today. Discharge date unclear, pending postop course. Therapy recommending ARU stay.      Discussed care plan with RN, pt's son and spouse 1/17    Arash Dolan MD  Text Page  (7am to 6pm)  Interval History   Afebrile. No diarrhea.   Stable overnight.     -Data reviewed today: I reviewed all new labs and imaging results over the last 24 hours. I personally reviewed labs and imaging last 24 hours.     Physical Exam   Temp: 97.3  F (36.3  C) Temp src: Axillary BP: (!) 155/69 Pulse: 68   Resp: 10 SpO2: 96 % O2 Device: None (Room air)    Vitals:     01/18/21 0200 01/19/21 0400 01/20/21 0400   Weight: 72.4 kg (159 lb 9.8 oz) 71.8 kg (158 lb 4.6 oz) 71.2 kg (156 lb 15.5 oz)     Vital Signs with Ranges  Temp:  [96.6  F (35.9  C)-97.3  F (36.3  C)] 97.3  F (36.3  C)  Pulse:  [58-70] 68  Resp:  [8-21] 10  BP: (114-172)/() 155/69  SpO2:  [95 %-98 %] 96 %  I/O last 3 completed shifts:  In: 1630 [I.V.:250; NG/GT:480]  Out: 2195 [Urine:2195]    Constitutional: In bed, appears comfortable  HEENT: sutures noted, feeding tube in place,   Respiratory: Breathing easily, CTAB  Cardiovascular: RRR no r/g/m  GI: soft, ND, nl BS, NT exam today  Skin/Integumen: no rash or edema, picc line site and wound seem fine  Neuro:  follows simple commands,  baseline L facial droop, baseline expressive aphasia. ,nods yes to certain questions      Medications     dextrose         amiodarone  200 mg Oral or FT or NG tube Daily     carvedilol  12.5 mg Oral BID w/meals     dexamethasone  4 mg Intravenous Q12H     docusate  100 mg Oral or Feeding Tube BID     sennosides  5-10 mL Oral or Feeding Tube BID    And     docusate   mg Oral or Feeding Tube BID     insulin aspart  1-6 Units Subcutaneous Q4H     insulin glargine  17 Units Subcutaneous At Bedtime     lisinopril  10 mg Oral Daily     nystatin  500,000 Units Swish & Spit 4x Daily     pantoprazole  40 mg Oral or Feeding Tube Daily     polyethylene glycol  17 g Oral Daily     sodium chloride (PF)  10 mL Intracatheter Q8H     sodium phosphate  9 mmol Intravenous Once       Data   Recent Labs   Lab 01/20/21  0500 01/19/21  0400 01/18/21  0427 01/16/21  1220 01/16/21  1220   WBC 34.6* 39.6*  --   --  22.3*   HGB 10.0* 9.6*  --   --  11.2*   MCV 94 94  --   --  92    353  --   --  533*   * 134 135   < > 132*   POTASSIUM 5.2 5.2 5.2   < > 4.8   CHLORIDE 100 101 103   < > 98   CO2 29 30 30   < > 29   BUN 37* 44* 57*   < > 46*   CR 0.56 0.66 0.81   < > 0.70   ANIONGAP 2* 3 2*   < > 5   LADI 8.2* 8.1* 8.0*   < > 8.3*    * 201* 207*   < > 202*   ALBUMIN  --  1.8*  --   --   --    PROTTOTAL  --  4.9*  --   --   --    BILITOTAL  --  0.2  --   --   --    ALKPHOS  --  76  --   --   --    ALT  --  24  --   --   --    AST  --  20  --   --   --    LIPASE  --  322  --   --   --     < > = values in this interval not displayed.       Imaging:   No results found for this or any previous visit (from the past 24 hour(s)).

## 2021-01-20 NOTE — PROGRESS NOTES
"GASTROENTEROLOGY PROGRESS NOTE    CC: Left sided abd pain and CT with edema of the left colon    SUBJECTIVE:  Moaning  Does not respond to questions    OBJECTIVE:  General Appearance:  Elderly women in NAD  /42   Pulse 65   Temp 97.3  F (36.3  C) (Axillary)   Resp 14   Ht 1.651 m (5' 5\")   Wt 71.2 kg (156 lb 15.5 oz)   SpO2 97%   BMI 26.12 kg/m    Temp (24hrs), Av.9  F (36.1  C), Min:96.6  F (35.9  C), Max:97.3  F (36.3  C)    Patient Vitals for the past 72 hrs:   Weight   21 0400 71.2 kg (156 lb 15.5 oz)   21 0400 71.8 kg (158 lb 4.6 oz)   21 0200 72.4 kg (159 lb 9.8 oz)       Intake/Output Summary (Last 24 hours) at 2021 0936  Last data filed at 2021 0800  Gross per 24 hour   Intake 1440 ml   Output 2135 ml   Net -695 ml       PHYSICAL EXAM  Abdomen: Abdomen soft, . BS normal. No masses, organomegaly ? tenderness      Additional Comments:  ROS, FH, SH: See initial GI consult for details.    I have reviewed the patient's new clinical lab results:    Recent Labs   Lab Test 21  0500 21  0400 21  1220 21  1050 21  1050   WBC 34.6* 39.6* 22.3*   < > 8.8   HGB 10.0* 9.6* 11.2*   < > 13.6   MCV 94 94 92   < > 96    353 533*   < > 380   INR  --   --   --   --  1.06    < > = values in this interval not displayed.     Recent Labs   Lab Test 21  0500 21  0400 21  0427   POTASSIUM 5.2 5.2 5.2   CHLORIDE 100 101 103   CO2 29 30 30   BUN 37* 44* 57*   ANIONGAP 2* 3 2*     Recent Labs   Lab Test 21  0400 21  0448 21  1050   ALBUMIN 1.8* 2.6* 3.0*   BILITOTAL 0.2 0.5 0.6   ALT 24 13 20   AST 20 13 13   LIPASE 322  --   --        Assessment/ Plan  Left sided abd pain and CT with left sided edema    Patient groaning this morning but not able to respond appropriately to questions  Abd soft  Multiple bowel movements ? Diarrhea  Stool studies if diarrhea   No plans for intervention at this time.     Will follow "     Halima Roman MD  [facility]  Office

## 2021-01-20 NOTE — PLAN OF CARE
BP elevated, gave PRN hydralazine x1. C/o pain to abd and surgical sites. Gave PRN pain meds and pt was able to rest comfortably at times. GI consulted for abd pain. LS diminished. EVD removed today. No neuro changes. Transfer orders placed, waiting for a neuro bed. Spouse updated.

## 2021-01-20 NOTE — PLAN OF CARE
SLP: Attempted to see patient for swallow treatment, however patient not able to participate. Patient's eyes closed and did not follow simple commands. Note congested breath sounds and soft moaning. Discussed with RN.

## 2021-01-20 NOTE — PLAN OF CARE
Neuro: alert. following most commands. Pupil equal and reactive, conjugate. L facial droop.  Pulm: LS clear. On RA  CV: SR, SBP remains < 160 with hydralazine given x 2  : figueroa in place with adequate output.  GI:  TF via NG, at goal of 45 ml/hr, tolerating well.  Extremities: purposeful movements, moves all extremities,  Lines: PICC SL   Skin: Scattered bruises. Grossly intact.  Family: No phone call this shift.   Plan: Awaiting bed availability for transfer to [facility].

## 2021-01-20 NOTE — PLAN OF CARE
[facility] Intensive Care Unit   Nursing Note                                                       Neuro:  PERRL.  Moves all extremities.  Follows commands  Intermittently/weak..  Cardiovascular:  RRR.  Hemodynamically stable.  Pulmonary:  Oxygen saturation > 92 on RA  GI/:  Adequate UOP per figueroa.  Meets HOUDINI criteria  Endocrine: Glucose elevated, MD aware, lantus adjusted.  Skin:  Intact except incisional areas, head staples CDI.  Protective mepilex applied to sacrum.  Restraints:  No restraints, but at times MITT on L hand to prevent picking at feeding tube.  AM labs noted; electrolytes replaced as indicated.  Family updated  Continue to monitor closely.  Has transfer orders to neuro floor but no bed yet.    ROUTINE IP LABS (Last four results)  BMP  Recent Labs   Lab 01/20/21  0500 01/19/21  0400 01/18/21  0427 01/17/21  0754   * 134 135 133   POTASSIUM 5.2 5.2 5.2 5.0   CHLORIDE 100 101 103 100   LADI 8.2* 8.1* 8.0* 8.0*   CO2 29 30 30 30   BUN 37* 44* 57* 52*   CR 0.56 0.66 0.81 0.74   * 201* 207* 146*     CBC  Recent Labs   Lab 01/20/21  0500 01/19/21  0400 01/16/21  1220   WBC 34.6* 39.6* 22.3*   RBC 3.35* 3.21* 3.83   HGB 10.0* 9.6* 11.2*   HCT 31.4* 30.0* 35.1   MCV 94 94 92   MCH 29.9 29.9 29.2   MCHC 31.8 32.0 31.9   RDW 16.2* 16.0* 15.7*    353 533*     INRNo lab results found in last 7 days.  LACTIC ACIDNo results found for: LACT  Blood Glucose  Glucose (mg/dL)   Date Value   01/20/2021 206 (H)   01/20/2021 242 (H)   01/20/2021 205 (H)   01/20/2021 194 (H)   01/19/2021 209 (H)   01/19/2021 198 (H)       Intake/Output Summary (Last 24 hours) at 1/20/2021 1450  Last data filed at 1/20/2021 1400  Gross per 24 hour   Intake 1560 ml   Output 1735 ml   Net -175 ml       Rito Oliva RN-BSN  [facility]  Intensive Care Unit

## 2021-01-20 NOTE — PROGRESS NOTES
[facility]    Neurosurgery  Daily Post-Op Note    Assessment & Plan   Procedure(s):  LEFT PHOEBE HOLE DRAINAGE OF SUBDURAL HEMATOMA   -5 Days Post-Op  Alert, intermittently following simple commands. Dysarthric speech. EVD removed yesterday. Incision site CDI. [facility] following for left sided abd pain and CT with left sided edema. TF continued. Dr. Rojas spoke to son yesterday. Son and  are considering goals of care at this time.     Plan:  - Activity as tolerated  - Planning to transfer to floor today   - Appreciate assistance from other services   - Will continue to follow     Marika Larson CNP  [facility] Neurosurgery  [facility]  [address]  [place] [address]  Tel [phone]  Pager [phone]      Interval History   Stable     Physical Exam   Temp: 97.3  F (36.3  C) Temp src: Axillary BP: 105/42 Pulse: 65   Resp: 14 SpO2: 97 % O2 Device: None (Room air)    Vitals:    01/18/21 0200 01/19/21 0400 01/20/21 0400   Weight: 159 lb 9.8 oz (72.4 kg) 158 lb 4.6 oz (71.8 kg) 156 lb 15.5 oz (71.2 kg)     Vital Signs with Ranges  Temp:  [96.6  F (35.9  C)-97.3  F (36.3  C)] 97.3  F (36.3  C)  Pulse:  [58-70] 65  Resp:  [8-18] 14  BP: (105-172)/() 105/42  SpO2:  [95 %-98 %] 97 %  I/O last 3 completed shifts:  In: 1630 [I.V.:250; NG/GT:480]  Out: 2195 [Urine:2195]    Alert, able to follow simple commands, dysarthric speech    Incision: CDI       Medications     dextrose          amiodarone  200 mg Oral or FT or NG tube Daily     carvedilol  12.5 mg Oral BID w/meals     dexamethasone  4 mg Intravenous Q12H     docusate  100 mg Oral or Feeding Tube BID     sennosides  5-10 mL Oral or Feeding Tube BID    And     docusate   mg Oral or Feeding Tube BID     insulin aspart  1-6 Units Subcutaneous Q4H     insulin glargine  17 Units Subcutaneous At Bedtime     lisinopril  10 mg Oral Daily     nystatin  500,000 Units Swish & Spit 4x Daily      pantoprazole  40 mg Oral or Feeding Tube Daily     polyethylene glycol  17 g Oral Daily     sodium chloride (PF)  10 mL Intracatheter Q8H       Marika Larson [facility]   [address] [place] [address], [place] [address]  Tel [phone]  Pager [phone]

## 2021-01-21 NOTE — PROGRESS NOTES
Service Date: 01/21/2021      SUBJECTIVE:  Mrs. Bernstein is an 86-year-old female with HER-2/matthew positive breast cancer.  The patient was admitted on 01/03/2021.  CT head and brain MRI revealed multiple brain metastases.  The patient had resection of large cerebellar mass on 01/06/2021.  She subsequently needed evacuation of hematoma x 2.  Pathology revealed metastatic carcinoma consistent with breast primary.      The patient's overall condition has not improved.  She is very weak.  She is aphasic.  I spoke to the nurse.  The patient's mentation waxes and wanes.  At times, patient seems to be understanding things and follows simple commands.  Other times, she is in altered mental status and not following any commands.  She is getting NG tube feeding.      The patient is not in any severe pain.  She is not short of breath.  She has not been having any fever.      PHYSICAL EXAMINATION:   GENERAL:  The patient is in altered mental status.  She looked weak.    The rest of the systems not examined.      ASSESSMENT:    1.  An 86-year-old female with metastatic breast cancer with multiple brain metastases.   2.  Encephalopathy.      PLAN:   1. I called and spoke to patient's son, Vick.  The patient's , Otf, was also there.  Discussed regarding patient's overall condition.  The patient continues to be lethargic and have expressive aphasia.  Her mentation waxes and wanes.  I am not sure how much her condition is overall going to improve.      Discussed regarding breast cancer.  Unfortunately, the patient has metastatic breast cancer with multiple brain metastases.  Initial plan was to give her postoperative radiation, followed by systemic treatment for HER-2/matthew positive breast cancer.  The patient's overall condition has not been good.  She is very weak.  She has altered mental status.  I would not recommend any radiation or systemic treatment given patient's overall poor condition.  It is unlikely that the  patient's condition is going to improve.  Any treatment is not going to improve her quality of life.  We discussed regarding comfort care with hospice.  Family has been thinking about that.  Vick had a few questions regarding hospice, which were all discussed.     Vick mentioned that family will decide over the next few days regarding further treatment versus comfort care with hospice.  I advised the son to call me with any questions.      2. Oncology will follow along peripherally. Please call us with any questions. Case discussed with Dr. Larkin.      TOTAL TIME SPENT:  40 minutes.         TEENA PATTERSON MD             D: 2021   T: 2021   MT: WILBUR      Name:     MIHAI GRAHAM   MRN:      [id number]        Account:      [id number]   :      1934           Service Date: 2021      Document: [id number]

## 2021-01-21 NOTE — PROGRESS NOTES
[facility]    Medicine Progress Note - Hospitalist Service       Date of Admission:  1/3/2021  Assessment & Plan       Jacque Bernstein is a 86 year old female admitted on 1/3/2021.  PMHx of hypertension, stage II CKD, GERD, hx of R breast cancer with resultant peripheral neuropathy dt chemotherapy, GERD, OA and frequent falls (reporting worsening ataxia over months) who was admitted from [place] ED on 1/3/2021 for further evaluation after she was incidentally found to have 2 intracranial masses after a mechanical fall with findings concerning for metastatic breast cancer.        Intracranial masses x2, dt metastatic breast cancer, s/p craniotomy with resection 1/6/21  Complicated by cerebellar hematoma, s/p R frontal EVD placement 1/6/21  Left SDH s/p left gila hole drainage 1/15  Acute metabolic encephalopathy due to brain mets, intracranial hemorrhage  * Initial head CT showed 2 masses, one of cerebellum with associated tonsillar herniation and mild hydrocephalus, and 2cm parietal mass with vasogenic edema.    * Underwent resection 1/6 as above with pathology confirming metastatic breast cancer.   * Returned to OR 1/6 for evacuation of cerebellar hematoma and placement of a R frontal external ventricular drain.   * EVD clamped 1/14 with worsening ICP; head CT showed worsening left SDH with mass effect s/p left gila hole drainage 1/15  * Head CT 1/20 shows persisting subdural fluid collections with some mass effect      - post-op management per NGS  - prn tylenol and oxycodone  - dexamethasone 4mg IV BID per surgery  - goal SBP <160 (no diastolic parameter per NSG), PRN hydralazine and labetalol available  - NPO on tube feeds to due to encephalopathy; NG placed 1/8  - updated son, Vick, via phone 1/21; he reports family is still considering hospice but looks forward to discussion with Oncology today regarding cancer prognosis - they also question the likelihood of improvement in her  mental status and ability to interact with family so will discuss with Neurosurgery    Hyponatremia  New onset 1/20.  - Na 130; check urine studies to assess for SIADH    ADDENDUM:  Labs consistent with SIADH, will decrease free water flush to 30 ml q4h.  Also notified by RN that family requesting care conference tomorrow with likely plan to transition to comfort care.     Hypophosphatemia  - replace per protocol     Leukocytosis - multifactorial 2/2 stress reaction  (surgery)/steroids  Colitis per CT 1/19  Developed abd pain on exam 1/19 with worsening leukocytosis, though afebrile.  Unclear to what extent leukemoid response to surgery and steroids.  CT abd/pelvis 1/19 showed diffuse bowel wall thickening of distal descending and sigmoid colon - infectious vs inflammatory.    - GI consulted, consider colonoscopy but family currently does not want invasive procedures  - check C.diff and enteric panel  - WBC trending down 1/21     Hx of right sided breast cancer s/p neoadjuvant chemo and mastectomy 2017 with brain mets as above  Peripheral neuropathy dt prior chemotherapy, manages with Tylenol  Clinical staging from 2017: Stage IIIA (T3, N2a, M0).  Noted ER+/Kul2edf+.  Follows with [facility] (Dr. Ho).  Anastrazole discontinued per Onc with new mets.   - Dr. Cazares to discuss prognosis with family today     Paroxysmal SVT  New onset SVT on 1/10 AM. Lake to be related to multiple systemic stressors.  TSH low but FT4 nl.  TTE 1/7 showed EF 65-70% with indeterminate diastolic dysfunction and normal wall motion, mild pulmonary hypertension, mild aortic stenosis.  Cardiology consulted.  - continue amiodarone 200 mg daily  - continue carvedilol  - follow up in Cardiology clinic in 1-2 months     Hypertension  PTA lisinopril 20mg po daily  - continue carvedilol, lisinopril (decreased 1/17 due to high normal K)  - mildly hypertensive today but has not yet received daily meds; monitor  - goal SBP <160 , PRN hydralazine  and labetalol available     Stage II CKD  Baseline Cr is around 1.   - Cr currently stable about 0.5 on 1/21     Steroid-induced hyperglycemia  Due to dexamethasone. A1C 5.6, no hx of DM.  Transitioned from insulin gtt to subcutaneous 1/8.   - continue lantus 17 units at bedtime (increased 1/20)  - increase to high dose ssi      Acute Blood Loss Anemia  Secondary to surgery, cerebellar hematoma.   - Hgb stable at 9-10    Oral thrush, resolved  Completed 12 days Nystatin 1/21.      GERD  Chronic and stable on PPI    Urinary retention - folely placed 1/17  - voiding trial in 1 week or when more ambulatory    Uncomplicated UTI dt ESBL, resolved  Completed 7 day course refugio 01/12     Hypernatremia, resolved     FEN: TFs per nutritionist, follow labs, npo per speech  Tubes / lines: NG, figueroa, PICC placed 1/16         Diet: NPO for Medical/Clinical Reasons Except for: No Exceptions  Adult Formula Drip Feeding: Continuous Isosource 1.5; Other - Specify in Comment; Goal Rate: 45; mL/hr; Medication - Feeding Tube Flush Frequency: At least 15-30 mL water before and after medication administration and with tube clogging; Amount to...    DVT Prophylaxis: Pneumatic Compression Devices  Figueroa Catheter: in place, indication: Strict 1-2 Hour I&O, Retention  Code Status: No CPR- Do NOT Intubate  See Progress note 1/20 regarding prior goals of care discussions and considerations of hospice.         Disposition Plan   Expected discharge: 2 - 3 days, recommended to transitional care unit once goals of care established, nutrition plan in place, cleared by Neurosurg.  Entered: Gabe Larkin MD 01/21/2021, 8:08 AM       The patient's care was discussed with the Bedside Nurse, Patient and Neurosurg Consultant.    Gabe Larkin MD  Hospitalist Service  [facility]  Contact information available via [facility] Paging/Directory    ______________________________________________________________________    Interval History    She does not respond verbally.  Follows most commands.      No acute events per RN.     Data reviewed today: I reviewed all medications, new labs and imaging results over the last 24 hours. I personally reviewed no images or EKG's today.    Physical Exam   Vital Signs: Temp: 98  F (36.7  C) Temp src: Oral BP: (!) 151/62 Pulse: 70   Resp: 13 SpO2: 98 % O2 Device: None (Room air)    Weight: 156 lbs 11.95 oz  General Appearance: Well nourished elderly female in NAD  Respiratory: lungs CTAB, no wheezes or crackles, no tachypnea  Cardiovascular: RRR, normal s1/s2 without murmur  GI: abdomen soft, grimaces with palpation of left lower quadrant, nondistended, normal bowel sounds  Skin: no rash or bruising  Other: Awake but will not open eyes, follows most commands, able to wiggle toes bilaterally, very minimal right  strength, PERRL     Data   Recent Labs   Lab 01/21/21  0502 01/20/21  0500 01/19/21  0400   WBC 29.0* 34.6* 39.6*   HGB 9.4* 10.0* 9.6*   MCV 94 94 94    359 353   * 131* 134   POTASSIUM 5.1 5.2 5.2   CHLORIDE 100 100 101   CO2 27 29 30   BUN 35* 37* 44*   CR 0.58 0.56 0.66   ANIONGAP 3 2* 3   LADI 7.8* 8.2* 8.1*   * 220* 201*   ALBUMIN  --   --  1.8*   PROTTOTAL  --   --  4.9*   BILITOTAL  --   --  0.2   ALKPHOS  --   --  76   ALT  --   --  24   AST  --   --  20   LIPASE  --   --  322

## 2021-01-21 NOTE — PLAN OF CARE
[id number]  Neuro: moaning frequently.  Follows commands to smile, stick out tongue, squeeze L hand and wiggle L toes.  Also wiggled R toes slightly and moved R fingers slightly. I: Tylenol given.  E: much calmer and seemed to sleep.  At 1200, lethargic.  Bites on thermometer, moves R arm spontaneously, wiggled L toes slightly. Cries out with pain to limbs.I: Neuro surgery texted.  At 1400, only wiggled bilateral toes to command.  Neurosurgery updated.  Has done this previously.  I: Transfer to Med tele.    Lungs: Tolerating room air.  CV: SBP >160.  I: Scheduled meds given.  SBP decreased to 96 X 1.  See erma sheet.  No SVT  GI: had 2 soft small stools.  I: Sent for Cdiff and enteric isolation initiated.  U/O adequate.  , Otf updated and to be designated visitor.

## 2021-01-21 NOTE — PROGRESS NOTES
Nursing noted patient to be more lethargic this evening, head Ct with slight increase in left SDH and shift:    Head CT:  IMPRESSION:  1.  Interval removal of the right frontal ventriculostomy with  interval development of mild ventricular distention left greater than  right.  2.  Regressing right subdural fluid collection and slightly progressed left subdural fluid collection.  3.  Shift right now measures 16.2 mm, previously 7.2 mm.  4.  Stable pneumocephalus.  5.  Unchanged effacement of the suprasellar cistern and prepontine  cistern.  6.  Accounting for differences in image acquisition postoperative  changes in the posterior fossa and the associated extracranial fluid  collection are unchanged.  7.  The left medial parietal metastasis is again visualized. The  smaller supratentorial metastases are not well visualized    RECOMMENDATIONS:  Turn patient to left side and keep HOB down (10 degrees) tonight.  Will discuss with Dr. Rojas in morning and will likely need to continue goals of care discussion with family for plan of care.    Discussed with Dr. Sabillon.    ADDENDUM 7:00 A.M. 1/21  Nursing notes patient more responsive this morning, following most commands.  Okay to transfer to neuro floor.  Continue to follow.    ESTEE Glass  [facility] Neurosurgery  [address]  [address] [place] [id number]    Tel [phone]  Pager [phone]

## 2021-01-21 NOTE — PROGRESS NOTES
[facility]    Neurosurgery Progress Note    Date of Service (when I saw the patient): 01/21/2021     Assessment & Plan   Jacque Bernstein is a 86 year old female who was admitted on 1/3/2021. She is s/p crani for tumor and subsequent left gila hole drainage of subdural hematoma. Today she was seen lying in bed. She denies pain. She is able to follow simple commands and moves all extremities equally. Incision intact. Head CT completed last night due to increased lethargy with slight increase in left SDH and shift. Son and  are considering goals of care at this time.     Principal Problem:    Brain metastasis (H)    Assessment: s/p crani for tumor and gila hole for SDH    Plan:    -Activity as tolerated   -Ok to transfer to the floor    ADDENDUM:  Contacted patient's son Vick as requested by nursing. He had previously spoke with Dr. Cazares regarding prognosis and further treatment and other recommendations. All questions were answered. Son, Vick, states they have the information they need at this time to make a decision regarding goals of care. They will discuss amongst themselves and provide the hospital with an update on those final decisions in the coming days.     I have discussed the following assessment and plan Dr. Rojas who is in agreement with initial plan and will follow up with further consultation recommendations.    Trixie Dawson CNP  [facility] Neurosurgery  [address] [address]    Tel [phone]  Pager [phone]      Interval History   Stable. Continues to wax and wane.    Physical Exam   Temp: 97.4  F (36.3  C) Temp src: Oral BP: 96/44 Pulse: 56   Resp: 10 SpO2: 97 % O2 Device: None (Room air)    Vitals:    01/19/21 0400 01/20/21 0400 01/21/21 0400   Weight: 158 lb 4.6 oz (71.8 kg) 156 lb 15.5 oz (71.2 kg) 156 lb 12 oz (71.1 kg)     Vital Signs with Ranges  Temp:  [97.4  F (36.3  C)-98.6  F  "(37  C)] 97.4  F (36.3  C)  Pulse:  [56-78] 56  Resp:  [10-30] 10  BP: ()/() 96/44  SpO2:  [92 %-98 %] 97 %  I/O last 3 completed shifts:  In: 1520 [NG/GT:360]  Out: 1850 [Urine:1850]     , Blood pressure 96/44, pulse 56, temperature 97.4  F (36.3  C), temperature source Oral, resp. rate 10, height 5' 5\" (1.651 m), weight 156 lb 12 oz (71.1 kg), SpO2 97 %.  156 lbs 11.95 oz  HEENT:  Normocephalic.  PERRLA.   Heart:  No peripheral edema  Lungs:  No SOB  Skin:  Warm and dry, good capillary refill. Incision intact.   Extremities:  Good radial and dorsalis pedis pulses bilaterally, no edema, cyanosis or clubbing.    NEUROLOGICAL EXAMINATION:   Mental status:  Alert to voice and follows simple commands.  Motor:  Moves all extremities equally.    Medications     dextrose         amiodarone  200 mg Oral or FT or NG tube Daily     carvedilol  12.5 mg Oral or Feeding Tube BID w/meals     dexamethasone  4 mg Intravenous Q12H     docusate  100 mg Oral or Feeding Tube BID     sennosides  5-10 mL Oral or Feeding Tube BID    And     docusate   mg Oral or Feeding Tube BID     insulin aspart  1-12 Units Subcutaneous Q4H     insulin glargine  17 Units Subcutaneous At Bedtime     lisinopril  10 mg Oral or Feeding Tube Daily     pantoprazole  40 mg Oral or Feeding Tube Daily     polyethylene glycol  17 g Oral Daily     sodium chloride (PF)  10 mL Intracatheter Q8H       Data     CBC RESULTS:   Recent Labs   Lab Test 01/16/21  1220   WBC 22.3*   RBC 3.83   HGB 11.2*   HCT 35.1   MCV 92   MCH 29.2   MCHC 31.9   RDW 15.7*   *     Basic Metabolic Panel:  Lab Results   Component Value Date     01/18/2021      Lab Results   Component Value Date    POTASSIUM 5.2 01/18/2021     Lab Results   Component Value Date    CHLORIDE 103 01/18/2021     Lab Results   Component Value Date    LADI 8.0 01/18/2021     Lab Results   Component Value Date    CO2 30 01/18/2021     Lab Results   Component Value Date    BUN 57 " 01/18/2021     Lab Results   Component Value Date    CR 0.81 01/18/2021     Lab Results   Component Value Date     01/18/2021     INR:  Lab Results   Component Value Date    INR 1.06 01/04/2021

## 2021-01-21 NOTE — PROGRESS NOTES
MD Notification    Notified Person: MD    Notified Person Name: Dr. Larkin     Notification Date/Time: 1/21/21 8606    Notification Interaction: text page    Purpose of Notification: c-diff came back positive.    Orders Received: I will put in orders to start patient on Vancomycin.    Comments:

## 2021-01-21 NOTE — PROGRESS NOTES
SPIRITUAL HEALTH SERVICES  SPIRITUAL ASSESSMENT Progress Note  [facility] ICU     REFERRAL SOURCE: Initial Visit with Family    I shared a reflective phone call with patient's son, Vick today. I introduced self/role and availability of our services in the hospital.    -Vick reflected on how challenging this time has been for his whole family. He shares he had just gotten his parents placed in the assisted living at the end of December and then his mother came to the hospital and had surgery. He shares things have happened very fast and that it's been challenging to not be there to be able to see her and be with her.    -Vick shares his parent's Religious beto is very important to them. He asks that I visit patient today for prayer and shares he'd appreciate  support for his father when he comes to visit, which now may be tomorrow.     -Vick reflected on their discerning of goals of care for Lauren. He shares his family is on the same page on how to honor Lauren's wishes and his sense that they are moving in the direction of comfort measures but that it is a difficult decision to make. I shared with him the hospital's visitor policy exception that is made when patient's transition to comfort measures as context if they were to make this decision.     I spent time offering grief support through reflective listening that affirmed emotions, experiences and meaning.     Later, I visited Lauren. She was asleep/not responding during the visit. I shared prayer with her and words of comfort and peace.     PLAN: Our team will continue to follow for support.     Marisela Peralta  Associate    Phone: [phone]  Pager: [phone]

## 2021-01-22 NOTE — PROGRESS NOTES
GI NOTE    Patient without further diarrhea   Palliative care being considered    Will sign off  Call if we can be of further assistance.    Halima Roman MD  [facility]  Cell  [phone] 8 AM-5 PM  Office

## 2021-01-22 NOTE — PLAN OF CARE
Occupational Therapy Discharge Summary    Reason for therapy discharge:    Change in medical status.    Progress towards therapy goal(s). See goals on Care Plan in [facility] electronic health record for goal details.  Goals not met.  Barriers to achieving goals:   Pt moved to comfort cares.    Therapy recommendation(s):    No further therapy is recommended.

## 2021-01-22 NOTE — PLAN OF CARE
Speech Language Therapy Discharge Summary    Reason for therapy discharge:    Change in goals of care - comfort cares initiated.    Progress towards therapy goal(s). See goals on Care Plan in Epic electronic health record for goal details.  Goals not met.  Barriers to achieving goals:   limited tolerance for therapy and change in goals of care.    Therapy recommendation(s):    No further therapy is recommended.Transition to comfort cares. Diet to be determined by MD      Most recent SLP recommendations - Continue NPO with TF and frequent oral cares.

## 2021-01-22 NOTE — PROGRESS NOTES
[facility]    Medicine Progress Note - Hospitalist Service       Date of Admission:  1/3/2021  Assessment & Plan       Jacque Bernstein is a 86 year old female admitted on 1/3/2021.  PMHx of hypertension, stage II CKD, GERD, hx of R breast cancer with resultant peripheral neuropathy dt chemotherapy, GERD, OA and frequent falls (reporting worsening ataxia over months) who was admitted from [facility] ED on 1/3/2021 for further evaluation after she was incidentally found to have 2 intracranial masses after a mechanical fall with findings concerning for metastatic breast cancer.        Intracranial masses x2, dt metastatic breast cancer, s/p craniotomy with resection 1/6/21  Complicated by cerebellar hematoma, s/p R frontal EVD placement 1/6/21  Left SDH s/p left gila hole drainage 1/15  Acute metabolic encephalopathy due to brain mets, intracranial hemorrhage  * Initial head CT showed 2 masses, one of cerebellum with associated tonsillar herniation and mild hydrocephalus, and 2cm parietal mass with vasogenic edema.    * Underwent resection 1/6 as above with pathology confirming metastatic breast cancer.   * Returned to OR 1/6 for evacuation of cerebellar hematoma and placement of a R frontal external ventricular drain.   * EVD clamped 1/14 with worsening ICP; head CT showed worsening left SDH with mass effect s/p left gila hole drainage 1/15  * Head CT 1/20 shows persisting subdural fluid collections with some mass effect      - Met with  and son, Vick, on 1/22 to discuss goals of care;  reports that based on prior discussions, she would not want long term artificial support including through tube feedings.  Discussed that I feel if she were to improve, this would be over a long period of time and would require this support if goals were to remain restorative.  They report that all family is in agreement with transition to comfort care.  Discussed next steps including hospice  liaison visit, meeting with social work to discuss discharge planning as well as treatment goals under comfort care - using any medication necessary to treat any possible pain, anxiety, restlessness, dyspnea or other symptoms of discomfort.  Discussed that I anticipate it would likely be her lack of hydration that would ultimately result in organ failure and her passing (possibly as long as 1-2 weeks), though I do not suspect she would be cognizant of this.  They wish to visit with her for a little while longer, but we will stop tube feeds, remove NG and transition to comfort measures once they are finished with their visit.  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  Urine studies 1/21 consistent with SIADH likely due to intracranial  Hemorrhage.   -- no further montoring     Hypophosphatemia  -- stop protocol     C.diff colitis  Developed abd pain on exam 1/19 with worsening leukocytosis, though afebrile.  CT abd/pelvis 1/19 showed diffuse bowel wall thickening of distal descending and sigmoid colon.  C.diff returned positive 1/21.    -- stop vancomycin with transition to comfort care     Hx of right sided breast cancer s/p neoadjuvant chemo and mastectomy 2017 with brain mets as above  Peripheral neuropathy dt prior chemotherapy, manages with Tylenol  Clinical staging from 2017: Stage IIIA (T3, N2a, M0).  Noted ER+/Ugt2cgx+.  Follows with [facility] (Dr. Ho).  Anastrazole discontinued per Onc with new mets.   -- not currently a candidate for further treatment, oncology recommends comfort care     Paroxysmal SVT  New onset SVT on 1/10 AM. [place] to be related to multiple systemic stressors.  TSH low but FT4 nl.  TTE 1/7 showed EF 65-70% with indeterminate diastolic  dysfunction and normal wall motion, mild pulmonary hypertension, mild aortic stenosis.  Cardiology consulted.  -- stop amio and carvedilol as well as tele     Hypertension  PTA lisinopril 20mg po daily  -- stop antihypertensives     Stage II CKD  Baseline Cr is around 1.   -- no further monitoring     Steroid-induced hyperglycemia  Due to dexamethasone. A1C 5.6, no hx of DM.  Transitioned from insulin gtt to subcutaneous 1/8.   -- stop insulin and POC glucose checks     Acute Blood Loss Anemia  Secondary to surgery, cerebellar hematoma.   -- no further monitoring    Oral thrush, resolved  Completed 12 days Nystatin 1/21.      GERD  -- stop PPI    Urinary retention - folely placed 1/17  -- continue figueroa for now, may consider discontinuation pending family wishes    Uncomplicated UTI dt ESBL, resolved  Completed 7 day course refugio 01/12     Hypernatremia, resolved     FEN: stop tube feeds  Tubes / lines: NG, figueroa, PICC placed 1/16         Diet: NPO for Medical/Clinical Reasons Except for: No Exceptions    DVT Prophylaxis: None as comfort care  Figueroa Catheter: in place, indication: Strict 1-2 Hour I&O, Retention  Code Status: No CPR- Do NOT Intubate           Disposition Plan   Expected discharge: 2 - 3 days, recommended to transitional care unit once safe disposition plan/ TCU bed available and if passing is not imminent.  Entered: Gabe Larkin MD 01/22/2021, 2:44 PM       The patient's care was discussed with the Bedside Nurse, Care Coordinator/, Patient and Patient's Family.    Time Spent on this Encounter   I spent >35 minutes on the unit/floor managing the care of Jacque Bernstein. Over 50% of my time was spent on the following:   - Counseling the patient and/or family regarding: diagnostic results, prognosis and risks and benefits of treatment options  - Coordination of care with the: care coordinator/, nurse and family    See details of discuss above.     MD Gabe Santiago  MD Chaya  Hospitalist Service  [facility]  Contact information available via [facility] Paging/Directory    ______________________________________________________________________    Interval History   She does not reliably respond to commands or questions.      No acute events per RN    Data reviewed today: I reviewed all medications, new labs and imaging results over the last 24 hours. I personally reviewed no images or EKG's today.    Physical Exam   Vital Signs: Temp: 96.1  F (35.6  C) Temp src: Axillary BP: (!) 140/55 Pulse: 62   Resp: 12 SpO2: 98 % O2 Device: None (Room air)    Weight: 156 lbs 11.95 oz  General Appearance: Well nourished elderly female in NAD, lying in bed  Respiratory: few basilar crackles, otherwise clear without wheezing or tachypnea  Cardiovascular: RRR, normal s1/s2 without murmur  GI: abdomen soft, no signs of tenderness, nondistended, normal bowel sounds  Skin:  Other: Awake but will not open eyes, follows most commands, able to wiggle toes bilaterally, no response of RUE    Data   Recent Labs   Lab 01/22/21  0545 01/21/21  2100 01/21/21  0502 01/20/21  0500 01/19/21  0400   WBC  --   --  29.0* 34.6* 39.6*   HGB  --   --  9.4* 10.0* 9.6*   MCV  --   --  94 94 94   PLT  --   --  324 359 353   * 132* 130* 131* 134   POTASSIUM 4.8  --  5.1 5.2 5.2   CHLORIDE 98  --  100 100 101   CO2 27  --  27 29 30   BUN 33*  --  35* 37* 44*   CR 0.56  --  0.58 0.56 0.66   ANIONGAP  --   --  3 2* 3   LADI 8.0*  --  7.8* 8.2* 8.1*   *  --  212* 220* 201*   ALBUMIN  --   --   --   --  1.8*   PROTTOTAL  --   --   --   --  4.9*   BILITOTAL  --   --   --   --  0.2   ALKPHOS  --   --   --   --  76   ALT  --   --   --   --  24   AST  --   --   --   --  20   LIPASE  --   --   --   --  322

## 2021-01-22 NOTE — PROGRESS NOTES
Unable to assess orientation.  Patient did not open her eyes, make facial movements, or grimace this shift.  Unable to assess neuro checks.  VSS.  On continuous pulse ox on RA.  NG tube for feeding at goal rate of 45/hr.  H2o flushes of 30 q4h.  Tele on NSR.  Matthews with clear yellow urine cares done.  Had soft green colored small BM this shift.  ID lab called with result of positive for C-diff.  MD paged and started vancomycin.  Double lumen PICC to L arm flush with blood return.  Patient's son called tonight for update and states that his father would be visiting tomorrow but has dementia and wondering if anyone could come to help.  Manager called and did get approved for both to come and see patient tomorrow.  Son Vick stated that the family has decided to transition her tomorrow to comfort cares.  Patient also has two silver with mariely rings in the room and one silver band as well as a wallet containing 21$, ID, and JERI kenia and discover cards.  Sent to security and let lu Hickman know to get tomorrow when in the hospital.

## 2021-01-22 NOTE — PLAN OF CARE
Physical Therapy Discharge Summary    Reason for therapy discharge:    Discharged to   care plan changed to comfort care    Progress towards therapy goal(s). See goals on Care Plan in [facility] electronic health record for goal details.  Goals not met.  Barriers to achieving goals:   change in medical plan.    Therapy recommendation(s):    No further therapy is recommended.

## 2021-01-22 NOTE — CONSULTS
Care Management Follow Up    Length of Stay (days): 19    Expected Discharge Date: 01/24/21(Hospice)     Concerns to be Addressed: discharge planning     Patient plan of care discussed at interdisciplinary rounds: Yes    Anticipated Discharge Disposition: Assisted Living vs residential facility     Anticipated Discharge Services:  hospice  Anticipated Discharge DME:      Patient/family educated on Medicare website which has current facility and service quality ratings:    Education Provided on the Discharge Plan:    Patient/Family in Agreement with the Plan: yes    Referrals Placed by CM/SW: Hospice  Private pay costs discussed: private room/amenity fees    Additional Information:  Met with pt's spouse and son, Vick. Discussed hospice care. Offered informational meeting with their choice of agency. They would like to meet with [facility]. SW sent an e-mail referral. Family is undecided about discharge location. Pt and her spouse moved in December to assisted living at [facility] in [place], [phone]. SW called there but was not able to talk to anyone who was able to make a decision on if pt could return this weekend. Pt had limited services in place. Also discussed hospice residential homes, [facility]. Both have availability. SW informed family these facilities would be private pay for room and board and have limited visitor options. Both require a negative Covid test with in 24-48 hours of admission. Will follow up with family on Saturday after hospice meeting.        MISA Hilton, SW  [phone]  [facility]

## 2021-01-22 NOTE — PLAN OF CARE
1400: RN shift note  Remains neurologically and cognitively impaired S/P cranial surgery complicated from multiple brain mets. Unable to assess orientation - no verbal communication. Will respond by nodding head yes or no to simple questions. Did respond to right left hand grasp when asked . Does not opens eyes. Medicated for pain with IV Dilaudid x2 - due to pain s/sx exhibited of moaning, grimacing, being slightly restless moving LUE and nodding her  head yes when asked if having pain. Hypertensive  - medicated with IV Hydralazine x1. Remains NPO, has tube feeding running at goal rate of 45cc/hr via NG tube. Remained on bedrest, T/R with assist of 2.- pt unable to turn self. Matthews patent w/ adequate UOP. STEPHEN's  double lumen PICC patent. BGM high - sliding scale insulin given  Remains in Enteric/Contact iso for positive C-Diff. Last BM was 1-21-21. GI signed off today  Care conference held today at  approx 1330 with Dr. Larkin, pt's  & son. Personal belongings which were down in security were obtained and given to son by another nurse while writer at break.     1900 addendum: Care conference completed by approx 1430 resulting transition of care to Comfort care status and numerous new orders. Tele dc'd by JEANINE.  Resource nurse, Jemal KAUR RN stopped the tube feedings and dc'd the NG feeding tube. Pt repo'd for comfort. Remains nonverbal. Appears to be resting comfortably. No grimace, face relaxed

## 2021-01-22 NOTE — PROVIDER NOTIFICATION
Patient's son Vick was notified to inform him that Dr Larkin would like to have a meeting with him and his father. Vick is planning on bringing his father to visit with Jacque first at 1300 and then meeting with Dr Larkin is planned for 1330.

## 2021-01-22 NOTE — PLAN OF CARE
Unable to assess orientation - barely opens eyes. Neuros not all intact. Did state she was in pain, PRN oxy given x1. VSS ex HTN - did not meet parameters. NPO, tube feeding. Bedrest, T/R q2h - does move around in bed on own @ times. Matthews patent w/ adequate UOP. Incontinent of bowel. L PICC SL. Plan for care conference 1/22 w/  & son - has been Ok'd by manager for both to be here. Personal belongings down in security.

## 2021-01-23 NOTE — PROGRESS NOTES
Comfort cares. ROGERS orientation, sometimes answers yes/no questions. All VS and assessments deferred. Oral morphine given x1 for pain. Turn and reposition to left side and back for comfort. Matthews in place. 1 loose BM this shift, bed bath and linen change this AM.  PICC SL. Slept between cares.

## 2021-01-23 NOTE — PROGRESS NOTES
Care Management Follow Up    Length of Stay (days): 20    Expected Discharge Date: 01/25/21     Concerns to be Addressed: discharge planning     Patient plan of care discussed at interdisciplinary rounds: Yes    Anticipated Discharge Disposition: Residential hospice home     Anticipated Discharge Services:  hospice  Anticipated Discharge DME:      Patient/family educated on Medicare website which has current facility and service quality ratings:    Education Provided on the Discharge Plan:  yes  Patient/Family in Agreement with the Plan: yes    Referrals Placed by CM/SW: Hospice  Private pay costs discussed: private room/amenity fees    Additional Information:  Spoke with pt's son, Vick. He has ruled out pt returning to [facility]. Also, SW learned [facility] would not be able to assess pt returning until Monday. Discussed [facility] vs [facility]. [facility] allows 4 visitors, 2 at a time, and is $600 per day. [facility] is $550 per day and allows 2 visitors. He will talk to his father and call back Sunday. Pt will need a negative Covid test within 72 hours of admission to [facility].       MISA Hilton, LGSW  [phone]  [facility]

## 2021-01-23 NOTE — PROGRESS NOTES
[facility]    Medicine Progress Note - Hospitalist Service       Date of Admission:  1/3/2021  Assessment & Plan       Jacque Bernstein is a 86 year old female admitted on 1/3/2021.  PMHx of hypertension, stage II CKD, GERD, hx of R breast cancer with resultant peripheral neuropathy dt chemotherapy, GERD, OA and frequent falls (reporting worsening ataxia over months) who was admitted from [place] ED on 1/3/2021 for further evaluation after she was incidentally found to have 2 intracranial masses after a mechanical fall with findings concerning for metastatic breast cancer.       PAtient is transitioned to comfort care only on 1/22/21       Intracranial masses x2, dt metastatic breast cancer, s/p craniotomy with resection 1/6/21  Complicated by cerebellar hematoma, s/p R frontal EVD placement 1/6/21  Left SDH s/p left gila hole drainage 1/15  Acute metabolic encephalopathy due to brain mets, intracranial hemorrhage  * Initial head CT showed 2 masses, one of cerebellum with associated tonsillar herniation and mild hydrocephalus, and 2cm parietal mass with vasogenic edema.    * Underwent resection 1/6 as above with pathology confirming metastatic breast cancer.   * Returned to OR 1/6 for evacuation of cerebellar hematoma and placement of a R frontal external ventricular drain.   * EVD clamped 1/14 with worsening ICP; head CT showed worsening left SDH with mass effect s/p left gila hole drainage 1/15  * Head CT 1/20 shows persisting subdural fluid collections with some mass effect      - Met with  and son, Vick, on 1/22 to discuss goals of care;  reports that based on prior discussions, she would not want long term artificial support including through tube feedings.  Discussed that I feel if she were to improve, this would be over a long period of time and would require this support if goals were to remain restorative.  They report that all family is in agreement with transition  to comfort care.  Discussed next steps including hospice liaison visit, meeting with social work to discuss discharge planning as well as treatment goals under comfort care - using any medication necessary to treat any possible pain, anxiety, restlessness, dyspnea or other symptoms of discomfort.  Discussed that I anticipate it would likely be her lack of hydration that would ultimately result in organ failure and her passing (possibly as long as 1-2 weeks), though I do not suspect she would be cognizant of this.  They wish to visit with her for a little while longer, but we will stop tube feeds, remove NG and transition to comfort measures once they are finished with their visit.  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  Urine studies 1/21 consistent with SIADH likely due to intracranial  Hemorrhage.   -- no further montoring     Hypophosphatemia  -- stop protocol     C.diff colitis  Developed abd pain on exam 1/19 with worsening leukocytosis, though afebrile.  CT abd/pelvis 1/19 showed diffuse bowel wall thickening of distal descending and sigmoid colon.  C.diff returned positive 1/21.    -- stop vancomycin with transition to comfort care     Hx of right sided breast cancer s/p neoadjuvant chemo and mastectomy 2017 with brain mets as above  Peripheral neuropathy dt prior chemotherapy, manages with Tylenol  Clinical staging from 2017: Stage IIIA (T3, N2a, M0).  Noted ER+/[id number]+.  Follows with [facility] (Dr. Ho).  Anastrazole discontinued per Onc with new mets.   -- not currently a candidate for further treatment, oncology recommends comfort care     Paroxysmal SVT  New onset SVT on 1/10 AM. [place] to be related to multiple systemic stressors.  TSH low but FT4 nl.   TTE 1/7 showed EF 65-70% with indeterminate diastolic dysfunction and normal wall motion, mild pulmonary hypertension, mild aortic stenosis.  Cardiology consulted.  -- stop amio and carvedilol as well as tele     Hypertension  PTA lisinopril 20mg po daily  -- stop antihypertensives     Stage II CKD  Baseline Cr is around 1.   -- no further monitoring     Steroid-induced hyperglycemia  Due to dexamethasone. A1C 5.6, no hx of DM.  Transitioned from insulin gtt to subcutaneous 1/8.   -- stop insulin and POC glucose checks     Acute Blood Loss Anemia  Secondary to surgery, cerebellar hematoma.   -- no further monitoring    Oral thrush, resolved  Completed 12 days Nystatin 1/21.      GERD  -- stop PPI    Urinary retention - folely placed 1/17  -- continue figueroa for now, may consider discontinuation pending family wishes    Uncomplicated UTI dt ESBL, resolved  Completed 7 day course refugio 01/12     Hypernatremia, resolved     FEN: stop tube feeds  Tubes / lines: NG, figueroa, PICC placed 1/16         Diet: NPO for Medical/Clinical Reasons Except for: Ice Chips, Meds    DVT Prophylaxis: None as comfort care  Figueroa Catheter: in place, indication: Strict 1-2 Hour I&O, End of Life  Code Status: No CPR- Do NOT Intubate           Disposition Plan   Expected discharge: 1-2 days, recommended to transitional care unit once safe disposition plan/ TCU bed available and if passing is not imminent.  Entered: Jade Etienne MD 01/23/2021, 12:00 PM       The patient's care was discussed with the Bedside Nurse, Care Coordinator/, Patient and Patient's Family.    Time Spent on this Encounter   I spent >35 minutes on the unit/floor managing the care of Jacque Bernstein. Over 50% of my time was spent on the following:   - Counseling the patient and/or family regarding: diagnostic results, prognosis and risks and benefits of treatment options  - Coordination of care with the: care coordinator/, nurse and family    See  details of discuss above.     MD Jade Estrada MD  Hospitalist Service  [facility]      ______________________________________________________________________    Interval History   She does not reliably respond to commands or questions.  Chart reviewed by me. Care assumed today     No acute events per RN. Appears comfortable in bed    Data reviewed today: I reviewed all medications, new labs and imaging results over the last 24 hours. I personally reviewed no images or EKG's today.    Physical Exam   Vital Signs: Temp: 96.1  F (35.6  C) Temp src: Axillary BP: (!) 156/64 Pulse: 62   Resp: 15 SpO2: 98 % O2 Device: None (Room air)    Weight: 156 lbs 11.95 oz  General Appearance: Well nourished elderly female in NAD, lying in bed  Respiratory: few basilar crackles, otherwise clear without wheezing or tachypnea  Cardiovascular: RRR, normal s1/s2 without murmur  GI: abdomen soft, no signs of tenderness, nondistended, normal bowel sounds  Skin:  Other: Awake but will not open eyes, follows most commands, able to wiggle toes bilaterally, no response of RUE    Data   Recent Labs   Lab 01/22/21  0545 01/21/21  2100 01/21/21  0502 01/20/21  0500 01/19/21  0400   WBC  --   --  29.0* 34.6* 39.6*   HGB  --   --  9.4* 10.0* 9.6*   MCV  --   --  94 94 94   PLT  --   --  324 359 353   * 132* 130* 131* 134   POTASSIUM 4.8  --  5.1 5.2 5.2   CHLORIDE 98  --  100 100 101   CO2 27  --  27 29 30   BUN 33*  --  35* 37* 44*   CR 0.56  --  0.58 0.56 0.66   ANIONGAP  --   --  3 2* 3   LADI 8.0*  --  7.8* 8.2* 8.1*   *  --  212* 220* 201*   ALBUMIN  --   --   --   --  1.8*   PROTTOTAL  --   --   --   --  4.9*   BILITOTAL  --   --   --   --  0.2   ALKPHOS  --   --   --   --  76   ALT  --   --   --   --  24   AST  --   --   --   --  20   LIPASE  --   --   --   --  322

## 2021-01-23 NOTE — PROGRESS NOTES
SPIRITUAL HEALTH SERVICES  SPIRITUAL ASSESSMENT Progress Note  [facility]     REFERRAL SOURCE: Follow Up    Patient has transitioned to comfort measures. Per request from family, I visited with patient today. She was not able to communicate during our visit. Per sarbjit family's request I read scripture and offered prayer and a blessing at her bedside.     PLAN: [facility] continues to remain available for patient and family support.     Marisela Peralta  Associate    Phone: [phone]  Pager: [phone]

## 2021-01-23 NOTE — PROGRESS NOTES
Writer spoke to son Vick about hospice consult received for pt.  Reviewed hospice benefit and philosophy.  He states he is interested in [facility] for his mom.  Will have SERENITY speak with Vick about open beds and when pt could discharge.  Hospice liason to follow up tomorrow and possibly meet family to sign consents if there is a plan in place.  Left VM for [facility] Dean to update her on writer's conversation with son.      Thank you for this consult,  Harjinder Mcnair, RN  [facility] Hospice Referral Specialist  [phone]

## 2021-01-24 NOTE — PLAN OF CARE
"7a-3p Nurse shift  More somnolent today. Remains nonverbal. Does not open eyes. Having regular apneic epsiodes of 10-15 seconds every minute. Resp shallow rate 10-12. Less responsive to commands. Barely flinched when COVID nasal test done.Did have weak hand grasp upon command RUE weaker than left. No movement in low extremities today. Appeared comfortable this shift. Reportedly was repo'd x2 by Renata. Oral  care with toothettes done.   Remains on Comfort care status. Remain on bedrest. Byron ortiz. MD and family aware of status.   Family decided disposition (if/when discharged), to be the \"Care suites\" of [facility] in [place] for continued [facility] care.      "

## 2021-01-24 NOTE — PROGRESS NOTES
Comfort cares. ROGERS orientation. Somnolent. Turn and reposition q2h. Breathing shallow/snoring with some periods of 5-8 seconds of apnea. Appears very comfortable, no pain medications given. 1 loose BM this shift. Matthews in place. Bed bath given. Sutures to head C/D/I. PICC SL. Discharge pending.

## 2021-01-24 NOTE — PROGRESS NOTES
Care Management Follow Up    Length of Stay (days): 21    Expected Discharge Date: 01/25/21     Concerns to be Addressed: discharge planning     Patient plan of care discussed at interdisciplinary rounds: Yes    Anticipated Discharge Disposition: Assisted Living     Anticipated Discharge Services:    Anticipated Discharge DME:      Patient/family educated on Medicare website which has current facility and service quality ratings:    Education Provided on the Discharge Plan:    Patient/Family in Agreement with the Plan: yes    Referrals Placed by CM/SW: Hospice  Private pay costs discussed: private room/amenity fees    Additional Information:  Tentative plan to discharge Monday to [facility] suites in [place]. [facility] BLS ride set for 1200. PCS faxed and on the front of pt's chart. Debby Ortiz with  Hospice updated and coordinating with he Hickman for paperwork. SERENITY spoke with Ryann at [facility], [phone] who is in agreement with this plan. No liquid meds, only solutabs, and meds should be bubble packed. He Hickman updated and in agreement.       MISA Hilton, LGSW  [phone]  [facility]

## 2021-01-24 NOTE — PROGRESS NOTES
[facility]    Medicine Progress Note - Hospitalist Service       Date of Admission:  1/3/2021  Assessment & Plan       Jacque Bernstein is a 86 year old female admitted on 1/3/2021.  PMHx of hypertension, stage II CKD, GERD, hx of R breast cancer with resultant peripheral neuropathy dt chemotherapy, GERD, OA and frequent falls (reporting worsening ataxia over months) who was admitted from [place] ED on 1/3/2021 for further evaluation after she was incidentally found to have 2 intracranial masses after a mechanical fall with findings concerning for metastatic breast cancer.       PAtient is transitioned to comfort care only on 1/22/21       Intracranial masses x2, dt metastatic breast cancer, s/p craniotomy with resection 1/6/21  Complicated by cerebellar hematoma, s/p R frontal EVD placement 1/6/21  Left SDH s/p left gila hole drainage 1/15  Acute metabolic encephalopathy due to brain mets, intracranial hemorrhage  * Initial head CT showed 2 masses, one of cerebellum with associated tonsillar herniation and mild hydrocephalus, and 2cm parietal mass with vasogenic edema.    * Underwent resection 1/6 as above with pathology confirming metastatic breast cancer.   * Returned to OR 1/6 for evacuation of cerebellar hematoma and placement of a R frontal external ventricular drain.   * EVD clamped 1/14 with worsening ICP; head CT showed worsening left SDH with mass effect s/p left gila hole drainage 1/15  * Head CT 1/20 shows persisting subdural fluid collections with some mass effect      - Met with  and son, Vick, on 1/22 to discuss goals of care;  reports that based on prior discussions, she would not want long term artificial support including through tube feedings.  Discussed that I feel if she were to improve, this would be over a long period of time and would require this support if goals were to remain restorative.  They report that all family is in agreement with transition  to comfort care.  Discussed next steps including hospice liaison visit, meeting with social work to discuss discharge planning as well as treatment goals under comfort care - using any medication necessary to treat any possible pain, anxiety, restlessness, dyspnea or other symptoms of discomfort.  Discussed that I anticipate it would likely be her lack of hydration that would ultimately result in organ failure and her passing (possibly as long as 1-2 weeks), though I do not suspect she would be cognizant of this.  They wish to visit with her for a little while longer, but we will stop tube feeds, remove NG and transition to comfort measures once they are finished with their visit.  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  Urine studies 1/21 consistent with SIADH likely due to intracranial  Hemorrhage.   -- no further montoring     Hypophosphatemia  -- stop protocol     C.diff colitis  Developed abd pain on exam 1/19 with worsening leukocytosis, though afebrile.  CT abd/pelvis 1/19 showed diffuse bowel wall thickening of distal descending and sigmoid colon.  C.diff returned positive 1/21.    -- stop vancomycin with transition to comfort care     Hx of right sided breast cancer s/p neoadjuvant chemo and mastectomy 2017 with brain mets as above  Peripheral neuropathy dt prior chemotherapy, manages with Tylenol  Clinical staging from 2017: Stage IIIA (T3, N2a, M0).  Noted ER+/Vek5pli+.  Follows with [facility] (Dr. Ho).  Anastrazole discontinued per Onc with new mets.   -- not currently a candidate for further treatment, oncology recommends comfort care     Paroxysmal SVT  New onset SVT on 1/10 AM. [place] to be related to multiple systemic stressors.  TSH low but FT4 nl.   TTE 1/7 showed EF 65-70% with indeterminate diastolic dysfunction and normal wall motion, mild pulmonary hypertension, mild aortic stenosis.  Cardiology consulted.  -- stop amio and carvedilol as well as tele     Hypertension  PTA lisinopril 20mg po daily  -- stop antihypertensives     Stage II CKD  Baseline Cr is around 1.   -- no further monitoring     Steroid-induced hyperglycemia  Due to dexamethasone. A1C 5.6, no hx of DM.  Transitioned from insulin gtt to subcutaneous 1/8.   -- stop insulin and POC glucose checks     Acute Blood Loss Anemia  Secondary to surgery, cerebellar hematoma.   -- no further monitoring    Oral thrush, resolved  Completed 12 days Nystatin 1/21.      GERD  -- stop PPI    Urinary retention - folely placed 1/17  -- continue figueroa for now, may consider discontinuation pending family wishes    Uncomplicated UTI dt ESBL, resolved  Completed 7 day course refugio 01/12     Hypernatremia, resolved     FEN: stop tube feeds  Tubes / lines: NG, figueroa, PICC placed 1/16         Diet: NPO for Medical/Clinical Reasons Except for: Ice Chips, Meds    DVT Prophylaxis: None as comfort care  Figueroa Catheter: in place, indication: Strict 1-2 Hour I&O, End of Life  Code Status: No CPR- Do NOT Intubate           Disposition Plan   Expected discharge: 1-2 days, recommended to transitional care unit once safe disposition plan/ TCU bed available and if passing is not imminent.  Entered: Jade Etienne MD 01/24/2021, 1:11 PM       The patient's care was discussed with the Bedside Nurse, Care Coordinator/, Patient and Patient's Family.    Time Spent on this Encounter   I spent >35 minutes on the unit/floor managing the care of Jacque Bernstein. Over 50% of my time was spent on the following:   - Counseling the patient and/or family regarding: diagnostic results, prognosis and risks and benefits of treatment options  - Coordination of care with the: care coordinator/, nurse and family    See  details of discuss above.     MD Jade Estrada MD  Hospitalist Service  [facility]      ______________________________________________________________________    Interval History   She is much more somnolent and comfortable today.  Chart reviewed by me.     No acute events per RN. Appears comfortable in bed    Data reviewed today: I reviewed all medications, new labs and imaging results over the last 24 hours. I personally reviewed no images or EKG's today.    Physical Exam   Vital Signs: Temp: 96.9  F (36.1  C) Temp src: Axillary BP: (!) 149/63 Pulse: 74   Resp: 10(10-15 sec apnea every minute) SpO2: 96 % O2 Device: None (Room air)    Weight: 156 lbs 11.95 oz  General Appearance: Well nourished elderly female in NAD, lying in bed  Respiratory: few basilar crackles, otherwise clear without wheezing or tachypnea  Cardiovascular: RRR, normal s1/s2 without murmur  GI: abdomen soft, no signs of tenderness, nondistended, normal bowel sounds  Skin:  Other: Awake but will not open eyes, follows most commands, able to wiggle toes bilaterally, no response of RUE    Data   Recent Labs   Lab 01/22/21  0545 01/21/21  2100 01/21/21  0502 01/20/21  0500 01/19/21  0400   WBC  --   --  29.0* 34.6* 39.6*   HGB  --   --  9.4* 10.0* 9.6*   MCV  --   --  94 94 94   PLT  --   --  324 359 353   * 132* 130* 131* 134   POTASSIUM 4.8  --  5.1 5.2 5.2   CHLORIDE 98  --  100 100 101   CO2 27  --  27 29 30   BUN 33*  --  35* 37* 44*   CR 0.56  --  0.58 0.56 0.66   ANIONGAP  --   --  3 2* 3   LADI 8.0*  --  7.8* 8.2* 8.1*   *  --  212* 220* 201*   ALBUMIN  --   --   --   --  1.8*   PROTTOTAL  --   --   --   --  4.9*   BILITOTAL  --   --   --   --  0.2   ALKPHOS  --   --   --   --  76   ALT  --   --   --   --  24   AST  --   --   --   --  20   LIPASE  --   --   --   --  322

## 2021-01-24 NOTE — PLAN OF CARE
7am-7pm nurse shift  Comfort cares. ROGERS orientation. Remains nonverbal. Intermittent alertness occurred this am when family visited resulting in pt opening right eye and flexed toes and did bilateral hand grasp on command. After family left, pt resumed back to her more lethargic state. Comfort maintained with occasional repo from supine to left side to <20 degrees HOB elevation and prn Roxanol SL given x1 this shift. Extremities all still warm, No mottling noted yet. Respirations shallow but rate us WNL.  Matthews in place -urine yellow. Inc of loose brown BM this shift x1. PICC SL. Slept between cares. Spent 30 minutes in discussion with /son on pt status, plan of care and their interested in the transfer of the pt to an inpatient hospice facility when discharge is deemed by MD Kyrie BENTON following.

## 2021-01-24 NOTE — PROGRESS NOTES
CLINICAL NUTRITION SERVICES - REASSESSMENT NOTE      Recommendations Ordered by Registered Dietitian (RD):   Continue TF at goal   Future/Additional Recommendations:   If oral intake appropriate in the future and pt able to tolerate some food, consider nocturnal TF to stimulate appetite and intake during the day   Malnutrition: (1/18)  % Weight Loss:  None noted  % Intake:  </= 50% for >/= 5 days (severe malnutrition) -- now EN reliant to meet needs  Subcutaneous Fat Loss:  None observed  Muscle Loss:  None observed  Fluid Retention:  Mild - likely not nutritional      Malnutrition Diagnosis: Patient does not meet two of the above criteria necessary for diagnosing malnutrition at this time        EVALUATION OF PROGRESS TOWARD GOALS   Diet:  NPO     Nutrition Support:  TF continues at goal rate as outlined below ~   Nutrition Support Enteral:  Type of Feeding Tube:  Nasoduodenal  Enteral Frequency:  Continuous  Enteral Regimen:  Isosource 1.5 at 45 mL/hr  Total Enteral Provisions: 1620 kcal (27 kcal/kg), 73 g protein (1.2 g/kg), 190 g CHO, 16 g fiber, 821 mL H2O  Free Water Flush:  60 mL every 4 hrs  Certavite canceled 1/14    Intake/Tolerance:    Diet downgraded from DD1, HTL to NPO on 1/16   Labs reviewed: Mg 2.5 (H), Phos 2.4 (L), K WNL  Recent Labs   Lab 01/18/21  0845 01/18/21  0610 01/18/21  0427 01/18/21  0242 01/17/21  2149 01/17/21  1756 01/17/21  1539 01/17/21  0754 01/16/21  1220 01/16/21  1220 01/14/21  0809 01/14/21  0809 01/14/21  0036 01/14/21  0036 01/13/21  1926 01/13/21  1926   GLC  --   --  207*  --   --   --   --  146*  --  202*  --  234*  --  212*  --  196*   * 171*  --  209* 176* 163* 177*  --    < >  --    < >  --    < >  --    < >  --     < > = values in this interval not displayed.     Medications reviewed: MSSI + Lantus 15 units HS, IV Decadron   Stooling: BM x4 yesterday (getting bowel meds)   Wt: 72.4 kg; I/O 1800/1595  Vitals:    01/14/21 0600 01/15/21 0400 01/16/21 0543 01/17/21  0400   Weight: 73.1 kg (161 lb 2.5 oz) 73.3 kg (161 lb 9.6 oz) 73.1 kg (161 lb 2.5 oz) 73.6 kg (162 lb 4.1 oz)    01/18/21 0200   Weight: 72.4 kg (159 lb 9.8 oz)         ASSESSED NUTRITION NEEDS:  Dosing Weight 60.8 kg (adjusted for overweight)  Estimated Energy Needs: 2682-5453 kcals (25-30 Kcal/Kg)  Justification: overweight  Estimated Protein Needs: 70-90 grams protein (1.2-1.5 g pro/Kg)  Justification: hypercatabolism with acute illness      NEW FINDINGS:   Ongoing GOC discussed with family   BIANCA drain removal today per Neurosurgery     Previous Goals:   TF will continue to meet % estimated needs while poor oral intake  Evaluation: Met    Previous Nutrition Diagnosis:   Inadequate oral intake related to dysphagia and lethargy as evidenced by poor oral intake and continued TF reliance  Evaluation: No change      MALNUTRITION  % Weight Loss:  None noted  % Intake:  </= 50% for >/= 5 days (severe malnutrition) -- now EN reliant to meet needs  Subcutaneous Fat Loss:  None observed  Muscle Loss:  None observed  Fluid Retention:  Mild - likely not nutritional      Malnutrition Diagnosis: Patient does not meet two of the above criteria necessary for diagnosing malnutrition at this time     CURRENT NUTRITION DIAGNOSIS  Inadequate oral intake related to dysphagia and lethargy as evidenced by poor oral intake and continued TF reliance    INTERVENTIONS  Recommendations / Nutrition Prescription  Continue TF at goal  If oral intake appropriate in the future and pt able to tolerate some food, consider nocturnal TF to stimulate appetite and intake during the day    Implementation  Medical Food Supplement: canceled order for Magic Cup as now NPO     Goals  TF will continue to meet % estimated needs while poor oral intake      MONITORING AND EVALUATION:  Progress towards goals will be monitored and evaluated per protocol and Practice Guidelines      Jackie Spangler RD, LD  Clinical Dietitian      Yes - the patient is able to be screened

## 2021-01-25 NOTE — PROGRESS NOTES
Continues on comfort cares. Opened eyes and attempted to talk to RN late in the evening, though not discernable. Otherwise somnolent. No indicators of pain or discomfort present. Repositioned every 2 hours. Matthews patent. RR 12-16, some short periods of apnea while sleeping. Continue to monitor, discharge to [facility] at 12pm.

## 2021-01-25 NOTE — PROGRESS NOTES
Chart reviewed  Note pt is comfort cares  FT pulled 1/22  Diet: NPO  No nutrition intervention at this time    Cathi Vora RD, LD  Clinical Dietitian - [facility]   Pager - [phone]

## 2021-01-25 NOTE — PROGRESS NOTES
Care Management Follow Up    Length of Stay (days): 22    Expected Discharge Date: 01/25/21     Concerns to be Addressed: discharge planning     Patient plan of care discussed at interdisciplinary rounds: Yes    Anticipated Discharge Disposition: Assisted Living     Anticipated Discharge Services:    Anticipated Discharge DME:      Patient/family educated on Medicare website which has current facility and service quality ratings:    Education Provided on the Discharge Plan:    Patient/Family in Agreement with the Plan: yes    Referrals Placed by CM/SW: Hospice  Private pay costs discussed: Not applicable    Additional Information:  Per Dr. Etienne, patient's ride has been rescheduled for tomorrow, 1/26/21 at 1200 to [facility]. Email sent to Nany to update on the plan. She noted that she would call the family. Call placed to Ryann at [facility] to update and message left.    Will continue to follow      MORALES Zuñiga

## 2021-01-25 NOTE — PROGRESS NOTES
Somnolent. Respirations shallow with periods of 5-10 seconds of apnea. Up with a lift. Turn and reposition q2h. NPO. Appears comfortable, no pain medications given. Matthews in place. 1 smear BM this shift. PICC SL. Discharge at 1200 to [facility].

## 2021-01-25 NOTE — PROGRESS NOTES
Writer received update from SIOBHAN Ingram regarding patients status.  Patient will not discharge today d/t decline in condition.  Writer called and left voicemail for patients son requesting a call back.  Writer updated hospice team.    Nany Barnett RN   [facility]  [phone]

## 2021-01-25 NOTE — CONSULTS
Writer spoke to He Hickman via phone to discuss Hospice philosophy, services and benefits.  Plan is for patient to discharge tomorrow 1/25/2021 to [facility] in [place] with [facility] services. Stretcher transportation has been arranged for 1200 pm Monday per SERENITY Dean. Hospice Liaison Nany will meet with He Hickman tomorrow morning 1/25/2021 at 1100 am on the 8th floor.to sign hospice consents prior to discharge. Medical equipment has been ordered, Hospital bed and OBT to be delivered to the facility on Monday between 1030 am and 1100 am. Ryann facility RN is aware of this. HospiceTeam will meet patient and family at the facility on Monday 1/25/2021 at 100 pm to complete the admission. He Hickman and Spouse Otf are aware of this..       Discharging MD  .. Please order the following comfort medications per Hospice, have them filled at the discharge pharmacy and send with patient upon discharge. The medications must contain, NO RANGES, NO LIQUID and bubble packed.     1. Lorazepam 0.5 mg tabs  Take 1/2 tablet (0.25 mg) PO/SL/NH q4hr prn for anxiety or restlessness  2. Haloperidol 0.5 mg tabs   Take 1 tablet (0.5 mg) PO/SL q6hr prn for nausea or agitation  3. Atropine sulfate 1 percent drops,   place 2 drops PO/SL/BUC q2hr prn for oral secretions  4. Acetaminophen 650 mg supp   Insert 1 supp NH q4h PRN for fever or mild pain  5. Bisacodyl 10 mg supp   Insert 1 supp NH QD prn for constipation  6. Morphine Solutabs 2.5 mg tabs  Take 1 tab (2.5 mg) PO/SL q2hr prn for pain or dyspnea    Coordinated discharge planning with SERENITY Dean. Please call  Hospice at [phone] with any questions or changes to this discharge plan.       Thank you for this referral,   Debby Vallejo, RN   [facility] Hospice Referral Specialist

## 2021-01-25 NOTE — PROVIDER NOTIFICATION
MD Notification    Notified Person: MD    Notified Person Name: Dr. Etienne    Notification Date/Time: 01/25/21    Notification Interaction: paged     Purpose of Notification: discharge med orders and picc line removal.     Orders Received:

## 2021-01-25 NOTE — PROGRESS NOTES
SPIRITUAL HEALTH SERVICES Progress Note  [facility]    Shared a visit with pt's  (of 57 years) and son.  At least two other sons are enroute.  Provided conversation and support as they shared stories about the pt and their long marriage and life together.  Pt and her  just moved into an assisted living in December, so they were surprised at her sudden decline in the last days/weeks.  Pt's family members express feelings of peace and believe that she is comfortable at this time.  No additional SH needs at present.  Chaplains are available per need or request of family.                                                                                                                                                 Karyn Vincent M.A.  Staff   Pager [phone]  Phone [phone]

## 2021-01-25 NOTE — PROGRESS NOTES
[facility]    Medicine Progress Note - Hospitalist Service       Date of Admission:  1/3/2021  Assessment & Plan       Jacque Bernstein is a 86 year old female admitted on 1/3/2021.  PMHx of hypertension, stage II CKD, GERD, hx of R breast cancer with resultant peripheral neuropathy dt chemotherapy, GERD, OA and frequent falls (reporting worsening ataxia over months) who was admitted from [place] ED on 1/3/2021 for further evaluation after she was incidentally found to have 2 intracranial masses after a mechanical fall with findings concerning for metastatic breast cancer.       PAtient is transitioned to comfort care only on 1/22/21  Patient is much less responsive today and looks like she is imminently dying so will hold off on discharge today.  She has significant intracranial bleeding is high risk of seizures so scheduled Ativan 1 mg IV every 8 hours to prevent seizures       Intracranial masses x2, dt metastatic breast cancer, s/p craniotomy with resection 1/6/21  Complicated by cerebellar hematoma, s/p R frontal EVD placement 1/6/21  Left SDH s/p left gila hole drainage 1/15  Acute metabolic encephalopathy due to brain mets, intracranial hemorrhage  * Initial head CT showed 2 masses, one of cerebellum with associated tonsillar herniation and mild hydrocephalus, and 2cm parietal mass with vasogenic edema.    * Underwent resection 1/6 as above with pathology confirming metastatic breast cancer.   * Returned to OR 1/6 for evacuation of cerebellar hematoma and placement of a R frontal external ventricular drain.   * EVD clamped 1/14 with worsening ICP; head CT showed worsening left SDH with mass effect s/p left gila hole drainage 1/15  * Head CT 1/20 shows persisting subdural fluid collections with some mass effect      - Met with  and son, Vick, on 1/22 to discuss goals of care;  reports that based on prior discussions, she would not want long term artificial support  including through tube feedings.  Discussed that I feel if she were to improve, this would be over a long period of time and would require this support if goals were to remain restorative.  They report that all family is in agreement with transition to comfort care.  Discussed next steps including hospice liaison visit, meeting with social work to discuss discharge planning as well as treatment goals under comfort care - using any medication necessary to treat any possible pain, anxiety, restlessness, dyspnea or other symptoms of discomfort.  Discussed that I anticipate it would likely be her lack of hydration that would ultimately result in organ failure and her passing (possibly as long as 1-2 weeks), though I do not suspect she would be cognizant of this.  They wish to visit with her for a little while longer, but we will stop tube feeds, remove NG and transition to comfort measures once they are finished with their visit.  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  Urine studies 1/21 consistent with SIADH likely due to intracranial  Hemorrhage.   -- no further montoring     Hypophosphatemia  -- stop protocol     C.diff colitis  Developed abd pain on exam 1/19 with worsening leukocytosis, though afebrile.  CT abd/pelvis 1/19 showed diffuse bowel wall thickening of distal descending and sigmoid colon.  C.diff returned positive 1/21.    -- stop vancomycin with transition to comfort care     Hx of right sided breast cancer s/p neoadjuvant chemo and mastectomy 2017 with brain mets as above  Peripheral neuropathy dt prior chemotherapy, manages with Tylenol  Clinical staging from 2017: Stage IIIA (T3, N2a, M0).  Noted ER+/Kfm5abe+.  Follows with [facility] (Dr. Ho).   Anastrazole discontinued per Onc with new mets.   -- not currently a candidate for further treatment, oncology recommends comfort care     Paroxysmal SVT  New onset SVT on 1/10 AM. [place] to be related to multiple systemic stressors.  TSH low but FT4 nl.  TTE 1/7 showed EF 65-70% with indeterminate diastolic dysfunction and normal wall motion, mild pulmonary hypertension, mild aortic stenosis.  Cardiology consulted.  -- stop amio and carvedilol as well as tele     Hypertension  PTA lisinopril 20mg po daily  -- stop antihypertensives     Stage II CKD  Baseline Cr is around 1.   -- no further monitoring     Steroid-induced hyperglycemia  Due to dexamethasone. A1C 5.6, no hx of DM.  Transitioned from insulin gtt to subcutaneous 1/8.   -- stop insulin and POC glucose checks     Acute Blood Loss Anemia  Secondary to surgery, cerebellar hematoma.   -- no further monitoring    Oral thrush, resolved  Completed 12 days Nystatin 1/21.      GERD  -- stop PPI    Urinary retention - folely placed 1/17  -- continue figueroa for now, may consider discontinuation pending family wishes    Uncomplicated UTI dt ESBL, resolved  Completed 7 day course refugio 01/12     Hypernatremia, resolved     FEN: stop tube feeds  Tubes / lines: NG, figueroa, PICC placed 1/16         Diet: NPO for Medical/Clinical Reasons Except for: Ice Chips, Meds    DVT Prophylaxis: None as comfort care  Figueroa Catheter: in place, indication: Strict 1-2 Hour I&O, End of Life  Code Status: No CPR- Do NOT Intubate           Disposition Plan   Expected discharge:   Patient looks like she is imminently dying in the next 24 to 48 hours so we will cancel discharge today  Discussed with  and bedside RN today    Entered: Jade Etienne MD 01/25/2021, 1:42 PM               Jade Etienne MD  Hospitalist Service  [facility]      ______________________________________________________________________    Interval History   She is much more somnolent  and comfortable today and less responsive today.  Chart reviewed by me.     No acute events per RN. Appears comfortable in bed    Data reviewed today: I reviewed all medications, new labs and imaging results over the last 24 hours. I personally reviewed no images or EKG's today.    Physical Exam   Vital Signs:          Resp: 12        Weight: 163 lbs 2.25 oz  General Appearance: Well nourished elderly female in NAD, lying in bed, not much responsive today  Respiratory: few basilar crackles, otherwise clear without wheezing or tachypnea  Cardiovascular: RRR, normal s1/s2 without murmur  GI: abdomen soft, no signs of tenderness, nondistended, normal bowel sounds  Skin:  Other: Lethargic and somnolent  Data   Recent Labs   Lab 01/22/21  0545 01/21/21  2100 01/21/21  0502 01/20/21  0500 01/19/21  0400   WBC  --   --  29.0* 34.6* 39.6*   HGB  --   --  9.4* 10.0* 9.6*   MCV  --   --  94 94 94   PLT  --   --  324 359 353   * 132* 130* 131* 134   POTASSIUM 4.8  --  5.1 5.2 5.2   CHLORIDE 98  --  100 100 101   CO2 27  --  27 29 30   BUN 33*  --  35* 37* 44*   CR 0.56  --  0.58 0.56 0.66   ANIONGAP  --   --  3 2* 3   LADI 8.0*  --  7.8* 8.2* 8.1*   *  --  212* 220* 201*   ALBUMIN  --   --   --   --  1.8*   PROTTOTAL  --   --   --   --  4.9*   BILITOTAL  --   --   --   --  0.2   ALKPHOS  --   --   --   --  76   ALT  --   --   --   --  24   AST  --   --   --   --  20   LIPASE  --   --   --   --  322

## 2021-01-26 NOTE — PROGRESS NOTES
Patient will discharge to [facility] tomorrow 1/27/2021 at 9:30 AM via stretcher transport.  Hospice will meet patient at facility at 10:00 AM to complete admission and ensure patient is comfortable.  Writer updated Ryann, facility nurse of discharge plan.  Ryann is in agreement and confirmed equipment arrived yesterday.     Nany Barnett RN  [facility] referral specialist   [phone]

## 2021-01-26 NOTE — PROGRESS NOTES
Care Management Discharge Note    Discharge Date: 01/27/21(Hospice placement)       Discharge Disposition: [facility]    Discharge Services:  [facility]    Discharge DME:  Hospital Bed    Discharge Transportation:  [employer] at 9:30 on 1/27.Patient in need of stretcher due to hospice enrollment, somnolent with minimal response. Discussed that we can not guarantee insurance coverage but will submit medical reason for stretcher transport.     Private pay costs discussed: transportation costs, continue with current room and board fees with [facility].     PAS Confirmation Code:    Patient/family educated on Medicare website which has current facility and service quality ratings:      Education Provided on the Discharge Plan:  Yes  Persons Notified of Discharge Plans: Spouse and sons.   Patient/Family in Agreement with the Plan: yes    Handoff Referral Completed: No    Additional Information:  Received discharge orders at 11:36 am for patient to discharge to [facility] with [facility].  SERENITY spoke with Ryann with [facility] who stated that they would need patient to be at their facility at 2:00 if she was going to discharge back to them today.  SERENITY placed call to [facility] to arrange for ride at 2:00 but the earliest ride was at 4:00 for today.  SERENITY paged physician who stated we could discharge patient in the morning. SERENITY cancelled noon transport and changed to 9:30 am for 1/27.  SERENITY updated Nany with [facility] and they are able to see patient at the facility at 10:00 on 1/27.  Updated spouse and sons and they are in agreement with discharge plan.  Nany with [facility] updated Ryann on discharge time.  Meds were sent to pharmacy to be filled and will be sent with patient on 1/27.  Provided family with requested cremation resources.       MORALES Raines

## 2021-01-26 NOTE — PLAN OF CARE
VS deferred. Continues to be somnolent with minimal response to verbal stimuli. Scheduled ativan given IV per orders. SL Roxanol given x1 for slight restlessness. Weight shifting done every 2-3 hours, avoiding right side lying. Incontinent of smear BM x2 figueroa in place and patent. Family at bedside. Plan to continue with comfort measures only.

## 2021-01-26 NOTE — PLAN OF CARE
Pt was unresponsive throughout this shift. Focus assessments and VSS deferred due to comfort cares. RR 14-20, intermittent apneic episodes. Comfort cares continued; turn/repo and frequent oral cares provided per comfort. Family at bedside. Scheduled IV Ativan given to prevent seizures. Will continue with comfort cares and providing support for family.

## 2021-01-26 NOTE — PROGRESS NOTES
[facility]    Medicine Progress Note - Hospitalist Service       Date of Admission:  1/3/2021  Assessment & Plan       Jacque Bernstein is a 86 year old female admitted on 1/3/2021.  PMHx of hypertension, stage II CKD, GERD, hx of R breast cancer with resultant peripheral neuropathy dt chemotherapy, GERD, OA and frequent falls (reporting worsening ataxia over months) who was admitted from [place] ED on 1/3/2021 for further evaluation after she was incidentally found to have 2 intracranial masses after a mechanical fall with findings concerning for metastatic breast cancer.       PAtient is transitioned to comfort care only on 1/22/21  Patient was  much less responsive on 1/25/21  and looks like she is imminently dying so will hold off on discharge today.  She has significant intracranial bleeding is high risk of seizures so scheduled Ativan 1 mg IV every 8 hours to prevent seizures will switch to sublingual today      Patient was  more alert and awake last evening and also was talking to the nurse today and RR remains tsbale in the 12-16 range. So will plan on dischrarge to hospice facility . They were unable to take her today due to delay in medication filling     Intracranial masses x2, dt metastatic breast cancer, s/p craniotomy with resection 1/6/21  Complicated by cerebellar hematoma, s/p R frontal EVD placement 1/6/21  Left SDH s/p left gila hole drainage 1/15  Acute metabolic encephalopathy due to brain mets, intracranial hemorrhage  * Initial head CT showed 2 masses, one of cerebellum with associated tonsillar herniation and mild hydrocephalus, and 2cm parietal mass with vasogenic edema.    * Underwent resection 1/6 as above with pathology confirming metastatic breast cancer.   * Returned to OR 1/6 for evacuation of cerebellar hematoma and placement of a R frontal external ventricular drain.   * EVD clamped 1/14 with worsening ICP; head CT showed worsening left SDH with mass effect  s/p left gila hole drainage 1/15  * Head CT 1/20 shows persisting subdural fluid collections with some mass effect      - Met with  and son, Vick, on 1/22 to discuss goals of care;  reports that based on prior discussions, she would not want long term artificial support including through tube feedings.  Discussed that I feel if she were to improve, this would be over a long period of time and would require this support if goals were to remain restorative.  They report that all family is in agreement with transition to comfort care.  Discussed next steps including hospice liaison visit, meeting with social work to discuss discharge planning as well as treatment goals under comfort care - using any medication necessary to treat any possible pain, anxiety, restlessness, dyspnea or other symptoms of discomfort.  Discussed that I anticipate it would likely be her lack of hydration that would ultimately result in organ failure and her passing (possibly as long as 1-2 weeks), though I do not suspect she would be cognizant of this.  They wish to visit with her for a little while longer, but we will stop tube feeds, remove NG and transition to comfort measures once they are finished with their visit.  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  Urine studies 1/21 consistent with SIADH likely due to intracranial  Hemorrhage.   -- no further montoring     Hypophosphatemia  -- stop protocol     C.diff colitis  Developed abd pain on exam 1/19 with worsening leukocytosis, though afebrile.  CT abd/pelvis 1/19 showed diffuse bowel wall thickening of distal descending and sigmoid colon.  C.diff returned positive 1/21.    -- stop vancomycin with transition to comfort care     Hx  of right sided breast cancer s/p neoadjuvant chemo and mastectomy 2017 with brain mets as above  Peripheral neuropathy dt prior chemotherapy, manages with Tylenol  Clinical staging from 2017: Stage IIIA (T3, N2a, M0).  Noted ER+/Jfa5zop+.  Follows with [facility] (Dr. Ho).  Anastrazole discontinued per Onc with new mets.   -- not currently a candidate for further treatment, oncology recommends comfort care     Paroxysmal SVT  New onset SVT on 1/10 AM. [place] to be related to multiple systemic stressors.  TSH low but FT4 nl.  TTE 1/7 showed EF 65-70% with indeterminate diastolic dysfunction and normal wall motion, mild pulmonary hypertension, mild aortic stenosis.  Cardiology consulted.  -- stop amio and carvedilol as well as tele     Hypertension  PTA lisinopril 20mg po daily  -- stop antihypertensives     Stage II CKD  Baseline Cr is around 1.   -- no further monitoring     Steroid-induced hyperglycemia  Due to dexamethasone. A1C 5.6, no hx of DM.  Transitioned from insulin gtt to subcutaneous 1/8.   -- stop insulin and POC glucose checks     Acute Blood Loss Anemia  Secondary to surgery, cerebellar hematoma.   -- no further monitoring    Oral thrush, resolved  Completed 12 days Nystatin 1/21.      GERD  -- stop PPI    Urinary retention - folely placed 1/17  -- continue figueroa for now, may consider discontinuation pending family wishes    Uncomplicated UTI dt ESBL, resolved  Completed 7 day course refugio 01/12     Hypernatremia, resolved     FEN: stop tube feeds  Tubes / lines: NG, figueroa, PICC placed 1/16         Diet: NPO for Medical/Clinical Reasons Except for: Ice Chips, Meds  Advance Diet as Tolerated    DVT Prophylaxis: None as comfort care  Figueroa Catheter: in place, indication: Strict 1-2 Hour I&O, End of Life  Code Status: No CPR- Do NOT Intubate           Disposition Plan   Expected discharge:   Back to assisted living with hospice care most likely tomorrow   Discharge orders are done     Discussed with   and bedside RN today    Entered: Jade Etienne MD 01/26/2021, 3:29 PM               Jade Etienne MD  Hospitalist Service  [facility]      ______________________________________________________________________    Interval History   She is much  More awake today and talked to the RN. Appears comfortable and respiratory status stable today   No acute events per RN. Appears comfortable in bed    Data reviewed today: I reviewed all medications, new labs and imaging results over the last 24 hours. I personally reviewed no images or EKG's today.    Physical Exam   Vital Signs:          Resp: 16        Weight: 163 lbs 2.25 oz  General Appearance: Well nourished elderly female in NAD, lying in bed. Comfortable   Respiratory: few basilar crackles, otherwise clear without wheezing or tachypnea  Cardiovascular: RRR, normal s1/s2 without murmur  GI: abdomen soft, no signs of tenderness, nondistended, normal bowel sounds  Skin:  Other: Lethargic   Data   Recent Labs   Lab 01/22/21  0545 01/21/21  2100 01/21/21  0502 01/20/21  0500   WBC  --   --  29.0* 34.6*   HGB  --   --  9.4* 10.0*   MCV  --   --  94 94   PLT  --   --  324 359   * 132* 130* 131*   POTASSIUM 4.8  --  5.1 5.2   CHLORIDE 98  --  100 100   CO2 27  --  27 29   BUN 33*  --  35* 37*   CR 0.56  --  0.58 0.56   ANIONGAP  --   --  3 2*   LADI 8.0*  --  7.8* 8.2*   *  --  212* 220*

## 2021-01-27 NOTE — PROGRESS NOTES
Care Management Discharge Note     Discharge Date: 01/27/21(Hospice placement)        Discharge Disposition: [facility]     Discharge Services:  [facility]     Discharge DME:  Hospital Bed     Discharge Transportation: [facility] Stretch at 9:30 on 1/27.Patient in need of stretcher due to hospice enrollment, somnolent with minimal response. Discussed that we can not guarantee insurance coverage but will submit medical reason for stretcher transport.      Private pay costs discussed: transportation costs, continue with current room and board fees with [facility].      PAS Confirmation Code:    Patient/family educated on Medicare website which has current facility and service quality ratings:       Education Provided on the Discharge Plan:  Yes  Persons Notified of Discharge Plans: Spouse and sons.   Patient/Family in Agreement with the Plan: yes     Handoff Referral Completed: No     Additional Information:  Received discharge orders for patient to discharge to [facility] today.  Ride arranged for 9:30.  PCS form completed, faxed to  and provided to [facility].  POLST provided by Hospice liaison and provided to [facility] for transport. Meds on unit to be sent with patient.     MISA aRines, LGSW  [phone]  [facility]

## 2021-01-27 NOTE — PLAN OF CARE
Pt discharged 0930 to [facility] with Hospice care. Pt was very lethargic and sleepy, but was able to answer yes/no questions and nod her head. Family at bedside. Solu-tabs medication given and tolerated. Good oral care. Pt has intact figueroa wit good UOP. Skin WDL ex bruised. LEFT arm PICC was removed.  Family at bedside for transfer. Medications sent with EMS transport

## 2021-01-27 NOTE — PLAN OF CARE
VS & full assessment deferred d/t comfort cares. Somnolent & snoring, responds to yes & no questions @ times. Weight shift q2h. Staples & sutures intact to head. [place] patent. Daughter @ bedside. Plan to discharge @ 0930 1/27 to [facility] w/ hospice.

## 2021-01-27 NOTE — DISCHARGE SUMMARY
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               Hospital Course   Jacque Bernstein is an 86 year-old female admitted on 1/3/2021.  PMHx of hypertension, stage II CKD, GERD, hx of R breast cancer with resultant peripheral neuropathy dt chemotherapy, GERD, OA and frequent falls (reporting worsening ataxia over months) who was admitted from [place] ED on 1/3/2021 for further evaluation after she was incidentally found to have 2 intracranial masses after a mechanical fall with findings concerning for metastatic breast cancer.       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    * Underwent resection 1/6 as above with pathology confirming metastatic breast cancer.   * Returned to OR 1/6 for evacuation of cerebellar hematoma and placement of a R frontal external ventricular drain.   * EVD clamped 1/14 with worsening ICP; head CT showed worsening left SDH with mass effect s/p left gila hole drainage 1/15  * Head CT 1/20 shows persisting subdural fluid collections with some mass effect   * Care conference with  and son, Vick, on 1/22 to discuss goals of care, transitioned to comfort measures only.   * Discharged to assisted living facility for ongoing hospice cares  * Discharge coordinated with RN, care coordinator, SW on day of discharge       Consultations This Hospital Stay   PHYSICAL THERAPY ADULT IP CONSULT  CARE MANAGEMENT / SOCIAL WORK IP CONSULT  NEUROSURGERY IP CONSULT  HEMATOLOGY & ONCOLOGY IP CONSULT  RADIATION ONCOLOGY IP CONSULT  PHYSICAL THERAPY ADULT IP CONSULT  OCCUPATIONAL THERAPY ADULT IP CONSULT  PHYSICAL THERAPY ADULT IP CONSULT  OCCUPATIONAL THERAPY ADULT IP CONSULT  PHARMACY IP CONSULT  NUTRITION SERVICES ADULT IP CONSULT  NUTRITION SERVICES ADULT IP CONSULT  PHARMACY IP CONSULT  NEUROLOGY IP CONSULT  PHYSICAL THERAPY ADULT IP CONSULT  OCCUPATIONAL THERAPY ADULT IP CONSULT  SWALLOW EVAL SPEECH PATH AT BEDSIDE IP  CONSULT  CARDIOLOGY IP CONSULT  VASCULAR ACCESS ADULT IP CONSULT  GASTROENTEROLOGY IP CONSULT  SOCIAL WORK IP CONSULT    Code Status   No CPR- Do NOT Intubate    Time Spent on this Encounter   I, Antonio Ortiz MD, personally saw the patient today and spent greater than 30 minutes discharging this patient.       Antonio Ortiz MD  [facility]  ______________________________________________________________________    Physical Exam   Vital Signs:          Resp: 18        Weight: 163 lbs 2.25 oz    Constitutional: Sleeping, appears comfortable at rest  Respiratory: Regular respirations, non-labored breathing   Cardiovascular: Pulse strong, regular   GI: Soft, non-tender, non-distended.   Skin/Integumen: Warm, dry  Neuro:  Sleeping. Not awakened given goals of care        Primary Care Physician   Sawyer Chou    Discharge Disposition   Discharged to assisted living facility   Condition at discharge: Terminal      Discharge Orders      General info for SNF    Length of Stay Estimate: Short Term Care: Estimated # of Days <30  Condition at Discharge: Declining  Level of care:skilled   Rehabilitation Potential: Poor  Admission H&P remains valid and up-to-date: Yes  Recent Chemotherapy: N/A  Use Nursing Home Standing Orders: No. Pt is comfort care only     Mantoux instructions    Give two-step Mantoux (PPD) Per Facility Policy No (if no explain). Pt from care facility     Activity - Up with nursing assistance     Follow Up and recommended labs and tests    Follow-up with hospice team after arrival to facility.     No CPR- Do NOT Intubate    Comfort care only     Fall precautions     Advance Diet as Tolerated    Follow this diet upon discharge: NPO except ice chips and meds     Discharge Medications   Current Discharge Medication List      START taking these medications    Details   acetaminophen (TYLENOL) 650 MG suppository Place 1 suppository (650 mg) rectally every 4 hours as needed for  fever or mild pain  Qty: 30 suppository, Refills: 0    Associated Diagnoses: End of life care      atropine 1 % ophthalmic solution Place 2 drops under the tongue every hour as needed for other (secretions)  Qty: 1 mL, Refills: 0    Associated Diagnoses: End of life care      bisacodyl (DULCOLAX) 10 MG suppository Place 1 suppository (10 mg) rectally daily as needed for constipation  Qty: 10 suppository, Refills: 0    Associated Diagnoses: End of life care      haloperidol (HALDOL) 0.5 MG tablet Take 1 tablet (0.5 mg) by mouth every 6 hours as needed for agitation  Qty: 30 tablet, Refills: 0    Associated Diagnoses: End of life care      LORazepam (ATIVAN) 0.5 MG tablet Take 1 tablet (0.5 mg) by mouth every 4 hours as needed for anxiety or nausea  Qty: 30 tablet, Refills: 0    Associated Diagnoses: End of life care      morphine 2.5 MG solu-tab Take 2 tablets (5 mg) by mouth every 2 hours as needed for shortness of breath / dyspnea or moderate to severe pain  Qty: 60 tablet, Refills: 0    Associated Diagnoses: End of life care         CONTINUE these medications which have NOT CHANGED    Details   melatonin 3 MG tablet Take 3 mg by mouth nightly as needed         STOP taking these medications       acetaminophen (TYLENOL) 500 MG tablet Comments:   Reason for Stopping:         amoxicillin (AMOXIL) 500 MG capsule Comments:   Reason for Stopping:         anastrozole (ARIMIDEX) 1 MG tablet Comments:   Reason for Stopping:         B Complex Vitamins (VITAMIN B-COMPLEX) TABS Comments:   Reason for Stopping:         Calcium Carb-Ergocalciferol 500-200 MG-UNIT TABS Comments:   Reason for Stopping:         docusate sodium (DSS) 100 MG capsule Comments:   Reason for Stopping:         lisinopril (ZESTRIL) 20 MG tablet Comments:   Reason for Stopping:               Significant Results and Procedures   Most Recent 3 CBC's:  Recent Labs   Lab Test 01/21/21  0502 01/20/21  0500 01/19/21  0400   WBC 29.0* 34.6* 39.6*   HGB 9.4*  10.0* 9.6*   MCV 94 94 94    359 353     Most Recent 3 BMP's:  Recent Labs   Lab Test 01/22/21  0545 01/21/21  2100 01/21/21  0502 01/20/21  0500 01/19/21  0400   * 132* 130* 131* 134   POTASSIUM 4.8  --  5.1 5.2 5.2   CHLORIDE 98  --  100 100 101   CO2 27  --  27 29 30   BUN 33*  --  35* 37* 44*   CR 0.56  --  0.58 0.56 0.66   ANIONGAP  --   --  3 2* 3   LADI 8.0*  --  7.8* 8.2* 8.1*   *  --  212* 220* 201*     Most Recent 2 LFT's:  Recent Labs   Lab Test 01/19/21  0400 01/08/21  0448   AST 20 13   ALT 24 13   ALKPHOS 76 61   BILITOTAL 0.2 0.5     Most Recent 3 INR's:  Recent Labs   Lab Test 01/04/21  1050   INR 1.06     Most Recent 3 Troponin's:No lab results found.  Most Recent 3 BNP's:No lab results found.  Most Recent Cholesterol Panel:No lab results found.  Most Recent 6 Bacteria Isolates From Any Culture (See EPIC Reports for Culture Details):No lab results found.  Most Recent TSH and T4:  Recent Labs   Lab Test 01/16/21  1220 01/10/21  0517   TSH 0.51 0.09*   T4  --  1.03     Most Recent Hemoglobin A1c:  Recent Labs   Lab Test 01/17/21  0812   A1C 6.1*     Most Recent Urinalysis:No lab results found.  Most Recent ABG:  Recent Labs   Lab Test 01/08/21  0448   PH 7.40   PO2 150*   PCO2 34*   HCO3 21     Most Recent ESR & CRP:No lab results found.,   Results for orders placed or performed during the hospital encounter of 01/03/21   MR Brain w Contrast Stereotactic    Narrative    MR BRAIN WITH CONTRAST STEREOTACTIC  1/4/2021 1:23 PM     HISTORY: Multiple brain lesions.    TECHNIQUE: High-resolution postcontrast images were obtained through  the brain with intravenous contrast for neurosurgical localization  purposes. 20 mL of Gadavist given.    FINDINGS: Exam was performed for neurosurgical stereotactic  localization purposes and does not constitute a diagnostic study. At  least five separate intracranial enhancing masses are present with the  largest being located in the left cerebellum  causing some obstructive  hydrocephalus. Scattered nonspecific white matter changes also noted.  The masses are described in body of report of MRI of the brain without  and with contrast performed on the same date.      Impression    IMPRESSION: Stereotactic MRI with contrast for neurosurgical  localization purposes.    SAMIRA HOANG MD   MR Brain w/o & w Contrast    Narrative    MRI BRAIN WITHOUT AND WITH CONTRAST  1/4/2021 1:23 PM    HISTORY: Breast carcinoma. Intra-axial masses discovered following  mechanical fall.    TECHNIQUE:  Multiplanar, multisequence MRI of the brain without and  with 20 mL Gadavist     COMPARISON: None.    FINDINGS: Diffusion weighted images do not show any evidence for any  acute ischemic infarcts. There are at least 5 enhancing masses which  are all probably intra-axial, the largest mass in the superior left  cerebellum could possibly be extra-axial. The large left cerebellar  mass crosses the midline and measures 5.2 x 5.0 x 4.9 cm. It extends  anteriorly up to the left middle/cerebral peduncle and appears to be  involving the middle cerebral peduncle. This lesion is causing  significant mass effect on the superior aspect of the fourth ventricle  and inferior aqueduct resulting in obstructive hydrocephalus with some  transependymal flow. The second largest lesion is in the left parietal  lobe measuring 2.7 x 2.3 x 2.4 cm and is associated with surrounding  edema. There is also a 1 cm area of enhancement in the right frontal  parietal region, is 0.8 cm enhancing lesion in the left anterior  frontal lobe, and a 0.6 cm enhancing lesion in the anterior right  frontal lobe. Moderately extensive scattered white matter changes also  noted. Vascular structures are patent at the skull base.      Impression    IMPRESSION: Multiple intracranial enhancing masses which appear to be  intra-axial. The largest is in the left cerebellum extending across  midline and causing compression of the superior  fourth ventricle and  inferior aqueduct resulting in some hydrocephalus. Findings are  suspicious for metastatic deposits.    SAMIRA HOANG MD   CT Chest/Abdomen/Pelvis w Contrast    Narrative    CT CHEST/ABDOMEN/PELVIS WITH CONTRAST January 4, 2021 2:11 PM    CLINICAL HISTORY: Breast cancer.    TECHNIQUE: CT scan of the chest, abdomen, and pelvis was performed  following injection of IV contrast. Multiplanar reformats were  obtained. Dose reduction techniques were used.   CONTRAST: 82mL Isovue-370.    COMPARISON: None.    FINDINGS:   LUNGS AND PLEURA: Minimal subpleural fibrotic changes anterolaterally  on the right, possibly related to radiation therapy. No pulmonary  masses. 3 mm nodule in the anterior right lower lobe image 141 series  5. Tiny nodule in the lingula 183 series 5. No effusions.    MEDIASTINUM/AXILLAE: No lymphadenopathy. No thoracic aortic aneurysms.    HEPATOBILIARY: No significant mass or bile duct dilatation. No  calcified gallstones. Low dense lesion too small to characterize in  the central right lobe of the liver on image 53 series 2. No definite  enhancement.    PANCREAS: No significant mass, duct dilatation, or inflammatory  change.    SPLEEN: Normal size.    ADRENAL GLANDS: Minimal nodular thickening of both adrenal glands.    KIDNEYS/BLADDER: Multiple low dense lesions too small to characterize  likely cysts. Areas of possible cortical scarring seen in the left  kidney, question some left renal atrophy. No hydronephrosis  bilaterally.    BOWEL: No obstruction or inflammatory change.    PELVIC ORGANS: No pelvic masses.    ADDITIONAL FINDINGS: No ascites. Right mastectomy.    MUSCULOSKELETAL: Survey of the visualized bony structures demonstrates  no destructive bony lesions.      Impression    IMPRESSION:  1.  A few tiny pulmonary nodules are noted of uncertain clinical  significance.  2.  Minimal nodular thickening of both adrenal glands is nonspecific.  3.  Multiple probable cysts in the  kidneys.  4.  No definite adenopathy or bony metastatic disease demonstrated.    JUNO DAN MD   NM Bone Scan Whole Body    Narrative    NUCLEAR MEDICINE BONE SCAN WHOLE BODY  1/5/2021 11:18 AM    HISTORY: Breast cancer. Recent fall.    TECHNIQUE:  28 mCi Tc99m MDP IV in left upper forearm.  Anterior and  posterior images. Selected oblique images.    COMPARISON:  Prior bone scan: None available.   Relevant imaging study: None.    FINDINGS: Fractures at the posterior aspects of the right seventh and  eighth ribs. Increased activity over the lower sacrum, consistent with  fracturing. There are some scattered areas of presumed  degenerative/arthritic type activity. Increased activity at the  lateral aspect of the left knee. This could simply be degenerative,  but if the patient has acute pain in this area, a lateral tibial  plateau fracture cannot be excluded; corresponding radiographs could  be performed if indicated clinically. No bony metastatic pattern.      Impression    IMPRESSION:   1. Fractures of the right ribs and sacrum.  2. Increased activity at the lateral aspect of the left knee, as  above.    JD FREY MD   CT Head w/o Contrast     Value    Radiologist flags Acute head bleed (AA)    Narrative    CT SCAN OF THE HEAD WITHOUT CONTRAST   1/6/2021 7:57 PM     HISTORY: Altered level of consciousness (LOC), altered mental status,  unexplained. Status post craniotomy.    TECHNIQUE: Axial images of the head and coronal reformations without  IV contrast material. Radiation dose for this scan was reduced using  automated exposure control, adjustment of the mA and/or kV according  to patient size, or iterative reconstruction technique.    COMPARISON: Head MRI 1/4/2021.    FINDINGS: Postoperative change of occipital craniotomy and tumor  resection. Large volume hemorrhage is present throughout the resection  cavity and within the posterior fossa extending into the basilar  cisterns. Worsened posterior fossa mass  effect with increased  effacement of the prepontine cistern, premedullary cistern, and  foramen magnum with increased cerebellar tonsillar herniation.     Thin subdural blood is present along the falx cerebri and bilateral  tentorium cerebelli. Supratentorial ventriculomegaly with extensive  periventricular white matter hypoattenuation. No appreciable change of  multiple other brain metastases.      Impression    IMPRESSION:     1. Large volume hemorrhage within the resection cavity and posterior  fossa with worsening mass effect and tonsillar herniation.  2. No appreciable change of multiple other brain metastases.  3. Supratentorial ventriculomegaly with extensive white matter  hypoattenuation.  4. Thin subdural blood along the posterior falx cerebri and bilateral  tentorium cerebelli.    [Critical Result: Acute head bleed]    Finding was identified on 1/6/2021 8:04 PM.     Dr. Echeverria of Anesthesiology was contacted by me on 1/6/2021 8:09 PM  and verbalized understanding of the critical result.     IAN HILL MD   CT Head w/o Contrast    Narrative    EXAM: CT HEAD W/O CONTRAST  LOCATION: [facility]  DATE/TIME: 1/6/2021 11:56 PM    INDICATION: Intracranial hemorrhage, postoperative  COMPARISON: 01/06/2021 performed at 1951  TECHNIQUE: Routine CT Head without IV contrast. Multiplanar reformats. Dose reduction techniques were used.    FINDINGS:  INTRACRANIAL CONTENTS: Interval postoperative changes evacuation of large amount of hemorrhage within posterior fossa; associated decrease in the amount of blood products and mild improvement in associated mass effect. Moderate amount of blood throughout   mostly superior aspect of posterior fossa remains, along with partial effacement of fourth ventricle and basilar cisterns. Slight improvement in mild hydrocephalus. Interval placement of right frontal shunt tube tip in anterior horn of right lateral   ventricle. No significant change in appearance of  metastases, diffuse periventricular and deep cortical hypodensities, and small parafalcine and para tentorial subdural hematoma.     VISUALIZED ORBITS/SINUSES/MASTOIDS: No intraorbital abnormality. No paranasal sinus mucosal disease. No middle ear or mastoid effusion.    BONES/SOFT TISSUES: Post suboccipital craniotomy.      Impression    IMPRESSION:  1.  Interval postoperative changes evacuation of large amount of hemorrhage within posterior fossa; associated decrease in the amount of blood products and mild improvement in associated mass effect.  2.  Moderate amount of blood throughout mostly superior aspect of posterior fossa remains, along with partial effacement of fourth ventricle and basilar cisterns.  3.  Slight improvement in mild hydrocephalus. Interval placement of right frontal shunt tube tip in anterior horn of right lateral ventricle.  4.  No significant change in appearance of metastases, diffuse periventricular and deep cortical hypodensities, and small parafalcine and para tentorial subdural hematoma.    MR Brain w/o & w Contrast    Narrative    MRI BRAIN WITHOUT AND WITH CONTRAST  1/7/2021 9:28 AM    HISTORY:  Postop tumor resection.    TECHNIQUE:  Multiplanar, multisequence MRI of the brain without and  with 7 mL Gadavist.    COMPARISON: Brain MRI 1/4/2021.    FINDINGS:  Postoperative changes of occipital craniotomy and tumor  resection are demonstrated. Complex areas of T1 hyperintense signal  and enhancement are present along the margins of the resection cavity  which are presumably postoperative; however, residual tumor cannot be  excluded. Heterogeneous signal on diffusion-weighted imaging involving  bilateral cerebellar hemispheres which presumably represents  postoperative change and hemorrhage; however, areas of cerebellar  infarct cannot be excluded.    Scattered areas of subarachnoid hemorrhage are present within and  along the resection cavity along the cerebellar hemispheres  extending  into the basilar cisterns.    Partial effacement of the fourth ventricle with supratentorial  hydrocephalus. The degree of posterior fossa mass effect is not  appreciably changed compared to 1/6/2021 head CT at 11:56 PM but  improved compared to 1/6/2021 head CT at 7:57 PM.    Diffuse dural thickening, increased compared to the prior MRI. Thin  subdural hematomas along the posterior falx cerebri and bilateral  tentorium cerebelli. Small volume subarachnoid hemorrhage is present  layering within the lateral ventricles.    Background of volume loss is present with confluent white matter T2  hyperintensities which likely represent a combination of chronic small  vessel ischemic change, vasogenic edema, and hydrostatic edema,  similar compared to the prior exam.    Right frontal approach ventriculostomy shunt catheter is present.  Ventricular system is slightly decreased in size compared to the prior  MRI but not appreciably changed compared to most recent head CT.    Brain metastases involving the bilateral frontal lobes, right  frontoparietal parenchyma along the posterior cingulate sulcus, and  left parietal lobe are slightly decreased in size compared to the  prior exam.    Endotracheal tube is partially visualized. Mild paranasal sinus  mucosal thickening. Trace opacification of the bilateral mastoid  cavities, likely inflammatory.      Impression    IMPRESSION:  1. Postoperative changes of posterior fossa tumor resection as  detailed with scattered subarachnoid hemorrhage within and along the  resection cavity extending into the basilar cisterns.  2. Posterior fossa mass effect is not significantly changed compared  to most recent head CT.  3. Supratentorial hydrocephalus with right frontal approach  ventriculostomy catheter.  4. Thin subdural hematomas along the posterior falx cerebri and  bilateral tentorium cerebelli.  5. Diffuse dural thickening and enhancement, likely postoperative, but  sequela of  hypotension cannot be excluded.  6. Multiple other metastases, slightly decreased in size compared to  prior MRI.    IAN HILL MD   XR Chest Port 1 View    Narrative    EXAM: CHEST SINGLE VIEW PORTABLE  LOCATION: [facility]  DATE/TIME: 1/7/2021 1:52 AM    INDICATION: Endotracheal tube.    COMPARISON: 1/4/2021 - CT chest, abdomen and pelvis.    FINDINGS: An endotracheal tube is present with distal tip projecting over the left lateral aspect of the mid trachea, approximately 4 cm proximal to the juan. Right internal jugular central venous catheter present with distal catheter tip in the   superior vena cava. The lungs are clear. Normal size cardiac silhouette. Recent-appearing fracture of the posterior aspect of the right seventh rib again noted.      Impression    IMPRESSION:  1. An endotracheal tube is present with distal tip projecting over the left lateral aspect of the mid trachea. The tube is very likely within the mid trachea, but at this position could also be within the esophagus. Recommend clinical correlation.  2. No evidence of active cardiopulmonary disease.    The findings were called to Niurka, the nurse taking care of the patient in the hospital, by Dr. Fish on 1/7/2021 at 0220 hours.     XR Abdomen Port 1 View    Narrative    XR ABDOMEN PORT 1 VW 1/7/2021 12:30 PM    HISTORY: tf placement    COMPARISON: None.      Impression    IMPRESSION: Feeding tube loops in the stomach but the tip is in the  proximal duodenum. Bowel gas pattern is nonobstructed.    JOHANA BRADSHAW MD   XR Abdomen Port 1 View    Narrative    XR ABDOMEN PORT 1 VW 1/7/2021 12:45 PM    HISTORY: tube placement- reposition.    COMPARISON: 721 1230 hours      Impression    IMPRESSION: Feeding tube unchanged in position, looped once in the  gastric fundus with tip in the proximal duodenum.    JOHANA BRADSHAW MD   CT Head w/o Contrast    Narrative    CT SCAN OF THE HEAD WITHOUT CONTRAST   1/10/2021 10:01 AM      HISTORY: Postoperative  follow-up. Prior posterior fossa surgery for  metastatic disease. Hydrocephalus.    TECHNIQUE:  Axial images of the head and coronal reformations without  IV contrast material.  Radiation dose for this scan was reduced using  automated exposure control, adjustment of the mA and/or kV according  to patient size, or iterative reconstruction technique.    COMPARISON: 1/6/2021.    FINDINGS: There has been prior posterior fossa craniectomy procedure  and prior right frontal gila hole for placement of a ventricular  catheter into the ventricular system. Since the prior exam, there has  been development of bilateral extra-axial fluid collections measuring  up to 1.4 cm on the right and 1.2 cm and the left when measuring on  coronal images. The ventricles are small in size. Blood products are  again seen in the posterior fossa and along the tentorium but they  have diminished since the prior exam. There is also low density in the  central cerebellar tissue in the operative bed region which is  presumably due to edema. Fourth ventricle continues to be small and  compressed in appearance by blood products in presumed edema. There is  no evidence for any supratentorial infarct. There is a left parietal  mass with surrounding edema unchanged from prior MRI. Some edema also  seen in the left frontal lobe at site of an additional known  metastatic deposit. Smaller known right frontal metastatic deposit not  readily appreciated on this study.      Impression    IMPRESSION:  1. Interval development of bilateral extra-axial fluid collections  with small ventricular size. This may be related to some over shunting  or due to persistent mass effect on the distal aqueduct and fourth  ventricle.  2. Persistent mass effect on the fourth ventricle and distal aqueduct  due to edema and blood products.  3. Mild interval decrease in blood products in the posterior fossa and  along the tentorium and extra-axial  spaces.  4. 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 It is unchanged  from MRI dated 1/7/2021.    SAMIRA HOANG MD   CT Head w/o Contrast    Narrative    EXAM: CT HEAD W/O CONTRAST  LOCATION: [facility]  DATE/TIME: 1/12/2021 4:37 AM    INDICATION: Status post craniotomy with EVD placement.    COMPARISON: 1/10/2021.    TECHNIQUE: Routine CT head without intravenous contrast. Multiplanar reformats. Dose reduction techniques were used.    FINDINGS:  INTRACRANIAL CONTENTS: Right frontal approach ventriculostomy catheter terminates in the right frontal horn. There has been decompression of the left lateral ventricle with slight expansion of the right lateral ventricle but no evidence for   hydrocephalus. Bilateral low-density subdural fluid collections are again noted. The low-density collection overlying the right cerebral hemisphere is slightly larger.. Stable postoperative changes to the posterior fossa with cerebellar and subarachnoid   hemorrhage. Left parietal mass with associated edema is unchanged. Left frontal metastatic lesion is again noted. Smaller metastasis in the right cerebral hemisphere is not well seen on this exam.     VISUALIZED ORBITS/SINUSES/MASTOIDS: No intraorbital abnormality. No paranasal sinus mucosal disease. No middle ear or mastoid effusion.    BONES/SOFT TISSUES: No acute abnormality.      Impression    IMPRESSION:  1.  Slight decompression of the left lateral ventricle when compared to prior with slight increase in size of the right lateral ventricle. No evidence for hydrocephalus on this exam.    2.  Slight interval increase in size of the low-density subdural collection overlying the right cerebral hemisphere.    3.  Smaller low-density subdural collection overlying the left cerebral hemisphere is unchanged.    4.  Unchanged postoperative changes in the posterior fossa.     CT Head w/o Contrast     Narrative    CT OF THE HEAD WITHOUT CONTRAST 1/13/2021 2:05 PM     COMPARISON: Head CT 1/12/2021.    HISTORY: Follow up CT.    TECHNIQUE: 5 mm thick axial CT images of the head were acquired  without IV contrast material.    FINDINGS: Hypodense subdural collections along the cerebral  convexities bilaterally again noted. Hyperdense thin subdural  hemorrhage along both leaves of the tentorium again noted without  change. Small amount of hyperdense hemorrhage in the quadrigeminal  plate cistern again noted. Vasogenic edema in the posterior aspect of  the left cerebral hemisphere again noted likely related to the  intra-axial enhancing mass at the medial aspect of the left parietal  lobe noted on the comparison MRI from 1/4/2021.    There is moderate diffuse cerebral volume loss. There are extensive  confluent areas of decreased density in the cerebral white matter  bilaterally that are consistent with sequela of chronic small vessel  ischemic. The ventricles and basal cisterns are within normal limits  in configuration given the degree of cerebral volume loss.  A  ventriculostomy catheter placed through a right frontal approach with  its tip in the right lateral ventricle again noted. There is no  midline shift.    No evidence for recent ischemic infarct.    The visualized paranasal sinuses are well-aerated. There is no  mastoiditis. There are no fractures of the visualized bones. Midline  occipital craniotomy again noted.      Impression    IMPRESSION:   1. Stable hypodense subdural collections along the cerebral  convexities bilaterally.  2. Stable minimal extra-axial hemorrhage along both leaves of the  tentorium and within the quadrigeminal plate cistern again noted. No  evidence for new or increasing hemorrhage.  3. Midline occipital craniotomy again noted.  4. Diffuse cerebral volume loss and cerebral white matter changes  consistent with chronic small vessel ischemic disease.      Radiation dose for this scan  was reduced using automated exposure  control, adjustment of the mA and/or kV according to patient size, or  iterative reconstruction technique    CARLENE WADE MD   CT Head w/o Contrast    Narrative    EXAM: CT HEAD W/O CONTRAST  LOCATION: [facility]  DATE/TIME: 1/15/2021 5:53 AM    INDICATION: Follow-up subdural fluid collections.  COMPARISON: 1/13/2021  TECHNIQUE: Routine CT Head without IV contrast. Multiplanar reformats. Dose reduction techniques were used.    FINDINGS:  INTRACRANIAL CONTENTS: Right frontal approach ventriculostomy catheter terminates along the right frontal horn. There is new slight dilatation of the right lateral ventricle and right temporal horn worrisome for a trapped ventricle. Bilateral low-density   subdural fluid collections are again noted. This has significantly enlarged on the left measuring up to 21 mm in radial thickness overlying the left frontal lobe, previously 15 mm in the same locale. This produces considerable mass effect on the brain   parenchyma and results in a 14 mm left to right midline shift. The low-density subdural fluid collection overlying the right cerebral hemisphere has decreased in size measuring 9 mm in radial thickness, previously 12 mm. Vasogenic edema related to an   underlying mass in the left parietal/occipital region. Stable postoperative changes to the cerebellum with overlying craniotomy.    VISUALIZED ORBITS/SINUSES/MASTOIDS: No intraorbital abnormality. No paranasal sinus mucosal disease. No middle ear or mastoid effusion.    BONES/SOFT TISSUES: Midline suboccipital craniotomy.      Impression    IMPRESSION:  1.  Significant enlargement of the low-density subdural fluid collection overlying the left cerebral hemisphere now measuring up to 21 mm in radial thickness, previously 15 mm.  2.  Decreased size of the low-density subdural fluid collection overlying the right cerebral hemisphere, now measuring 9 mm in radial thickness, previously  12 mm.  3.  There is considerable mass effect on the current study with a new 14 mm left to right midline shift.  4.  Early trapped right lateral ventricle/temporal horn.  5.  Remainder unchanged.    Exam discussed with Dr. Piña at 6:18 AM.   CT Head w/o Contrast    Narrative    EXAM: CT HEAD W/O CONTRAST  LOCATION: [facility]  DATE/TIME: 1/15/2021 11:47 PM    INDICATION: Intracranial hemorrhage, known, follow up. See the clinical information for interpreting provider.  COMPARISON: 01/15/2021 at 5:55 AM  TECHNIQUE: Routine CT Head without IV contrast. Multiplanar reformats. Dose reduction techniques were used.    FINDINGS:  INTRACRANIAL CONTENTS: Interval placement of a left-sided subdural drain with evacuation of the previously seen low-density left-sided subdural fluid collection. This fluid collection has been near completely evacuated. There is a moderate amount of gas   overlying the left frontal lobe measuring approximately 20 mm in thickness. Overall mass effect is improved. There is a 6 mm left to right midline shift, previously 14 mm. Stable small low-density right subdural fluid collection. Stable positioning of   the right frontal approach ventriculostomy catheter. Slight decompression of the right lateral ventricle. No CT evidence of acute infarct. Again seen is some vasogenic edema at the left parietal lobe and postoperative changes in the posterior fossa.    VISUALIZED ORBITS/SINUSES/MASTOIDS: No intraorbital abnormality. No paranasal sinus mucosal disease. No middle ear or mastoid effusion.    BONES/SOFT TISSUES: No acute abnormality.      Impression    IMPRESSION:  1.  Interval evacuation of the low-density subdural collection overlying the left cerebral hemisphere via a left sided subdural drain. This collection has been completely evacuated. There is a moderate amount of gas overlying the left frontal lobe   measuring approximately 20 mm in thickness.  2.  Improved mass effect with  a 6 mm left to right midline shift, previously 14 mm.  3.  Slight decompression of the right lateral ventricle.  4.  Otherwise unchanged.   CT Head w/o Contrast    Narrative    EXAM: CT HEAD WITHOUT CONTRAST  LOCATION: [facility]  DATE/TIME: 01/16/2021, 5:17 AM    INDICATION: Follow-up hemorrhage status post left-sided gila hole.  COMPARISON: 01/15/2021.  TECHNIQUE: Routine CT Head without IV contrast. Multiplanar reformats. Dose reduction techniques were used.    FINDINGS:  INTRACRANIAL CONTENTS: Redemonstrated right frontal ventriculostomy catheter and left-sided subdural drain. The volume of pneumocephalus along the left frontal lobe has decreased since the prior exam. There is decreased mass effect in the left frontal   lobe. Trace residual left-to-right midline shift, measuring approximately 1 mm. The fluid collection along the left cerebral convexity has nearly resolved, with a small amount of fluid along the posterior convexity that measures approximately 2 mm in   thickness. The low-density collection along the right cerebral hemisphere is stable. The ventricles are stable in size. Redemonstrated vasogenic edema in the left parietal lobe. Redemonstrated postoperative changes of the bilateral cerebellar   hemispheres. Posterior fossa mass effect is stable.     VISUALIZED ORBITS/SINUSES/MASTOIDS: Prior bilateral cataract surgery. Visualized portions of the orbits are otherwise unremarkable. No paranasal sinus mucosal disease. No middle ear or mastoid effusion.    BONES/SOFT TISSUES: Redemonstrated suboccipital craniotomy.      Impression    IMPRESSION:  1.  Decreased pneumocephalus along the anterior left frontal lobe. Decreased associated mass effect with near-complete resolution of left-to-right midline shift, measuring 1 mm.  2.  Otherwise, no significant change.     CT Head w/o Contrast    Narrative    EXAM: CT HEAD W/O CONTRAST  LOCATION: [facility]  DATE/TIME: 1/19/2021 5:44  AM    INDICATION: Brain mass or lesion. Follow-up hemorrhage status post left-sided gila hole.  COMPARISON: 01/16/2021.  TECHNIQUE: Routine CT Head without IV contrast. Multiplanar reformats. Dose reduction techniques were used.    FINDINGS:  INTRACRANIAL CONTENTS: Again seen are postsurgical changes of a suboccipital craniotomy with a prominent region of encephalomalacia involving the cerebellum, not significantly changed when compared to prior exam.     Additionally there is a persistent right frontal EVD catheter with tip terminating in the anterior horn of the right lateral ventricle. There has been interval decrease in size of the ventricular system when compared to prior examination.     Since prior examination there is been interval removal of the left sided subdural drainage catheter. There are bilateral CSF density extra-axial fluid collections overlying the bilateral cerebral convexities, left greater than right. The CSF density   collection on the left is noted measures up to 1.6 cm. A focal gas-filled collection over the left cerebral convexity measures up to 1.6 cm, previously 1.96 cm. Since prior exam, the amount of gas overlying the left frontal lobe has decreased. The CSF   density collection overlying the right cerebral convexity measures up to 1.5 cm, previously 1.35 cm. Left to right midline shift measures up to 0.8 cm, previously 0.3 cm.    Mild local mass effect is noted along the left lateral ventricle and left cerebral hemisphere.     There is persistent unchanged vasogenic edema involving the left parietal lobe.     No acute large territory infarction is identified.     Evolving blood product is noted in the region of the left quadrigeminal plate cistern, not increased when compared to prior exam. Stable appearing subarachnoid blood product within the posterior fossa. Decreasing trace subdural blood product along the   bilateral tentorial leaflets.        VISUALIZED ORBITS/SINUSES/MASTOIDS:  Prior right cataract surgery. Visualized portions of the orbits are otherwise unremarkable. No paranasal sinus mucosal disease. No middle ear or mastoid effusion.    BONES/SOFT TISSUES: No acute abnormality.      Impression    IMPRESSION:  1.  Interval removal of the left-sided subdural drainage catheter with development of a  CSF density extra-axial fluid now measuring up to 1.6 cm. Previously there is a pocket of gas overlying the left frontal lobe in the extra-axial space which is   slightly decreased in size.  2.  Left to right midline shift now measures 0.8 centimeters, previously 0.3 cm.  3.  Slight interval increase in extra-axial CSF density fluid along the right cerebral convexity now measuring 1.5 cm, previously 1.35 centimeters.   4.  Stable EVD catheter with interval decrease in size of the ventricular system.  5.  Postoperative changes of a suboccipital craniotomy with evolving encephalomalacia and blood product involving the posterior fossa.        Dr. Sathya Valentino discussed results with Dr. Diaz on 1/19/2021 632AM.   CT Chest/Abdomen/Pelvis w Contrast    Narrative    CT CHEST/ABDOMEN/PELVIS WITH CONTRAST January 19, 2021 9:20 AM    CLINICAL HISTORY: Abdominal pain. Worsening leukocytosis. History of  breast cancer with brain metastases.    TECHNIQUE: CT scan of the chest, abdomen, and pelvis was performed  following injection of IV contrast. Multiplanar reformats were  obtained. Dose reduction techniques were used.   CONTRAST: 80mL [id number].    COMPARISON: CT of the chest, abdomen, and pelvis performed 1/4/2021.    FINDINGS:   LUNGS AND PLEURA: Small bilateral pleural effusions are new since the  previous exam. There is mild associated atelectasis at the left lung  base posteriorly. No lung masses are identified.    MEDIASTINUM/AXILLAE: No enlarged lymph nodes are identified in the  chest. Atherosclerotic calcification of the thoracic aorta and  coronary arteries. No pericardial effusion. Two  indeterminate left  thyroid nodules are unchanged, with the largest measuring 2.1 cm. Left  PICC line is in place, with tip near the SVC/RA junction.    HEPATOBILIARY: A 0.7 cm low-density lesion in the right hepatic lobe  is too small to characterize, but is unchanged, and likely represents  a small cyst.    PANCREAS: Normal.    SPLEEN: Normal.    ADRENAL GLANDS: Unremarkable.    KIDNEYS/BLADDER: Scattered small bilateral renal cortical cysts are  unchanged. No hydronephrosis. The urinary bladder is not well  visualized due to artifact from bilateral hip arthroplasties.    BOWEL: Diffuse bowel wall thickening involving the distal descending  and sigmoid colon as well as the rectum are likely related to an  infectious or inflammatory proctocolitis. No bowel obstruction.  Unremarkable appendix. Enteric tube is in place, with tip in the first  portion of the duodenum.    PELVIC ORGANS: The uterus is not seen, and is likely surgically  absent.    LYMPH NODES: No enlarged lymph nodes are identified in the abdomen or  pelvis.    VASCULATURE: Mild atherosclerotic aortoiliac calcification.    ADDITIONAL FINDINGS: Moderate subcutaneous edema about the pelvis is  new since the previous exam.    MUSCULOSKELETAL: Degenerative changes are noted throughout the  thoracolumbar spine. Lumbar curve, convex left.      Impression    IMPRESSION:   1.  Diffuse bowel wall thickening in the distal descending and sigmoid  colon, as well as the rectum, are likely related to an infectious or  inflammatory proctocolitis.  2.  Small bilateral pleural effusions are new since the previous exam.  3.  Moderate subcutaneous edema about the pelvis is new since the  previous exam.    JAGDISH MEJIA MD   CT Head w/o Contrast    Addendum: 1/20/2021    Findings were discussed with Lisa Veloz at 8:15 PM hours on  1/20/2021.    JOSE A WILKERSON MD      Narrative    CT OF THE HEAD WITHOUT CONTRAST   1/20/2021 7:44 PM     COMPARISON: Head CT 1/19/2021  and head MRI examinations 1/17/2021 and  1/4/2021.    HISTORY: Subdural hemorrhage. History of supra and infratentorial  masses. Status post posterior fossa mass resection. Postoperative  subdural hygromas.    TECHNIQUE: Axial CT images of the head from the skull base to the  vertex were acquired without IV contrast.    FINDINGS:   INTRACRANIAL CONTENTS: The previously noted right frontal  ventriculostomy has been removed. Regressing right subdural fluid  collection now measuring 7.2 mm in thickness previously measuring 11.3  mm. 16.2 mm thick subdural fluid collection overlying the left  cerebral hemisphere previously measuring 14.3 mm. Pneumocephalus  overlying the left frontal pole measures 18.8 mm in AP dimension,  previously measuring 20.9 mm. Associated gila hole. Shift right  measures 16.6 mm, previously 7.3 mm. The ventricular system is  increasingly distended left more so than right. Effacement of the  suprasellar cistern and prepontine CSF spaces has not significantly  changed.    The medial left parietal mass is again identified. The smaller  supratentorial masses are not well visualized. Stable postoperative  changes in the posterior fossa. Accounting for differences in image  acquisition, the fluid collection overlying the suboccipital  craniotomy has not significantly changed.    VISUALIZED ORBITS/SINUSES/MASTOIDS: No significant orbital  abnormality.  No significant paranasal sinus mucosal disease. No  significant middle ear or mastoid effusion.    OSSEOUS STRUCTURES/SOFT TISSUES: No acute abnormality.      Impression    IMPRESSION:  1.  Interval removal of the right frontal ventriculostomy with  interval development of mild ventricular distention left greater than  right.  2.  Regressing right subdural fluid collection and slightly progressed  left subdural fluid collection.  3.  Shift right now measures 16.2 mm, previously 7.2 mm.  4.  Stable pneumocephalus.  5.  Unchanged effacement of the  suprasellar cistern and prepontine  cistern.  6.  Accounting for differences in image acquisition postoperative  changes in the posterior fossa and the associated extracranial fluid  collection are unchanged.  7.  The left medial parietal metastasis is again visualized. The  smaller supratentorial metastases are not well visualized.    Radiation dose for this scan was reduced using automated exposure  control, adjustment of the mA and/or kV according to patient size, or  iterative reconstruction technique    JOSE A WILKERSON MD   Echocardiogram Complete    Narrative    [id number]  [id number]  [id number]  [id number]^SERGEI^CARLENE^MERVIN           [facility]  Echocardiography Laboratory  [address] [place] [id number]        Name: MIHAI GRAHAM  MRN: [id number]  : 1934  Study Date: 2021 10:55 AM  Age: 86 yrs  Gender: Female  Patient Location: [facility]  Reason For Study: Endocarditis  Ordering Physician: CARLENE MAI  Referring Physician: MIKE BAILEY  Performed By: Hari Cloud RDCS     BSA: 1.8 m2  Height: 65 in  Weight: 160 lb  HR: 84  BP: 209/87 mmHg  _____________________________________________________________________________  __        Procedure  Complete Echo Adult.  _____________________________________________________________________________  __        Interpretation Summary     Mild valvular aortic stenosis. Not hemodynamically significant.  No evidence of endocarditis seen in this study.  Right ventricular systolic pressure is elevated, consistent with mild  pulmonary hypertension.  The visual ejection fraction is estimated at 65-70%.  _____________________________________________________________________________  __        Left Ventricle  The left ventricle is normal in size. There is normal left ventricular wall  thickness. Left ventricular systolic function is normal. Left ventricular  diastolic function is indeterminate. The visual ejection fraction is  estimated  at 65-70%. Intracavity gradient present likely due to borderline hyperdynamic  ventricle. Normal left ventricular wall motion. No evidence of left  ventricular mass or tumors.     Right Ventricle  The right ventricle is normal in structure, function and size.     Atria  Normal left atrial size. Right atrial size is normal.     Mitral Valve  The mitral valve leaflets are mildly thickened.        Tricuspid Valve  Normal tricuspid valve. There is trace tricuspid regurgitation. Right  ventricular systolic pressure is elevated, consistent with mild pulmonary  hypertension.     Aortic Valve  Mild valvular aortic stenosis.     Pulmonic Valve  The pulmonic valve is not well seen, but is grossly normal.     Vessels  The aortic root is normal size. The inferior vena cava is normal.     Pericardium  The pericardium appears normal.        Rhythm  Sinus rhythm was noted.  _____________________________________________________________________________  __  MMode/2D Measurements & Calculations  IVSd: 1.1 cm     LVIDd: 3.8 cm  LVIDs: 1.9 cm  LVPWd: 0.70 cm  FS: 49.2 %  LV mass(C)d: 99.8 grams  LV mass(C)dI: 55.5 grams/m2  Ao root diam: 3.4 cm  LA dimension: 3.7 cm  LA/Ao: 1.1  LVOT diam: 1.9 cm  LVOT area: 2.9 cm2  LA Volume (BP): 41.3 ml  LA Volume Index (BP): 22.9 ml/m2  RWT: 0.36           Doppler Measurements & Calculations  MV E max brendan: 79.9 cm/sec  MV A max brendan: 122.0 cm/sec  MV E/A: 0.66  MV dec time: 0.24 sec  Ao V2 max: 195.5 cm/sec  Ao max PG: 15.0 mmHg  Ao V2 mean: 144.1 cm/sec  Ao mean P.2 mmHg  Ao V2 VTI: 47.2 cm  ROBIN(I,D): 1.9 cm2  ROBIN(V,D): 2.2 cm2  LV V1 max P.5 mmHg  LV V1 max: 145.5 cm/sec  LV V1 VTI: 31.5 cm  SV(LVOT): 92.0 ml  SI(LVOT): 51.1 ml/m2  PA acc time: 0.14 sec  TR max brendan: 298.6 cm/sec  TR max P.7 mmHg  AV Brendan Ratio (DI): 0.74  ROBIN Index (cm2/m2): 1.1  E/E' av.4  Lateral E/e': 10.1  Medial E/e': 14.7               _____________________________________________________________________________  __        Report approved by: Vanesa Christiansen 01/07/2021 11:39 AM          Allergies   Allergies   Allergen Reactions     Sudafed [Pseudoephedrine] Anxiety

## 2021-01-27 NOTE — PLAN OF CARE
Pt snoring, but reposnds with yes or no when basic questions asked. ROGERS orientation. Weight shift Q2 h. Staples and sutures to head intact. Matthews patent. 1 small smear this shift. Family at bedside. Plan: Discharge 1/27 0930 to [facility] with Hospice

## 2021-01-27 NOTE — PLAN OF CARE
Comfort cares.  Resting comfortably.  Roxanol given x1 for restlessness.  Repositioned q2h.  1 smear of soft stool this evening.  Family at bedside.  Staples to head intact.  Plan for discharge tomorrow morning at 930.  Will continue to monitor.

## 2021-02-03 ENCOUNTER — COMMUNICATION - HEALTHEAST (OUTPATIENT)
Dept: ONCOLOGY | Facility: HOSPITAL | Age: 86
End: 2021-02-03

## 2021-02-10 LAB
GLUCOSE BLDC GLUCOMTR-MCNC: 164 MG/DL (ref 70–99)
GLUCOSE BLDC GLUCOMTR-MCNC: 200 MG/DL (ref 70–99)
GLUCOSE BLDC GLUCOMTR-MCNC: 215 MG/DL (ref 70–99)

## 2021-05-25 ENCOUNTER — RECORDS - HEALTHEAST (OUTPATIENT)
Dept: ADMINISTRATIVE | Facility: CLINIC | Age: 86
End: 2021-05-25

## 2021-05-26 ENCOUNTER — RECORDS - HEALTHEAST (OUTPATIENT)
Dept: ADMINISTRATIVE | Facility: CLINIC | Age: 86
End: 2021-05-26

## 2021-05-27 ENCOUNTER — RECORDS - HEALTHEAST (OUTPATIENT)
Dept: ADMINISTRATIVE | Facility: CLINIC | Age: 86
End: 2021-05-27

## 2021-05-28 ENCOUNTER — RECORDS - HEALTHEAST (OUTPATIENT)
Dept: ADMINISTRATIVE | Facility: CLINIC | Age: 86
End: 2021-05-28

## 2021-05-31 ENCOUNTER — RECORDS - HEALTHEAST (OUTPATIENT)
Dept: ADMINISTRATIVE | Facility: CLINIC | Age: 86
End: 2021-05-31

## 2021-06-01 ENCOUNTER — RECORDS - HEALTHEAST (OUTPATIENT)
Dept: ADMINISTRATIVE | Facility: CLINIC | Age: 86
End: 2021-06-01

## 2021-06-02 ENCOUNTER — RECORDS - HEALTHEAST (OUTPATIENT)
Dept: ADMINISTRATIVE | Facility: CLINIC | Age: 86
End: 2021-06-02

## 2021-07-13 ENCOUNTER — RECORDS - HEALTHEAST (OUTPATIENT)
Dept: ADMINISTRATIVE | Facility: CLINIC | Age: 86
End: 2021-07-13

## 2021-07-20 VITALS
BODY MASS INDEX: 29.23 KG/M2 | WEIGHT: 165 LBS | BODY MASS INDEX: 29.41 KG/M2 | BODY MASS INDEX: 29.41 KG/M2 | WEIGHT: 166 LBS | WEIGHT: 166 LBS

## 2021-07-20 VITALS
HEIGHT: 63 IN | BODY MASS INDEX: 31.79 KG/M2 | WEIGHT: 178.9 LBS | HEIGHT: 65 IN | BODY MASS INDEX: 29.32 KG/M2 | BODY MASS INDEX: 32.2 KG/M2 | WEIGHT: 175 LBS | BODY MASS INDEX: 31.17 KG/M2 | WEIGHT: 176 LBS | BODY MASS INDEX: 32.72 KG/M2 | HEIGHT: 62 IN | BODY MASS INDEX: 32.2 KG/M2 | HEIGHT: 62 IN | WEIGHT: 175.9 LBS | WEIGHT: 173.8 LBS | WEIGHT: 175 LBS

## 2021-07-20 VITALS
WEIGHT: 162 LBS | HEIGHT: 63 IN | WEIGHT: 162 LBS | BODY MASS INDEX: 28.7 KG/M2 | HEIGHT: 63 IN | BODY MASS INDEX: 28.7 KG/M2

## 2021-07-20 VITALS — WEIGHT: 170 LBS | BODY MASS INDEX: 30.11 KG/M2

## 2021-07-20 VITALS
WEIGHT: 170.6 LBS | WEIGHT: 170.6 LBS | BODY MASS INDEX: 31.2 KG/M2 | BODY MASS INDEX: 31.42 KG/M2 | HEIGHT: 62 IN | BODY MASS INDEX: 31.61 KG/M2 | BODY MASS INDEX: 31.39 KG/M2 | WEIGHT: 174.4 LBS | BODY MASS INDEX: 31.9 KG/M2 | WEIGHT: 172.8 LBS | WEIGHT: 171.8 LBS

## 2021-07-20 VITALS
WEIGHT: 169 LBS | WEIGHT: 169 LBS | HEIGHT: 63 IN | HEIGHT: 63 IN | WEIGHT: 164 LBS | BODY MASS INDEX: 29.14 KG/M2 | WEIGHT: 164.5 LBS | BODY MASS INDEX: 29.06 KG/M2 | WEIGHT: 164 LBS | BODY MASS INDEX: 29.06 KG/M2 | WEIGHT: 164 LBS | BODY MASS INDEX: 29.95 KG/M2 | WEIGHT: 169 LBS | HEIGHT: 63 IN | HEIGHT: 63 IN | BODY MASS INDEX: 29.95 KG/M2 | BODY MASS INDEX: 29.05 KG/M2 | BODY MASS INDEX: 29.95 KG/M2 | HEIGHT: 63 IN

## 2021-07-20 VITALS
HEART RATE: 81 BPM | HEIGHT: 63 IN | HEIGHT: 63 IN | DIASTOLIC BLOOD PRESSURE: 70 MMHG | TEMPERATURE: 98 F | WEIGHT: 165.2 LBS | WEIGHT: 160.3 LBS | BODY MASS INDEX: 28.4 KG/M2 | SYSTOLIC BLOOD PRESSURE: 157 MMHG | OXYGEN SATURATION: 97 % | BODY MASS INDEX: 28.4 KG/M2 | BODY MASS INDEX: 29.26 KG/M2 | WEIGHT: 160.3 LBS

## 2021-07-20 VITALS
HEIGHT: 63 IN | BODY MASS INDEX: 29.09 KG/M2 | WEIGHT: 164.2 LBS | BODY MASS INDEX: 28.7 KG/M2 | WEIGHT: 162.5 LBS | WEIGHT: 162 LBS | BODY MASS INDEX: 28.79 KG/M2

## 2021-07-20 VITALS
HEART RATE: 96 BPM | SYSTOLIC BLOOD PRESSURE: 147 MMHG | DIASTOLIC BLOOD PRESSURE: 78 MMHG | BODY MASS INDEX: 28.89 KG/M2 | TEMPERATURE: 98.6 F | WEIGHT: 163.1 LBS | OXYGEN SATURATION: 98 %

## 2021-07-20 VITALS — BODY MASS INDEX: 29.41 KG/M2 | HEIGHT: 63 IN | WEIGHT: 166 LBS

## 2021-07-20 VITALS
HEART RATE: 78 BPM | OXYGEN SATURATION: 97 % | DIASTOLIC BLOOD PRESSURE: 81 MMHG | TEMPERATURE: 97.9 F | BODY MASS INDEX: 29.6 KG/M2 | WEIGHT: 167.1 LBS | SYSTOLIC BLOOD PRESSURE: 179 MMHG

## 2021-07-20 VITALS — BODY MASS INDEX: 29.9 KG/M2 | WEIGHT: 168.8 LBS

## 2021-07-20 VITALS
WEIGHT: 166 LBS | BODY MASS INDEX: 28.87 KG/M2 | WEIGHT: 163 LBS | WEIGHT: 162 LBS | BODY MASS INDEX: 28.7 KG/M2 | BODY MASS INDEX: 29.41 KG/M2

## 2021-07-20 NOTE — PROGRESS NOTES
Met with Lauren and her spouse Otf to check in and inquire about her needs.  She is feeling improvement in neuropathy and now uses cane only outside of home.  They were able to enjoy a multi-day [place] cruise and shared about that trip.    We did discuss her follow up with Dr. Fregoso.  In June Dr. Fregoso's note reads for follow up in six months.  Writer will watch for this to be rescheduled or check in with schedulers.   Offered emotional support and encouragement and welcomed calls. Dorothy Alejandre RN

## 2021-07-20 NOTE — PROGRESS NOTES
In for continued follow-up of her right mastectomy.  She is feeling well.  She is having very minimal pain.      Physical exam:  Appears well.  Does not appear in any discomfort.  Breasts: Incisions are healing nicely with no signs of infection.  No swelling.  BIANCA drainage has decreased markedly and I pulled that out today.      Impression: Postop visit. Doing well.  Gave her prescription for both a mastectomy bra and a compression sleeve for her right arm.  She is set up to visit with the radiation oncologist next week.  She should wear her compression sleeve throughout radiation.    Plan: Follow-up with me again in a week just to be sure she is not developing a seroma.

## 2021-07-20 NOTE — PROGRESS NOTES
"Jacque (Lauren) came to chemo infusion this monning for C4D15 treatment with Herceptin and Taxol.  Pt reports feeling weak and \"wobbley\".  She reports that she fell this morning at home but was uninjured.  She denies losing consciousness. She also reports SOB with any activity. Pt has orthostatic hypotension and dry mucous membranes.  Order received for IV hydration as well as to check a cmp along with already ordered lab.  Port was accessed with good blood return.  Labs drawn.  Hydration started.  Pt assessed.  Lab results noted and were reviewed with Dr Ho who ordered an additional 500cc IV hydration and for this RN to also proceed with treatment. He also ordered for her to hold her blood pressure medication until she is seen again.  Pt was educated on her plan of care and verbalized understanding and is in agreement with this plan.  She received hydration prior to and after treatment. She received treatment as ordered and tolerated all of her treatment here today well.  Port was flushed, heparinized then deaccessed and site covered.  BP improved.  Pt reports feeling somewhat better.  Pt d/c from clinic using a wheelchair to car due to the weather (Ice).  She is aware of her future appointment.  "

## 2021-07-20 NOTE — PROGRESS NOTES
Pt here for taxol and herceptin.  Port accessed easily and labs obtained.  Pt does note neuropathy in finger tips and feet.  Pt will monitor and review with Dr Ho next week.  No problems with infusion as directed.  Upon completion port flushed and deaccessed and pt d/c ambulatory to lobby with her

## 2021-07-20 NOTE — CONSULTS
Consults   [facility] Hematology and Oncology Consult Note    Patient: Jacque Bernstein  MRN: [id number]  Date of Service: 12/11/2017  Referring provider: Dr. Angeles Fregoso MD, Dr. Sawyer Chou MD      Reason for Visit     1. Malignant neoplasm of central portion of right breast in female, estrogen receptor negative  Education (Chemo Class)    prochlorperazine (COMPAZINE) 10 MG tablet    CC OFFICE VISIT LONG    Infusion Appointment    Infusion Appointment    Infusion Appointment    CANCELED: [place] [facility]         Assessment     1.  A very pleasant 83-year-old woman with advanced breast cancer located on the right side T3 N2 M0 which is ER negative IN weakly positive but HER-2/matthew 3+.  2.  No evidence of any distant metastatic disease.  3.  Good general health otherwise.    Plan     1.  I had a very long discussion with the patient her  as well as her son.  My recommendation is that she should be treated with neoadjuvant treatment.  I would recommend that she should get prechemotherapy cardiology clearance with the help of a MUGA scan.  Then I would request Dr. Fregoso to place a port.    The treatment plan that I would recommend with her would be weekly paclitaxel along with weekly Herceptin.  I will also add Perjetta given once every 3 weeks.  I would assess her response and she how she is responding.  If the response is optimal then I may add weekly carboplatin as well.    After about 4 months of neoadjuvant treatment I would recommend that she would have surgery followed by radiation therapy.  She would then go on to finish over a year of Herceptin therapy.    I gave printed information to the patient as well as her family.  She seems to have good understanding of her plan of care.  We will plan on starting her treatment and hopefully later on this week.      Staging History     Malignant neoplasm of central portion of right breast in female, estrogen receptor negative    Staging form: Breast, AJCC 7th Edition    -  Clinical stage from 12/11/2017: Stage IIIA (T3, N2a, M0) - Signed by Holland Ho MD on 12/14/2017    History     Patient is a very pleasant 83-year-old woman who has been referred to me by Dr. Fregoso as well as Dr. Sawyer Chou for evaluation and treatment of newly diagnosed invasive ductal carcinoma.  Jacque noted some fullness in her breast about end of November.  She had a bruise few days but prior to that in that breast.  After the bruise resolved she felt that there was still the lump present.  She went and saw Dr. Chou who felt that this was very concerning for malignancy.  Ultrasound and mammogram were done which were very suggestive of malignancy.  She underwent biopsy of the right axillary lymph node which confirmed the presence of invasive ductal carcinoma which was ER negative, MT weakly positive but HER-2/matthew 3+ on immunohistochemistry.  This was a high-grade tumor.  The lymph nodes are clinically palpable.    The patient has been since seen by Dr. Fregoso.  Dr. Fregoso also performed a punch biopsy of the skin.  She was concerned that this might be inflammatory breast cancer.  The patient is here to discuss her next treatment option.  Fortunately at the time of this dictation the skin biopsies negative for involvement with cancer so this is not inflammatory breast cancer.    Past Medical History     Past Medical History:   Diagnosis Date     Breast cancer      Encephalomyelitis 1956     History of anesthesia complications     Felt jitterty after some surgeries for days not sure what caused it     Hypertension      Shingles        Past Social History     Social History     Social History     Marital status:      Spouse name: N/A     Number of children: 4     Years of education: N/A     Occupational History     Homemaker      Social History Main Topics     Smoking status: Never Smoker     Smokeless tobacco: Never Used     Alcohol use Yes      Comment: social     Drug use: No     Sexual  activity: No     Other Topics Concern     Not on file     Social History Narrative       Family History     Family History   Problem Relation Age of Onset     Prostate cancer Father 82     Cancer Father      Prostate cancer Brother 77     Cancer Brother      Skin cancer Brother      Heart attack Mother      No Medical Problems Sister      Gout Brother      No Medical Problems Son      No Medical Problems Son      No Medical Problems Son      No Medical Problems Daughter        Medication List     Prior to Admission medications    Medication Sig Start Date End Date Taking? Authorizing Provider   acetaminophen (TYLENOL) 650 MG CR tablet Take 650 mg by mouth 2 (two) times a day.   Yes PROVIDER, HISTORICAL   aspirin 81 MG EC tablet Take 81 mg by mouth 2 (two) times a day.   Yes PROVIDER, HISTORICAL   diphenhydrAMINE (BENADRYL) 25 mg capsule Take 25 mg by mouth at bedtime as needed for itching.   Yes PROVIDER, HISTORICAL   glucosamine-chondroitin 500-400 mg cap Take 1 capsule by mouth daily.   Yes PROVIDER, HISTORICAL   MULTIVITAMIN (MULTIPLE VITAMIN ORAL) Take 1 tablet by mouth daily.   Yes PROVIDER, HISTORICAL   OMEGA-3/DHA/EPA/FISH OIL (FISH OIL-OMEGA-3 FATTY ACIDS) 300-1,000 mg capsule Take 1 g by mouth daily.   Yes PROVIDER, HISTORICAL   HYDROcodone-acetaminophen 5-325 mg per tablet Take 1 tablet by mouth every 4 (four) hours as needed for pain. 12/14/17   Angeles Fregoso MD   lisinopril-hydrochlorothiazide (PRINZIDE,ZESTORETIC) 20-12.5 mg per tablet Take 1 tablet by mouth 2 (two) times a day. 9/28/17   PROVIDER, HISTORICAL   prochlorperazine (COMPAZINE) 10 MG tablet Take 1 tablet (10 mg total) by mouth every 6 (six) hours as needed (For breakthrough nausea/vomiting). 12/11/17   Holland Ho MD       Allergies     Allergies   Allergen Reactions     Antihistamines - Alkylamine        Review of Systems   A comprehensive review of 14 systems was done. Pertinent findings noted here and in history of present illness.  All the rest negative.     General  General (WDL): Exceptions to WDL  Weight Loss: Yes - Recent (Greater than 3 months) (5 lbs)  ENT  ENT (WDL): Exceptions to WDL  Changes in vision: Yes - Recent (Less than 3 months)  Glasses or Contacts: Yes - Recent (Less than 3 months)  Respiratory  Respiratory (WDL): All respiratory elements are within defined limits  Cardiovascular  Cardiovascular (WDL): All cardiovascular elements are within defined limits  Endocrine  Endocrine (WDL): All endocrine elements are within defined limits  Gastrointestinal  Gastrointestinal (WDL): All gastrointestinal elements are within defined limits  Musculoskeletal  Musculoskeletal (WDL): Exceptions to WDL  Joint pain: Yes - Chronic (Greater than 3 months)  Pain interfering with walking: Yes - Chronic (Greater than 3 months)  Neurological  Neurological (WDL): All neurological elements are within defined limits  Dominant Hand: Right  Psychological/Emotional  Psychological/Emotional (WDL): All psychological/emotional elements are within defined limits  Hematological/Lymphatic  Hematological/Lymphatic (WDL): All hematological/lymphatic elements are within defined limits  Dermatological  Dermatologic (WDL): All dermatological elements are within defined limits  Genitourinary/Reproductive  Genitourinary/Reproductive (WDL): Exceptions to WDL  Urinary Frequency: Yes - Recent (Less than 3 months)  Urinary Incontinence: Yes - Chronic (Greater than 3 months)  Urination more than 2 times a night: Yes - Chronic (Greater than 3 months)  Urinary Urgency: Yes - Recent (Less than 3 months)  Reproductive (Females only)     Pain  Currently in Pain: No/denies    Physical Exam     Recent Vitals 12/14/2017   Height -   Weight -   BSA (m2) -   /61   Pulse 73   Temp -   Temp src -   SpO2 97   Some recent data might be hidden       GENERAL: no acute distress. Cooperative in conversation.   HEENT: pupils are equal, round and reactive. Oral mucosa is moist and  intact.  RESP:Chest symmetric. Regular respiratory rate. No stridor.  ABD: Nondistended, soft.  EXTREMITIES: No lower extremity edema.   NEURO: non focal. Alert and oriented x3.   PSYCH: within normal limits. No depression or anxiety.  SKIN: warm dry intact       Lab Results       Lab Results   Component Value Date    BUN 28 06/01/2017    CALCIUM 10.0 06/01/2017     06/01/2017    CO2 28 06/01/2017    CREATININE 0.97 06/01/2017    GFRAA >60 06/01/2017    GFRNONAA 55 (L) 06/01/2017    GLU 73 06/01/2017    HGB 10.0 (L) 11/05/2010    MG 1.9 06/01/2017    K 4.8 07/07/2017     06/01/2017     I also reviewed her pathology report which confirmed invasive ductal carcinoma high-grade ER negative NJ positive HER-2/matthew 3+.  NJ positivity is weak.  This was from a biopsy of the axillary lymph node.      Imaging Results     Nm Pet Ct Skull To Mid Thigh    Result Date: 12/11/2017  [facility] PET FDG/CT 12/11/2017 10:53 AM INDICATION: Right breast cancer staging, initial treatment strategy. TECHNIQUE: Serum glucose level 110 mg/dL. One hour post intravenous administration of 8.1 mCi F-18 FDG, PET imaging was performed from the skull base to the mid thighs utilizing attenuation correction with concurrent axial CT and PET/CT image fusion. Dose reduction techniques were used. COMPARISON: None. FINDINGS: Moderately FDG avid (SUVmax 4.3) soft tissue in the right breast has an infiltrative appearance and spans about 10 x 2 x 4 cm. FDG avid right axillary lymphadenopathy at levels I and II. A representative node at level I measures 2.0 x 1.2 cm (SUVmax 5.5). 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 Mild calcified atherosclerosis, including left anterior descending coronary disease. Hysterectomy. Pelvic phleboliths. Left total hip arthroplasty. Moderate degenerative change cervical and lumbar spine. Slight anterolisthesis of L4 on L5 and L5 on S1.     CONCLUSION: Findings consistent with  right breast cancer with lymph node metastases in the right axilla at levels I and II. No evidence of distant metastases.          Total time spent was 80 minutes, more than half of it was in face-to-face counseling regarding disease state, review of available images, laboratory values, pathological reports, treatment, options, their side effects and management.

## 2021-07-20 NOTE — PLAN OF CARE
Problem: Pain  Goal: Patient's pain/discomfort is manageable  Outcome: Progressing     Denies any pain.     Problem: Daily Care  Goal: Daily care needs are met  Outcome: Progressing     VSS. On room air. Patient reports baseline neuropathy in all extremities. Dressing CDI. Hemovac in place. Patient tolerating a regular diet and voiding without difficulty. Report having occasional incontinent episodes. Ambulates with assist of 1 and walker. IVF running at 100 mL/hr.

## 2021-07-20 NOTE — PROGRESS NOTES
[facility] Cancer Care Progress Note    Patient: Jacque Bernstein  MRN: [id number]  Date of Service: 11/26/2018          Reason for visit      1. Malignant neoplasm of central portion of right breast in female, estrogen receptor positive, Her 2 matthew positive.        Assessment     1.  A very pleasant 84 y.o.  woman with advanced breast cancer located on the right side T3 N2 M0 which was ER negative AR weakly positive but HER-2/matthew 3+.  Status post neoadjuvant chemotherapy with weekly paclitaxel, Herceptin and Perjetta.  Status post mastectomy with small amount of residual tumor in the breast and one positive lymph node.  The residual tumor in the breast is ER positive AR negative and HER-2/matthew positive.  Anderson disease is ER negative AR negative HER-2/matthew 3+.  She has finished radiation therapy and now finishing Herceptin.  2.  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  3.  Good general health otherwise.  4.  Some osteopenia seen on her bone density.    Plan     1.  Finished Herceptin today.  Continue on anastrozole for at least 5 years which she would finish in July 2023.  2.  Continue symptomatic care for her neuropathy.  3.  Good diet and exercise.  4.  Advised to continue to wear the compression sleeve as much as she can.  She absolutely has to use it if she takes a flight in an airplane.  Her current sleeve appears to be getting somewhat loose.  Advised to get one which fits better.  5.  Advised to take some calcium rich diet and vitamin D.  Advised to exercise outside in the form of walking and get some sunlight.    Clinical stage      Cancer Staging  Malignant neoplasm of central portion of right breast in female, estrogen receptor positive, Her 2 matthew positive.  Staging form: Breast, AJCC 7th Edition  - Clinical stage from 12/11/2017: Stage IIIA (T3, N2a, M0) - Signed by Holland Ho MD on 12/14/2017  ER Status: Negative  AR Status: Positive  HER2 Status: Positive      History      Jacque Bernstein is a very pleasant 84 y.o. old female with a history of invasive ductal carcinoma.  Jacque noted some fullness in her breast about end of November.  She had a bruise few days but prior to that in that breast.  After the bruise resolved she felt that there was still the lump present.  She went and saw Dr. Chou who felt that this was very concerning for malignancy.  Ultrasound and mammogram were done which were very suggestive of malignancy.  She underwent biopsy of the right axillary lymph node which confirmed the presence of invasive ductal carcinoma which was ER negative, MS weakly positive but HER-2/matthew 3+ on immunohistochemistry.  This was a high-grade tumor.  The lymph nodes are clinically palpable.     The patient has been since seen by Dr. Fregoso.  Dr. Fregoso performed a punch biopsy of the skin.  She was concerned that this might be inflammatory breast cancer.  The skin biopsies came back negative for involvement with cancer.  She has since started on neoadjuvant chemotherapy with weekly paclitaxel along with Herceptin and once every 3 week Perjetta.  She seems to be tolerating treatment quite well.  She noticed significant improvement in her breast lump in fact that she could not feel it anymore.    She underwent mastectomy in April 2018 and pathology revealed one positive lymph node and a small 1.8 cm lesion in the breast.  Margins negative.  Interestingly the lesion in the breast was ER positive MS borderline positive HER-2/matthew positive at least in the parts of the tumor.  The lymph node metastases ER negative MS negative and HER-2/matthew 3+.      She is started on radiation therapy.  Finished that on July 18, 2018.  She had some radiation burns.  She was also noted to have slight lymphedema so she was prescribed a lymphedema sleeve as well on her right arm.      She has continued Herceptin.  We also started on anastrozole on July 2018.  She is tolerating that with the expected side  "effects.  Comes in today for scheduled follow-up.  She feels that some of the side effects are getting better.  Neuropathy is getting better.    Past Medical History     Past Medical History:   Diagnosis Date     Breast cancer (H)      Chronic kidney disease     Stage III     Encephalomyelitis 1956     History of anesthesia complications     Felt jittery after some surgeries for days not sure what caused it     Hypertension      Incontinence of urine      Neuropathy (H)      Shingles        Review of Systems   Constitutional  Constitutional (WDL): All constitutional elements are within defined limits  Neurosensory  Neurosensory (WDL): Exceptions to WDL  Peripheral Motor Neuropathy: Asymptomatic, clinical or diagnostic observations only, intervention not indicated  Ataxia: Moderate symptoms, limiting instrumental ADL  Peripheral Sensory Neuropathy: Asymptomatic, loss of deep tendon reflexes or paresthesia(feet - numb fingers - \"gritty\" - both improved slowly)  Cardiovascular  Cardiovascular (WDL): All cardiovascular elements are within defined limits  Pulmonary  Respiratory (WDL): Exceptions to WDL  Dyspnea: Shortness of breath with moderate exertion  Gastrointestinal  Gastrointestinal (WDL): All gastrointestinal elements are within defined limits  Genitourinary  Genitourinary (WDL): Exceptions to WDL(continual leaking bladder )  Integumentary  Integumentary (WDL): All integumentary elements are within defined limits  Patient Coping  Patient Coping: Accepting  Accompanied by  Accompanied by: Family Member    ECOG performance status and Distress Assessment      ECOG Performance:    ECOG Performance Status: 1    Distress Assessment  Distress Assessment Score: No distress:     Pain Status  Currently in Pain: No/denies      Vital Signs     Vitals:    11/26/18 1242   BP: 157/70   Pulse: 80   Temp: 97.5  F (36.4  C)   SpO2: 96%       Physical Exam     GENERAL: No acute distress. Cooperative in conversation.   HEENT: Pupils " are equal, round and reactive. Oral mucosa is clean and intact. No ulcerations or mucositis noted. No bleeding noted.  RESP:Chest symmetric lungs are clear bilaterally per auscultation. Regular respiratory rate. No wheezes or rhonchi.  CV: Normal S1 S2 Regular, rate and rhythm. No murmurs.  ABD: Nondistended, soft, nontender. Positive bowel sounds. No organomegaly.   EXTREMITIES: No lower extremity edema.  She is wearing a lymphedema sleeve on her right arm but does not appear to have any lymphedema.  In fact the sleeve appears somewhat loose.  NEURO: Non- focal. Alert and oriented x3.  Cranial nerves appear intact.  PSYCH: Within normal limits. No depression or anxiety.  SKIN: Warm dry intact.    LYMPH NODES: Bilateral cervical, supraclavicular, axillary lymph node examination was done.  Slight right axillary palpable adenopathy.Still has a drain in      Lab Results     Results for orders placed or performed in visit on 11/26/18   Comprehensive Metabolic Panel   Result Value Ref Range    Sodium 138 136 - 145 mmol/L    Potassium 4.2 3.5 - 5.0 mmol/L    Chloride 104 98 - 107 mmol/L    CO2 26 22 - 31 mmol/L    Anion Gap, Calculation 8 5 - 18 mmol/L    Glucose 103 70 - 125 mg/dL    BUN 29 (H) 8 - 28 mg/dL    Creatinine 0.91 0.60 - 1.10 mg/dL    GFR MDRD Af Amer >60 >60 mL/min/1.73m2    GFR MDRD Non Af Amer 59 (L) >60 mL/min/1.73m2    Bilirubin, Total 0.4 0.0 - 1.0 mg/dL    Calcium 10.5 8.5 - 10.5 mg/dL    Protein, Total 6.9 6.0 - 8.0 g/dL    Albumin 3.6 3.5 - 5.0 g/dL    Alkaline Phosphatase 62 45 - 120 U/L    AST 19 0 - 40 U/L    ALT 15 0 - 45 U/L   HM1 (CBC with Diff)   Result Value Ref Range    WBC 8.3 4.0 - 11.0 thou/uL    RBC 4.28 3.80 - 5.40 mill/uL    Hemoglobin 13.7 12.0 - 16.0 g/dL    Hematocrit 40.9 35.0 - 47.0 %    MCV 96 80 - 100 fL    MCH 32.0 27.0 - 34.0 pg    MCHC 33.5 32.0 - 36.0 g/dL    RDW 13.2 11.0 - 14.5 %    Platelets 298 140 - 440 thou/uL    MPV 9.3 8.5 - 12.5 fL    Neutrophils % 69 50 - 70 %     Lymphocytes % 18 (L) 20 - 40 %    Monocytes % 10 2 - 10 %    Eosinophils % 3 0 - 6 %    Basophils % 0 0 - 2 %    Neutrophils Absolute 5.7 2.0 - 7.7 thou/uL    Lymphocytes Absolute 1.5 0.8 - 4.4 thou/uL    Monocytes Absolute 0.8 0.0 - 0.9 thou/uL    Eosinophils Absolute 0.3 0.0 - 0.4 thou/uL    Basophils Absolute 0.0 0.0 - 0.2 thou/uL         Imaging Results     No results found.      Holland Ho MD

## 2021-07-20 NOTE — PROGRESS NOTES
Orthopedic Progress Note      Assessment: 1 Day Post-Op  S/P RIGHT DIRECT ANTERIOR TOTAL HIP ARTHROPLASTY    Plan:   - Severe left knee primary OA - last injection was 3/4/19. Repeat injection today.   - Continue PT/OT  - Weightbearing status: WBAT  - Anticoagulation: ASA 81 PO BID in addition to SCDs, roberto stockings and early ambulation.  - Discharge planning: Home with family assist today      PROCEDURE:    After verbal consent was obtained, the area was sterilely prepped. Then a mixture of 5ml 1% lidocaine and 2ml of 40mg/ml depo-medrol was injection intra-articularly into the left knee. Patient tolerated the procedure well without complaint.       Subjective:  Pain: mild  Nausea, Vomiting:  No  Lightheadedness, Dizziness:  No  Neuro:  Patient denies new onset numbness or paresthesias    Patient is doing well today. Pain is under good control with tylenol and toradol. Ambulating well. No complaints. She does have severe left knee OA and is due for a steroid injection. Last injection was done in clinic on 3/4/19. Planning to go home today with her .     Objective:  Vitals:    08/01/19 1250   BP: 111/55   Pulse: 80   Resp: 20   Temp: 97.5  F (36.4  C)   SpO2: 99%     The patient is A&Ox3. Appears comfortable.   Sensation is intact.  Dorsiflexion and plantar flexion is intact.  Dorsalis pedis pulse intact.  Calves are soft and non-tender. Negative Trey's.  The incision is covered. Dressing C/D/I.      Pertinent Labs   Lab Results: personally reviewed.   Lab Results   Component Value Date    INR 2.55 (H) 11/05/2010    INR 1.21 (H) 11/04/2010    INR 0.95 11/03/2010     Lab Results   Component Value Date    WBC 15.2 (H) 08/01/2019    HGB 10.3 (L) 08/01/2019    HCT 31.8 (L) 08/01/2019    MCV 99 08/01/2019     08/01/2019     Lab Results   Component Value Date     08/01/2019    K 4.8 08/01/2019     08/01/2019    CO2 23 08/01/2019         Report completed by:  Marge Gray PA-C  Date: 8/1/2019   Time: 2:30 PM

## 2021-07-20 NOTE — PROGRESS NOTES
Jacque (Lauren) came to chemo infusion this morning for C2D15 treatment using taxol and herceptin. VSS.  Port assessed and found patent with good blood return.  Lab drawn and results noted and reviewed with patient.  Pt met provision for treatment.  She received premedication and chemotherapy as ordered and tolerated it well while in clinic today.  Port was flushed with ns then heparinized and deaccessed.  Site was covered with gauze.  Jacque d/c from clinic ambulatory accompanied by her family.  She is aware of her future appointment.

## 2021-07-20 NOTE — PROGRESS NOTES
Jacque comes in for continued follow-up of her right mastectomy.  She is feeling well but does feel like the fluid has reaccumulated.  Overall she is feeling better however.    Physical exam:  General: Looks comfortable.  Looks like she is getting stronger.  Chest: Definitely a larger seroma.  The incision looks great but I aspirated her for about 150 cc today.    Impression: Large seroma of the right chest wall.  I told her we may end up having to put the drain back and given how much built up in just a week.  I want to see her back again on Friday.  If we can try to keep this down by a more frequent aspiration I think we will have a better chance of getting it under control.  May consider doing some sclerotherapy with doxycycline also on Friday.

## 2021-07-20 NOTE — TELEPHONE ENCOUNTER
After UA and labs were reviewed by Dr Ho, call placed to Lauren to let her know that her UA was not concerning for an infection and her calcium level went down a little.  Per Dr Ho, if she is still having symptoms of UTI, she can try a probiotic and/or piridium.  She reports that the symptoms kind of relieved themselves on Friday evening.  Nothing more needed at this time.    Mary Anne Lazcano RN

## 2021-07-20 NOTE — PROGRESS NOTES
Pt came into infusion clinic for Day 1 Cycle 1 of her treatment. Consent verified. Labs Reviewed. Medications explained to pt who verbalized understanding. Port patent throughout infusion. Pt tolerated infusion with no complications. Pt did attend chemo class. Reasons to call the MD were re-iterated to pt who verbalized understanding of this. Pt will RTC next week for treatment. Pt left infusion clinic via ambulatory.

## 2021-07-20 NOTE — PROGRESS NOTES
Physical Therapy     07/31/19 1600   Visit Specifics   Eval Type Initial eval   Inital PT Consult 07/31/19   Bed/Tabs/Pad Alarm Applied Not applicable   General   Onset date 07/31/19   Additional Pertinent History PMH includes L MARY, TKA   Chart Reviewed Yes   PT/OT Patient/Caregiver Stated Goals None stated   Family/Caregiver Present Yes  ()   Treatment Time   Gait Training 10   Theraputic Exercise 10   Precautions   Total Hip Replacement No restriction;Anterior approach   Weight Bearing Status wbat   Home Living   Type of Home Condo   Home Layout Elevator   Mobility Equipment Walker-4 wheeled   Prior Status   Independent With Functional mobility   Needs Assistance With Driving   Indoor Mobility Independent   Lives With Spouse   Receives Help From Family   Device Used Yes   Cognition   Overall Cognitive Status WFL   RLE Assessment   RLE Assessment   (R MARY)   LLE Assessment   LLE Assessment WFL   Bed Mobility   Bed Mobility Comments In chair   Transfer    Sit To Stand CGA   Stand To Sit CGA   Transfer Comments Cues for safe hand placement and RLE positioning.   Ambulation    Weight Bearing Status wbat   Distance (ft)  70' x 1   Assistance CGA   Assistive Device Rolling walker   Verbal Cues Sequencing;Device advancement;Posture   Quality of Gait/Comment Slow pace. Cues for posture and on safe use of FWW with turns.   Pattern Decreased step length;R decreased heel strike;Decreased pace;Flexed posture   Plan   Treatment/Interventions Functional transfer training;Gait/stair training;Strengthening/ROM   PT Frequency 2x/day   Assessment   Prognosis Good   Problem List Decreased strength;Decreased mobility   Recommendation   PT Discharge Recommendation Home with assist  (Assist from spouse as needed.)   PT Equipment Recommendation Rolling walker / FWW   Treatment Suggestions for Next Session Goals 8/3. Has 4WW here that she wants to try. R MARY protocol.   PT Care Plan REVIEWED DAILY Yes, goals remain appropriate

## 2021-07-20 NOTE — ANESTHESIA PREPROCEDURE EVALUATION
Anesthesia Evaluation      Patient summary reviewed   History of anesthetic complications     Airway   Mallampati: II  Neck ROM: full   Pulmonary - normal exam    breath sounds clear to auscultation  (+) shortness of breath,   (-) not a smoker                         Cardiovascular   Exercise tolerance: < 4 METS  (+) hypertension, ,     (-) past MI, CAD, angina, murmur  ECG reviewed  Rhythm: regular  Rate: normal,    no murmur      Neuro/Psych      Comments: Remote encephalomyelitis  neuropathy    Endo/Other    (+) arthritis,   (-) no diabetes, not pregnant     Comments: Breast CA    GI/Hepatic/Renal    (+)   chronic renal disease CRI,     Comments: Incontinence of urine          Dental - normal exam                        Anesthesia Plan  Planned anesthetic: general endotracheal    ASA 3   Induction: intravenous   Anesthetic plan and risks discussed with: patient, spouse and child/children  Anesthesia plan special considerations: antiemetics,   Post-op plan: routine recovery

## 2021-07-20 NOTE — TELEPHONE ENCOUNTER
Telephoned patient representative and son Vick to offer support surrounding Lauren's recent disease progression.  Mail box was full and writer unable to leave voice message. Dorothy Alejandre RN

## 2021-07-20 NOTE — PROGRESS NOTES
Jacque comes in for continued follow-up of her right mastectomy.  She is overall feeling well.  She feels like she is getting her strength back.    Physical exam:  Looks well.  Does have a fairly large seroma.  I aspirated this for about 120 cc of serous fluid.    Impression: Status post right mastectomy.  Does have a fairly large seroma.  She is scheduled to be simulated at radiation oncology tomorrow.  I told her to make sure they know about the amount of fluid aspirated.  They may want to hold off on starting radiation until we know that the fluid is under control.    Plan: Follow-up with me again in 1 week.

## 2021-07-20 NOTE — PROGRESS NOTES
"Met with patient son, Vick, who is also patient representative to check in prior to patient arrival to clinic.  Son has concerns Lauren is withdrawing from social events and activities due to concern for influenza exposure and myelosuppression.  He also senses his parents do not grasp the entire treatment plan.  I suggested a weekly check in by navigator to address these issues and he agreed with that plan.      Met with patient, her spouse Otf, and son Vick in chemo infusion once patient arrived.   Patient verbalized her understanding of her treatment plan, as currently known, with accuracy.  She admits she previously did not understand Herceptin would continue for one year.  She does have questions about when to return for follow up with the surgeon, and whether there will be any pause in the Herceptin administration for surgery.  Dr. Fregoso's office will be calling them for that follow up.  Radiation therapy is expected to follow surgery, and they have decided they prefer for Lauren to have radiation therapy closer to home.  She hopes to have that at [facility] in [place], [place].  They have asked for Dr. Ho's recommendation for a rad onc provider there.          Lauren has been avoiding the volunteer activities, social events, or Taoism attendance she previously enjoyed.  This seems multifactorial.  She does not want \"to be far from a bathroom,\" and complains of diarrhea and urinary urgency.  She is using Imodium as directed with improvement.  States she had some incontinence prior to treatment.  Denies skin irritation or breakdown, and says she has been using an adult incontinence product.  She acknowledges changes in her appearance (alopecia), and a decrease in energy level also keep her at home. She does have concerns about influenza especially after the recent death of a cousin who had been successfully in treatment for cancer for six years, and then  from a bout with the flu.  Her Taoism " "members are \"huggers\" and she has avoided attending services out of concern for colds and viruses.     Neuropathy has been significant and she describes the sensation of wearing shoes when she is not wearing any, or \"cardboard boxes on my feet.\"  Lauren asked about how long this can be expected and she is hopeful it might improve after Taxol finishes.  We discussed pharmaceutical and acupuncture approaches sometimes used to treat neuropathy, but she declines those at present.  She is using a walker and has it with her today.  She denies falling, but has been lightheaded, dizzy, and weak after treatment.      Lauren acknowledges these many stressors and indicates willingness to meet with oncology psychotherapist.  Request sent to Debby Choudhury'  to proceed with scheduling.      We reviewed procedures for contacting clinic for symptom management.  Utilized son's cell phone to listen to the recording and phone tree patients hear when they call clinic. I illustrated the prompts used to get to med onc, and to triage line.  They verbalized understanding and also have the direct triage line number for use during business hours.      Her  Otf is primary caregiver.  He has also decreased his social events and outings.  Encouraged him to take time for himself and to give himself permission to do so.  He does have a group he used to golf with.  They meet to play pool and he has this scheduled later this week.  Dorothy Alejandre RN               "

## 2021-07-20 NOTE — TELEPHONE ENCOUNTER
Lauren was called to let her know that Dr. Ho reviewed the results of her US and that they are OK.  Patient verbalized understanding.

## 2021-07-20 NOTE — OR NURSING
Message left with Juno GABRIEL PA-C re:  Keflex started due to possible UTI-see labs in epic for result.  WBC 12.8 also reported to PA.   Requested call back to pre op office.

## 2021-07-20 NOTE — PROGRESS NOTES
Pt ambulates to infusion center for lab draw prior to MD visit.   IVAD accessed, labs drawn et sent.  Pt was seen by Dr. Ho today and orders were approved.  Pt returns to infusion center for treatment.  Herceptin infused as ordered without difficulty.  IVAD flushed w/NS et heparin and needle deaccessed.  Pt left clinic stable to lobby accompanied by her .  Plan RTC as scheduled.

## 2021-07-20 NOTE — PROGRESS NOTES
[facility] Progress Note    Patient: Jacque Bernstein  MRN: [id number]  Date of Service:          Reason for visit      1. 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  A very pleasant 83 y.o.  woman with advanced breast cancer located on the right side T3 N2 M0 which is ER negative DE weakly positive but HER-2/matthew 3+.  Currently on neoadjuvant chemotherapy with weekly paclitaxel, Herceptin and Perjetta.  But it has been given once every 3 weeks.  She seems to be having good clinical response with pretty much no palpable lesion in her breast.  2.  Chemotherapy most likely Perjetta induced diarrhea.  3.  Good general health otherwise.  4.  Hypertension due to a combination of slight decreased fluid intake and her antihypertensive medications.    Plan     1. Continue with chemotherapy with weekly paclitaxel, herceptin along with Perjetta.  She can continue to take Imodium to control her diarrhea.  2.  Discussed with the patient that after she is finished with her chemotherapy Taxol then she will go for surgery.  After surgery she will need radiation therapy.  Dr. Fregoso is going to have her come and see her sometime in the first couple of weeks in March.  3.  Patient advised to hold tonight's dose of her blood pressure medication.  We also going to give her some extra IV fluids to help with her hypertension.    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 old female with a history of invasive ductal carcinoma.  Jacque noted some fullness in her  breast about end of November.  She had a bruise few days but prior to that in that breast.  After the bruise resolved she felt that there was still the lump present.  She went and saw Dr. Chou who felt that this was very concerning for malignancy.  Ultrasound and mammogram were done which were very suggestive of malignancy.  She underwent biopsy of the right axillary lymph node which confirmed the presence of invasive ductal carcinoma which was ER negative, TN weakly positive but HER-2/matthew 3+ on immunohistochemistry.  This was a high-grade tumor.  The lymph nodes are clinically palpable.     The patient has been since seen by Dr. Fregoso.  Dr. Fregosoperformed a punch biopsy of the skin.  She was concerned that this might be inflammatory breast cancer.  The skin biopsies came back negative for involvement with cancer.  She has since started on neoadjuvant chemotherapy with weekly paclitaxel along with Herceptin and once every 3 week Perjetta.  She seems to be tolerating treatment quite well.  .   She has noticed significant improvement in her breast lump in fact that she can not feel it anymore.    Comes in today for scheduled follow-up. Her biggest complaint is some degree of diarrhea which makes her not want to go too far away from her home.  Imodium helps quite a bit.  She is also worried of catching flu therefore she is also avoiding any Shinto.  She felt slightly lightheaded this morning.  She took her blood pressure medication and since that time she has felt more lightheaded.    Past Medical History     Past Medical History:   Diagnosis Date     Breast cancer      Encephalomyelitis 1956     History of anesthesia complications     Felt jitterty after some surgeries for days not sure what caused it     Hypertension      Shingles        Review of Systems   Constitutional  Constitutional (WDL): Exceptions to WDL  Fatigue: Fatigue not relieved by rest - Limiting instrumental ADL  Weight Loss: to <10% from  baseline, intervention not indicated (down 4 lbs)  Neurosensory  Neurosensory (WDL): Exceptions to WDL  Peripheral Motor Neuropathy: Asymptomatic, clinical or diagnostic observations only, intervention not indicated  Ataxia: Moderate symptoms, limiting instrumental ADL (unsteady using a walker)  Peripheral Sensory Neuropathy: Moderate symptoms, limiting instrumental ADL (feet and fingertips)  Cardiovascular  Cardiovascular (WDL): Exceptions to WDL (pulse 118)  Edema Limbs: 5 - 10% inter-limb discrepancy in volume or circumference at point of greatest visible difference, swelling or obscuration of anatomic architecture on close inspection (ankles)  Pulmonary  Respiratory (WDL): Exceptions to WDL (hoarseness)  Cough: Mild symptoms, nonprescription intervention indicated (throat clearing)  Dyspnea: Shortness of breath with minimal exertion, limiting instrumental ADL  Gastrointestinal  Gastrointestinal (WDL): Exceptions to WDL  Anorexia: Oral intake altered without significant weight loss or malnutrition, oral nutritional supplements indicated  Diarrhea: Increase of <4 stools per day over baseline, mild increase in ostomy output compared to baseline  Dysgeusia: Altered taste but no change in diet  Genitourinary  Genitourinary (WDL): All genitourinary elements are within defined limits  Integumentary  Integumentary (WDL): Exceptions to WDL  Alopecia: Hair loss of >50% normal for that individual that is readily apparent to others, a wig or hair piece is necessary if the patient desires to completely camouflage the hair loss, associated with psychosocial impact  Patient Coping  Patient Coping: Sadness  Accompanied by  Accompanied by: Family Member    ECOG performance status and Distress Assessment      ECOG Performance:    ECOG Performance Status: 2    Distress Assessment  Distress Assessment Score: 6 (discouraged, doesn't feel well):     Pain Status  Currently in Pain: No/denies        Vital Signs     Vitals:    02/19/18  0844   BP: 92/55   Pulse: (!) 118   Temp: 97.5  F (36.4  C)   SpO2: 97%       Physical Exam     GENERAL: No acute distress. Cooperative in conversation.   HEENT: Pupils are equal, round and reactive. Oral mucosa is clean and intact. No ulcerations or mucositis noted. No bleeding noted.  RESP:Chest symmetric lungs are clear bilaterally per auscultation. Regular respiratory rate. No wheezes or rhonchi.  CV: Normal S1 S2 Regular, rate and rhythm. No murmurs.  ABD: Nondistended, soft, nontender. Positive bowel sounds. No organomegaly.   EXTREMITIES: No lower extremity edema.   NEURO: Non- focal. Alert and oriented x3.  Cranial nerves appear intact.  PSYCH: Within normal limits. No depression or anxiety.  SKIN: Warm dry intact.    LYMPH NODES: Bilateral cervical, supraclavicular, axillary lymph node examination was done.  Slight right axillary palpable adenopathy.      Lab Results     Results for orders placed or performed in visit on 02/19/18   Comprehensive Metabolic Panel   Result Value Ref Range    Sodium 139 136 - 145 mmol/L    Potassium 4.2 3.5 - 5.0 mmol/L    Chloride 101 98 - 107 mmol/L    CO2 20 (L) 22 - 31 mmol/L    Anion Gap, Calculation 18 5 - 18 mmol/L    Glucose 144 (H) 70 - 125 mg/dL    BUN 25 8 - 28 mg/dL    Creatinine 1.45 (H) 0.60 - 1.10 mg/dL    GFR MDRD Af Amer 42 (L) >60 mL/min/1.73m2    GFR MDRD Non Af Amer 34 (L) >60 mL/min/1.73m2    Bilirubin, Total 0.6 0.0 - 1.0 mg/dL    Calcium 10.3 8.5 - 10.5 mg/dL    Protein, Total 6.2 6.0 - 8.0 g/dL    Albumin 3.2 (L) 3.5 - 5.0 g/dL    Alkaline Phosphatase 53 45 - 120 U/L    AST 13 0 - 40 U/L    ALT 17 0 - 45 U/L   HM1 (CBC with Diff)   Result Value Ref Range    WBC 11.0 4.0 - 11.0 thou/uL    RBC 3.19 (L) 3.80 - 5.40 mill/uL    Hemoglobin 10.8 (L) 12.0 - 16.0 g/dL    Hematocrit 32.9 (L) 35.0 - 47.0 %     (H) 80 - 100 fL    MCH 33.9 27.0 - 34.0 pg    MCHC 32.8 32.0 - 36.0 g/dL    RDW 18.4 (H) 11.0 - 14.5 %    Platelets 443 (H) 140 - 440 thou/uL    MPV  9.7 8.5 - 12.5 fL    Neutrophils % 58 50 - 70 %    Lymphocytes % 31 20 - 40 %    Monocytes % 9 2 - 10 %    Eosinophils % 2 0 - 6 %    Basophils % 1 0 - 2 %    Neutrophils Absolute 6.2 2.0 - 7.7 thou/uL    Lymphocytes Absolute 3.4 0.8 - 4.4 thou/uL    Monocytes Absolute 1.0 (H) 0.0 - 0.9 thou/uL    Eosinophils Absolute 0.2 0.0 - 0.4 thou/uL    Basophils Absolute 0.1 0.0 - 0.2 thou/uL         Imaging Results     No results found.      Holland Ho MD

## 2021-07-20 NOTE — PROGRESS NOTES
The patient attended chemotherapy class today.  The patient was educated on:  -[facility] Educational Classes and Support Groups  -Chemotherapy: what it is and how it works  -Described a typical day at the treatment center and what the patient can expect  -Discussed port access and functions of the port   -Chemotherapy side effects and appropriate management of these side effects. SE discussed included and not limited to: Fatigue, nausea and vomiting, constipation, diarrhea, mucositis, alopecia, peripheral neuropathy, pain, anemia, thrombocytopenia, and neutropenia.    -Reasons to call the physician, including, fever>100.5, shaking chills, unusual bleeding or bruising, shortness of breath, vomiting>12hours, nausea >24 hours, unable to eat/drink >24 hours, painful urination, blood in urine or stool, soreness at the IV site, and painful mouth sores.  The  triage phone number was given to the patient and the patient was encouraged to call with any questions.  The patient was given a tour of the infusion center.  All questions were addressed to the best of my abilities and the patient was encouraged to call with any questions.  Time Spent:120 minutes

## 2021-07-20 NOTE — PROGRESS NOTES
Jacque came to chemo infusion following port lab draw and MD visit for her next cycle with Pertuzumab, Trastuzumab and Paclitaxel. Port maintained excellent blood return.  Dr Ho also ordered 500cc ns hydration due to low blood pressure today a well as weakness and lightheadedness.  Pt received hydration and treatment as ordered and tolerated it well while in clinic today.  Port was flushed, heparinized then deaccessed and site covered.  Jacque d/c from clinic ambulatory accompanied by her family. They are aware of her future appointment.

## 2021-07-20 NOTE — PROGRESS NOTES
Jacque came to chemo infusion this morning for cycle 1 day 15 treatment with taxol and herceptin.  Port was accessed with excellent blood return and lab drawn.  Lab results noted and pt met provision for treatment today.  She was educated on her plan of care and each medication given was reviewed prior to administration.  She received treatment as ordered and tolerated it well while in clinic today. Port was flushed with ns, heparinized then deaccessed and site covered.  Jacque d/c from clinic ambulatory accompanied by her .  They are aware of her future appointment.

## 2021-07-20 NOTE — PROGRESS NOTES
Met with Lauren, her spouse Otf, and her son and daughter-in-law in chemo infusion.  She is receiving final Taxol, Herceptin, Perjeta infusion today.  She had a fall in her home today and Otf assisted her in standing.  Please see chemo RN notes about incident.    Dr. Ho is considering radiation oncologist in [place], [place] and we should be able to provide a referral this week. Dorothy Alejandre RN

## 2021-07-20 NOTE — PROGRESS NOTES
Pt came into infusion clinic for Day 1, Cycle 2 of her treatment. Labs Reviewed. Medications explained to pt who verbalized understanding. Port patent throughout infusion. Pt tolerated infusion with no complications. Pt gait unsteady. Pt states she will be getting a walker or cane. Pt also spoke with Jazlyn about managing her diarrhea. Pt will begin using Imodium and drinking Gatorade. Pt left infusion clinic via ambulatory.

## 2021-07-20 NOTE — PROGRESS NOTES
Patient is here today for labs, provider follow-up visit and possible herceptin for her right breast cancer.

## 2021-07-20 NOTE — PROGRESS NOTES
"0820 Jacque \"Lauren\" arrived  into infusion clinic for Day 8, Cycle 4 of her treatment. POC/meds reviewed, pt stated understanding and agreed to plan.  Lauren stated she is still having diarrhea.  she is taking benadryl at night and has been sleeping better.  Lauren reports her outer L ankle, skin is dry and dark pink, pt stated this started 2 days ago. Pt advised to monitor area, apply aquaphor. She denies fevers, chills, denies warmth or tenderness to L ankle and is aware she should call the clinic if these sx develop.  Jacque also reports when she was a little SOB last night, was taking a shower and then felt \"like a band around lower rib/upper abdominal area\". States the feeling subsided and she feels it was due to being SOB.  Pt Had been dehydrated, was prescribed to decreased lisinopril dose; denies increased edema. Checked BMP, no new orders.  Port accessed w ease, some initial difficulty maintaining blood return, then OK after flushing port.  Labs drawn and results reviewed. Medications explained to pt who verbalized understanding. Port patent throughout infusion. Pt tolerated infusion with no complications. Port flushed, heparinized and deaccessed.  Pt encouraged to call the clinic with any issues, concerns or questions. This information was added to AVS which was reviewed with pt, she stated understanding and agreed to plan.  Up to BR with SBA x2 during her time in infusion today.  Lauren dc'd A&Ox4 ambulatory and stable accompanied by  and son, RTC in one week  "

## 2021-07-20 NOTE — PROGRESS NOTES
Pt here for herceptin after seeing MD.  Pt tolerated treatment as directed.  Port flushed and deaccessed.  Pt d/c ambulatory to lobby with her .  Today is pts last radiation.

## 2021-07-20 NOTE — PROGRESS NOTES
[facility] Cancer Care Progress Note    Patient: Jacque Bernstein  MRN: [id number]  Date of Service: 1/20/2020          Reason for visit      1. Malignant neoplasm of central portion of right breast in female, estrogen receptor positive, Her 2 matthew positive.        Assessment     1.  A very pleasant 85 y.o.  woman with advanced breast cancer located on the right side T3 N2 M0 which was ER negative DC weakly positive but HER-2/matthew 3+.  Status post neoadjuvant chemotherapy with weekly paclitaxel, Herceptin and Perjetta.  Status post mastectomy with small amount of residual tumor in the breast and one positive lymph node.  The residual tumor in the breast is ER positive DC negative and HER-2/matthew positive.  Anderson disease was ER negative DC negative HER-2/matthew 3+.  She has finished radiation therapy and also 1 year of Herceptin.  2.  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  3.  Good general health otherwise.  4.  Some osteopenia seen on her bone density.  5.  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  Slowly recovering from it.    Plan     1.  Patient will continue on anastrozole for at least 5 years which she would finish in July 2023.  2.  Continue symptomatic care for her neuropathy.  She is slowly getting better.  3.  Good diet and exercise.    4.  Advised to continue to wear the compression sleeve as much as she can.  She absolutely has to use it if she takes a flight in an airplane.    5.  Continue to take some calcium rich diet and vitamin D.  Continue to exercise outside in the form of walking and get some sunlight.    Clinical stage      Cancer Staging  Malignant neoplasm of central portion of right breast in female, estrogen receptor positive, Her 2 matthew positive.  Staging form: Breast, AJCC 7th Edition  - Clinical stage from 12/11/2017: Stage IIIA (T3, N2a, M0) - Signed by Holland Ho MD on 12/14/2017  ER Status: Negative  DC Status:  Positive  HER2 Status: Positive      History     Jacque Bernstein is a very pleasant 85 y.o. old female with a history of invasive ductal carcinoma.  Jacque noted some fullness in her breast about end of November 2017.  She had a bruise few days prior to that in that breast.  After the bruise resolved she felt that there was still the lump present.  She went and saw Dr. Chou who felt that this was very concerning for malignancy.  Ultrasound and mammogram were done which were very suggestive of malignancy.  She underwent biopsy of the right axillary lymph node which confirmed the presence of invasive ductal carcinoma which was ER negative, MA weakly positive but HER-2/matthew 3+ on immunohistochemistry.  This was a high-grade tumor.  The lymph nodes are clinically palpable.     The patient has been since seen by Dr. Fregoso.  Dr. Fregoos performed a punch biopsy of the skin.  She was concerned that this might be inflammatory breast cancer.  The skin biopsies came back negative for involvement with cancer.  She started on neoadjuvant chemotherapy with weekly paclitaxel along with Herceptin and once every 3 week Perjetta.  He had good clinical response.    She underwent mastectomy in April 2018 and pathology revealed one positive lymph node and a small 1.8 cm lesion in the breast.  Margins negative.  Interestingly the lesion in the breast was ER positive MA borderline positive HER-2/matthew positive at least in the parts of the tumor.  The lymph node metastases ER negative MA negative and HER-2/matthew 3+.      She is started on radiation therapy.  Finished that on July 18, 2018.  She had some radiation burns.  She was also noted to have slight lymphedema so she was prescribed a lymphedema sleeve as well on her right arm.  She finished a year of Herceptin.    We also started her on anastrozole on July 2018.  She is tolerating that with the expected side effects.  Comes in today for scheduled follow-up.  Her neuropathy is getting  better in her hands and perhaps in her feet.  She underwent right hip surgery in July 2019.  Due to the hip surgery she is walking slower and needs a walker.  She has lost a little bit of weight.  She feels her balance is not as good.  No significant hot flashes.  She still has some trouble with bladder incontinence issues.    Past Medical History     Past Medical History:   Diagnosis Date     Arthritis      Breast cancer (H)     breast     Chronic kidney disease      Encephalomyelitis 1956     History of anesthesia complications     Felt jittery after some surgeries for days not sure what caused it     Hypertension      Incontinence of urine      Neuropathy      Shingles        Review of Systems   Constitutional  Constitutional (WDL): Exceptions to WDL  Fatigue: Fatigue not relieved by rest - Limiting instrumental ADL  Neurosensory  Neurosensory (WDL): Exceptions to WDL  Ataxia: Asymptomatic, clinical or diagnostic observations only, intervention not indicated(Uses cane. Neuropathy. )  Peripheral Sensory Neuropathy: Asymptomatic, loss of deep tendon reflexes or paresthesia(Hands and feet; numbness. )  Cardiovascular  Cardiovascular (WDL): Exceptions to WDL  Palpitations: Definition: A disorder characterized by inflammation of the muscle tissue of the heart.(About twice a month, a racing sensation. )  Edema: Yes(Ankles. Elevates her legs as much as she can. )  Pulmonary  Respiratory (WDL): Within Defined Limits  Gastrointestinal  Gastrointestinal (WDL): Exceptions to WDL  Dry Mouth: Symptomatic (e.g., dry or thick saliva) without significant dietary alteration, unstimulated saliva flow >0.2 ml/min  Genitourinary  Genitourinary (WDL): Exceptions to WDL  Urinary Frequency: Present  Integumentary  Integumentary (WDL): All integumentary elements are within defined limits  Patient Coping  Patient Coping: Accepting  Accompanied by  Accompanied by: Family Member(. )    ECOG performance status and Distress Assessment       ECOG Performance:    ECOG Performance Status: 1    Distress Assessment  Distress Assessment Score: No distress:     Pain Status  Currently in Pain: No/denies      Vital Signs     Vitals:    01/20/20 1041   BP: 179/81   Pulse: 78   Temp: 97.9  F (36.6  C)   SpO2: 97%       Physical Exam     GENERAL: No acute distress. Cooperative in conversation.   HEENT: Pupils are equal, round and reactive. Oral mucosa is clean and intact. No ulcerations or mucositis noted. No bleeding noted.  RESP:Chest symmetric lungs are clear bilaterally per auscultation. Regular respiratory rate. No wheezes or rhonchi.  CV: Normal S1 S2 Regular, rate and rhythm. No murmurs.  ABD: Nondistended, soft, nontender. Positive bowel sounds. No organomegaly.   EXTREMITIES: No lower extremity edema.  She is wearing a lymphedema sleeve on her right arm but does not appear to have any lymphedema.  In fact the sleeve appears somewhat loose.  NEURO: Non- focal. Alert and oriented x3.  Cranial nerves appear intact.  PSYCH: Within normal limits. No depression or anxiety.  SKIN: Warm dry intact.  Well-healing incision on her right hip.  LYMPH NODES: Bilateral cervical, supraclavicular, axillary lymph node examination was done.  Slight right axillary palpable adenopathy.  BREAST: s/p Right mastectomy with some radiation changes. Left breast normal. No palpable lump. No nipple discharge.      Lab Results     Results for orders placed or performed in visit on 01/20/20   Comprehensive Metabolic Panel   Result Value Ref Range    Sodium 141 136 - 145 mmol/L    Potassium 4.4 3.5 - 5.0 mmol/L    Chloride 105 98 - 107 mmol/L    CO2 26 22 - 31 mmol/L    Anion Gap, Calculation 10 5 - 18 mmol/L    Glucose 80 70 - 125 mg/dL    BUN 20 8 - 28 mg/dL    Creatinine 0.96 0.60 - 1.10 mg/dL    GFR MDRD Af Amer >60 >60 mL/min/1.73m2    GFR MDRD Non Af Amer 55 (L) >60 mL/min/1.73m2    Bilirubin, Total 0.5 0.0 - 1.0 mg/dL    Calcium 10.1 8.5 - 10.5 mg/dL    Protein, Total 6.6  6.0 - 8.0 g/dL    Albumin 3.7 3.5 - 5.0 g/dL    Alkaline Phosphatase 67 45 - 120 U/L    AST 16 0 - 40 U/L    ALT 9 0 - 45 U/L   Lactate Dehydrogenase (LDH)   Result Value Ref Range    LD (LDH) 165 125 - 220 U/L   HM1 (CBC with Diff)   Result Value Ref Range    WBC 7.1 4.0 - 11.0 thou/uL    RBC 4.55 3.80 - 5.40 mill/uL    Hemoglobin 13.8 12.0 - 16.0 g/dL    Hematocrit 43.3 35.0 - 47.0 %    MCV 95 80 - 100 fL    MCH 30.3 27.0 - 34.0 pg    MCHC 31.9 (L) 32.0 - 36.0 g/dL    RDW 14.0 11.0 - 14.5 %    Platelets 318 140 - 440 thou/uL    MPV 9.5 8.5 - 12.5 fL    Neutrophils % 59 50 - 70 %    Lymphocytes % 23 20 - 40 %    Monocytes % 14 (H) 2 - 10 %    Eosinophils % 4 0 - 6 %    Basophils % 1 0 - 2 %    Neutrophils Absolute 4.2 2.0 - 7.7 thou/uL    Lymphocytes Absolute 1.6 0.8 - 4.4 thou/uL    Monocytes Absolute 1.0 (H) 0.0 - 0.9 thou/uL    Eosinophils Absolute 0.3 0.0 - 0.4 thou/uL    Basophils Absolute 0.1 0.0 - 0.2 thou/uL         Imaging Results     No results found.      Holland Ho MD

## 2021-07-20 NOTE — PROGRESS NOTES
"0800 Jacque \"Lauren\" arrived  into infusion clinic for Day 2, Cycle 2 of her treatment. POC/meds reviewed, pt stated understanding and agreed to plan.  Lauren stated she is having more and more \"soft stools, not resting well due to getting up to the bathroom frequently and urinary urgency\". Pt states immodium is not helping as much but benadryl at bedtime helps her fall back to sleep easier.   Port accessed w ease, some initial difficulty maintaining blood return, then OK after flushing port.  Labs drawn and results reviewed. Medications explained to pt who verbalized understanding. Port patent throughout infusion. Pt tolerated infusion with no complications. Port flushed, heparinized and deaccessed.  Per Jazlyn pt is to try taking Imodium 2 tabls QID and if no improvement of her frequent loose stools then she is to try Lomotil. Pt encouraged to call the clinic with any issues, concerns or questions. This information was added to AVS which was reviewed with pt, she stated understanding and agreed to plan.  Up to BR with SBA x2 during her time in infusion today.  1215 Lauren dc'd A&Ox4 ambulatory and stable, RTC in one week  "

## 2021-07-20 NOTE — PROGRESS NOTES
Met briefly with Lauren and her  Otf to check in and inquire about her needs.  She will be starting physical therapy to help with strength and balance.  She's eager to do this and hopeful for good results.  Invited calls. Dorothy Alejandre RN

## 2021-07-20 NOTE — PROGRESS NOTES
Met with Lauren, spouse Otf, and their daughter Precious in medical oncology reception.  She has returned to clinic for chemo class.  Presented her with a prayer shawl and wig resources folder which we reviewed together.  Lauren has questions about her treatment schedule in light of the upcoming [place] and New Year's holidays.  This question was directed to  and was answered to patient satisfaction.  Dorothy Alejandre RN

## 2021-07-20 NOTE — PROGRESS NOTES
Lorena comes in for continued follow-up of her right mastectomy.  She is feeling well.  No complaints.  Physical exam:  Still has a very large seroma.  I aspirated this for about 100 cc of serous fluid.    Impression: Large recurrent seroma.  I warned her that if this does not go down soon I would need to replace the drain.  I want to see her back already on Tuesday.  Again if there is large amount of fluid I think the drain should go back again.

## 2021-07-20 NOTE — OP NOTE
Operative Note    Name:  Jacque Bernstein  PCP:  Sawyer Chou MD  Procedure Date:  12/14/2017      Port Placement (Left)    Pre-Procedure Diagnosis:  Breast cancer, right [C50.911]     Post-Procedure Diagnosis:    Same    Surgeon(s):  Angeles Fregoso MD      Anesthesia Type:  MAC      Findings:  Good position of catheter.    Operative Report:    Patient was brought to the operating suite where she was placed in supine position.  The are was infiltrated with 1% Lidocaine. The patient was prepped and draped in a sterile fashion.  The left subclavian vein  was accessed easily with a needle and a wire was passed.  An incision was then made further down on the chest wall and a small subcutaneous pocket was created with electrocautery.  The catheter was tunneled between the 2 points.  Under fluoroscopic guidance the introducer was then passed over the wire.  The wire was removed and the catheter passed through the introducer which was subsequently removed leaving the tip of the catheter in the right atrium.  This was all done under fluoroscopic guidance.  The catheter was then cut and hooked up to the port which is placed into the pocket.  This was accessed and blood withdrawn easily and flushed with heparinized saline.  The wound was then closed with 3-0 Vicryl to subcutaneous tissues and a subcuticular stitch of 4 Monocryl to the skin. Sterile dressings were placed.      Estimated Blood Loss:   None    Specimens:    * No specimens in log *       Drains:        Complications:    None    Angeles Fregoso     Date: 12/14/2017  Time: 8:18 AM

## 2021-07-20 NOTE — PROGRESS NOTES
[facility] Hematology and Oncology Progress Note    Patient: Jacque Bernstein  MRN: [id number]  Date of Service:         Reason for Visit    Chief Complaint   Patient presents with     HE Cancer     Breast Cancer       Assessment and Plan  Malignant neoplasm of central portion of right breast in female, estrogen receptor positive, Her 2 matthew positive.    Staging form: Breast, AJCC 7th Edition    - Clinical stage from 12/11/2017: Stage IIIA (T3, N2a, M0) - Signed by Holland Ho MD on 12/14/2017          ER Status: Negative          IA Status: Positive          HER2 Status: Positive     1. Breast cancer, stage IIIA: has started on neoadjuvant chemo with Taxol, Herceptin and Perjeta.   She is starting radiation with, Dr. LISA Del Toro at [facility] radiation therapy [place] tomorrow. This was slightly delayed due to post op seroma/drain.  Continue getting Herceptin every 3 weeks.  She will see Dr. Ho with labs in 6 weeks. We will do MUGA scans every 3 months, so she will be due roughly in September. Last week, it was normal. She will start hormonal pills, exemestane when radiation is completed.     2. Peripheral neuropathy: chemo induced. Not currently affecting ADL's except needing to use walker/cane. No need for pain medications. Discussed acupuncture, but pt does not want to try that. It should slowly improve with time. Continue to monitior.         ECOG Performance   ECOG Performance Status: 1     Distress Assessment  Distress Assessment Score: No distress:    Pain  Currently in Pain: No/denies      Problem List    1. Malignant neoplasm of central portion of right breast in female, estrogen receptor positive, Her 2 matthew positive.  CC OFFICE VISIT LONG    Infusion Appointment    CC OFFICE VISIT LONG    Infusion Appointment        ______________________________________________________________________________    History of Present Illness    Jacque Bernstein is a very pleasant 83 y.o. old female with a history of  invasive ductal carcinoma.  Jacque noted some fullness in her breast about end of November.  She had a bruise few days but prior to that in that breast.  After the bruise resolved she felt that there was still the lump present.  She went and saw Dr. Chou who felt that this was very concerning for malignancy.  Ultrasound and mammogram were done which were very suggestive of malignancy.  She underwent biopsy of the right axillary lymph node which confirmed the presence of invasive ductal carcinoma which was ER negative, DE weakly positive but HER-2/matthew 3+ on immunohistochemistry.  This was a high-grade tumor.  The lymph nodes are clinically palpable.     The patient has been since seen by Dr. Fregoso.  Dr. Fregoso also performed a punch biopsy of the skin.  She was concerned that this might be inflammatory breast cancer.  The skin biopsies negative for involvement with cancer so this is not inflammatory breast cancer. So she has started on neoadjuvant chemo with Taxol and Herceptin and Perjeta. She has completed her initial weekly taxol with herceptin and perjeta. She has now started on maintenenace herceptin. Her surgery after chemo showed some residual cancer in breast as well as lymph node. Interestingly, it did show ER- in lymph node, but ER+ in breast.   She had a seroma post op and had her drain removed last week. She is starting radiation tomorrow.   Only complaints are continued peripheral neuropathy, breathing changes, nail changes.     Pain Status  Currently in Pain: No/denies    Review of Systems    Constitutional  Constitutional (WDL): Exceptions to WDL  Fatigue: Fatigue relieved by rest  Weight Loss: to <10% from baseline, intervention not indicated (down 2# since 5/15)  Neurosensory  Neurosensory (WDL): Exceptions to WDL  Peripheral Motor Neuropathy: Moderate symptoms, limiting instrumental ADL  Ataxia: Moderate symptoms, limiting instrumental ADL (hands and feet; feet worse than hands - has gotten better  "but still there)  Eye   Eye Disorder (WDL): All eye disorder elements are within defined limits (glasses)  Ear  Ear Disorder (WDL): All ear disorder elements are within defined limits  Cardiovascular  Cardiovascular (WDL): All cardiovascular elements are within defined limits  Pulmonary  Respiratory (WDL): Exceptions to WDL  Dyspnea: Shortness of breath with moderate exertion (constriction on diaphragm)  Gastrointestinal  Gastrointestinal (WDL): All gastrointestinal elements are within defined limits  Genitourinary  Genitourinary (WDL): Exceptions to WDL  Urinary Frequency: Present  Lymphatic  Lymph (WDL): All lymph disorder elements are within defined limits  Musculoskeletal and Connective Tissue  Musculoskeletal and Connetive Tissue Disorders (WDL): Exceptions to WDL (\"wobbly due to neuropathy in feet\")  Integumentary  Integumentary (WDL): All integumentary elements are within defined limits  Patient Coping  Patient Coping: Accepting  Distress Assessment  Distress Assessment Score: No distress  Accompanied by  Accompanied by: Family Member (son and )  Oral Chemo Adherence       Past History  Past Medical History:   Diagnosis Date     Breast cancer      Chronic kidney disease     Stage III     Encephalomyelitis 1956     History of anesthesia complications     Felt jittery after some surgeries for days not sure what caused it     Hypertension      Incontinence of urine      Neuropathy      Shingles        PHYSICAL EXAM:  /85  Pulse 81  Temp 97.7  F (36.5  C) (Oral)   Wt 162 lb 8 oz (73.7 kg)  SpO2 97%  BMI 28.79 kg/m2  GENERAL: no acute distress. Cooperative in conversation. Here with  and son.  Using walker  HEENT: pupils are equal, round and reactive. Oromucosa is clean and intact. No ulcerations or mucositis noted. No bleeding noted.  RESP: lungs are clear bilaterally per auscultation. Regular respiratory rate. No wheezes or rhonchi.  CV: Regular, rate and rhythm. No murmurs.  ABD: soft, " nontender. Positive bowel sounds. No organomegaly.   MUSCULOSKELETAL: No lower extremity swelling.   NEURO: non focal. Alert and oriented x3.   PSYCH: within normal limits. No depression or anxiety.  SKIN: warm dry intact, fingernail changes. Has lost 8 toenails. No signs of infection.   LYMPH: no cervical, supraclavicular or axillary lymphadenopathy  BREAST: deferred. Recently on drain placed and removed by Dr. Fregoso's office. Is starting radiation tomorrow.         Lab Results    Recent Results (from the past 168 hour(s))   NM Muga   Result Value Ref Range    MUGA PRIOR EF 75.6 %    MUGA EF 72.8 %   Comprehensive Metabolic Panel   Result Value Ref Range    Sodium 138 136 - 145 mmol/L    Potassium 4.1 3.5 - 5.0 mmol/L    Chloride 104 98 - 107 mmol/L    CO2 24 22 - 31 mmol/L    Anion Gap, Calculation 10 5 - 18 mmol/L    Glucose 119 70 - 125 mg/dL    BUN 28 8 - 28 mg/dL    Creatinine 1.02 0.60 - 1.10 mg/dL    GFR MDRD Af Amer >60 >60 mL/min/1.73m2    GFR MDRD Non Af Amer 52 (L) >60 mL/min/1.73m2    Bilirubin, Total 0.3 0.0 - 1.0 mg/dL    Calcium 9.8 8.5 - 10.5 mg/dL    Protein, Total 6.4 6.0 - 8.0 g/dL    Albumin 3.2 (L) 3.5 - 5.0 g/dL    Alkaline Phosphatase 54 45 - 120 U/L    AST 17 0 - 40 U/L    ALT 13 0 - 45 U/L   HM1 (CBC with Diff)   Result Value Ref Range    WBC 11.7 (H) 4.0 - 11.0 thou/uL    RBC 3.97 3.80 - 5.40 mill/uL    Hemoglobin 12.1 12.0 - 16.0 g/dL    Hematocrit 37.6 35.0 - 47.0 %    MCV 95 80 - 100 fL    MCH 30.5 27.0 - 34.0 pg    MCHC 32.2 32.0 - 36.0 g/dL    RDW 13.8 11.0 - 14.5 %    Platelets 352 140 - 440 thou/uL    MPV 9.7 8.5 - 12.5 fL    Neutrophils % 71 (H) 50 - 70 %    Lymphocytes % 16 (L) 20 - 40 %    Monocytes % 10 2 - 10 %    Eosinophils % 3 0 - 6 %    Basophils % 0 0 - 2 %    Neutrophils Absolute 8.3 (H) 2.0 - 7.7 thou/uL    Lymphocytes Absolute 1.9 0.8 - 4.4 thou/uL    Monocytes Absolute 1.1 (H) 0.0 - 0.9 thou/uL    Eosinophils Absolute 0.4 0.0 - 0.4 thou/uL    Basophils Absolute 0.0 0.0  - 0.2 thou/uL       Imaging    Nm Muga    Result Date: 5/31/2018    The measured left ventricular ejection fraction on the prior study date of 3/8/2018 was 75.6%.   The left ventricular ejection fraction is 72.8%.          Signed by: Jazlyn Hackett, CNP

## 2021-07-20 NOTE — TELEPHONE ENCOUNTER
Lauren calls in to update Dr Ho on how she has been feeling the last week or more.  She has been extremely fatigued and weak.  She also reports that her urine looks very cloudy in the toilet.  She is having pain in her bladder when her bladder is full, just prior to urinating.  She is having some incontinence which normally has but it seems worse the last week.  Per Dr Ho, she needs to come in for a UA and some lab work.  The last time she was in, last week, her calcium was slightly elevated so he wants to recheck that along with a urine test.  When I called her back, it went directly to voicemail so I left a detailed message asking her to call back to further discuss.    Mary Anne Lazcano RN

## 2021-07-20 NOTE — PROGRESS NOTES
Pt here for lab port draw and see MD. No problems with port which was flushed and deaccessed. Pt did state she will be having port removed soon.

## 2021-07-20 NOTE — PROGRESS NOTES
Jacque comes in for continued follow-up of her right modified radical mastectomy.  Unfortunately she clearly has a buildup of her seroma again.  She however feels well.    Physical exam:  Clearly still a fairly large seroma.  After much discussion he agreed to replacing the drain.  I strongly feel it is indicated.    Procedure: The lateral side of the incision was prepped with Betadine.  A small incision was made right through the incision and a 15 Chinese round Mina-Loo drain passed into the seroma cavity.  This was secured with a 2-0 silk suture.  I then cut the catheter little bit shorter and then instilled 100 mg of doxycycline diluted in 10 cc of saline.  The BIANCA bulb was hooked up but not placed to suction.  Sterile dressings are placed.  She was instructed to put the BIANCA bulb to suction and 1 hour.    Impression: Large recurrent seroma.  Drain placed.  She will now follow-up with me once the drain is putting out 10-15 cc a day of fluid.  She would come in in 3 weeks if it is not ready by then.

## 2021-07-20 NOTE — PROGRESS NOTES
Pt came into infusion clinic for Day 1, Cycle 8 of her treatment. Labs Reviewed. Medications explained to pt who verbalized understanding. Port patent throughout infusion. Pt tolerated infusion with no complications. Pt left infusion clinic via ambulatory and will RTC as sched

## 2021-07-20 NOTE — PROGRESS NOTES
[facility] Hematology and Oncology Progress Note    Patient: Jacque Bernstein  MRN: [id number]  Date of Service:         Reason for Visit    Chief Complaint   Patient presents with     HE Cancer     Breast Cancer       Assessment and Plan  Malignant neoplasm of central portion of right breast in female, estrogen receptor negative, HER-2 positive    Staging form: Breast, AJCC 7th Edition    - Clinical stage from 12/11/2017: Stage IIIA (T3, N2a, M0) - Signed by Holland Ho MD on 12/14/2017          ER Status: Negative          WV Status: Positive          HER2 Status: Positive     1. Breast cancer, stage IIIA: has started on neoadjuvant chemo with Taxol, Herceptin and Perjeta. She has responded clinically very well so far. Her tumor is basically no longer palpable. She will start maintenance herceptin today. She will be seen every 6 weeks with labs and get the medication every 3 weeks. She will get set up for surgery with Dr. Fregoso. Has decided to do mastectomy. She already does have an appt with radiation oncology at [facility] at [facility] due to proximity from home. We spent a lot of time discussing the plan of care, the time frame and why we needed to do chemo, radiation and surgery. MUGA scan was normal. Will do that every 3 months with maintenance herceptin.     2. Diarrhea: this has improved. Will continue to monitor. Likely from perjeta, so that will hopefully stop now. Use imodium PRN and if that is not effective, would try lomotil     3. Lightheaded/dizziness with dehydration: has improved her oral intake. Felt worse last week. Continue to encourage her to drink as much as she can tolerate. We will restart lisinopril/hctz at once a day to start. She was taking that BID. If her BP is still elevated at her next appt, we will go back up to BID.     4. Peripheral neuropathy: will continue to monitor. This will hopefully improve with time. If not, we could try acupuncture.     5. Anemia: chemo induced and usually  cumulative. Overall asymptomatic except fatigue. Continue to monitor.     6. Nail changes: mainly toes. From Taxol. No signs of infection. Encourage her to keep them clean and short. Call us with infectious complaints.     ECOG Performance   ECOG Performance Status: 2     Distress Assessment  Distress Assessment Score: 6: side effects. Meeting with our psychologist. No need for further intervention. Will continue to monitor.     Pain  Currently in Pain: No/denies      Problem List    1. Malignant neoplasm of central portion of right breast in female, estrogen receptor negative, HER-2 positive  CC OFFICE VISIT LONG    Infusion Appointment    CC OFFICE VISIT LONG    Infusion Appointment   2. Weak     3. Change in nail appearance     4. Peripheral neuropathy due to chemotherapy     5. Anemia associated with chemotherapy          ______________________________________________________________________________    History of Present Illness    Jacque Bernstein is a very pleasant 83 y.o. old female with a history of invasive ductal carcinoma.  Jacque noted some fullness in her breast about end of November.  She had a bruise few days but prior to that in that breast.  After the bruise resolved she felt that there was still the lump present.  She went and saw Dr. Chou who felt that this was very concerning for malignancy.  Ultrasound and mammogram were done which were very suggestive of malignancy.  She underwent biopsy of the right axillary lymph node which confirmed the presence of invasive ductal carcinoma which was ER negative, KS weakly positive but HER-2/matthew 3+ on immunohistochemistry.  This was a high-grade tumor.  The lymph nodes are clinically palpable.     The patient has been since seen by Dr. Fregoso.  Dr. Fregoso also performed a punch biopsy of the skin.  She was concerned that this might be inflammatory breast cancer.  The skin biopsies negative for involvement with cancer so this is not inflammatory breast  "cancer. So she has started on neoadjuvant chemo with Taxol and Herceptin and Perjeta. She has completed her initial weekly taxol with herceptin and perjeta. She is here today to start maintenance herceptin.   Tolerated chemo like you would expect. Is having neuropathy, feet worse than hands. Dehydration and weakness. Also some nail changes.     Pain Status  Currently in Pain: No/denies    Review of Systems    Constitutional  Constitutional (WDL): Exceptions to WDL  Fatigue: Fatigue relieved by rest  Weight Gain: 5 - <10% from baseline (up 1lb since 2/19)  Neurosensory  Neurosensory (WDL): Exceptions to WDL  Peripheral Motor Neuropathy: Moderate symptoms, limiting instrumental ADL (feet; fingers)  Peripheral Sensory Neuropathy: Moderate symptoms, limiting instrumental ADL (feet; fingers )  Eye   Eye Disorder (WDL): All eye disorder elements are within defined limits  Ear  Ear Disorder (WDL): All ear disorder elements are within defined limits  Cardiovascular  Cardiovascular (WDL): Exceptions to WDL  Edema Limbs: 5 - 10% inter-limb discrepancy in volume or circumference at point of greatest visible difference, swelling or obscuration of anatomic architecture on close inspection (better in ankles)  Pulmonary  Respiratory (WDL): Exceptions to WDL  Cough: Mild symptoms, nonprescription intervention indicated (dry; \"voice is not right\")  Dyspnea: Shortness of breath with minimal exertion, limiting instrumental ADL  Gastrointestinal  Gastrointestinal (WDL): Exceptions to WDL  Anorexia: Oral intake altered without significant weight loss or malnutrition, oral nutritional supplements indicated  Dysgeusia: Altered taste with change in diet (e.g., oral supplements), noxious or unpleasant taste, loss of taste  Genitourinary  Genitourinary (WDL): All genitourinary elements are within defined limits  Lymphatic  Lymph (WDL): All lymph disorder elements are within defined limits  Musculoskeletal and Connective " Tissue  Musculoskeletal and Connetive Tissue Disorders (WDL): Exceptions to WDL  Muscle Weakness : Symptomatic, evident on physical exam, limiting instrumental ADL (getting better)  Integumentary  Integumentary (WDL): Exceptions to WDL  Alopecia: Hair loss of >50% normal for that individual that is readily apparent to others, a wig or hair piece is necessary if the patient desires to completely camouflage the hair loss, associated with psychosocial impact  Patient Coping  Patient Coping: Accepting  Distress Assessment  Distress Assessment Score: 6  Accompanied by  Accompanied by: Family Member ( and son)  Oral Chemo Adherence       Past History  Past Medical History:   Diagnosis Date     Breast cancer      Encephalomyelitis 1956     History of anesthesia complications     Felt jitterty after some surgeries for days not sure what caused it     Hypertension      Shingles        PHYSICAL EXAM:  /65  Pulse 88  Temp 98.6  F (37  C) (Oral)   Wt 171 lb 12.8 oz (77.9 kg)  SpO2 98%  BMI 31.42 kg/m2  GENERAL: no acute distress. Cooperative in conversation. Here with  and son. In wheelchair today.   HEENT: pupils are equal, round and reactive. Oromucosa is clean and intact. No ulcerations or mucositis noted. No bleeding noted.  RESP: lungs are clear bilaterally per auscultation. Regular respiratory rate. No wheezes or rhonchi.  CV: Regular, rate and rhythm. No murmurs.  ABD: soft, nontender. Positive bowel sounds. No organomegaly.   MUSCULOSKELETAL: No lower extremity swelling.   NEURO: non focal. Alert and oriented x3.   PSYCH: within normal limits. No depression or anxiety.  SKIN: warm dry intact, nail changes on toenails. No redness or drainage noted.   LYMPH: no cervical, supraclavicular or axillary lymphadenopathy  BREAST: tumor in left breast is no longer palpable.         Lab Results    Recent Results (from the past 168 hour(s))   NM Muga   Result Value Ref Range    MUGA EF 75.6 %    MUGA PRIOR  EF 75.7 %   Comprehensive Metabolic Panel   Result Value Ref Range    Sodium 137 136 - 145 mmol/L    Potassium 4.1 3.5 - 5.0 mmol/L    Chloride 104 98 - 107 mmol/L    CO2 23 22 - 31 mmol/L    Anion Gap, Calculation 10 5 - 18 mmol/L    Glucose 113 70 - 125 mg/dL    BUN 13 8 - 28 mg/dL    Creatinine 1.04 0.60 - 1.10 mg/dL    GFR MDRD Af Amer >60 >60 mL/min/1.73m2    GFR MDRD Non Af Amer 51 (L) >60 mL/min/1.73m2    Bilirubin, Total 0.6 0.0 - 1.0 mg/dL    Calcium 9.6 8.5 - 10.5 mg/dL    Protein, Total 5.9 (L) 6.0 - 8.0 g/dL    Albumin 3.0 (L) 3.5 - 5.0 g/dL    Alkaline Phosphatase 49 45 - 120 U/L    AST 17 0 - 40 U/L    ALT 14 0 - 45 U/L   HM1 (CBC with Diff)   Result Value Ref Range    WBC 5.7 4.0 - 11.0 thou/uL    RBC 2.53 (L) 3.80 - 5.40 mill/uL    Hemoglobin 8.8 (L) 12.0 - 16.0 g/dL    Hematocrit 27.2 (L) 35.0 - 47.0 %     (H) 80 - 100 fL    MCH 34.8 (H) 27.0 - 34.0 pg    MCHC 32.4 32.0 - 36.0 g/dL    RDW 19.7 (H) 11.0 - 14.5 %    Platelets 352 140 - 440 thou/uL    MPV 9.2 8.5 - 12.5 fL   Manual Differential   Result Value Ref Range    Total Neutrophils % 67 50 - 70 %    Lymphocytes % 23 20 - 40 %    Monocytes % 8 2 - 10 %    Eosinophils %  1 0 - 6 %    Basophils % 1 0 - 2 %    Total Neutrophils Absolute 3.8 2.0 - 7.7 thou/ul    Lymphocytes Absolute 1.3 0.8 - 4.4 thou/uL    Monocytes Absolute 0.5 0.0 - 0.9 thou/uL    Eosinophils Absolute 0.1 0.0 - 0.4 thou/uL    Basophils Absolute 0.1 0.0 - 0.2 thou/uL    Manual nRBC per 100 Cells 2 (H) 0-<1    Platelet Estimate Normal Normal    Polychromasia 1+ (!) Negative       Imaging    Nm Muga    Result Date: 3/8/2018    1.The left ventricular ejection fraction is 75.6%.   2.Normal study.   3.The measured left ventricular ejection fraction on the prior study date of 12/14/2017 was 75.7%. This indicates no significant change.      Total time spent with patient was 50 minutes.  Greater than 50% of that was in counseling and care coordination.      Signed by: Jazlyn  Roman Hackett, CNP

## 2021-07-20 NOTE — PROGRESS NOTES
In for follow-up of her right modified radical mastectomy.  She is feeling well.  She is having very minimal pain.  In fact she has not taken a single pain pill since her discharge.    Physical exam:  Appears well.  Does not appear in any discomfort.  Breasts: Incisions are healing nicely with no signs of infection.  No swelling.  BIANCA is serous but putting out too much to be pulled today.    Pathology: The residual tumor was 1.8 cm.  The margins are negative.  There was only a micrometastases in 1 of 8 lymph nodes.  Interestingly she had 2 different types of tumor biology.  The tumor itself was estrogen receptor positive and then had a mix of HER-2 positive and HER-2 negative areas.  Going back and looking at her lymph node it was estrogen and progesterone receptor negative and HER-2 positive.    Impression: Postop visit. Doing well.  Overall doing well.  She did not have a complete pathologic response but had a very good pathologic response.  I am in particularly pleased with the response she had in the lymph nodes.    Plan: Follow-up with me again in 2 weeks.  She will need radiation but we need to wait until the drain comes out and that could be a while.  She should continue her Herceptin as per the usual schedule.

## 2021-07-20 NOTE — PROGRESS NOTES
Jacque came to chemo infusion this morning for her next dose of Trastuzumab.  VSS.  Pt assessed.  She is feeling well today.  Port accessed with good blood return.  Trastuzumab infused over 30 minutes and was well tolerated while in clinic today.  Port was flushed with ns then heparinized and deaccessed.  Jacque d/c from clinic ambulatory accompanied by her spouse.

## 2021-07-20 NOTE — PROGRESS NOTES
Port heplocked, de-accessed  Denies pain  Discharge instructions reviewed w/ pt, spouse, family, they state understanding  BIANCA instructions provided verbally and written  BIANCA output sheet also provided  They expect a call from Ogren's office tomorrow, know to call office if they have questions  Drsg c/d/i, ace wrapped  Questions answered  Spouse to drive pt home    George Valentin RN

## 2021-07-20 NOTE — PLAN OF CARE
Problem: Occupational Therapy  Goal: OT Goals  Description  Patient will demonstrate the following by 8/1/2019, in order to maximize independence with ADL/IADL performance:     -Patient will be mod I with dressing him/herself with AE as needed.    -Patient will be mod i with bed, chair, toilet, tub/shower, and kitchen mobility.    Goals entered on 8/1/2019 by Regina Polk OT     Outcome: Completed   Occupational Therapy Discharge Summary    Date of OT Discharge: 8/1/2019  Refer to daily doc flowsheet for equipment issued and current functional status.  Discharge Destination: Home  Discharge Comments: OT goals met this PM      8/1/2019 by Regina Polk, OT

## 2021-07-20 NOTE — DISCHARGE SUMMARY
ORTHOPEDIC DISCHARGE SUMMARY       Jacque Bernstein,  8/3/1934, MRN [id number]    Admission Date: 2019  Admission Diagnoses: OA (osteoarthritis) of hip [M16.9]  Right hip pain [M25.551]     Discharge Date: 2019     Post-operative Day:  1 day post op    Reason for Admission: The patient was admitted for the following:  RIGHT DIRECT ANTERIOR TOTAL HIP ARTHROPLASTY (Right)    BRIEF HOSPITAL COURSE   This 85 y.o. female underwent the aforementioned procedure with Dr. Cuba on 19. There were no intraoperative complications and the patient was transferred to the recovery room and later the orthopedic unit in stable condition. Once the patient reached the orthopedic floor our orthopedic pain protocol was implemented along with the following:    Anticoagulation Medications: Aspirin   Therapy: Physical Therapy and Occupational Therapy  Activity: WBAT  Bracing: None    Consultations during Admission: Hospitalist service for medical management     COMPLICATIONS/SIGNIFICANT FINDINGS    None    DISCHARGE INFORMATION   Condition at discharge: good  Discharge destination: Home or Self Care  Patient was seen by myself on the date of discharge.    FOLLOW UP CARE   Follow up with orthopedics in 2 weeks or sooner should the need arise. Ortho will continue to manage pain control, post op anticoagulation and incision care.     Follow up with your PCP in 6-10 days (based on your readmission risk score) for management of chronic medical problems and to evaluate for post op medical complications including constipation, nausea/vomiting, DVT/PE, anemia, changes in blood pressure, fevers/chills, urinary retention and atelectasis/pneumonia.     Subjective   Patient is doing well today. Pain is under good control. Ambulating well. No complaints.     Physical Exam   The patient is A&Ox3.  Sensation is intact.  Dorsiflexion and plantar flexion is intact.  Dorsalis pedis pulse intact.  Calves are soft and non-tender.   The incision  is covered. Dressing C/D/I    Vitals:    08/01/19 1250   BP: 111/55   Pulse: 80   Resp: 20   Temp: 97.5  F (36.4  C)   SpO2: 99%       Pertinent Results at Discharge     Hemoglobin   Date/Time Value Ref Range Status   08/01/2019 05:21 AM 10.3 (L) 12.0 - 16.0 g/dL Final   07/31/2019 06:44 AM 12.6 12.0 - 16.0 g/dL Final   05/20/2019 12:33 PM 14.3 12.0 - 16.0 g/dL Final     INR   Date/Time Value Ref Range Status   11/05/2010 06:03 AM 2.55 (H) 0.90 - 1.10 Final     Comment:                                                     INR Therapeutic Ranges                                                  Mech. Valve 2.5-3.5                                                  Post surg.  2.0-3.0                                                  DVT/PE      2.0-3.0   11/04/2010 06:14 AM 1.21 (H) 0.90 - 1.10 Final     Comment:                                                     INR Therapeutic Ranges                                                  Mech. Valve 2.5-3.5                                                  Post surg.  2.0-3.0                                                  DVT/PE      2.0-3.0   11/03/2010 06:24 AM 0.95 0.90 - 1.10 Final     Comment:                                                     INR Therapeutic Ranges                                                  Mech. Valve 2.5-3.5                                                  Post surg.  2.0-3.0                                                  DVT/PE      2.0-3.0     Platelets   Date/Time Value Ref Range Status   08/01/2019 05:21  140 - 440 thou/uL Final   07/31/2019 06:44  140 - 440 thou/uL Final   05/20/2019 12:33  140 - 440 thou/uL Final       Medications at Discharge      Lauren Bernstein   Home Medication Instructions TARA:[id number]    Printed on:08/15/19 1519   Medication Information                      acetaminophen (TYLENOL) 500 MG tablet  Take 2 tablets (1,000 mg total) by mouth 3 (three) times a day.             anastrozole (ARIMIDEX) 1  mg tablet  Take 1 tablet (1 mg total) by mouth daily.             aspirin 81 mg chewable tablet  Chew 1 tablet (81 mg total) 2 (two) times a day.             b complex vitamins tablet  Take 1 tablet by mouth daily.             calcium carbonate-vitamin D2 500 mg(1,250mg) -200 unit tablet  Take 1 tablet by mouth daily.              docusate sodium (COLACE) 100 MG capsule  Take 100 mg by mouth 2 (two) times a day as needed for constipation.             famotidine (PEPCID) 20 MG tablet  Take 20 mg by mouth daily as needed for heartburn.              glucosamine-chondroitin 500-400 mg cap  Take 1 capsule by mouth daily.             lisinopril-hydrochlorothiazide (PRINZIDE,ZESTORETIC) 20-12.5 mg per tablet  Take 1 tablet by mouth 2 (two) times a day.              melatonin 3 mg Tab tablet  Take 3 mg by mouth at bedtime as needed.                    MULTIVITAMIN (MULTIPLE VITAMIN ORAL)  Take 1 tablet by mouth daily.             polyethylene glycol (MIRALAX) 17 gram packet  Take 1 packet (17 g total) by mouth daily as needed.             traMADol (ULTRAM) 50 mg tablet  Take 0.5-1 tablets (25-50 mg total) by mouth every 6 (six) hours as needed for pain.                 Problem List   Principal Problem:    Primary osteoarthritis of right hip  Active Problems:    Essential hypertension    Gastroesophageal reflux disease without esophagitis        Marge Gray PA-C  Date: 8/15/2019  Time: 3:19 PM

## 2021-07-20 NOTE — PROGRESS NOTES
Psychology Psychotherapy  Note    Name:  Jacque Bernstein  :  8/3/1934  MRN:  [id number]      Date of Service: 3/8/2018  Duration: 45 minutes (9:00 AM- 9:45 AM)    Target Symptoms:    The patient was seen in light of concerns regarding symptoms of worry and concern related to her breast cancer and cancer treatment as evidenced by patient and staff report.    Participation:  The patient was able to participate and benefit from treatment as evidenced by her verbal expression of ideas and initiation of topics discussed.    Mental Status:    Mood:  sad  Affect:  elevated  Suicidal Ideation:  absent  Homicidal Ideation:  absent  Thought process:  normal  Thought content:  Normal  Fund of Knowledge:  Sufficient  Attention/Concentration:  intact  Language ability:  intact  Speech: normal  Memory:  recent and remote memory intact  Insight and Judgement:  good  Orientation:  person, place, time and situation  Appearance: casually-dressed,  and engaged,  Eye Contact:  Appropriate  Estimated IQ:  Average      Intervention:    Jacque and her , Otf were interested in meeting to discuss how they were coping emotionally with her breast cancer diagnosis and treatment. Jacque was planning to have a knee replacement.  Prior to her preop physical, she noticed lump on her breast.  She discussed this with her primary care physician at the time of the physical and it was recommended that she get a mammogram.  In late 2017, she was diagnosed with Malignant neoplasm of the central portion of the right breast, estrogen receptor negative, HER-2 positive.  She completed 12 cycles of chemotherapy and had a good clinical response.  She will start getting Herceptin every 3 weeks.  Her main side effects from her chemotherapy have been fatigue and some neuropathy in her hands and feet.    Jacque met with Dr. Fregoso on 3/6/2018.  When Dr. Fregoso met with her initially after her diagnosis, she had a discussion with her with her  recommendation that she needed chemotherapy followed by mastectomy and then radiation.  Unfortunately, Jacque did not recall that she would need a mastectomy and was hoping for a lumpectomy.  They decided that she will have a PET scan, which is scheduled for Tuesday, 3/13/2018.  After the results of the PET scan, Dr. Fregoso will contact her and they will further discuss the course of surgical treatment.  We talked at length about Jacque's worries and fears about having a mastectomy.  We discussed strategies to help her cope.  When it is time for radiation therapy, they are planning to receive this treatment at [facility] as it is closer to their home.  They already have a referral in to radiation therapy at [facility] and have been in contact with them.    Fabian have been  for 63 years.  They met on a blind date when they were in high school.  They both graduated from [employer].  They have 4 children.  Their oldest son, Vick age 62 lives in [place].  He is a former  at [employer] and currently works in management position.  Their second oldest, Louis is an optometrist and also lives in [place].  The third oldest is Jemal who lives in [place].  He works as a  for the [employer].  Their youngest is their daughter Liliana, age 47 who lives in [place].  They have 8 grandchildren.  They also have one great grandchild and one on the way.    Fabian are Orthodox.  Their beto and their Judaism community is very important to them.  They have a large network of friends that have been very supportive throughout her cancer journey.  This winter, Jacque has been worried about infection and the flu, so she has stayed away from Judaism and social engagements.  She also has had some issues with urinary urgency since she started chemotherapy, which makes her want to stick closer to home.    We talked about the emotional aspects  of coping with breast cancer and cancer treatment.  Prior to her diagnosis, Jacque had been a very independent and healthy 83-year-old.  She had both the knee and hip replacement several years ago.  We talked at length about strategies to help her cope with her cancer diagnosis and treatment.  I also explained my role in her cancer care team.    Psychoterapeutic Techniques:  Cognitive-behavioral therapy, motivational interviewing and supportive psychotherapy strategies were utilized.    Necessity:    The session was necessary for the care of the patient to address symptoms of worry, concern and anxiety related to the patient's medical condition.    Progress:    She has shown improvement in her ability to utilize coping skills to address symptoms of worry, concern and anxiety related to her breast cancer and cancer treatment. .      Plan:    Jacque will contact me on an as needed basis for additional support and assistance throughout her cancer journey..    Diagnosis:    1. Adjustment reaction    2. Malignant neoplasm of central portion of right breast in female, estrogen receptor negative        Problem List:  Patient Active Problem List   Diagnosis     Malignant neoplasm of central portion of right breast in female, estrogen receptor negative, HER-2 positive       Provider: Debby Choudhury MA, LP, LICSW    Date:  3/8/2018  Time:  11:54 AM

## 2021-07-20 NOTE — PROGRESS NOTES
Pt arrived ambulatory to clinic for Cycle # 12 Day # 1 of her chemotherapy regimen.  Port was accessed using aseptic technique without difficulties with excellent blood return.  Administered Herceptin per MD order.  Pt tolerated infusion well, no s/s of infusion reaction.  Port was flushed with NS and Heparin then de-accessed using 2x2 and papertape.  Pt verbalized understanding of plan of care and return to clinic.

## 2021-07-20 NOTE — PROGRESS NOTES
Met with Ryan in chemo infusion.  She is now using her cane only and her hair is continuing to grow back.  She declines need for resources or assistance at this point.  Welcomed calls and provided emotional support and encouragement. Dorothy Alejandre RN

## 2021-07-20 NOTE — ADDENDUM NOTE
Addendum Note by Erick Hackett CNP at 10/8/2018  2:29 PM     Author: Erick Hackett CNP Service: -- Author Type: Nurse Practitioner    Filed: 10/8/2018  2:29 PM Encounter Date: 10/8/2018 Status: Signed    : Erick Hackett CNP (Nurse Practitioner)    Addended by: ERICK HACKETT on: 10/8/2018 02:29 PM        Modules accepted: Orders

## 2021-07-20 NOTE — PROGRESS NOTES
Comes in for continued follow-up of her right mastectomy that was complicated by recurrent seroma.  I placed a drain about 3 weeks ago.  The drain was only in for a short time and very quickly stop putting out anything.  The drain was pulled by Kari about half weeks ago.  She is feeling well.    Physical exam:  Looks well.  Only using a cane rather than a walker to get around.  Right chest wall looks fabulous.  No fluid.  No erythema.  No swelling.    Impression: Status post right modified radical mastectomy.  Drain had to be replaced but is already out and things look great.  She started radiation and seems to be doing very well.    Plan: Follow-up with me in about 6 months.  I think she is making a very good recovery.

## 2021-07-20 NOTE — PROGRESS NOTES
Met with Otf Aguilar, and daughter (in-law) Sindi in chemo infusion where patient is receiving first treatment.  They did return to infusion early in the week when possible to allow greater recovery time before the weekend when they enjoy most of of their socializing.  Dorothy Alejandre RN

## 2021-07-20 NOTE — PROGRESS NOTES
Pt here for herceptin after seeing MD.  No problem with infusion as directed.  Port flushed and deaccessed upon completion.  Pt d/c using walker to lobby with her

## 2021-07-20 NOTE — ANESTHESIA POSTPROCEDURE EVALUATION
Patient: Jacque Bernstein  RIGHT DIRECT ANTERIOR TOTAL HIP ARTHROPLASTY  Anesthesia type: general    Patient location: PACU  Last vitals:   Vitals Value Taken Time   /70 7/31/2019 12:35 PM   Temp 36.3  C (97.4  F) 7/31/2019 12:35 PM   Pulse 74 7/31/2019 12:35 PM   Resp 16 7/31/2019 12:35 PM   SpO2 98 % 7/31/2019 12:35 PM     Post vital signs: stable  Level of consciousness: awake and responds to simple questions  Post-anesthesia pain: pain controlled  Post-anesthesia nausea and vomiting: no  Pulmonary: unassisted, return to baseline  Cardiovascular: stable and blood pressure at baseline  Hydration: adequate  Anesthetic events: no    QCDR Measures:  ASA# 11 - Elizabeth-op Cardiac Arrest: ASA11B - Patient did NOT experience unanticipated cardiac arrest  ASA# 12 - Elizabeth-op Mortality Rate: ASA12B - Patient did NOT die  ASA# 13 - PACU Re-Intubation Rate: ASA13B - Patient did NOT require a new airway mgmt  ASA# 10 - Composite Anes Safety: ASA10A - No serious adverse event    Additional Notes:  Recovery as anticipated from general anesthetic.  No apparent complications.

## 2021-07-20 NOTE — PROGRESS NOTES
[facility] Hematology and Oncology Progress Note    Patient: Jacque Bernstein  MRN: [id number]  Date of Service:         Reason for Visit    Chief Complaint   Patient presents with     HE Cancer       Assessment and Plan  Malignant neoplasm of central portion of right breast in female, estrogen receptor negative, HER-2 positive    Staging form: Breast, AJCC 7th Edition    - Clinical stage from 12/11/2017: Stage IIIA (T3, N2a, M0) - Signed by Holland Ho MD on 12/14/2017          ER Status: Negative          PA Status: Positive          HER2 Status: Positive     1. Breast cancer, stage IIIA: has started on neoadjuvant chemo with Taxol, Herceptin and Perjeta. She has responded clinically very well so far. Her tumor is basically no longer palpable.  She has started maintenance Herceptin.  She is awaiting for surgery.  She also did meet with the radiation oncologist, Dr. LISA Del Toro at [facility] radiation therapy [place].  Continue getting Herceptin every 3 weeks.  We will do MUGA scans every 3 months.    6. Nail changes: mainly toes. From Taxol.  Does have some signs of infection.  I will give her Keflex for 1 week.  Also encourage her to soak her toes in absent salt 3 times a day.  I think she is going to lose most of her nails.  Encourage her to keep them short and clean and not pull on them.    ECOG Performance   ECOG Performance Status: 2     Distress Assessment  Distress Assessment Score: 2 (frustrated but not stressed):    Pain  Currently in Pain: No/denies      Problem List    1. Malignant neoplasm of central portion of right breast in female, estrogen receptor negative, HER-2 positive     2. Change in nail appearance     3. Toe infection          ______________________________________________________________________________    History of Present Illness    Jacque Bernstein is a very pleasant 83 y.o. old female with a history of invasive ductal carcinoma.  Jacque noted some fullness in her breast about end of  November.  She had a bruise few days but prior to that in that breast.  After the bruise resolved she felt that there was still the lump present.  She went and saw Dr. Chou who felt that this was very concerning for malignancy.  Ultrasound and mammogram were done which were very suggestive of malignancy.  She underwent biopsy of the right axillary lymph node which confirmed the presence of invasive ductal carcinoma which was ER negative, CA weakly positive but HER-2/matthew 3+ on immunohistochemistry.  This was a high-grade tumor.  The lymph nodes are clinically palpable.     The patient has been since seen by Dr. Fregoso.  Dr. Fregoso also performed a punch biopsy of the skin.  She was concerned that this might be inflammatory breast cancer.  The skin biopsies negative for involvement with cancer so this is not inflammatory breast cancer. So she has started on neoadjuvant chemo with Taxol and Herceptin and Perjeta. She has completed her initial weekly taxol with herceptin and perjeta. She did maintenance Herceptin on Monday.  Is an add-on to my clinic schedule today for worsening nail issues with some odorous drainage, no fevers    Pain Status  Currently in Pain: No/denies    Review of Systems    Constitutional  Constitutional (WDL): Exceptions to WDL  Fatigue: Fatigue relieved by rest  Neurosensory  Neurosensory (WDL): Exceptions to WDL  Peripheral Motor Neuropathy: Moderate symptoms, limiting instrumental ADL (fingers/toes)  Ataxia: Asymptomatic, clinical or diagnostic observations only, intervention not indicated (using walker)  Peripheral Sensory Neuropathy: Moderate symptoms, limiting instrumental ADL  Eye   Eye Disorder (WDL): All eye disorder elements are within defined limits  Ear  Ear Disorder (WDL): All ear disorder elements are within defined limits  Cardiovascular  Cardiovascular (WDL): Exceptions to WDL  Edema Limbs: 5 - 10% inter-limb discrepancy in volume or circumference at point of greatest visible  difference, swelling or obscuration of anatomic architecture on close inspection  Pulmonary  Respiratory (WDL): Exceptions to WDL  Dyspnea: Shortness of breath with minimal exertion, limiting instrumental ADL  Gastrointestinal  Gastrointestinal (WDL): Exceptions to WDL  Anorexia: Loss of appetite without alteration in eating habits  Dysgeusia: Altered taste but no change in diet  Dry Mouth: Moderate symptoms, oral intake alterations (e.g., copious water, other lubricants, diet limited to purees and/or soft, moist foods), unstimulated saliva 0.1 to 0.2 ml/min  Genitourinary  Genitourinary (WDL): All genitourinary elements are within defined limits  Lymphatic  Lymph (WDL): All lymph disorder elements are within defined limits  Musculoskeletal and Connective Tissue  Musculoskeletal and Connetive Tissue Disorders (WDL): Exceptions to WDL  Muscle Weakness : Symptomatic, evident on physical exam, limiting instrumental ADL  Integumentary  Integumentary (WDL): Exceptions to WDL (left foot swollen, nail coming off and has odor)  Alopecia: Hair loss of >50% normal for that individual that is readily apparent to others, a wig or hair piece is necessary if the patient desires to completely camouflage the hair loss, associated with psychosocial impact  Patient Coping  Patient Coping: Accepting  Distress Assessment  Distress Assessment Score: 2 (frustrated but not stressed)  Accompanied by  Accompanied by: Family Member  Oral Chemo Adherence       Past History  Past Medical History:   Diagnosis Date     Breast cancer      Encephalomyelitis 1956     History of anesthesia complications     Felt jitterty after some surgeries for days not sure what caused it     Hypertension      Shingles        PHYSICAL EXAM:  /65  Pulse (!) 102  Temp 98  F (36.7  C) (Oral)   Wt 172 lb 12.8 oz (78.4 kg)  SpO2 99%  BMI 31.61 kg/m2  GENERAL: no acute distress. Cooperative in conversation. Here with .  Using walker  HEENT: pupils are  equal, round and reactive. Oromucosa is clean and intact. No ulcerations or mucositis noted. No bleeding noted.  RESP: lungs are clear bilaterally per auscultation. Regular respiratory rate. No wheezes or rhonchi.  CV: Regular, rate and rhythm. No murmurs.  ABD: soft, nontender. Positive bowel sounds. No organomegaly.   MUSCULOSKELETAL: No lower extremity swelling.   NEURO: non focal. Alert and oriented x3.   PSYCH: within normal limits. No depression or anxiety.  SKIN: warm dry intact, all of her toenails have changed.  They are loosening up.  Her big toe on her left foot is almost completely off.  Does have malodorous drainage noted.  She also does have a lot of redness around a couple of her toes with almost some blistering.  LYMPH: no cervical, supraclavicular or axillary lymphadenopathy  BREAST: tumor in left breast is no longer palpable.         Lab Results    No results found for this or any previous visit (from the past 168 hour(s)).    Imaging    Nm Muga    Result Date: 3/8/2018    1.The left ventricular ejection fraction is 75.6%.   2.Normal study.   3.The measured left ventricular ejection fraction on the prior study date of 12/14/2017 was 75.7%. This indicates no significant change.          Signed by: Jazlyn Hackett CNP

## 2021-07-20 NOTE — PROGRESS NOTES
Pt ambulates to infusion center for lab draw prior to NP visit.  IVAD accessed, labs drawn et sent.  Pt seen by ASHLEY Hackett CNP today and orders approved.  Pt returns to infusion center for treatment.  Herceptin infused as ordered without complication.  IVAD flushed w/NS et heparin and needle deaccessed.  Pt left clinic stable to lobby.  Plan RTC as scheduled.

## 2021-07-20 NOTE — PROGRESS NOTES
"Jacque Bernstein is a 86 y.o. female who is being evaluated via a billable video visit.      The patient has been notified of following:     \"This video visit will be conducted via a call between you and your physician/provider. We have found that certain health care needs can be provided without the need for an in-person physical exam.  This service lets us provide the care you need with a video conversation.  If a prescription is necessary we can send it directly to your pharmacy.  If lab work is needed we can place an order for that and you can then stop by our lab to have the test done at a later time.    Video visits are billed at different rates depending on your insurance coverage. Please reach out to your insurance provider with any questions.    If during the course of the call the physician/provider feels a video visit is not appropriate, you will not be charged for this service.\"    Patient has given verbal consent to a Video visit? Yes  How would you like to obtain your AVS? AVS Preference: HiMomhart.  If dropped by the video visit, the video invitation should be sent to: Text to cell phone: [phone]  Will anyone else be joining your video visit? No            "

## 2021-07-20 NOTE — PROGRESS NOTES
[facility] Hematology and Oncology Progress Note    Patient: Jacque Bernstein  MRN: [id number]  Date of Service: 10/08/18          Reason for Visit    Chief Complaint   Patient presents with     HE Cancer     Breast Cancer       Assessment and Plan  Malignant neoplasm of central portion of right breast in female, estrogen receptor positive, Her 2 matthew positive.    Staging form: Breast, AJCC 7th Edition    - Clinical stage from 12/11/2017: Stage IIIA (T3, N2a, M0) - Signed by Holland Ho MD on 12/14/2017          ER Status: Negative          MS Status: Positive          HER2 Status: Positive     1. Breast cancer, stage IIIA: has started on neoadjuvant chemo with Taxol, Herceptin and Perjeta.   She received radiation at [facility] radiation therapy [place].  Continue getting Herceptin every 3 weeks.  She has today's dose and then 2 more so her last dose will be in 6 weeks. She will see Dr. Ho with labs in 6 weeks. MUGA was normal in August so we do not need to do another one.  She will continue on anastrozole    2. Peripheral neuropathy: chemo induced. Not currently affecting ADL's except needing to use cane when out of the house. She feels like it is slowly getting better. Continue to monitor     ECOG Performance   ECOG Performance Status: 1     Distress Assessment  Distress Assessment Score: No distress:    Pain  Currently in Pain: No/denies      Problem List    1. Malignant neoplasm of central portion of right breast in female, estrogen receptor positive, Her 2 matthew positive.  CC OFFICE VISIT LONG    Infusion Appointment    CC OFFICE VISIT LONG    Infusion Appointment        ______________________________________________________________________________    History of Present Illness    Jacque Bernstein is a very pleasant 83 y.o. old female with a history of invasive ductal carcinoma.  Jacque noted some fullness in her breast about end of November.  She had a bruise few days but prior to that in that breast.  After  the bruise resolved she felt that there was still the lump present.  She went and saw Dr. Chou who felt that this was very concerning for malignancy.  Ultrasound and mammogram were done which were very suggestive of malignancy.  She underwent biopsy of the right axillary lymph node which confirmed the presence of invasive ductal carcinoma which was ER negative, TX weakly positive but HER-2/matthew 3+ on immunohistochemistry.  This was a high-grade tumor.  The lymph nodes are clinically palpable.     The patient has been since seen by Dr. Fregoso.  Dr. Fregoso also performed a punch biopsy of the skin.  She was concerned that this might be inflammatory breast cancer.  The skin biopsies negative for involvement with cancer so this is not inflammatory breast cancer. So she has started on neoadjuvant chemo with Taxol and Herceptin and Perjeta. She has completed her initial weekly taxol with herceptin and perjeta. She has now started on maintenenace herceptin. Her surgery after chemo showed some residual cancer in breast as well as lymph node. Interestingly, it did show ER- in lymph node, but ER+ in breast.   She had a seroma post op and had her drain removed last week. She then received radiation at [facility], which is closer to home. She has continued on maintenance herceptin. Tolerating well.     Pain Status  Currently in Pain: No/denies    Review of Systems    Constitutional  Constitutional (WDL): All constitutional elements are within defined limits  Neurosensory  Neurosensory (WDL): Exceptions to WDL  Ataxia: Asymptomatic, clinical or diagnostic observations only, intervention not indicated (use cane occ)  Peripheral Sensory Neuropathy: Asymptomatic, loss of deep tendon reflexes or paresthesia (hands, feet)  Eye   Eye Disorder (WDL): Exceptions to WDL  Watering Eyes: Intervention not indicated  Ear  Ear Disorder (WDL): All ear disorder elements are within defined limits  Cardiovascular  Cardiovascular (WDL): Exceptions to  WDL  Edema: Yes (compression sleeve right arm, both feet swell)  Pulmonary  Respiratory (WDL): Exceptions to WDL  Dyspnea: Shortness of breath with moderate exertion (with activity)  Gastrointestinal  Gastrointestinal (WDL): All gastrointestinal elements are within defined limits  Genitourinary  Genitourinary (WDL): All genitourinary elements are within defined limits  Lymphatic  Lymph (WDL): Exceptions to WDL  Musculoskeletal and Connective Tissue  Musculoskeletal and Connetive Tissue Disorders (WDL): Exceptions to WDL  Arthralgia: Mild pain (left knee)  Integumentary  Integumentary (WDL): All integumentary elements are within defined limits (bruise on left thigh, healing)  Patient Coping  Patient Coping: Accepting  Distress Assessment  Distress Assessment Score: No distress  Accompanied by  Accompanied by: Family Member ( and daughter in law)  Oral Chemo Adherence       Past History  Past Medical History:   Diagnosis Date     Breast cancer (H)      Chronic kidney disease     Stage III     Encephalomyelitis 1956     History of anesthesia complications     Felt jittery after some surgeries for days not sure what caused it     Hypertension      Incontinence of urine      Neuropathy (H)      Shingles        PHYSICAL EXAM:  /75  Pulse 80  Temp 97.6  F (36.4  C) (Oral)   Wt 165 lb (74.8 kg)  SpO2 96%  BMI 29.23 kg/m2  GENERAL: no acute distress. Cooperative in conversation. Here with .  Using cane  HEENT: pupils are equal, round and reactive. Oromucosa is clean and intact. No ulcerations or mucositis noted. No bleeding noted.  RESP: lungs are clear bilaterally per auscultation. Regular respiratory rate. No wheezes or rhonchi.  CV: Regular, rate and rhythm. No murmurs.  ABD: soft, nontender. Positive bowel sounds. No organomegaly.   MUSCULOSKELETAL: No lower extremity swelling. Has compression sleeve on right arm.   NEURO: non focal. Alert and oriented x3.   PSYCH: within normal limits. No  depression or anxiety.  SKIN: warm dry intact, fingernail changes. Has lost 8 toenails. No signs of infection.   LYMPH: no cervical, supraclavicular or axillary lymphadenopathy  BREAST: deferred.         Lab Results    Recent Results (from the past 168 hour(s))   Comprehensive Metabolic Panel   Result Value Ref Range    Sodium 138 136 - 145 mmol/L    Potassium 4.1 3.5 - 5.0 mmol/L    Chloride 104 98 - 107 mmol/L    CO2 28 22 - 31 mmol/L    Anion Gap, Calculation 6 5 - 18 mmol/L    Glucose 137 (H) 70 - 125 mg/dL    BUN 27 8 - 28 mg/dL    Creatinine 0.95 0.60 - 1.10 mg/dL    GFR MDRD Af Amer >60 >60 mL/min/1.73m2    GFR MDRD Non Af Amer 56 (L) >60 mL/min/1.73m2    Bilirubin, Total 0.5 0.0 - 1.0 mg/dL    Calcium 10.0 8.5 - 10.5 mg/dL    Protein, Total 6.4 6.0 - 8.0 g/dL    Albumin 3.5 3.5 - 5.0 g/dL    Alkaline Phosphatase 60 45 - 120 U/L    AST 19 0 - 40 U/L    ALT 15 0 - 45 U/L   HM1 (CBC with Diff)   Result Value Ref Range    WBC 7.8 4.0 - 11.0 thou/uL    RBC 4.15 3.80 - 5.40 mill/uL    Hemoglobin 13.0 12.0 - 16.0 g/dL    Hematocrit 39.8 35.0 - 47.0 %    MCV 96 80 - 100 fL    MCH 31.3 27.0 - 34.0 pg    MCHC 32.7 32.0 - 36.0 g/dL    RDW 13.0 11.0 - 14.5 %    Platelets 268 140 - 440 thou/uL    MPV 9.5 8.5 - 12.5 fL    Neutrophils % 72 (H) 50 - 70 %    Lymphocytes % 13 (L) 20 - 40 %    Monocytes % 11 (H) 2 - 10 %    Eosinophils % 4 0 - 6 %    Basophils % 1 0 - 2 %    Neutrophils Absolute 5.6 2.0 - 7.7 thou/uL    Lymphocytes Absolute 1.0 0.8 - 4.4 thou/uL    Monocytes Absolute 0.8 0.0 - 0.9 thou/uL    Eosinophils Absolute 0.3 0.0 - 0.4 thou/uL    Basophils Absolute 0.0 0.0 - 0.2 thou/uL       Imaging    No results found.      Signed by: Jzalyn Hackett, CNP

## 2021-07-20 NOTE — PROGRESS NOTES
Pharmacy Note - Admission Medication History  Pertinent Provider Information: Pt recently finished a 7 day course of Keflex for UTI.    ______________________________________________________________________  Prior To Admission (PTA) med list completed and updated in EMR.     PTA Med List   Medication Sig Last Dose     acetaminophen (TYLENOL) 500 MG tablet Take 1,000 mg by mouth 2 (two) times a day. 7/30/2019 at pm     anastrozole (ARIMIDEX) 1 mg tablet Take 1 tablet (1 mg total) by mouth daily. 7/30/2019 at pm (takes in the evening)     aspirin 81 MG EC tablet Take 81 mg by mouth daily.  ~10 days ago     b complex vitamins tablet Take 1 tablet by mouth daily. 7/29/2019     calcium carbonate-vitamin D2 500 mg(1,250mg) -200 unit tablet Take 1 tablet by mouth daily.  ~ 1 week ago     docusate sodium (COLACE) 100 MG capsule Take 100 mg by mouth 2 (two) times a day as needed for constipation. as needed     famotidine (PEPCID) 20 MG tablet Take 20 mg by mouth daily as needed for heartburn.  as needed     glucosamine-chondroitin 500-400 mg cap Take 1 capsule by mouth daily. ~ 2 weeks ago     lisinopril-hydrochlorothiazide (PRINZIDE,ZESTORETIC) 20-12.5 mg per tablet Take 1 tablet by mouth 2 (two) times a day.  7/30/2019 at pm     melatonin 3 mg Tab tablet Take 3 mg by mouth at bedtime as needed.        as needed     MULTIVITAMIN (MULTIPLE VITAMIN ORAL) Take 1 tablet by mouth daily. > 1 week ago       Information source(s): Patient, Patient's pharmacy and Clinic records  Patient was asked about OTC/herbal products specifically.  PTA med list reflects this.  Based on the pharmacist s assessment, the PTA med list information appears reliable  Allergies were reviewed, assessed, and updated with the patient.    Patient does not use any multi-dose medications prior to admission.   Thank you for the opportunity to participate in the care of this patient.    Harjinder Reyes, PharmD     7/31/2019     7:25 AM

## 2021-07-20 NOTE — OP NOTE
xOperative Note    Name:  Jacque Bernstein  PCP:  Sawyer Chou MD  Procedure Date:  7/31/2019      RIGHT DIRECT ANTERIOR TOTAL HIP ARTHROPLASTY (Right)    Pre-Procedure Diagnosis:  OA (osteoarthritis) right hip [M16.9]  Right hip pain [M25.551]     Post-Procedure Diagnosis:    same    Surgeon(s):  Santiago Cuba MD    Assist:  Brett Osgood, PA-C PA-C assist was required for this operation due to the complexity of the procedure, for proper positioning of the limb during the operation, and for patient safety.    Anesthesia Type:  General    Condition on discharge from OR:  stable    Findings:  Grade 4 changes    Operative Report:    Upon informed consent after review of the procedure along with attendant risk of complication an  informed cooperative decision  was made to proceed.  The leg was marked, labs were checked, my notes were reviewed, consent obtained, the chart reviewed, and the patient was properly premedicated.  The patient had been cleared by primary care and anesthesia.        All standard protocols and regimens as established by  the [facility] [place] were followed.  In preparation, x-rays were templated and plan formulated.  This was communicated with the intraoperative team.      Patient was brought to the operating room and placed on the table in a supine position. Pt underwent  anesthetic induction and appropriate medication was provided including antibiotic. The Pt was transferred to the HANA table and properly positioned on the HANA table.  The abdomen was properly positioned given the patient's body habitus.  The right lower quadrant right, inguinal area, right hemipelvis, and right proximal thigh were prepped and draped in the usual fashion and [place] drapes placed on all exposed skin.       We paused for patient identification,  limb, and procedure confirmation.       Starting 3 cm lateral and 2 cm distal to the anterior anterior superior iliac spine, a 10 cm oblique  incision was made. The fascia was incised and the interval (Heuter's) between the tensor muscle, and the sartorius and rectus femoris muscle was developed.  A retractor was placed on the lateral femoral neck and the medial femoral shaft. The circumflex vessels ×3 were then isolated and ligated.  The aponeurosis confluent with the circumflex vessels was then incised distally, mobilizing the tensor muscle laterally allowing safe retraction.  Meticulous attention to preserving the integrity of the tensor muscle was observed throughout the subsequent procedure.  The fatty interval overlying the capsule was taken down.  The quadriceps tendon was left intact    A fluoroscopically directed x-ray was then obtained of the hip and printed on a transparency.    The capsule was then incised from the anterior inferior iliac spine to the trochanteric tubercle and then along the inter- trochanteric line from the anterior aspect of the medial trochanteric wall to the lesser trochanter, of which the corners were subsequently tagged with a 0 Ethibond suture and reflected for subsequent repair.  The hip was then distracted and externally rotated 70  and the inferior capsule was released under direct visualization. A threaded pin was inserted in the femoral head at 20  of rotation and then the hip was rotated back to 0  after which the femoral neck was osteotomized at the appropriate level, noting the position of the neck-trochanter junction, as indicated by preoperative templating.  Careful attention was directed to proper osteotomy distally and medially avoiding the greater trochanter profile posteriorly and laterally. Safe blade swath, cherf and excursion observed. The femoral head was  delivered vertex first to avoid   soft tissue injury and meticulous attention to protecting the pericapsular muscles was observed throughout the procedure.  The medial and lateral capsule flaps were reflected with a self-retaining retractor and the  acetabulum was exposed. The retractor was entirely intra-articular. The inferior/posterior capsule was left intact. Contents of the cotyloid notch were extracted and the redundant labrum was removed anteriorly and posteriorly. All impinging vanessa-acetabular osteophytes, ossified labral overhang were removed, leaving a contoured acetabular portal.The posterior structures were left intact. Excellent direct visual, and subsequent fluoroscopic visualization was obtained.      Under fluoroscopic control, in proper orientation after calibrating C-arm position / rotation, reaming commenced at 47 mm and progressed in 1mm increments to 49 mm, then line to line 50 mm. Self-centering reamers were appropriately inclined and anteverted under fluoroscopic control to contour and create a  fully contained reamed concentric hemispheric acetabulum with total contact, per templated plan.  Acetabular reaming and subsequent acetabular component placement including position was performed under fluoroscopic control.  The 50 mm [place] shell was then impacted in 40  of inclination and 20  of anteversion under fluoroscopic control seating optimally and fully.  The elliptical profile of the acetabular portal was appropriate fluoroscopically.  Satisfactory interference fit obtained and additional fixation was not necessary. The apical hole eliminator was placed. The shell was washed and dried and the neutral neutral polyliner for a 32 mm head was inserted.        The proximal lateral femur was dissected and the femoral hook was brought around the proximal femoral shaft distal to the vastus ridge.  The lower extremity was externally   rotated 120  and then was subsequently hyperextended and adducted, simultaneously mechanically lifting the proximal femur obtaining excellent exposure clearing the acetabulum. Retractors were placed laterally and posteriorly on the proximal femur and the lateral capsule was released anterior to posterior along  the medial aspect of the trochanteric wall.  The obturator externus, obturator internus, and piriformis tendons were left intact. A blunt file probe was sent down the canal for confirmation of orientation and integrity of the endosteal bone. Broaching commenced with a size 8 compaction broach, gently and in proper anteversion relative to the posterior calcar. Routine broaching was performed under direct visualization mindful of anteversion relative to the posterior cortex of the femoral neck.  Broaching commenced at a size 8 and progressed sequentially in order to a size 9.  Per templating, the appropriate neck configuration was applied along with the templated head and neck size.  A trial reduction was subsequently performed.     Fluoroscopically directed x-ray printed on a transparency for subsequent overlay was produced.  Anatomic maintenance of leg length and offset was obtained. Trial components were removed and the actual #9 KA [place] stem was inserted placing the collar on the calcar. The trunion was cleaned and dried meticulously and the +5mm x 32mm ceramic head assembly affixed to the trunnion. The hip was reduced.    A final fluoroscopically directed x-ray printed on a transparency was then obtained    We confirmed anatomic maintenance of leg length +2-3mm and offset + 0mm.  This was confirmed by superimposing a guide abhishek over the trans-ischial tuberosity line noting symmetric intersection at the medial femoral cortex relative to the lesser trochanter.    The hip was stable to 70  of external rotation and hyperextension to the floor.  The posterior capsule was left intact, except for the radial cut.    The hip was then soaked with 3.5% Betadine solution for 3 minutes. We thoroughly lavaged the hip with multiple liters of antibiotic laden irrigant. The capsule, previously left in it's entirety, was then closed with nonabsorbable suture. A drain was placed extracapsular and the tensor fascia was repaired  with a running #1 Vicryl suture. Skin and subcutaneous tissue was closed in layers and a Aquacel dressing was applied per routine.     Counts were correct. Bleeding was largely from reamed cancellus surfaces of the acetabulum as well as from the medullary canal of the femur. The patient tolerated the procedure well and was sent to the [place] in stable condition.      Estimated Blood Loss:   250 mL from 7/31/2019  8:00 AM to 7/31/2019 10:06 AM    Specimens:    Bone per routine MARY       Drains: x1  Drain Right Hip (Active)   Dressing Status  Clean;Dry;Intact 7/31/2019 10:15 AM   Status Unclamped 7/31/2019 10:15 AM       Local Anesthetic Pump Implanted: none    Complications:    None    Santiago Cuba     Date: 7/31/2019  Time: 10:21 AM      Implant Name Type Inv. Item Serial No.  Lot No. LRB No. Used Action   IMP CUP ACE PINNACLE 50MM 1217- - [id number] Total Joint Component/Insert IMP CUP ACE PINNACLE 50MM 1217-  [employer]- [id number] Right 1 Implanted   IMP APEX HOLE ELIMINATOR HIP DEPUY DURALOC 1246- - [id number] Metallic Hardware/[place] IMP APEX HOLE ELIMINATOR HIP DEPUY DURALOC 1246-  [employer]- [id number] Right 1 Implanted   LINER ACETABULAR ALTRX NEUTRAL 21A14VW - [id number] Total Joint Component/Insert LINER ACETABULAR ALTRX NEUTRAL 66L46LL  [employer]- [id number] Right 1 Implanted   HEAD CERAMIC DELTA 12/14 TAPER 32 - [id number] Total Joint Component/Insert HEAD CERAMIC DELTA 12/14 TAPER 32  [employer]- [id number] Right 1 Implanted   STEM FEMORAL CORAIL STD COLLARED SZ 9 - [id number] Total Joint Component/Insert STEM FEMORAL CORAIL STD COLLARED SZ 9  [employer]- [id number] Right 1 Implanted

## 2021-07-20 NOTE — PROGRESS NOTES
Pt here for cycle 3 day 1 txt after exam with MD. Labs drawn previously via port and approved for txt. PT states does feel legs more wobbly with walking and MD recommended using walker. PT denies any nausea and appetite good, will try to drink more water. Txt reviewed and administered as ordered. PT tolerated txt without any problems. Family with pt during txt. txt completed, follow up reviewed. Port flushed with ns then heparin/deaccessed with 2x2 to site. Pt dc'd steady gait with cane for assist.

## 2021-07-20 NOTE — PROGRESS NOTES
Met with Lauren, her spouse Otf, and son Vick, in chemo infusion where she has arrived for treatment.  She has questions about these items:    her follow up schedule with Dr. Fregoso, if any (Dr. Fregoso's staff will contact pt)    if she needs to keep her compression sleeve on constantly during radiation therapy, or if she may take breaks from the sleeve and if so at what frequency and intervals (she should wear it as much as possible)    Is [facility] receiving records from Dr. Kidd's office regarding her radiation therapy progress (we did receive consult note and will watch for additional records as available)

## 2021-07-20 NOTE — PROGRESS NOTES
PT here ambulatory with cane for assist for txt.  is with pt today. PT states had a busy am so is tired today. PT also states feels that neuropathy is getting a little better. Port accessed sterile technique, brisk blood return/smooth ns flush. Herceptin infused over 30 minutes then tubing flushed with ns and port flushed with heparin/deaccessed with 2x2 to site. Follow up reviewed and pt tolerated infusion without any problems. PT dc'd steady gait with cane.

## 2021-07-20 NOTE — PROGRESS NOTES
"The patient has been notified of following:     \"This video visit will be conducted via a call between you and your physician/provider. We have found that certain health care needs can be provided without the need for an in-person physical exam.  This service lets us provide the care you need with a video conversation.  If a prescription is necessary we can send it directly to your pharmacy.  If lab work is needed we can place an order for that and you can then stop by our lab to have the test done at a later time.    Video visits are billed at different rates depending on your insurance coverage. Please reach out to your insurance provider with any questions.    If during the course of the call the physician/provider feels a video visit is not appropriate, you will not be charged for this service.\"    Patient has given verbal consent to a Video visit? Yes    Patient would like to receive their AVS by AVS Preference: Hipolito.      Will anyone else be joining your video visit? No          Video-Visit Details    Start: 12/14/2020 03:25 pm   Stop: 12/14/2020 03:55 pm    Total visit Time: 30 minutes    Originating Location (pt. Location): [facility] Progress Note    Patient: Jacque Bernstein  MRN: [id number]  Date of Service: 12/14/2020          Reason for visit      1. Malignant neoplasm of central portion of right breast in female, estrogen receptor positive, Her 2 matthew positive.        Assessment     1.  A very pleasant 86 y.o.  woman with advanced breast cancer located on the right side T3 N2 M0 which was ER negative FL weakly positive but HER-2/matthew 3+ diagnosed November 2017.  Status post neoadjuvant chemotherapy with weekly paclitaxel, Herceptin and Perjetta.  Status post mastectomy with small amount of residual tumor in the breast and one positive lymph node.  The residual tumor in the breast is ER positive FL negative and HER-2/matthew positive.  Anderson disease was ER negative FL negative " HER-2/matthew 3+.  She has finished radiation therapy and also 1 year of Herceptin.  Currently on anastrozole since July 2018.  2.  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  This is slowly getting better.  3.  Lipoma in her arm.  No change in size.  4.  Some osteopenia seen on her bone density.  5.  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  6.  Some left knee pain.    Plan     1.  Patient will continue on anastrozole for at least 5 years which she would finish in July 2023.  2.  Continue symptomatic care for her neuropathy.  She is slowly getting better.  3.  Follow-up with her orthopedic doctor.  She needs her knee replaced.  4.  Advised to continue to wear the compression sleeve as much as she can.  She absolutely has to use it if she takes a flight in an airplane.    5.  Continue to take some calcium rich diet and vitamin D.  Continue to exercise outside in the form of walking and get some sunlight.    Clinical stage      Cancer Staging  Malignant neoplasm of central portion of right breast in female, estrogen receptor positive, Her 2 matthew positive.  Staging form: Breast, AJCC 7th Edition  - Clinical stage from 12/11/2017: Stage IIIA (T3, N2a, M0) - Signed by Holland Ho MD on 12/14/2017  ER Status: Negative  CA Status: Positive  HER2 Status: Positive      History     Jacque Bernstein is a very pleasant 86 y.o. old female with a history of invasive ductal carcinoma.  Jacque noted some fullness in her breast about end of November 2017.  She had a bruise few days prior to that in that breast.  After the bruise resolved she felt that there was still the lump present.  She went and saw Dr. Chou who felt that this was very concerning for malignancy.  Ultrasound and mammogram were done which were very suggestive of malignancy.  She underwent biopsy of the right axillary lymph node which confirmed the presence of invasive ductal carcinoma which was ER negative, CA weakly positive  but HER-2/matthew 3+ on immunohistochemistry.  This was a high-grade tumor.  The lymph nodes were clinically palpable.     She started on neoadjuvant chemotherapy with weekly paclitaxel along with Herceptin and once every 3 week Perjetta.  He had good clinical response.    She underwent mastectomy in April 2018 and pathology revealed one positive lymph node and a small 1.8 cm lesion in the breast.  Margins negative.  Interestingly the lesion in the breast was ER positive GA borderline positive HER-2/matthew positive at least in the parts of the tumor.  The lymph node metastases ER negative GA negative and HER-2/matthew 3+.      She also finished radiation therapy on July 18, 2018.  She had some radiation burns.  She was also noted to have slight lymphedema so she was prescribed a lymphedema sleeve as well on her right arm.  She finished a year of Herceptin.    We also started her on anastrozole on July 2018.  She is tolerating that with the expected side effects.     She had some neuropathy after chemotherapy.  Her neuropathy is not much better in her hands and perhaps in her feet.  She underwent right hip surgery in July 2019.      Noticed a lump on the back of her left upper arm in May 2020. Not tender. Not seem to be growing.    We did a virtual visit today.  The patient is mainly complaining of some left knee pain which has been going on for a while.  She has been seen by Dr. Santiago Valdez from orthopedics.  She also been having complains of some falls.  She thinks she is losing her balance because her knee is weak and gives out.  She is also moving to an assisted living facility in [facility] next week.    Past Medical History     Past Medical History:   Diagnosis Date     Arthritis      Breast cancer (H)     breast     Chronic kidney disease      Encephalomyelitis 1956     History of anesthesia complications     Felt jittery after some surgeries for days not sure what caused it     Hypertension      Incontinence of urine       Neuropathy      Shingles        Review of Systems   Constitutional  Constitutional (WDL): All constitutional elements are within defined limits  Neurosensory  Neurosensory (WDL): Exceptions to WDL  Peripheral Motor Neuropathy: Asymptomatic, clinical or diagnostic observations only, intervention not indicated  Ataxia: Asymptomatic, clinical or diagnostic observations only, intervention not indicated(walker)  Peripheral Sensory Neuropathy: Moderate symptoms, limiting instrumental ADL(hands and feet)  Cardiovascular  Cardiovascular (WDL): Exceptions to WDL  Edema: Yes  Edema Limbs: 5 - 10% inter-limb discrepancy in volume or circumference at point of greatest visible difference, swelling or obscuration of anatomic architecture on close inspection(ankles)  Pulmonary  Respiratory (WDL): Within Defined Limits  Gastrointestinal  Gastrointestinal (WDL): Exceptions to WDL  Constipation: Occasional or intermittent symptoms, occasional use of stool softeners, laxatives, dietary modification, or enema  Genitourinary  Genitourinary (WDL): Exceptions to WDL(some incontinence)  Integumentary  Integumentary (WDL): Exceptions to WDL(bruises on legs)  Patient Coping  Patient Coping: Accepting  Accompanied by  Accompanied by: Alone    ECOG performance status and Distress Assessment      ECOG Performance:    ECOG Performance Status: 2    Distress Assessment  Distress Assessment Score: No distress:     Pain Status  Currently in Pain: No/denies      Vital Signs     There were no vitals filed for this visit.    Physical Exam     GENERAL: No acute distress. Cooperative in conversation.   HEENT: Pupils are equal, round and reactive. Oral mucosa is clean and intact. No ulcerations or mucositis noted. No bleeding noted.  RESP:Chest symmetric lungs are clear bilaterally per auscultation. Regular respiratory rate. No wheezes or rhonchi.  CV: Normal S1 S2 Regular, rate and rhythm. No murmurs.  ABD: Nondistended, soft, nontender. Positive bowel  sounds. No organomegaly.   EXTREMITIES: No lower extremity edema.  She is wearing a lymphedema sleeve on her right arm but does not appear to have any lymphedema.  In fact the sleeve appears somewhat loose.  NEURO: Non- focal. Alert and oriented x3.  Cranial nerves appear intact.  PSYCH: Within normal limits. No depression or anxiety.  SKIN: Warm dry intact.  Well-healing incision on her right hip. Lump just above the elbow in her left arm, appears to be a lipoma.  LYMPH NODES: Bilateral cervical, supraclavicular, axillary lymph node examination was done.  Slight right axillary palpable adenopathy.        Lab Results     Results for orders placed or performed in visit on 11/02/20   Basic Metabolic Panel   Result Value Ref Range    Sodium 140 136 - 145 mmol/L    Potassium 5.1 (H) 3.5 - 5.0 mmol/L    Chloride 105 98 - 107 mmol/L    CO2 27 22 - 31 mmol/L    Anion Gap, Calculation 8 5 - 18 mmol/L    Glucose 103 70 - 125 mg/dL    Calcium 10.3 8.5 - 10.5 mg/dL    BUN 23 8 - 28 mg/dL    Creatinine 1.02 0.60 - 1.10 mg/dL    GFR MDRD Af Amer >60 >60 mL/min/1.73m2    GFR MDRD Non Af Amer 51 (L) >60 mL/min/1.73m2         Imaging Results     No results found.      Holland Ho MD

## 2021-07-20 NOTE — ANESTHESIA CARE TRANSFER NOTE
Last vitals:   Vitals:    07/31/19 1008   BP: (!) 164/95   Pulse: 78   Resp: 16   Temp: 37.1  C (98.8  F)   SpO2: 100%     Patient's level of consciousness is drowsy  Spontaneous respirations: yes  Maintains airway independently: yes  Dentition unchanged: yes  Oropharynx: oropharynx clear of all foreign objects    QCDR Measures:  ASA# 20 - Surgical No data recorded  PQRS# 430 - Adult PONV Prevention: 4558F - Pt received => 2 anti-emetic agents (different classes) preop & intraop  ASA# 8 - Peds PONV Prevention: 4558F-8P - Pt did NOT receive => 2 antiemetic agents  PQRS# 424 - Elizabeth-op Temp Management: 4559F - At least one body temp DOCUMENTED => 35.5C or 95.9F within required timeframe  PQRS# 426 - PACU Transfer Protocol: - Transfer of care checklist used  ASA# 14 - Acute Post-op Pain: ASA14B - Patient did NOT experience pain >= 7 out of 10

## 2021-07-20 NOTE — PROGRESS NOTES
"This is a 84 y.o. woman who comes in for  continued follow-up of her right breast cancer.  She is now 6 months  status post right modified radical mastectomy with radiation.  This was preceded by chemotherapy.  She is now continuing on her Herceptin and anastrozole.  She is feeling well.  She has no new complaints today.      Please see the chart review for PMH, Meds, allergies, FH and SH.    ROS:  A 12 point comprehensive review of systems was negative except as noted.      Physical Exam:  /80 (Patient Site: Left Arm, Patient Position: Sitting, Cuff Size: Adult Regular)   Pulse 74   Ht 5' 3\" (1.6 m)   Wt 166 lb (75.3 kg)   SpO2 96%   Breastfeeding? No   BMI 29.41 kg/m    General appearance: alert, appears stated age and cooperative  Breasts: There are no palpable masses. There are minimal radiation changes. The scar has healed up well.  The left breast feels normal without palpable masses.  Lymph nodes: Cervical, supraclavicular, and axillary nodes normal.  Neurologic: Grossly normal   Extremities: No evidence of lymphedema in the right arm.        Impression: Personal History of breast cancer. NOD.  Is overall doing very well.  Talked about how long she should be wearing the sleeve on her right arm.  I told her there is no good data about how long she needs to keep wearing it.  She is well done with her radiation now.  I think she can start weaning herself off of it.  I told her if she ever notices swelling and it is difficult to put back on then she should keep wearing it more.  If however she is not noticing any swelling I think she can gently wean herself off the sleeve completely.  She should however wear it when she flies.  She also has not had a mammogram on the left side since last November so she does need a screening mammogram on her left.      Plan: Screening mammogram on the left.  Follow-up with me in 6 months.    "

## 2021-07-20 NOTE — PROGRESS NOTES
Jacque comes in for follow-up of her right breast cancer.  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  She has been receiving chemotherapy.  She has had a very good clinical response.  She will continue to get Herceptin.  She comes in to discuss surgical therapy.    Past medical history is otherwise unchanged.    Physical exam:  General: Elderly but healthy woman in no acute distress.  She has chemo-induced alopecia.  Breasts: Still some slight skin changes inferiorly but overall the breast exam has changed markedly.  No palpable mass.  The breasts are fairly symmetric.  Axilla: No palpable axillary adenopathy.    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  Her skin biopsies were negative.  At the time of her diagnosis I had discussed with her that I felt that she need chemo followed by a mastectomy and then radiation.  Unfortunately she does not recall hearing that she would need a mastectomy and was very disappointed about that today and is wondering why we can just do a lumpectomy.  Explained that she did not have a single discrete mass even on her mammogram and on her PET scan they saw an area lighting up that spans 10 cm.  I went over the whole history of treatment of breast cancer and how we have become less aggressive with treatment over the years but that nobody has taken the leap of beto to do anything short of a mastectomy for somebody with inflammatory breast cancer.  I did acknowledge that there is a chance we could do the mastectomy and there could be no residual cancer, however there is just no way to know that short of doing a mastectomy.  After much discussion of the options, we have opted to repeat her PET scan.  If the Pap does not indicate anything lighting up in the region then I told her I would be willing to just do a large lumpectomy/biopsy of the area where  they had placed the clip.  If there is anything residual on the PET then I really do think she needs a mastectomy.  I spent 45 minutes with her and her family explaining all of this.  I tried to answer all of her questions as best as I could.  Plan: We will get the PET scan and then will make a plan from there.

## 2021-07-20 NOTE — PROGRESS NOTES
[facility] Medicine PROGRESS NOTE      Identification/Summary: Jacque Bernstein is a 84 y.o. female with a past medical history of hypertension, CKD 3, breast cancer had right total hip arthroplasty.  POD 1.   mL.  Right hip pain is stable.  Resting comfortably.    Assessment and Plan:  Essential hypertension  Blood pressure is stable today  Resume lisinopril hydrochlorothiazide 20/12.5 mg twice a day    GERD  Resume H2 blocker    History of breast cancer  Status post right mastectomy  Resume Arimidex 1 mg daily    CKD 3  Creatinine is stable    Acute blood loss anemia  Hemoglobin is 10.3 from 12.6    Status post right total hip arthroplasty, POD 1  Resume routine postoperative care   Physical and Occupational Therapy  Use incentive spirometry frequently  DVT prophylaxis per orthopedics, aspirin 81 mg twice a day  Pain control with Tylenol 1000 mg 3 times a day, oxycodone 5 to 10 mg every 3 hours as needed.    Interval History/Subjective:  Sitting in a recliner comfortably.  Right hip pain is stable.  No new concern.  Denies headache, chest pain, breathing difficulty, palpitation, nausea, vomiting, abdominal pain or urinary symptoms.    Review of system  Rest of the review of systems are negative except HPI    Physical Exam/Objective:  Vitals I/O   Temp:  [97.4  F (36.3  C)-98  F (36.7  C)] 97.7  F (36.5  C)  Heart Rate:  [64-79] 64  Resp:  [10-20] 20  BP: (122-162)/(58-71) 123/58  SpO2:  [94 %-100 %] 97 %  I/O last 3 completed shifts:  In: 3585.4 [P.O.:720; I.V.:2675.4; IV Piggyback:190]  Out: 1535 [Urine:1125; Drains:160; Blood:250]   160 lb 4.8 oz (72.7 kg)  Body mass index is 28.4 kg/m .      GENERAL:  Alert, appears comfortable, in no acute distress, appears stated age   HEAD:  Normocephalic, without obvious abnormality, atraumatic   EYES:  PERRL, conjunctiva  clear, no scleral icterus, EOM's intact   NOSE: Nares normal,   mucosa normal, no drainage   THROAT: Lips, mucosa, gums normal, mouth moist    NECK: Supple, symmetrical, trachea midline   BACK:   Symmetric, no curvature, ROM normal   LUNGS:   Clear to auscultation bilaterally, no rales, rhonchi, or wheezing,  respirations unlabored   CHEST WALL:  No tenderness or deformity   HEART:  Regular rate and rhythm, S1 and S2 normal, no murmur    ABDOMEN:   Soft, non-tender, bowel sounds active all four quadrants, no masses   EXTREMITIES: status post right total hip arthroplasty   SKIN: No rashes   NEURO: Alert, oriented x3, moves all four extremities freely, non-focal   PSYCH: Cooperative, behavior is appropriate        Medications:   Personally Reviewed.    acetaminophen  1,000 mg Oral TID     anastrozole  1 mg Oral QPM     aspirin  81 mg Oral BID     docusate sodium  100 mg Oral BID     gabapentin  100 mg Oral QHS     ketorolac  15 mg IM or IV Q6H     lidocaine  10 mL Intra-articular Once     lisinopril-hydrochlorothiazide  1 tablet Oral BID     methylPREDNISolone acetate  40 mg Intra-articular Once     methylPREDNISolone acetate  40 mg Intra-articular Once     polyethylene glycol  17 g Oral DAILY     senna-docusate  1 tablet Oral BID       Data reviewed today: I personally reviewed all new medications, labs, imaging/diagnostics reports over the past 24 hours.      Labs:  Results from last 7 days   Lab Units 08/01/19  0521 07/31/19  0644   LN-WHITE BLOOD CELL COUNT thou/uL 15.2* 7.7   LN-HEMOGLOBIN g/dL 10.3* 12.6   LN-HEMATOCRIT % 31.8* 38.6   LN-MEAN CORPUSCULAR VOLUME fL 99 99   LN-PLATELET COUNT thou/uL 248 304    and   Results from last 7 days   Lab Units 08/01/19  0521 07/31/19  0644   LN-SODIUM mmol/L 136  --    LN-POTASSIUM mmol/L 4.8 4.2   LN-CHLORIDE mmol/L 107  --    LN-CO2 mmol/L 23  --    LN-BLOOD UREA NITROGEN mg/dL 32*  --    LN-CREATININE mg/dL 1.07 0.98   LN-CALCIUM mg/dL 9.1  --          Breanna Matos  [facility]

## 2021-07-20 NOTE — PLAN OF CARE
Care Plan-Care Management  Care Management Goals of the Day: progression of care    Care Progression Reviewed With: Charge RN, RNCM, CMSW, DANIEL, MD  Barriers to Discharge: None  Discharge Disposition: Home  Expected Discharge Date: tbd  Transportation: Family    Care Coordination Narrative: Reviewed chart, anticipate pt will return home without any additional services once medically ready.    GIA Winter  8/1/2019  8:20 AM

## 2021-07-20 NOTE — PROGRESS NOTES
Pt ambulates to infusion center for lab draw prior to NP visit.  IVAD accessed, labs drawn et sent.  Pt was seen by ASHLEY Hackett CNP today and orders were approved.  Pt returns to infusion center for treatment.  Herceptin infused as ordered without difficulty.  IVAD flushed w/NS et heparin and needle deaccessed.  Pt left clinic stable to lobby accompanied by family. Plan RTC as scheduled.

## 2021-07-20 NOTE — ANESTHESIA CARE TRANSFER NOTE
Last vitals:   Vitals:    12/14/17 0617   BP: 140/65   Pulse: 93   Resp: 16   Temp: 37.1  C (98.7  F)   SpO2: 95%     Patient's level of consciousness is awake  Spontaneous respirations: yes  Maintains airway independently: yes  Dentition unchanged: yes  Oropharynx: oropharynx clear of all foreign objects    QCDR Measures:  ASA# 20 - Surgical Safety Checklist: WHO surgical safety checklist completed prior to induction  PQRS# 430 - Adult PONV Prevention: 4558F - Pt received => 2 anti-emetic agents (different classes) preop & intraop  ASA# 8 - Peds PONV Prevention: NA - Not pediatric patient, not GA or 2 or more risk factors NOT present  PQRS# 424 - Elizabeth-op Temp Management: 4559F - At least one body temp DOCUMENTED => 35.5C or 95.9F within required timeframe  PQRS# 426 - PACU Transfer Protocol: - Transfer of care checklist used  ASA# 14 - Acute Post-op Pain: ASA14B - Patient did NOT experience pain >= 7 out of 10

## 2021-07-20 NOTE — PROGRESS NOTES
Jacque comes in for continued follow-up of her right mastectomy.  She is feeling well and states that her drainage has been decreasing over the past few days. She notes 10 cc's on Saturday and 5 cc's today. She would like to have the BIANCA drain removed today. Per her last office visit note with Dr. Fregoso, it is ok at this time to proceed with removal. Pt tolerated this well. Applied bacitracin to drain site and covered with band-aid. Pt instructed to keep it covered x 1 week. She will begin her radiation in early June. Pt informed I would discuss what follow up is needed with Dr. Fregoso.

## 2021-07-20 NOTE — PROGRESS NOTES
PT here for txt after exam with NP. PT is having difficulty with walking due to legs and feet wobbly. PT is using a wheelchair until legs get more stronger and steadier. Reviewed with pt herceptin infusion and duration. Herceptin infused over 30 minutes the iv tubing flushed with ns . Port flushed with heparin/deaccessed with 2x2 to site. Follow up reviewed and pt dc'd in wheelchair with family.

## 2021-07-20 NOTE — PROGRESS NOTES
[facility] Cancer Care Progress Note    Patient: Jacque Bernstein  MRN: [id number]  Date of Service: 6/22/2020          Reason for visit      1. Malignant neoplasm of central portion of right breast in female, estrogen receptor positive, Her 2 matthew positive.    2. Benign neoplasm of connective and other soft tissue of left upper limb, including shoulder         Assessment     1.  A very pleasant 85 y.o.  woman with advanced breast cancer located on the right side T3 N2 M0 which was ER negative WV weakly positive but HER-2/matthew 3+.  Status post neoadjuvant chemotherapy with weekly paclitaxel, Herceptin and Perjetta.  Status post mastectomy with small amount of residual tumor in the breast and one positive lymph node.  The residual tumor in the breast is ER positive WV negative and HER-2/matthew positive.  Anderson disease was ER negative WV negative HER-2/matthew 3+.  She has finished radiation therapy and also 1 year of Herceptin.  2.  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  3.  Lipoma in her arm.  4.  Some osteopenia seen on her bone density.  5.  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  Slowly recovering from it.    Plan     1.  Patient will continue on anastrozole for at least 5 years which she would finish in July 2023.  2.  Continue symptomatic care for her neuropathy.  She is slowly getting better.  3.  US of the arm to see if it is lipoma or something else.    4.  Advised to continue to wear the compression sleeve as much as she can.  She absolutely has to use it if she takes a flight in an airplane.    5.  Continue to take some calcium rich diet and vitamin D.  Continue to exercise outside in the form of walking and get some sunlight.    Clinical stage      Cancer Staging  Malignant neoplasm of central portion of right breast in female, estrogen receptor positive, Her 2 matthew positive.  Staging form: Breast, AJCC 7th Edition  - Clinical stage from 12/11/2017: Stage IIIA (T3, N2a,  M0) - Signed by Holland Ho MD on 12/14/2017  ER Status: Negative  AR Status: Positive  HER2 Status: Positive      History     Jacque Bernstein is a very pleasant 85 y.o. old female with a history of invasive ductal carcinoma.  Jacque noted some fullness in her breast about end of November 2017.  She had a bruise few days prior to that in that breast.  After the bruise resolved she felt that there was still the lump present.  She went and saw Dr. Chou who felt that this was very concerning for malignancy.  Ultrasound and mammogram were done which were very suggestive of malignancy.  She underwent biopsy of the right axillary lymph node which confirmed the presence of invasive ductal carcinoma which was ER negative, AR weakly positive but HER-2/matthew 3+ on immunohistochemistry.  This was a high-grade tumor.  The lymph nodes are clinically palpable.     The patient has been since seen by Dr. Fregoso.  Dr. Fregoso performed a punch biopsy of the skin.  She was concerned that this might be inflammatory breast cancer.  The skin biopsies came back negative for involvement with cancer.  She started on neoadjuvant chemotherapy with weekly paclitaxel along with Herceptin and once every 3 week Perjetta.  He had good clinical response.    She underwent mastectomy in April 2018 and pathology revealed one positive lymph node and a small 1.8 cm lesion in the breast.  Margins negative.  Interestingly the lesion in the breast was ER positive AR borderline positive HER-2/matthew positive at least in the parts of the tumor.  The lymph node metastases ER negative AR negative and HER-2/matthew 3+.      She is started on radiation therapy.  Finished that on July 18, 2018.  She had some radiation burns.  She was also noted to have slight lymphedema so she was prescribed a lymphedema sleeve as well on her right arm.  She finished a year of Herceptin.    We also started her on anastrozole on July 2018.  She is tolerating that with the expected  side effects.  Comes in today for scheduled follow-up.  Her neuropathy is not much better in her hands and perhaps in her feet.  She underwent right hip surgery in July 2019.  Due to the hip surgery she is walking slower and needs a walker.  She  lost a little bit of weight.  She feels her balance is not as good.  No significant hot flashes.  She still has some trouble with bladder incontinence issues.    Noticed a lump on the back of her left upper arm. Not tender. Not seem to be growing.    Past Medical History     Past Medical History:   Diagnosis Date     Arthritis      Breast cancer (H)     breast     Chronic kidney disease      Encephalomyelitis 1956     History of anesthesia complications     Felt jittery after some surgeries for days not sure what caused it     Hypertension      Incontinence of urine      Neuropathy      Shingles        Review of Systems   Constitutional  Constitutional (WDL): Exceptions to WDL  Fatigue: Fatigue not relieved by rest - Limiting instrumental ADL  Neurosensory  Neurosensory (WDL): Exceptions to WDL  Ataxia: Moderate symptoms, limiting instrumental ADL(Uses a walker.)  Peripheral Sensory Neuropathy: Moderate symptoms, limiting instrumental ADL(Hands and feet; numbness.)  Cardiovascular  Cardiovascular (WDL): Exceptions to WDL  Edema: Yes  Edema Limbs: Grade 2(Feet)  Pulmonary  Respiratory (WDL): Within Defined Limits(Dry throat.)  Gastrointestinal  Gastrointestinal (WDL): Exceptions to WDL  Constipation: Occasional or intermittent symptoms, occasional use of stool softeners, laxatives, dietary modification, or enema  Genitourinary  Genitourinary (WDL): Exceptions to WDL  Urinary Frequency: Present  Integumentary  Integumentary (WDL): All integumentary elements are within defined limits  Patient Coping  Patient Coping: Accepting  Accompanied by  Accompanied by: Alone    ECOG performance status and Distress Assessment      ECOG Performance:    ECOG Performance Status:  1    Distress Assessment  Distress Assessment Score: 2:     Pain Status  Currently in Pain: No/denies      Vital Signs     Vitals:    06/22/20 1316   BP: 147/78   Pulse: 96   Temp: 98.6  F (37  C)   SpO2: 98%       Physical Exam     GENERAL: No acute distress. Cooperative in conversation.   HEENT: Pupils are equal, round and reactive. Oral mucosa is clean and intact. No ulcerations or mucositis noted. No bleeding noted.  RESP:Chest symmetric lungs are clear bilaterally per auscultation. Regular respiratory rate. No wheezes or rhonchi.  CV: Normal S1 S2 Regular, rate and rhythm. No murmurs.  ABD: Nondistended, soft, nontender. Positive bowel sounds. No organomegaly.   EXTREMITIES: No lower extremity edema.  She is wearing a lymphedema sleeve on her right arm but does not appear to have any lymphedema.  In fact the sleeve appears somewhat loose.  NEURO: Non- focal. Alert and oriented x3.  Cranial nerves appear intact.  PSYCH: Within normal limits. No depression or anxiety.  SKIN: Warm dry intact.  Well-healing incision on her right hip. Lump just above the elbow in her left arm, appears to be a lipoma.  LYMPH NODES: Bilateral cervical, supraclavicular, axillary lymph node examination was done.  Slight right axillary palpable adenopathy.        Lab Results     Results for orders placed or performed in visit on 06/22/20   Comprehensive Metabolic Panel   Result Value Ref Range    Sodium 140 136 - 145 mmol/L    Potassium 4.2 3.5 - 5.0 mmol/L    Chloride 105 98 - 107 mmol/L    CO2 26 22 - 31 mmol/L    Anion Gap, Calculation 9 5 - 18 mmol/L    Glucose 132 (H) 70 - 125 mg/dL    BUN 22 8 - 28 mg/dL    Creatinine 1.06 0.60 - 1.10 mg/dL    GFR MDRD Af Amer 60 (L) >60 mL/min/1.73m2    GFR MDRD Non Af Amer 49 (L) >60 mL/min/1.73m2    Bilirubin, Total 0.4 0.0 - 1.0 mg/dL    Calcium 10.3 8.5 - 10.5 mg/dL    Protein, Total 7.0 6.0 - 8.0 g/dL    Albumin 3.6 3.5 - 5.0 g/dL    Alkaline Phosphatase 72 45 - 120 U/L    AST 15 0 - 40 U/L     ALT 12 0 - 45 U/L   HM1 (CBC with Diff)   Result Value Ref Range    WBC 10.9 4.0 - 11.0 thou/uL    RBC 4.37 3.80 - 5.40 mill/uL    Hemoglobin 13.7 12.0 - 16.0 g/dL    Hematocrit 43.6 35.0 - 47.0 %     80 - 100 fL    MCH 31.4 27.0 - 34.0 pg    MCHC 31.4 (L) 32.0 - 36.0 g/dL    RDW 14.2 11.0 - 14.5 %    Platelets 321 140 - 440 thou/uL    MPV 9.4 8.5 - 12.5 fL    Neutrophils % 71 (H) 50 - 70 %    Lymphocytes % 18 (L) 20 - 40 %    Monocytes % 9 2 - 10 %    Eosinophils % 2 0 - 6 %    Basophils % 1 0 - 2 %    Neutrophils Absolute 7.8 (H) 2.0 - 7.7 thou/uL    Lymphocytes Absolute 1.9 0.8 - 4.4 thou/uL    Monocytes Absolute 0.9 0.0 - 0.9 thou/uL    Eosinophils Absolute 0.2 0.0 - 0.4 thou/uL    Basophils Absolute 0.1 0.0 - 0.2 thou/uL         Imaging Results     No results found.      Holland Ho MD

## 2021-07-20 NOTE — PROGRESS NOTES
[facility] Hematology and Oncology Progress Note    Patient: Jacque Bernstein  MRN: [id number]  Date of Service: 01/09/2018        Reason for Visit    Chief Complaint   Patient presents with     HE Cancer     Breast Cancer       Assessment and Plan  Malignant neoplasm of central portion of right breast in female, estrogen receptor negative, HER-2 positive    Staging form: Breast, AJCC 7th Edition    - Clinical stage from 12/11/2017: Stage IIIA (T3, N2a, M0) - Signed by Holland Ho MD on 12/14/2017     1. Breast cancer, stage IIIA: has started on neoadjuvant chemo with Taxol, Herceptin and Perjeta. She has responded clinically very well so far. Her tumor is basically no longer palpable. She will continue on her current therapy. She will see dr. Ho in 3 weeks.     2. Diarrhea: this is likely from the herceptin and perjeta. We will have her start taking imodium 1-2 in the am. She will then also take it with each loose stool throughout the day. I will have a nurse call her later this week to see how she is doing. If this doesn't seem to be working, I would try lomotil.     3. Lightheaded/dizziness: likely slight dehydration from frequent stools. Slightly tachycardic, but BP looks good. Increase hydration. Try drinks with electrolytes in them. Use walker PRN for safety.     ECOG Performance   ECOG Performance Status: 1     Distress Assessment  Distress Assessment Score: No distress    Pain  Currently in Pain: No/denies      Problem List    1. Malignant neoplasm of central portion of right breast in female, estrogen receptor negative, HER-2 positive  Infusion Appointment    Infusion Appointment    [facility] OFFICE VISIT LONG    Infusion Appointment   2. Weakness     3. Diarrhea due to drug          ______________________________________________________________________________    History of Present Illness    Jacque Bernstein is a very pleasant 83 y.o. old female with a history of invasive ductal carcinoma.  Jacque tequila  some fullness in her breast about end of November.  She had a bruise few days but prior to that in that breast.  After the bruise resolved she felt that there was still the lump present.  She went and saw Dr. Chou who felt that this was very concerning for malignancy.  Ultrasound and mammogram were done which were very suggestive of malignancy.  She underwent biopsy of the right axillary lymph node which confirmed the presence of invasive ductal carcinoma which was ER negative, CO weakly positive but HER-2/matthew 3+ on immunohistochemistry.  This was a high-grade tumor.  The lymph nodes are clinically palpable.     The patient has been since seen by Dr. Fregoso.  Dr. Fregoso also performed a punch biopsy of the skin.  She was concerned that this might be inflammatory breast cancer.  The skin biopsies negative for involvement with cancer so this is not inflammatory breast cancer. So she has started on neoadjuvant chemo with Taxol and Herceptin and Perjeta. She has had 1 cycles and is doing ok. Having some weakness, lightheadedness and diarrhea. States the tumor has significantly decreased.     Pain Status  Currently in Pain: No/denies    Review of Systems    Constitutional  Constitutional (WDL): Exceptions to WDL  Fatigue: Fatigue relieved by rest  Weight Loss: to <10% from baseline, intervention not indicated (down 5 lbs)  Neurosensory  Neurosensory (WDL): Exceptions to WDL  Peripheral Motor Neuropathy: Asymptomatic, clinical or diagnostic observations only, intervention not indicated  Ataxia: Asymptomatic, clinical or diagnostic observations only, intervention not indicated (off balance)  Peripheral Sensory Neuropathy: Moderate symptoms, limiting instrumental ADL (feet)  Eye   Eye Disorder (WDL): Exceptions to WDL (glasses)  Blurred Vision: Intervention not indicated  Watering Eyes: Intervention not indicated  Ear  Ear Disorder (WDL): All ear disorder elements are within defined limits  Cardiovascular  Cardiovascular  (WDL): Exceptions to WDL  Edema: Yes  Edema Limbs: 5 - 10% inter-limb discrepancy in volume or circumference at point of greatest visible difference, swelling or obscuration of anatomic architecture on close inspection (feet and ankles)  Pulmonary  Respiratory (WDL): Exceptions to WDL (blood tinge when blowing nose)  Cough: Mild symptoms, nonprescription intervention indicated (dry)  Gastrointestinal  Gastrointestinal (WDL): Exceptions to WDL  Dysgeusia: Altered taste but no change in diet  Genitourinary  Genitourinary (WDL): Exceptions to WDL (some incontinence)  Urinary Tract Pain: Mild pain (pain with urination only at night)  Lymphatic  Lymph (WDL): All lymph disorder elements are within defined limits  Musculoskeletal and Connective Tissue  Musculoskeletal and Connetive Tissue Disorders (WDL): All Musculoskeletal and Connetive Tissue Disorder elements are within defined limits  Integumentary  Integumentary (WDL): All integumentary elements are within defined limits  Patient Coping  Patient Coping: Accepting  Distress Assessment  Distress Assessment Score: No distress  Accompanied by  Accompanied by: Family Member  Oral Chemo Adherence       Past History  Past Medical History:   Diagnosis Date     Breast cancer      Encephalomyelitis 1956     History of anesthesia complications     Felt jitterty after some surgeries for days not sure what caused it     Hypertension      Shingles        PHYSICAL EXAM:  /60  Pulse (!) 104  Temp 97.5  F (36.4  C) (Oral)   Wt 173 lb 12.8 oz (78.8 kg)  SpO2 97%  BMI 31.79 kg/m2  GENERAL: no acute distress. Cooperative in conversation. Here with  and son  HEENT: pupils are equal, round and reactive. Oromucosa is clean and intact. No ulcerations or mucositis noted. No bleeding noted.  RESP: lungs are clear bilaterally per auscultation. Regular respiratory rate. No wheezes or rhonchi.  CV: Regular, rate and rhythm. No murmurs.  ABD: soft, nontender. Positive bowel  sounds. No organomegaly.   MUSCULOSKELETAL: No lower extremity swelling.   NEURO: non focal. Alert and oriented x3.   PSYCH: within normal limits. No depression or anxiety.  SKIN: warm dry intact   LYMPH: no cervical, supraclavicular or axillary lymphadenopathy  BREAST: tumor in left breast is no longer palpable.         Lab Results    Recent Results (from the past 168 hour(s))   HM1 (CBC with Diff)   Result Value Ref Range    WBC 6.1 4.0 - 11.0 thou/uL    RBC 3.88 3.80 - 5.40 mill/uL    Hemoglobin 12.5 12.0 - 16.0 g/dL    Hematocrit 37.6 35.0 - 47.0 %    MCV 97 80 - 100 fL    MCH 32.2 27.0 - 34.0 pg    MCHC 33.2 32.0 - 36.0 g/dL    RDW 13.6 11.0 - 14.5 %    Platelets 372 140 - 440 thou/uL    MPV 9.3 8.5 - 12.5 fL    Neutrophils % 64 50 - 70 %    Lymphocytes % 18 (L) 20 - 40 %    Monocytes % 14 (H) 2 - 10 %    Eosinophils % 3 0 - 6 %    Basophils % 1 0 - 2 %    Neutrophils Absolute 3.9 2.0 - 7.7 thou/uL    Lymphocytes Absolute 1.1 0.8 - 4.4 thou/uL    Monocytes Absolute 0.9 0.0 - 0.9 thou/uL    Eosinophils Absolute 0.2 0.0 - 0.4 thou/uL    Basophils Absolute 0.1 0.0 - 0.2 thou/uL   Comprehensive Metabolic Panel   Result Value Ref Range    Sodium 136 136 - 145 mmol/L    Potassium 4.2 3.5 - 5.0 mmol/L    Chloride 102 98 - 107 mmol/L    CO2 23 22 - 31 mmol/L    Anion Gap, Calculation 11 5 - 18 mmol/L    Glucose 119 70 - 125 mg/dL    BUN 22 8 - 28 mg/dL    Creatinine 1.15 (H) 0.60 - 1.10 mg/dL    GFR MDRD Af Amer 55 (L) >60 mL/min/1.73m2    GFR MDRD Non Af Amer 45 (L) >60 mL/min/1.73m2    Bilirubin, Total 0.6 0.0 - 1.0 mg/dL    Calcium 9.8 8.5 - 10.5 mg/dL    Protein, Total 6.5 6.0 - 8.0 g/dL    Albumin 3.3 (L) 3.5 - 5.0 g/dL    Alkaline Phosphatase 50 45 - 120 U/L    AST 17 0 - 40 U/L    ALT 18 0 - 45 U/L   HM1 (CBC with Diff)   Result Value Ref Range    WBC 6.5 4.0 - 11.0 thou/uL    RBC 3.87 3.80 - 5.40 mill/uL    Hemoglobin 12.3 12.0 - 16.0 g/dL    Hematocrit 37.5 35.0 - 47.0 %    MCV 97 80 - 100 fL    MCH 31.8 27.0  - 34.0 pg    MCHC 32.8 32.0 - 36.0 g/dL    RDW 13.7 11.0 - 14.5 %    Platelets 398 140 - 440 thou/uL    MPV 10.2 8.5 - 12.5 fL    Neutrophils % 70 50 - 70 %    Lymphocytes % 18 (L) 20 - 40 %    Monocytes % 8 2 - 10 %    Eosinophils % 3 0 - 6 %    Basophils % 1 0 - 2 %    Neutrophils Absolute 4.5 2.0 - 7.7 thou/uL    Lymphocytes Absolute 1.2 0.8 - 4.4 thou/uL    Monocytes Absolute 0.5 0.0 - 0.9 thou/uL    Eosinophils Absolute 0.2 0.0 - 0.4 thou/uL    Basophils Absolute 0.1 0.0 - 0.2 thou/uL       Imaging    Nm Muga    Result Date: 12/14/2017    1.The left ventricular ejection fraction is 75.7%.   2.Normal study.   3.No prior assessment of left ventricular ejection fraction available.      Nm Pet Ct Skull To Mid Thigh    Result Date: 12/11/2017  [facility] PET FDG/CT 12/11/2017 10:53 AM INDICATION: Right breast cancer staging, initial treatment strategy. TECHNIQUE: Serum glucose level 110 mg/dL. One hour post intravenous administration of 8.1 mCi F-18 FDG, PET imaging was performed from the skull base to the mid thighs utilizing attenuation correction with concurrent axial CT and PET/CT image fusion. Dose reduction techniques were used. COMPARISON: None. FINDINGS: Moderately FDG avid (SUVmax 4.3) soft tissue in the right breast has an infiltrative appearance and spans about 10 x 2 x 4 cm. FDG avid right axillary lymphadenopathy at levels I and II. A representative node at level I measures 2.0 x 1.2 cm (SUVmax 5.5). 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 Mild calcified atherosclerosis, including left anterior descending coronary disease. Hysterectomy. Pelvic phleboliths. Left total hip arthroplasty. Moderate degenerative change cervical and lumbar spine. Slight anterolisthesis of L4 on L5 and L5 on S1.     CONCLUSION: Findings consistent with right breast cancer with lymph node metastases in the right axilla at levels I and II. No evidence of distant metastases.        Signed by:  Jazlyn Hackett, CNP

## 2021-07-20 NOTE — PROGRESS NOTES
Jacque came to chemo infusion this afternoon for her next dose of herceptin.  VSS.  Pt assessed.  She is concerned about fingernail and toenail changes as well as some tightening in her diaphragm area and she discussed these issues with Jazlyn ROBERTSON CNP while in treatment chair.  Port was accessed with good blood return and lab was drawn.  Lab result noted. Herceptin infused over 30 minutes and was well tolerated while in clinic.  Port was flushed with ns then heparinized.  Port was deaccessed and site covered.  Jacque d/c from clinic ambulatory using her walker accompanied by family. She is having surgery Monday.  She is aware of her future appointment.

## 2021-07-20 NOTE — PROGRESS NOTES
Pharmacy Note - Admission Medication History   ______________________________________________________________________  Prior To Admission (PTA) med list completed and updated in EMR.     PTA Med List   Medication Sig Last Dose     acetaminophen (TYLENOL) 500 MG tablet Take 500 mg by mouth daily. 4/1/2018     aspirin 81 MG EC tablet Take 81 mg by mouth daily.  Past Week at ~1 wk ago     b complex vitamins tablet Take 1 tablet by mouth daily. Past Week at ~1 wk ago     calcium carbonate-vitamin D2 500 mg(1,250mg) -200 unit tablet Take 1 tablet by mouth daily.  Past Week at ~1 wk ago     diphenhydrAMINE (BENADRYL) 25 mg capsule Take 25 mg by mouth at bedtime as needed for itching or sleep.  prn     famotidine (PEPCID) 20 MG tablet Take 20 mg by mouth 2 (two) times a day.  4/1/2018 at pm     lisinopril-hydrochlorothiazide (PRINZIDE,ZESTORETIC) 20-12.5 mg per tablet Take 1 tablet by mouth daily.  4/1/2018 at pm     loperamide (IMODIUM) 2 mg capsule Take 2 mg by mouth 4 (four) times a day as needed for diarrhea.  prn     MULTIVITAMIN (MULTIPLE VITAMIN ORAL) Take 1 tablet by mouth daily. Past Week at ~1 wk ago     OMEGA-3/DHA/EPA/FISH OIL (FISH OIL-OMEGA-3 FATTY ACIDS) 300-1,000 mg capsule Take 1 g by mouth daily. Past Week at ~1 wk ago     simethicone (MYLICON) 80 MG chewable tablet Chew 80 mg every 6 (six) hours as needed for flatulence. Past Week at ~1 wk ago       Information source(s): Patient and Clinic records  Patient was asked about OTC/herbal products specifically.  PTA med list reflects this.  Based on the pharmacist s assessment, the PTA med list information appears reliable  Allergies were reviewed, assessed, and updated with the patient.    Patient does not use any multi-dose medications prior to admission.   Thank you for the opportunity to participate in the care of this patient.    Colette Arshad, PharmD     4/2/2018     9:05 AM

## 2021-07-20 NOTE — PLAN OF CARE
Problem: Psychosocial Needs  Goal: Demonstrates ability to cope with hospitalization/illness  Outcome: Progressing     Patient still denies pain to right hip. Scheduled tyl/toradol. Ice applied.  Up with A1 using walker, tolerating well. Voiding and also incontinent.  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  VSS. Dressing CDI to R hip.

## 2021-07-20 NOTE — PROGRESS NOTES
Met with Lauren and her family members as she arrived for treatment. She expressed appreciation for the visit and declined resources.  She is very positive about nearing the end of chemotherapy and after today only two Herceptin infusions remain. Dorothy Alejandre RN

## 2021-07-20 NOTE — OP NOTE
Operative Note    Name:  Jacque Bernstein  PCP:  Sawyer Chou MD  Procedure Date:  3/20/2019      Procedure:  PORT REMOVAL (N/A)    Pre-Procedure Diagnosis:  Hx of breast cancer [Z85.3]     Post-Procedure Diagnosis:    Same     Surgeon(s):  Angeles Fregoso MD      Anesthesia Type:  No value filed.      Findings:      Operative Report:    The patient was brought to operating suite where she was placed in supine position.  She is prepped draped in a sterile fashion.  After infiltration with 1% lidocaine I re-incised the incision site where the port was placed.  This is carried down to the port with electrocautery and the port as well as the capsule that had formed around the port was removed easily and completely with electrocautery.  The wound was inspected for hemostasis.  The wound was closed with 3-0 Vicryl to the subcutaneous tissues and subcuticular stitch of 4 Monocryl to the skin.  Sterile dressings are placed.  The patient tolerated the procedure well.      Estimated Blood Loss:   2cc    Specimens:    None       Drains:        Complications:    None    Angeles Fregoso     Date: 3/20/2019  Time: 7:53 AM

## 2021-07-20 NOTE — TELEPHONE ENCOUNTER
Discussed UC results and need for MD to be aware. Requested I fax over and he will inform hospitalist. Fax to

## 2021-07-20 NOTE — PROGRESS NOTES
[facility] Cancer Care Progress Note    Patient: Jacque Bernstein  MRN: [id number]  Date of Service: 8/27/2018          Reason for visit      1. Malignant neoplasm of central portion of right breast in female, estrogen receptor positive, Her 2 matthew positive.        Assessment     1.  A very pleasant 84 y.o.  woman with advanced breast cancer located on the right side T3 N2 M0 which is ER negative TX weakly positive but HER-2/matthew 3+.  Status post neoadjuvant chemotherapy with weekly paclitaxel, Herceptin and Perjetta.  Status post mastectomy with small amount of residual tumor in the breast and one positive lymph node.  The residual tumor in the breast is ER positive TX negative and HER-2/matthew positive.  Anderson disease is ER negative TX negative HER-2/matthew 3+..  2.  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  3.  Good general health otherwise.  4.  Some issues with urinary urgency and worsening of her urge incontinence.  5.  Some osteopenia seen on her bone density.    Plan     1. She will continue on Herceptin for a year as discussed before.  Due to the presence of ER positive disease she will stay on anastrozole 1 mg p.o. daily.  Plan to continue that until July 2023.  2.  Continue symptomatic care for her neuropathy.  3.  Good diet and exercise.  4.  Advised to continue to wear the compression sleeve as much as she can.  She absolutely has to use it if she takes a flight in an airplane.  5.  Advised to take some calcium rich diet and vitamin D.  Advised to exercise outside in the form of walking and get some sunlight.    Clinical stage      Malignant neoplasm of central portion of right breast in female, estrogen receptor positive, Her 2 matthew positive.    Staging form: Breast, AJCC 7th Edition    - Clinical stage from 12/11/2017: Stage IIIA (T3, N2a, M0) - Signed by Holland Ho MD on 12/14/2017          ER Status: Negative          TX Status: Positive          HER2 Status:  Positive    History     Jacque Bernstein is a very pleasant 84 y.o. old female with a history of invasive ductal carcinoma.  Jacque noted some fullness in her breast about end of November.  She had a bruise few days but prior to that in that breast.  After the bruise resolved she felt that there was still the lump present.  She went and saw Dr. Chou who felt that this was very concerning for malignancy.  Ultrasound and mammogram were done which were very suggestive of malignancy.  She underwent biopsy of the right axillary lymph node which confirmed the presence of invasive ductal carcinoma which was ER negative, NE weakly positive but HER-2/matthew 3+ on immunohistochemistry.  This was a high-grade tumor.  The lymph nodes are clinically palpable.     The patient has been since seen by Dr. Fregoso.  Dr. Fregoso performed a punch biopsy of the skin.  She was concerned that this might be inflammatory breast cancer.  The skin biopsies came back negative for involvement with cancer.  She has since started on neoadjuvant chemotherapy with weekly paclitaxel along with Herceptin and once every 3 week Perjetta.  She seems to be tolerating treatment quite well.  She noticed significant improvement in her breast lump in fact that she could not feel it anymore.    She underwent mastectomy in April 2018 and pathology revealed one positive lymph node and a small 1.8 cm lesion in the breast.  Margins negative.  Interestingly the lesion in the breast was ER positive NE borderline positive HER-2/matthew positive at least in the parts of the tumor.  The lymph node metastases ER negative NE negative and HER-2/matthew 3+.      She is started on radiation therapy.  Finished that on July 18.  She has some radiation burns.  She was also noted to have slight lymphedema so she is wearing a lymphedema sleeve as well on her right arm.  Her neuropathy is getting better.    She has continued Herceptin.  We also started on anastrozole on July 2018.  She is  tolerating that with the expected side effects.  Comes in today for scheduled follow-up.  Her main complaint is slight worsening of her urge incontinence.  She has had some urge incontinence over a period of time  but seems to have gotten worse lately.  She gets up several times at night to go to the bathroom.  Sometimes she only has a small amount of urine sometime reasonable amount of urine.  Occasional burning around the urethra.  She does use pads and has been doing that for quite some time.    Past Medical History     Past Medical History:   Diagnosis Date     Breast cancer (H)      Chronic kidney disease     Stage III     Encephalomyelitis 1956     History of anesthesia complications     Felt jittery after some surgeries for days not sure what caused it     Hypertension      Incontinence of urine      Neuropathy (H)      Shingles        Review of Systems   Constitutional  Constitutional (WDL): Exceptions to WDL  Fatigue: Fatigue relieved by rest (intermittant)  Neurosensory  Neurosensory (WDL): Exceptions to WDL  Ataxia: Asymptomatic, clinical or diagnostic observations only, intervention not indicated  Peripheral Sensory Neuropathy: Asymptomatic, loss of deep tendon reflexes or paresthesia (feet and hands)  Cardiovascular  Cardiovascular (WDL): Exceptions to WDL (feling fullness mid abdomen under breasts)  Edema: Yes  Edema Limbs: 5 - 10% inter-limb discrepancy in volume or circumference at point of greatest visible difference, swelling or obscuration of anatomic architecture on close inspection (ankles)  Pulmonary  Respiratory (WDL): Within Defined Limits  Gastrointestinal  Gastrointestinal (WDL): Exceptions to WDL (sensitive smell)  Genitourinary  Genitourinary (WDL): Exceptions to WDL (leaky bladder)  Urinary Frequency: Present  Integumentary  Integumentary (WDL): All integumentary elements are within defined limits  Patient Coping  Patient Coping: Accepting  Accompanied by  Accompanied by: Family Member  (, daughter in law)    ECOG performance status and Distress Assessment      ECOG Performance:    ECOG Performance Status: 0    Distress Assessment  Distress Assessment Score: No distress:     Pain Status  Currently in Pain: No/denies      Vital Signs     Vitals:    08/27/18 1253   BP: 128/65   Pulse: 97   Temp: 97.5  F (36.4  C)   SpO2: 97%       Physical Exam     GENERAL: No acute distress. Cooperative in conversation.   HEENT: Pupils are equal, round and reactive. Oral mucosa is clean and intact. No ulcerations or mucositis noted. No bleeding noted.  RESP:Chest symmetric lungs are clear bilaterally per auscultation. Regular respiratory rate. No wheezes or rhonchi.  CV: Normal S1 S2 Regular, rate and rhythm. No murmurs.  ABD: Nondistended, soft, nontender. Positive bowel sounds. No organomegaly.   EXTREMITIES: No lower extremity edema.   NEURO: Non- focal. Alert and oriented x3.  Cranial nerves appear intact.  PSYCH: Within normal limits. No depression or anxiety.  SKIN: Warm dry intact.    LYMPH NODES: Bilateral cervical, supraclavicular, axillary lymph node examination was done.  Slight right axillary palpable adenopathy.Still has a drain in  BREAST status post right-sided mastectomy.  Significant radiation skin changes.      Lab Results     Results for orders placed or performed in visit on 08/27/18   Comprehensive Metabolic Panel   Result Value Ref Range    Sodium 140 136 - 145 mmol/L    Potassium 4.5 3.5 - 5.0 mmol/L    Chloride 104 98 - 107 mmol/L    CO2 28 22 - 31 mmol/L    Anion Gap, Calculation 8 5 - 18 mmol/L    Glucose 125 70 - 125 mg/dL    BUN 25 8 - 28 mg/dL    Creatinine 1.02 0.60 - 1.10 mg/dL    GFR MDRD Af Amer >60 >60 mL/min/1.73m2    GFR MDRD Non Af Amer 52 (L) >60 mL/min/1.73m2    Bilirubin, Total 0.5 0.0 - 1.0 mg/dL    Calcium 10.4 8.5 - 10.5 mg/dL    Protein, Total 6.8 6.0 - 8.0 g/dL    Albumin 3.7 3.5 - 5.0 g/dL    Alkaline Phosphatase 61 45 - 120 U/L    AST 36 0 - 40 U/L    ALT 22 0 -  45 U/L   HM1 (CBC with Diff)   Result Value Ref Range    WBC 12.5 (H) 4.0 - 11.0 thou/uL    RBC 4.19 3.80 - 5.40 mill/uL    Hemoglobin 13.2 12.0 - 16.0 g/dL    Hematocrit 40.1 35.0 - 47.0 %    MCV 96 80 - 100 fL    MCH 31.5 27.0 - 34.0 pg    MCHC 32.9 32.0 - 36.0 g/dL    RDW 13.8 11.0 - 14.5 %    Platelets 293 140 - 440 thou/uL    MPV 9.5 8.5 - 12.5 fL    Neutrophils % 81 (H) 50 - 70 %    Lymphocytes % 9 (L) 20 - 40 %    Monocytes % 7 2 - 10 %    Eosinophils % 2 0 - 6 %    Basophils % 0 0 - 2 %    Neutrophils Absolute 10.1 (H) 2.0 - 7.7 thou/uL    Lymphocytes Absolute 1.1 0.8 - 4.4 thou/uL    Monocytes Absolute 0.9 0.0 - 0.9 thou/uL    Eosinophils Absolute 0.3 0.0 - 0.4 thou/uL    Basophils Absolute 0.1 0.0 - 0.2 thou/uL         Imaging Results     Nm Muga    Result Date: 8/23/2018    The left ventricular ejection fraction is 68%.   The measured left ventricular ejection fraction on the prior study date of 5/31/2018 was 73%.      Dxa Bone Density Scan    Result Date: 8/15/2018  8/13/2018 RE: Jacque North YOB: 1934 Dear Holland Ho, Patient Profile: 84 y.o. female, postmenopausal, is here for the first bone density test. History of fractures - None. Family history of osteoporosis - Yes;  Cousins.  Family history of hip fracture: None. Smoking history - No. Osteoporosis treatment past -  No. Osteoporosis treatment current - No.  Chronic medical problems - Breast cancer, Hip replacement , Hysterectomy, Oophorectomy and Radiation treatment. High risk medications -  Chemotherapy;  Yes, Currently and Aromatase Inhibitor;  Yes, Currently. Assessment: 1. The spine bone density L1-L2 with T-score 4.5.  Of note, significant sclerotic changes are noted in the lumbar vertebrae which may artifactually elevate the bone mineral density. 2. Femoral bone density shows right total hip T- score -0.3. 3. Trabecular bone score indicates good trabecular bone architecture. 4.  Left one third radius T score -1.9 84 y.o.  female with LOW BONE DENSITY (OSTEOPENIA) and MODERATE fracture risk, adjusted for the TBS, with major osteoporotic fracture risk 9.3 % and hip fracture risk 2.0 %. Recommendations: Appropriate calcium and vitamin D supplements, along with both weightbearing and balance exercises, are recommended with follow up bone density scan in 1-2 years for close monitoring while on an aromatase inhibitor. Bone densitometry was performed on your patient using our [employer] densitometer. The results are summarized and a copy of the actual scans are included for your review. In conformity with the International Society of Clinical Densitometry's most recent position statement for DXA interpretation (2015), the diagnosis will be made on the lowest measured T-score of the lumbar spine, femoral neck, total proximal femur or 33% radius. Note the change in terminology for diagnostic classification from OSTEOPENIA to LOW BONE MASS. All trending for sequential exams will be done using multiple vertebrae or the total proximal femur. Fracture risk is based on the WHO Fracture Risk Assessment Tool (FRAX). If additional information is needed or if you would like to discuss the results, please do not hesitate to call me. Thank you for referring this patient to [facility]oporosis [facility]. We are happy to be of service in support of you and your practice. If you have any questions or suggestions to improve our service, please call me at [phone]. Sincerely, Taisha Parson M.D. C.C.D. Osteoporosis Services, [facility]         Holland Ho MD

## 2021-07-20 NOTE — PROGRESS NOTES
Jacque came to chemo infusion this afternoon after port lab draw and MD visit for her last dose of Trastuzumab.  Port maintained good blood return.  Trastuzumab infused over 30 minutes and was well tolerated while in clinic today.  Port was flushed with ns then heparinized and deaccessed. Port flushes discussed as patient is planning on keeping the port for at least a while.  Jacque d/c from clinic ambulatory accompanied by her spouse and son.

## 2021-07-20 NOTE — PROGRESS NOTES
Jacque came to chemo infusion this morning for C1D8 treatment using taxol and herceptin.  VSS.  Pt assessed and is feeling well today.  Port was accessed with excellent blood return and lab was drawn.  Lab results noted and reviewed with pt.  Pt met criteria for treatment today.  She received premedication and chemotherapy as ordered and tolerated it well while in clinic.  Port was flushed with ns, heparinized then deaccessed and site covered.  Jacque d/c from clinic ambulatory accompanied by family.  She is aware of her future appointment.

## 2021-07-20 NOTE — PROGRESS NOTES
Pt feels well. Almost no pain. No swelling. Wants to go home.    Will d/c today. Follow up in about 1 week.    Angeles Fregoso

## 2021-07-20 NOTE — TELEPHONE ENCOUNTER
Lauren calls back in.  She reports that she will be here about 1600 today to do these things.    Mary Anne Lazcano RN

## 2021-07-20 NOTE — OP NOTE
Operative Note    Name:  Jacque Bernstein  PCP:  Sawyer Chou MD  Procedure Date:  4/2/2018      Right Modified Radical Mastectomy (Right)    Pre-Procedure Diagnosis:  Malignant neoplasm of central portion of right breast in female, estrogen receptor negative [C50.111, Z17.1]     Post-Procedure Diagnosis:    Same     Surgeon(s):  Angeles Fregoso MD     Assistant:  Harjinder Vasquez CNP      Anesthesia Type:  General      Findings:  Bulky nodes in the axilla.    Operative Report:  The patient was brought to the operating suite and placed in the supine position. Gneral anesthesia was administered.  She was prepped and draped in a sterile fashion.  An elliptical shaped incision was made encompassing the right nipple.  Skin flaps are raised superiorly to the pectoralis medially to the sternum inferior to the rectus sheath and lateral leak to the lateral chest wall.  The breast tissue was then swept away from the chest wall using electrocautery.  Once I was past the lateral edge of the pectoralis major muscle, I dissected up and exposed the axillary vein and then all of the axillary tissue from the vein down was swept inferiorly.  Branches of the axillary vein and artery were clipped with hemoclips and divided.  The long thoracic nerve and the thoracodorsal bundle were left intact.  There was a bulky effect in the axilla but difficult to tell if they were involved or shrunk lymph nodes.  A 7 mm flat Mina-Loo drain is then brought out through separate stab incision.  The wound was irrigated.  It was closed with 3-0 Vicryl to the subcutaneous tissues and Insorb staples to the skin.  Sterile dressings were placed.  The patient tolerated the procedure well.  My assistant provided retraction and exposure for me throughout the case.      Estimated Blood Loss:   20 mL from 4/2/2018 10:06 AM to 4/2/2018 11:24 AM    Specimens:      ID Type Source Tests Collected by Time Destination   A : Right Breast with Axillary Lymph  Node *stitch is Lateral Tissue Breast, Right SURGICAL PATHOLOGY EXAM Angeles Fregoso MD 4/2/2018 1042           Drains:   Drain Right Breast 7 Fr. (Active)       Complications:    None    Angeles Fregoso     Date: 4/2/2018  Time: 11:46 AM

## 2021-07-20 NOTE — PROGRESS NOTES
Met with Lauren, her spouse, and her son as she arrived for follow up with Dr. Fregoso.  She reports improvement in upper and lower extremity neuropathy, but it is still present.  She no longer uses a cane when she is away from home, although stays close to [place] for some support.    We reflected on the last year and emotional support and encouragement provided. Dorothy Alejandre RN

## 2021-07-20 NOTE — PROGRESS NOTES
Discharged to home with spouse. Stated understanding of discharge instructions. 8/1/2019 Roxana Ferreira RN

## 2021-07-20 NOTE — CONSULTS
Consultation - for hypertension  Jacque Bernstein,  8/3/1934, MRN [id number]    [facility]  OA (osteoarthritis) of hip [M16.9]  Right hip pain [M25.551]    PCP: Sawyer Chou MD, [phone]   Code status:  Full Code       Extended Emergency Contact Information  Primary Emergency Contact: Vick Bernstein   [place]  Mobile Phone: [phone]  Relation: Child  Secondary Emergency Contact: Otf Bernstein   [facility]  Home Phone: [phone]  Relation: Spouse       Assessment and Plan   84 years old lady with history of hypertension, CKD 3, breast cancer had right total hip arthroplasty.  POD 0.   mL.    Essential hypertension  Blood pressure is in higher side  Lisinopril 10 mg once tonight  Resume lisinopril hydrochlorothiazide 20/12.5 mg twice a day  Hold antihypertensive if systolic blood pressure is less than 110 as risk of postop hypotension    GERD  Resume H2 blocker    History of breast cancer  Status post right mastectomy  Resume Arimidex 1 mg daily    CKD 3  Creatinine is stable    Status post right total hip arthroplasty  Resume routine postoperative care  Physical and Occupational Therapy  Use incentive spirometry frequently  DVT prophylaxis per orthopedics, aspirin 81 mg twice a day  Pain control Tylenol 1000 mg 3 times a day, oxycodone 5 to 10 mg every 3 hours as needed, IV Dilaudid 0.3 to 0.6 mg every 3 hours as needed       Chief Complaint Primary osteoarthritis of right hip       HPI   We have been requested by orthopedics/ Santiago Rivas to evaluate Jacque Bernstein for hypertension. The patient  is a 84 y.o. year old female with history of hypertension, GERD, breast cancer, CKD 3, had right total hip arthroplasty due to severe osteoarthritis of right hip.  .  Right hip pain is stable.  Resting comfortably.  On lisinopril and hydrochlorothiazide 20/12.5 mg twice a day for hypertension.  Blood pressure is in higher side.  On famotidine  twice a day for GERD.  Symptoms are stable.  Has history of breast cancer.  Treated with mastectomy and on Arimidex 1 mg daily.  Has CKD 3.  Creatinine has been stable.  Denies headache, chest pain, breathing difficulty, palpitation, nausea, vomiting, abdominal pain or urinary symptoms.  Does not have  history of heart disease, lung disease, diabetes, bleeding or clotting disorder or sleep apnea.  Preop physical is reviewed.  History is provided by the patient.       Medical History  Active Ambulatory (Non-Hospital) Problems    Diagnosis     Personal history of malignant neoplasm of breast     Malignant neoplasm of central portion of right breast in female, estrogen receptor positive, Her 2 matthew positive.     Past Medical History:   Diagnosis Date     Arthritis      Breast cancer (H)      Chronic kidney disease      Encephalomyelitis 1956     History of anesthesia complications      Hypertension      Incontinence of urine      Neuropathy      Shingles     Surgical History  She  has a past surgical history that includes Knee arthroscopy; Appendectomy (07/20/1985); Oophorectomy; pr insj prph ctr vad w/subq port age 5 yr/> (Left, 12/14/2017); Hysterectomy; pr mastectomy, modified radical (Right, 4/2/2018); Mastectomy (Right, 04/02/2018); Total knee arthroplasty (Right); Total hip arthroplasty (Left); Cataract extraction; and pr rmvl ervin ctr vad w/subq port/ ctr/prph insj (N/A, 3/20/2019).   Social History  Reviewed, and she  reports that she has never smoked. She has never used smokeless tobacco. She reports that she drank alcohol. She reports that she does not use drugs.   Allergies  Allergies   Allergen Reactions     Antihistamine [Diphenhydramine Hcl] Other (See Comments)     Sleeplessness, jumpy    Family History  Reviewed, and family history includes Cancer in her brother and father; Gout in her brother; Heart attack in her mother; No Medical Problems in her daughter, sister, son, son, and son; Prostate cancer  (age of onset: 77) in her brother; Prostate cancer (age of onset: 82) in her father; Skin cancer in her brother.   Psychosocial Needs  Social History     Social History Narrative     Not on file     Additional psychosocial needs reviewed per nursing assessment.     Prior to Admission Medications   Medications Prior to Admission   Medication Sig Dispense Refill Last Dose     acetaminophen (TYLENOL) 500 MG tablet Take 1,000 mg by mouth 2 (two) times a day.   7/30/2019 at pm     anastrozole (ARIMIDEX) 1 mg tablet Take 1 tablet (1 mg total) by mouth daily. 90 tablet 1 7/30/2019 at pm (takes in the evening)     aspirin 81 MG EC tablet Take 81 mg by mouth daily.    ~10 days ago     b complex vitamins tablet Take 1 tablet by mouth daily.   7/29/2019     calcium carbonate-vitamin D2 500 mg(1,250mg) -200 unit tablet Take 1 tablet by mouth daily.    ~ 1 week ago     docusate sodium (COLACE) 100 MG capsule Take 100 mg by mouth 2 (two) times a day as needed for constipation.   as needed     famotidine (PEPCID) 20 MG tablet Take 20 mg by mouth daily as needed for heartburn.    as needed     glucosamine-chondroitin 500-400 mg cap Take 1 capsule by mouth daily.   ~ 2 weeks ago     lisinopril-hydrochlorothiazide (PRINZIDE,ZESTORETIC) 20-12.5 mg per tablet Take 1 tablet by mouth 2 (two) times a day.    7/30/2019 at pm     melatonin 3 mg Tab tablet Take 3 mg by mouth at bedtime as needed.          as needed     MULTIVITAMIN (MULTIPLE VITAMIN ORAL) Take 1 tablet by mouth daily.   > 1 week ago          Review of Systems:  Constitutional: negative   Eyes: negative for  visual disturbance  Ears, nose, mouth, throat, and face: negative for  nasal congestion,   sore throat    Respiratory ROS: negative for - cough,   shortness of breath, sputum changes or wheezing    Cardiovascular ROS: negative for - chest pain, dyspnea on exertion, edema, palpitations   Gastrointestinal: negative for constipation, diarrhea, nausea, reflux symptoms,  vomiting Genitourinary:negative for dysuria, frequency   Hematologic/lymphatic: negative for easy bruising, lymphadenopathy    Musculoskeletal:negative for back pain, muscle weakness, myalgias, neck pain and stiff joints   Neurological: negative for dizziness, headaches, vertigo and weakness    Behavioral/Psych: negative for anxiety, decreased appetite, depression    Endocrine: negative for diabetic symptoms including increased fatigue       Physical Exam:  Temp:  [97.4  F (36.3  C)-98.9  F (37.2  C)] 97.4  F (36.3  C)  Heart Rate:  [72-88] 74  Resp:  [8-18] 16  BP: (136-164)/(63-95) 161/70    General Appearance:    Alert, cooperative, no distress, appears stated age   Head:    Normocephalic, atraumatic   Eyes:    PERRL, conjunctiva  clear, EOM's intact    Nose:   Nares normal,  no drainage  or sinus tenderness   Throat:   Lips, mucosa,  gums normal   Neck:   Supple, symmetrical,  no carotid  bruit or JVD   Back:     Symmetric, no CVA tenderness   Lungs:     Clear to auscultation bilaterally, respirations unlabored   Chest wall:    No tenderness or deformity   Heart:    Regular rate and rhythm, S1 and S2 normal, no murmur   Abdomen:     Soft, non-tender, bowel sounds active,     no masses, no organomegaly   Extremities:   S/P right hip arthroplasty   Pulses:   2+ and symmetric all extremities   Skin:   no rashes or lesions   Neurologic:   CNII-XII intact.           Pertinent Labs  Lab Results: personally reviewed.   Lab Results   Component Value Date     07/23/2019    K 4.2 07/31/2019     07/23/2019    CO2 22 07/23/2019    BUN 39 (H) 07/23/2019    CREATININE 0.98 07/31/2019    CALCIUM 10.5 07/23/2019     Lab Results   Component Value Date    WBC 7.7 07/31/2019    HGB 12.6 07/31/2019    HCT 38.6 07/31/2019    MCV 99 07/31/2019     07/31/2019           Total time spent  70 min    Breanna Borrego MD.  [facility] medicine service.

## 2021-07-20 NOTE — PROGRESS NOTES
Met with Lauren and her family member to check in and inquire about her needs.  She feels well supported and declines resources at present.  Reminded her I am available if she has questions or obstacles to care. Dorothy Alejandre RN

## 2021-07-20 NOTE — PROGRESS NOTES
"This is a 83 y.o.  female who I'm asked to see by Sawyer Chou MD for evaluation of a right breast cancer.  This was picked up by the patient.  The patient cannot feel a mass per se but noticed changes in the skin of the breast.  She states first she just noticed that it looks somewhat bruised.  But then felt that the skin seemed thicker and that the breast just became heavier.  She underwent a mammogram and then ultrasound.  There is just some generalized thickening of the breast itself and not a discrete mass in the breast.  However they could clearly see an enlarged lymph node in the axilla.  A needle biopsy was done which shows an invasive ductal carcinoma.  It is estrogen receptor negative, progesterone receptor positive and HER-2 positive.    She has no family history of breast cancer.      PAST MEDICAL HISTORY:  Past Medical History:   Diagnosis Date     Breast cancer      Hypertension      Past Surgical History:   Procedure Laterality Date     CATARACT EXTRACTION       KNEE ARTHROSCOPY         Medications:    Current Outpatient Prescriptions:      lisinopril-hydrochlorothiazide (PRINZIDE,ZESTORETIC) 20-12.5 mg per tablet, Take 1 tablet by mouth 2 (two) times a day., Disp: , Rfl:     Allergies:  Allergies   Allergen Reactions     Antihistamines - Alkylamine        Social History:   reports that she has never smoked. She has never used smokeless tobacco. She reports that she drinks alcohol. She reports that she does not use illicit drugs.    ROS:  A 12 point comprehensive review of systems was negative except as noted.    Physical Exam  /86 (Patient Site: Right Arm, Patient Position: Sitting, Cuff Size: Adult Regular)  Pulse (!) 116  Ht 5' 5\" (1.651 m)  Wt 176 lb (79.8 kg)  SpO2 95%  BMI 29.29 kg/m2  General:alert, appears stated age and cooperative  Lungs:clear to auscultation bilaterally  CV:regular rate and rhythm, S1, S2 normal, no murmur, click, rub or gallop  Breasts: The right breast is " just full compared to the left.  It is not a discrete mass but there is definitely a thickening that encompasses much of the breast compared to the left.  She has pigment changes in the right breast particularly in the lower aspect of the breast.  It is not a classic peau d'orange change.  Lymph Nodes: Palpable lymphadenopathy in the right.  None on the left.  Neuro:Grossly normal      Reviewed her mammograms and ultrasound and pathology.     Procedure: After verbal consent and sterile prep, 2 areas of the breast were infiltrated with 1% lidocaine and 2 6 mm punch biopsies were taken.  These were placed in formalin.  Each site was closed with interrupted stitches of 3-0 nylon.  Sterile dressings are placed.    Impression: Right Breast Cancer.  This is clinically worrisome for inflammatory breast cancer.  It is not a classic change but on mammogram you can definitely see thickening of the skin.  In any case it is high-grade and aggressive.  Discussed the surgical options for treatment of breast cancer which generally are a lumpectomy (partial mastectomy) combined with radiation versus a mastectomy.  Explained that the survival benefit is the same for both.  The difference is in local recurrence risk.  The patient is a Poor candidate for a lumpectomy.  In fact I do not see that a lumpectomy would be possible.  The big question is whether or not the skin biopsies are positive.  I explained that given what we know so far she clearly is going to be recommended to do chemotherapy.  Despite her age she is at very high risk for metastases if chemotherapy is not done.  The big question is whether or not the skin biopsies are positive.  If they are then she should do chemotherapy first.  If they are negative then you could argue both ways to either just go ahead with surgery or to do neoadjuvant chemotherapy.  She needs a PET scan to be sure that there is not already metastatic disease.      Plan: We have scheduled for a PET  scan.  I also discussed mastectomy with her.  This would be a modified radical mastectomy because we would need to lymph nodes.  If we go ahead with surgery first I would also place a port.  I have also discussed this already with the oncologist and they are going to work to get her in early next week.  Holding off on scheduling the surgery until we get the skin biopsies and the PET scan back.  Total time with the patient which was almost entirely in discussion and coordination of care was 1 hour.

## 2021-07-20 NOTE — ANESTHESIA PREPROCEDURE EVALUATION
Anesthesia Evaluation      Patient summary reviewed   History of anesthetic complications (slow emergence)     Airway   Mallampati: II  Neck ROM: full   Pulmonary - negative ROS and normal exam    breath sounds clear to auscultation                         Cardiovascular - normal exam  (+) hypertension well controlled, ,      Neuro/Psych - negative ROS     Endo/Other    (+) obesity (bmi 32),      GI/Hepatic/Renal - negative ROS      Other findings: Breast cancer        Dental - normal exam   (+) caps and bridge                       Anesthesia Plan  Planned anesthetic: MAC  Propofol ggt - ketamine PRN    Decadron/zofran  ASA 2   Induction: intravenous   Anesthetic plan and risks discussed with: patient  Anesthesia plan special considerations: antiemetics,   Post-op plan: routine recovery      Results for orders placed or performed during the hospital encounter of 12/11/17   POCT Glucose   Result Value Ref Range    Glucose,  mg/dL

## 2021-07-20 NOTE — PROGRESS NOTES
[facility] Cancer Care Progress Note    Patient: Jacque Bernstein  MRN: [id number]  Date of Service: 2/18/2019          Reason for visit      1. Malignant neoplasm of central portion of right breast in female, estrogen receptor positive, Her 2 matthew positive.        Assessment     1.  A very pleasant 84 y.o.  woman with advanced breast cancer located on the right side T3 N2 M0 which was ER negative NC weakly positive but HER-2/matthew 3+.  Status post neoadjuvant chemotherapy with weekly paclitaxel, Herceptin and Perjetta.  Status post mastectomy with small amount of residual tumor in the breast and one positive lymph node.  The residual tumor in the breast is ER positive NC negative and HER-2/matthew positive.  Anderson disease is ER negative NC negative HER-2/matthew 3+.  She has finished radiation therapy and also 1 year of Herceptin.  2.  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  3.  Good general health otherwise.  4.  Some osteopenia seen on her bone density.    Plan     1.  Continue on anastrozole for at least 5 years which she would finish in July 2023.  2.  Continue symptomatic care for her neuropathy.  She is slowly getting better.  3.  Good diet and exercise.  I am also going to refer her to physical therapy to get her some gait strengthening exercises done.  4.  Advised to continue to wear the compression sleeve as much as she can.  She absolutely has to use it if she takes a flight in an airplane.  Her current sleeve appears to be getting somewhat loose.  Advised to get one which fits better.  5.  Advised to take some calcium rich diet and vitamin D.  Advised to exercise outside in the form of walking and get some sunlight.    Clinical stage      Cancer Staging  Malignant neoplasm of central portion of right breast in female, estrogen receptor positive, Her 2 matthew positive.  Staging form: Breast, AJCC 7th Edition  - Clinical stage from 12/11/2017: Stage IIIA (T3, N2a, M0) - Signed by [place]  Holland AYALA MD on 12/14/2017  ER Status: Negative  OK Status: Positive  HER2 Status: Positive      History     Jacque Bernstein is a very pleasant 84 y.o. old female with a history of invasive ductal carcinoma.  Jacque noted some fullness in her breast about end of November.  She had a bruise few days but prior to that in that breast.  After the bruise resolved she felt that there was still the lump present.  She went and saw Dr. Chou who felt that this was very concerning for malignancy.  Ultrasound and mammogram were done which were very suggestive of malignancy.  She underwent biopsy of the right axillary lymph node which confirmed the presence of invasive ductal carcinoma which was ER negative, OK weakly positive but HER-2/matthew 3+ on immunohistochemistry.  This was a high-grade tumor.  The lymph nodes are clinically palpable.     The patient has been since seen by Dr. Fregoso.  Dr. Fregoso performed a punch biopsy of the skin.  She was concerned that this might be inflammatory breast cancer.  The skin biopsies came back negative for involvement with cancer.  She has since started on neoadjuvant chemotherapy with weekly paclitaxel along with Herceptin and once every 3 week Perjetta.  She seems to be tolerating treatment quite well.  She noticed significant improvement in her breast lump in fact that she could not feel it anymore.    She underwent mastectomy in April 2018 and pathology revealed one positive lymph node and a small 1.8 cm lesion in the breast.  Margins negative.  Interestingly the lesion in the breast was ER positive OK borderline positive HER-2/matthew positive at least in the parts of the tumor.  The lymph node metastases ER negative OK negative and HER-2/matthew 3+.      She is started on radiation therapy.  Finished that on July 18, 2018.  She had some radiation burns.  She was also noted to have slight lymphedema so she was prescribed a lymphedema sleeve as well on her right arm.      She has continued  Herceptin.  We also started on anastrozole on July 2018.  She is tolerating that with the expected side effects.  Comes in today for scheduled follow-up.  She feels that some of the side effects are getting better.  Neuropathy is getting better.  She is noticing increased stiffness in her joints.  She also needs some help in getting up.  Needs walker to walk as well.  She feels her balance is not as good.  No significant hot flashes.  Overall however her mood is upbeat.    Past Medical History     Past Medical History:   Diagnosis Date     Breast cancer (H)      Chronic kidney disease     Stage III     Encephalomyelitis 1956     History of anesthesia complications     Felt jittery after some surgeries for days not sure what caused it     Hypertension      Incontinence of urine      Neuropathy (H)      Shingles        Review of Systems   Constitutional  Constitutional (WDL): Exceptions to WDL  Fatigue: Fatigue relieved by rest  Neurosensory  Neurosensory (WDL): Exceptions to WDL  Peripheral Motor Neuropathy: Asymptomatic, clinical or diagnostic observations only, intervention not indicated  Ataxia: Moderate symptoms, limiting instrumental ADL  Peripheral Sensory Neuropathy: Moderate symptoms, limiting instrumental ADL(feet and hands)  Cardiovascular  Cardiovascular (WDL): Exceptions to WDL  Edema: Yes  Edema Limbs: 5 - 10% inter-limb discrepancy in volume or circumference at point of greatest visible difference, swelling or obscuration of anatomic architecture on close inspection(ankles)  Pulmonary  Respiratory (WDL): Within Defined Limits  Gastrointestinal  Gastrointestinal (WDL): All gastrointestinal elements are within defined limits  Genitourinary  Genitourinary (WDL): All genitourinary elements are within defined limits  Integumentary  Integumentary (WDL): All integumentary elements are within defined limits  Patient Coping  Patient Coping: Accepting  Accompanied by  Accompanied by: Family Member(son and  )    ECOG performance status and Distress Assessment      ECOG Performance:    ECOG Performance Status: 2    Distress Assessment  Distress Assessment Score: No distress:     Pain Status  Currently in Pain: No/denies      Vital Signs     Vitals:    02/18/19 1258   BP: 164/73   Pulse: 86   Temp: 98  F (36.7  C)   SpO2: 98%       Physical Exam     GENERAL: No acute distress. Cooperative in conversation.   HEENT: Pupils are equal, round and reactive. Oral mucosa is clean and intact. No ulcerations or mucositis noted. No bleeding noted.  RESP:Chest symmetric lungs are clear bilaterally per auscultation. Regular respiratory rate. No wheezes or rhonchi.  CV: Normal S1 S2 Regular, rate and rhythm. No murmurs.  ABD: Nondistended, soft, nontender. Positive bowel sounds. No organomegaly.   EXTREMITIES: No lower extremity edema.  She is wearing a lymphedema sleeve on her right arm but does not appear to have any lymphedema.  In fact the sleeve appears somewhat loose.  NEURO: Non- focal. Alert and oriented x3.  Cranial nerves appear intact.  PSYCH: Within normal limits. No depression or anxiety.  SKIN: Warm dry intact.    LYMPH NODES: Bilateral cervical, supraclavicular, axillary lymph node examination was done.  Slight right axillary palpable adenopathy.Still has a drain in      Lab Results     Results for orders placed or performed in visit on 02/18/19   Comprehensive Metabolic Panel   Result Value Ref Range    Sodium 139 136 - 145 mmol/L    Potassium 4.3 3.5 - 5.0 mmol/L    Chloride 105 98 - 107 mmol/L    CO2 25 22 - 31 mmol/L    Anion Gap, Calculation 9 5 - 18 mmol/L    Glucose 120 70 - 125 mg/dL    BUN 36 (H) 8 - 28 mg/dL    Creatinine 0.99 0.60 - 1.10 mg/dL    GFR MDRD Af Amer >60 >60 mL/min/1.73m2    GFR MDRD Non Af Amer 53 (L) >60 mL/min/1.73m2    Bilirubin, Total 0.4 0.0 - 1.0 mg/dL    Calcium 10.2 8.5 - 10.5 mg/dL    Protein, Total 6.6 6.0 - 8.0 g/dL    Albumin 3.5 3.5 - 5.0 g/dL    Alkaline Phosphatase 64 45 -  120 U/L    AST 16 0 - 40 U/L    ALT 15 0 - 45 U/L   HM1 (CBC with Diff)   Result Value Ref Range    WBC 8.6 4.0 - 11.0 thou/uL    RBC 4.18 3.80 - 5.40 mill/uL    Hemoglobin 13.3 12.0 - 16.0 g/dL    Hematocrit 40.4 35.0 - 47.0 %    MCV 97 80 - 100 fL    MCH 31.8 27.0 - 34.0 pg    MCHC 32.9 32.0 - 36.0 g/dL    RDW 13.4 11.0 - 14.5 %    Platelets 290 140 - 440 thou/uL    MPV 9.5 8.5 - 12.5 fL    Neutrophils % 67 50 - 70 %    Lymphocytes % 20 20 - 40 %    Monocytes % 10 2 - 10 %    Eosinophils % 4 0 - 6 %    Basophils % 1 0 - 2 %    Neutrophils Absolute 5.7 2.0 - 7.7 thou/uL    Lymphocytes Absolute 1.7 0.8 - 4.4 thou/uL    Monocytes Absolute 0.8 0.0 - 0.9 thou/uL    Eosinophils Absolute 0.3 0.0 - 0.4 thou/uL    Basophils Absolute 0.0 0.0 - 0.2 thou/uL         Imaging Results     No results found.      Holland Ho MD

## 2021-07-20 NOTE — PROGRESS NOTES
[facility] Cancer Care Progress Note    Patient: Jacque Bernstein  MRN: [id number]  Date of Service: 4/23/2018          Reason for visit      1. Malignant neoplasm of central portion of right breast in female, estrogen receptor negative, HER-2 positive    2. Malignant neoplasm of central portion of right breast in female, estrogen receptor positive, Her 2 matthew positive.        Assessment     1.  A very pleasant 83 y.o.  woman with advanced breast cancer located on the right side T3 N2 M0 which is ER negative WY weakly positive but HER-2/matthew 3+.  Status post neoadjuvant chemotherapy with weekly paclitaxel, Herceptin and Perjetta.  Status post mastectomy with small amount of residual tumor in the breast and one positive lymph node.  The residual tumor in the breast is ER positive WY negative and HER-2/matthew positive.  Anderson disease is ER negative WY negative HER-2/matthew 3+..  2.  Chemotherapy induced neuropathy and diarrhea.  3.  Good general health otherwise.  4.  Hypertension due to a combination of slight decreased fluid intake and her antihypertensive medications.    Plan     1.  I had lengthy discussion with her regarding management of her condition.  She will need to continue on Herceptin for a year as discussed before.  She should also go ahead and finished radiation therapy.  After she is finished radiation therapy I am also going to add hormonal therapy with exemestane to her treatment plan.  2.  Continue symptomatic care for her neuropathy.  3.  Good diet and exercise.  4.  Getting radiation therapy at a place closer to her home.    Clinical stage      Malignant neoplasm of central portion of right breast in female, estrogen receptor positive, Her 2 matthew positive.    Staging form: Breast, AJCC 7th Edition    - Clinical stage from 12/11/2017: Stage IIIA (T3, N2a, M0) - Signed by Holland Ho MD on 12/14/2017          ER Status: Negative          WY Status: Positive          HER2 Status: Positive    History      Jacque Bernstein is a very pleasant 83 y.o. old female with a history of invasive ductal carcinoma.  Jacque noted some fullness in her breast about end of November.  She had a bruise few days but prior to that in that breast.  After the bruise resolved she felt that there was still the lump present.  She went and saw Dr. Chou who felt that this was very concerning for malignancy.  Ultrasound and mammogram were done which were very suggestive of malignancy.  She underwent biopsy of the right axillary lymph node which confirmed the presence of invasive ductal carcinoma which was ER negative, WA weakly positive but HER-2/matthew 3+ on immunohistochemistry.  This was a high-grade tumor.  The lymph nodes are clinically palpable.     The patient has been since seen by Dr. Fregoso.  Dr. Fregosoperformed a punch biopsy of the skin.  She was concerned that this might be inflammatory breast cancer.  The skin biopsies came back negative for involvement with cancer.  She has since started on neoadjuvant chemotherapy with weekly paclitaxel along with Herceptin and once every 3 week Perjetta.  She seems to be tolerating treatment quite well.  She noticed significant improvement in her breast lump in fact that she could not feel it anymore.    She underwent mastectomy earlier this month and pathology revealed one positive lymph node and a small 1.8 cm lesion in the breast.  Margins negative.  Interestingly the lesion in the breast was ER positive WA borderline positive HER-2/matthew positive at least in the parts of the tumor.  The lymph node metastases ER negative WA negative and HER-2/matthew 3+.  She is recovering well from her surgery.  She still has a drain in place.    Past Medical History     Past Medical History:   Diagnosis Date     Breast cancer      Chronic kidney disease     Stage III     Encephalomyelitis 1956     History of anesthesia complications     Felt jittery after some surgeries for days not sure what caused it      Hypertension      Incontinence of urine      Neuropathy      Shingles        Review of Systems   Constitutional  Constitutional (WDL): Exceptions to WDL  Fatigue: Fatigue relieved by rest  Weight Loss: to <10% from baseline, intervention not indicated (down 5 lbs)  Neurosensory  Neurosensory (WDL): Exceptions to WDL  Peripheral Motor Neuropathy: Moderate symptoms, limiting instrumental ADL  Ataxia: Moderate symptoms, limiting instrumental ADL (using a walker/ off balance)  Peripheral Sensory Neuropathy: Moderate symptoms, limiting instrumental ADL (hands and feet)  Cardiovascular  Cardiovascular (WDL): Exceptions to WDL  Edema: Yes  Edema Limbs: 5 - 10% inter-limb discrepancy in volume or circumference at point of greatest visible difference, swelling or obscuration of anatomic architecture on close inspection (left knee and ankles)  Pulmonary  Respiratory (WDL): Exceptions to WDL (allergies)  Cough: Mild symptoms, nonprescription intervention indicated  Dyspnea: Shortness of breath with moderate exertion (worse in the shower)  Gastrointestinal  Gastrointestinal (WDL): All gastrointestinal elements are within defined limits  Genitourinary  Genitourinary (WDL): All genitourinary elements are within defined limits (some incontinence)  Integumentary  Integumentary (WDL): Exceptions to WDL (recent rt mastectomy)  Alopecia: Hair loss of >50% normal for that individual that is readily apparent to others, a wig or hair piece is necessary if the patient desires to completely camouflage the hair loss, associated with psychosocial impact  Patient Coping  Patient Coping: Accepting  Accompanied by  Accompanied by: Family Member    ECOG performance status and Distress Assessment      ECOG Performance:    ECOG Performance Status: 2    Distress Assessment  Distress Assessment Score: No distress:     Pain Status  Currently in Pain: No/denies        Vital Signs     Vitals:    04/23/18 1043   BP: 131/60   Pulse: 89   Temp: 98  F  (36.7  C)   SpO2: 96%       Physical Exam     GENERAL: No acute distress. Cooperative in conversation.   HEENT: Pupils are equal, round and reactive. Oral mucosa is clean and intact. No ulcerations or mucositis noted. No bleeding noted.  RESP:Chest symmetric lungs are clear bilaterally per auscultation. Regular respiratory rate. No wheezes or rhonchi.  CV: Normal S1 S2 Regular, rate and rhythm. No murmurs.  ABD: Nondistended, soft, nontender. Positive bowel sounds. No organomegaly.   EXTREMITIES: No lower extremity edema.   NEURO: Non- focal. Alert and oriented x3.  Cranial nerves appear intact.  PSYCH: Within normal limits. No depression or anxiety.  SKIN: Warm dry intact.    LYMPH NODES: Bilateral cervical, supraclavicular, axillary lymph node examination was done.  Slight right axillary palpable adenopathy.  BREAST status post right-sided mastectomy.  Still has a drain in place.      Lab Results     Results for orders placed or performed in visit on 04/23/18   Comprehensive Metabolic Panel   Result Value Ref Range    Sodium 138 136 - 145 mmol/L    Potassium 4.2 3.5 - 5.0 mmol/L    Chloride 99 98 - 107 mmol/L    CO2 26 22 - 31 mmol/L    Anion Gap, Calculation 13 5 - 18 mmol/L    Glucose 127 (H) 70 - 125 mg/dL    BUN 18 8 - 28 mg/dL    Creatinine 1.09 0.60 - 1.10 mg/dL    GFR MDRD Af Amer 58 (L) >60 mL/min/1.73m2    GFR MDRD Non Af Amer 48 (L) >60 mL/min/1.73m2    Bilirubin, Total 0.3 0.0 - 1.0 mg/dL    Calcium 9.8 8.5 - 10.5 mg/dL    Protein, Total 6.2 6.0 - 8.0 g/dL    Albumin 2.9 (L) 3.5 - 5.0 g/dL    Alkaline Phosphatase 58 45 - 120 U/L    AST 19 0 - 40 U/L    ALT 15 0 - 45 U/L   HM1 (CBC with Diff)   Result Value Ref Range    WBC 8.6 4.0 - 11.0 thou/uL    RBC 3.51 (L) 3.80 - 5.40 mill/uL    Hemoglobin 11.6 (L) 12.0 - 16.0 g/dL    Hematocrit 35.7 35.0 - 47.0 %     (H) 80 - 100 fL    MCH 33.0 27.0 - 34.0 pg    MCHC 32.5 32.0 - 36.0 g/dL    RDW 13.4 11.0 - 14.5 %    Platelets 357 140 - 440 thou/uL    MPV  9.5 8.5 - 12.5 fL    Neutrophils % 65 50 - 70 %    Lymphocytes % 19 (L) 20 - 40 %    Monocytes % 12 (H) 2 - 10 %    Eosinophils % 4 0 - 6 %    Basophils % 1 0 - 2 %    Neutrophils Absolute 5.6 2.0 - 7.7 thou/uL    Lymphocytes Absolute 1.7 0.8 - 4.4 thou/uL    Monocytes Absolute 1.0 (H) 0.0 - 0.9 thou/uL    Eosinophils Absolute 0.4 0.0 - 0.4 thou/uL    Basophils Absolute 0.0 0.0 - 0.2 thou/uL         Imaging Results     No results found.      Holland Ho MD

## 2021-07-20 NOTE — PROGRESS NOTES
"Jacque \"Lauren\" is here today for ongoing management and treatment of right breast cancer. Today is planned D1C17 herceptin pending labs and oV with Dr Ho. Jackie Ryder    "

## 2021-07-20 NOTE — PROGRESS NOTES
[facility] Cancer Care Progress Note    Patient: Jacque Bernstein  MRN: [id number]  Date of Service: 5/20/2019          Reason for visit      1. Malignant neoplasm of central portion of right breast in female, estrogen receptor positive, Her 2 matthew positive.        Assessment     1.  A very pleasant 84 y.o.  woman with advanced breast cancer located on the right side T3 N2 M0 which was ER negative WY weakly positive but HER-2/matthew 3+.  Status post neoadjuvant chemotherapy with weekly paclitaxel, Herceptin and Perjetta.  Status post mastectomy with small amount of residual tumor in the breast and one positive lymph node.  The residual tumor in the breast is ER positive WY negative and HER-2/matthew positive.  Anderson disease is ER negative WY negative HER-2/matthew 3+.  She has finished radiation therapy and also 1 year of Herceptin.  2.  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  3.  Good general health otherwise.  4.  Some osteopenia seen on her bone density.  5.  Right hip pain. This is DJD type issue.    Plan     1.  Patient will continue on anastrozole for at least 5 years which she would finish in July 2023.  2.  Continue symptomatic care for her neuropathy.  She is slowly getting better.  3.  Good diet and exercise.    4.  Advised to continue to wear the compression sleeve as much as she can.  She absolutely has to use it if she takes a flight in an airplane.    5.  Continue to take some calcium rich diet and vitamin D.  Advised to exercise outside in the form of walking and get some sunlight.  6.  Advised to see Dr. To for her hip. Advised to take some Tylenol with Aleve.    Clinical stage      Cancer Staging  Malignant neoplasm of central portion of right breast in female, estrogen receptor positive, Her 2 matthew positive.  Staging form: Breast, AJCC 7th Edition  - Clinical stage from 12/11/2017: Stage IIIA (T3, N2a, M0) - Signed by Holland Ho MD on 12/14/2017  ER Status:  Negative  NH Status: Positive  HER2 Status: Positive      History     Jacque Bernstein is a very pleasant 84 y.o. old female with a history of invasive ductal carcinoma.  Jacque noted some fullness in her breast about end of November 2017.  She had a bruise few days prior to that in that breast.  After the bruise resolved she felt that there was still the lump present.  She went and saw Dr. Chou who felt that this was very concerning for malignancy.  Ultrasound and mammogram were done which were very suggestive of malignancy.  She underwent biopsy of the right axillary lymph node which confirmed the presence of invasive ductal carcinoma which was ER negative, NH weakly positive but HER-2/matthew 3+ on immunohistochemistry.  This was a high-grade tumor.  The lymph nodes are clinically palpable.     The patient has been since seen by Dr. Fregoso.  Dr. Fregoso performed a punch biopsy of the skin.  She was concerned that this might be inflammatory breast cancer.  The skin biopsies came back negative for involvement with cancer.  She has since started on neoadjuvant chemotherapy with weekly paclitaxel along with Herceptin and once every 3 week Perjetta.  She seems to be tolerating treatment quite well.  She noticed significant improvement in her breast lump in fact that she could not feel it anymore.    She underwent mastectomy in April 2018 and pathology revealed one positive lymph node and a small 1.8 cm lesion in the breast.  Margins negative.  Interestingly the lesion in the breast was ER positive NH borderline positive HER-2/matthew positive at least in the parts of the tumor.  The lymph node metastases ER negative NH negative and HER-2/matthew 3+.      She is started on radiation therapy.  Finished that on July 18, 2018.  She had some radiation burns.  She was also noted to have slight lymphedema so she was prescribed a lymphedema sleeve as well on her right arm.      She has continued Herceptin.  We also started on anastrozole  on July 2018.  She is tolerating that with the expected side effects.  Comes in today for scheduled follow-up.  Her neuropathy is getting better in her hands.  The feet are about the same.  She is also noticing increasing achiness in her right hip area.  Due to that reason she is not doing a whole lot of walking.  The fact that it was a harsh went to did not help either.  She has gained a little bit of weight.  She also needs some help in getting up.  Needs walker to walk as well.  She feels her balance is not as good.  No significant hot flashes.  She still has some trouble with bladder incontinence issues.    Past Medical History     Past Medical History:   Diagnosis Date     Breast cancer (H)      Chronic kidney disease     Stage III     Encephalomyelitis 1956     History of anesthesia complications     Felt jittery after some surgeries for days not sure what caused it     Hypertension      Incontinence of urine      Neuropathy (H)      Shingles        Review of Systems   Constitutional  Constitutional (WDL): Exceptions to WDL  Weight Gain: 5 - <10% from baseline(up 8 lbs)  Neurosensory  Neurosensory (WDL): Exceptions to WDL  Peripheral Motor Neuropathy: Asymptomatic, clinical or diagnostic observations only, intervention not indicated  Ataxia: Asymptomatic, clinical or diagnostic observations only, intervention not indicated(using a cane)  Peripheral Sensory Neuropathy: Moderate symptoms, limiting instrumental ADL(hands, feet)  Cardiovascular  Cardiovascular (WDL): Exceptions to WDL  Edema: Yes  Edema Limbs: 5 - 10% inter-limb discrepancy in volume or circumference at point of greatest visible difference, swelling or obscuration of anatomic architecture on close inspection(ankles)  Pulmonary  Respiratory (WDL): Exceptions to WDL  Dyspnea: Shortness of breath with moderate exertion  Gastrointestinal  Gastrointestinal (WDL): Exceptions to WDL  Constipation: Occasional or intermittent symptoms, occasional use of  stool softeners, laxatives, dietary modification, or enema  Dry Mouth: Symptomatic (e.g., dry or thick saliva) without significant dietary alteration, unstimulated saliva flow >0.2 ml/min(at night)  Genitourinary  Genitourinary (WDL): All genitourinary elements are within defined limits(incontinence)  Integumentary  Integumentary (WDL): All integumentary elements are within defined limits  Patient Coping  Patient Coping: Accepting  Accompanied by  Accompanied by: Family Member( and daughter-in-law)    ECOG performance status and Distress Assessment      ECOG Performance:    ECOG Performance Status: 2    Distress Assessment  Distress Assessment Score: No distress:     Pain Status  Currently in Pain: No/denies      Vital Signs     Vitals:    05/20/19 1252   BP: 171/78   Pulse: (!) 101   Temp: 97.9  F (36.6  C)   SpO2: 96%       Physical Exam     GENERAL: No acute distress. Cooperative in conversation.   HEENT: Pupils are equal, round and reactive. Oral mucosa is clean and intact. No ulcerations or mucositis noted. No bleeding noted.  RESP:Chest symmetric lungs are clear bilaterally per auscultation. Regular respiratory rate. No wheezes or rhonchi.  CV: Normal S1 S2 Regular, rate and rhythm. No murmurs.  ABD: Nondistended, soft, nontender. Positive bowel sounds. No organomegaly.   EXTREMITIES: No lower extremity edema.  She is wearing a lymphedema sleeve on her right arm but does not appear to have any lymphedema.  In fact the sleeve appears somewhat loose.  NEURO: Non- focal. Alert and oriented x3.  Cranial nerves appear intact.  PSYCH: Within normal limits. No depression or anxiety.  SKIN: Warm dry intact.    LYMPH NODES: Bilateral cervical, supraclavicular, axillary lymph node examination was done.  Slight right axillary palpable adenopathy.Still has a drain in      Lab Results     Results for orders placed or performed in visit on 05/20/19   Comprehensive Metabolic Panel   Result Value Ref Range    Sodium  141 136 - 145 mmol/L    Potassium 4.6 3.5 - 5.0 mmol/L    Chloride 104 98 - 107 mmol/L    CO2 25 22 - 31 mmol/L    Anion Gap, Calculation 12 5 - 18 mmol/L    Glucose 99 70 - 125 mg/dL    BUN 30 (H) 8 - 28 mg/dL    Creatinine 0.99 0.60 - 1.10 mg/dL    GFR MDRD Af Amer >60 >60 mL/min/1.73m2    GFR MDRD Non Af Amer 53 (L) >60 mL/min/1.73m2    Bilirubin, Total 0.5 0.0 - 1.0 mg/dL    Calcium 11.0 (H) 8.5 - 10.5 mg/dL    Protein, Total 6.9 6.0 - 8.0 g/dL    Albumin 3.7 3.5 - 5.0 g/dL    Alkaline Phosphatase 68 45 - 120 U/L    AST 17 0 - 40 U/L    ALT 10 0 - 45 U/L   HM1 (CBC with Diff)   Result Value Ref Range    WBC 11.3 (H) 4.0 - 11.0 thou/uL    RBC 4.41 3.80 - 5.40 mill/uL    Hemoglobin 14.3 12.0 - 16.0 g/dL    Hematocrit 43.3 35.0 - 47.0 %    MCV 98 80 - 100 fL    MCH 32.4 27.0 - 34.0 pg    MCHC 33.0 32.0 - 36.0 g/dL    RDW 13.2 11.0 - 14.5 %    Platelets 349 140 - 440 thou/uL    MPV 9.6 8.5 - 12.5 fL    Neutrophils % 72 (H) 50 - 70 %    Lymphocytes % 16 (L) 20 - 40 %    Monocytes % 9 2 - 10 %    Eosinophils % 2 0 - 6 %    Basophils % 1 0 - 2 %    Neutrophils Absolute 8.1 (H) 2.0 - 7.7 thou/uL    Lymphocytes Absolute 1.8 0.8 - 4.4 thou/uL    Monocytes Absolute 1.0 (H) 0.0 - 0.9 thou/uL    Eosinophils Absolute 0.3 0.0 - 0.4 thou/uL    Basophils Absolute 0.1 0.0 - 0.2 thou/uL         Imaging Results     No results found.      Holland Ho MD

## 2021-07-20 NOTE — PROGRESS NOTES
Occupational Therapy     08/01/19 1000   Visit Specifics   Eval Type Inital eval   Inital OT Consult  08/01/19   Bed/Tabs/Pad Alarm Applied Not applicable   Treatment Time   ADL Training 24   General   Onset date 07/31/19   Chart Reviewed Yes   PT/OT Patient/Caregiver Stated Goals go home today   Family/Caregiver Present Yes   Precautions   Total Hip Replacement No restriction;Anterior approach   Weight Bearing Status wbat   Home Living   Type of Home Town House   Bathroom Shower/Tub Walk-in shower   Bathroom Toilet Raised   Bathroom Equipment Shower chair;Grab bars around toilet   Prior Status   Independent With All ADL's/IADL's   Lives With Spouse   Receives Help From Family   ADL   Grooming All grooming   All Grooming Modified independent (Device);Standing   Upper Body Dressing All upper body dressing   All Upper Body Dressing Modified independent (Device);Sitting   Lower Body Dressing Pants;Underware;TEDs;Socks;Shoes   Pants Modified independent (Device);Cues for technique;Cues for safety;After OT interventions   Underware Modified independent (Device);Cues for technique;Cues for safety;After OT interventions   TEDs Modified independent (Device);After OT interventions;Cues for technique;Cues for safety   Socks Modified independent (Device);After OT interventions;Cues for technique;Cues for safety   Shoes Modified independent (Device);After OT interventions;Cues for technique;Cues for safety   Toileting All toileting   All Toileting Modified independent (Device)   ADL Comments Instructed pt on ADLS after DA MARY.  She gave great return demo   Activity Tolerance   Endurance Endurance does not limit participation in activity   Balance   Sitting Balance Independent   Standing Balance Standby;Independent   Functional Transfers   Functional Transfers Chair Transfers;Toilet Transfers   Chair Transfers SBA   Toilet Transfers SBA   Transfer Cues Technique;Correct hand placement;Safety;Used correct ortho technique;After OT  intervention;Standard toilet;Grab bars   Transfer Deficits Increased effort;Increased time;Decreased flexibility   Ambulation   Location To bathroom;To chair   Assistance SBA   Device Rolling Walker   Verbal Cues Sequencing;Safety   Weight Bearing Status WBAT   Cognition   Overall Cognitive Status WFL   RUE Assessment   RUE Assessment WFL   LUE Assessment   LUE Assessment WFL   Assessment   Assessment Decreased ADL status;Decreased funtional mobility   Prognosis Good   Treatment/Interventions ADL training;Functional transfer training   OT Frequency 2x/day   Goal Formulation Patient;Family   Recommendations   OT Discharge Recommendation Home with family support/assistance   Treatment Suggestions for Next Session clinic   OT Care Plan REVIEWED DAILY Yes, goals remain appropriate   Handouts Given Precautions after MARY

## 2021-07-20 NOTE — ANESTHESIA PREPROCEDURE EVALUATION
Anesthesia Evaluation      History of anesthetic complications     Airway   Mallampati: II  Neck ROM: full   Pulmonary - negative ROS and normal exam                          Cardiovascular - normal exam  (+) hypertension well controlled, ,      Neuro/Psych      Comments: Encephalomyelitis.      Endo/Other - negative ROS      GI/Hepatic/Renal    (+)   chronic renal disease CRI,           Dental - normal exam                        Anesthesia Plan  Planned anesthetic: general endotracheal    ASA 3   Induction: intravenous   Anesthetic plan and risks discussed with: patient    Post-op plan: routine recovery    Pt has history of encephalomyelitis and was recommended to not have a spinal anesthetic.  Plan general anesthetic with propofol infusion.

## 2021-07-20 NOTE — PROGRESS NOTES
[facility] Cancer Care Progress Note    Patient: Jacque Bernstein  MRN: [id number]  Date of Service: 12/18/2017        Reason for visit      1. Malignant neoplasm of central portion of right breast in female, estrogen receptor negative        Assessment     1.  A very pleasant 83 y.o.  woman with advanced breast cancer located on the right side T3 N2 M0 which is ER negative ME weakly positive but HER-2/matthew 3+.  2.  No evidence of any distant metastatic disease.  3.  Good general health otherwise.  4.  Normal MUGA    Plan     1. Proceed with chemotherapy with weekly paclitaxel, herceptin along with Perjetta.  2. Follow up on tumor marker  3. Counseled patient and family at length about management of side effects.    Clinical stage      Malignant neoplasm of central portion of right breast in female, estrogen receptor negative, HER-2 positive    Staging form: Breast, AJCC 7th Edition    - Clinical stage from 12/11/2017: Stage IIIA (T3, N2a, M0) - Signed by Holland Ho MD on 12/14/2017    History     Jacque Bernstein is a very pleasant 83 y.o. old female with a history of invasive ductal carcinoma.  Jacque noted some fullness in her breast about end of November.  She had a bruise few days but prior to that in that breast.  After the bruise resolved she felt that there was still the lump present.  She went and saw Dr. Chou who felt that this was very concerning for malignancy.  Ultrasound and mammogram were done which were very suggestive of malignancy.  She underwent biopsy of the right axillary lymph node which confirmed the presence of invasive ductal carcinoma which was ER negative, ME weakly positive but HER-2/matthew 3+ on immunohistochemistry.  This was a high-grade tumor.  The lymph nodes are clinically palpable.     The patient has been since seen by Dr. Fregoso.  Dr. Fregoso also performed a punch biopsy of the skin.  She was concerned that this might be inflammatory breast cancer.  The skin biopsies negative  for involvement with cancer so this is not inflammatory breast cancer. She is here to start her treatment.      Past Medical History     Past Medical History:   Diagnosis Date     Breast cancer      Encephalomyelitis 1956     History of anesthesia complications     Felt jitterty after some surgeries for days not sure what caused it     Hypertension      Shingles            Review of Systems   Constitutional  Constitutional (WDL): Exceptions to WDL  Fatigue: Fatigue relieved by rest (naps daily)  Neurosensory  Neurosensory (WDL): All neurosensory elements are within defined limits  Cardiovascular  Cardiovascular (WDL): Exceptions to WDL  Pulmonary  Respiratory (WDL): Within Defined Limits  Gastrointestinal  Gastrointestinal (WDL): All gastrointestinal elements are within defined limits  Genitourinary  Genitourinary (WDL): All genitourinary elements are within defined limits  Integumentary  Integumentary (WDL): All integumentary elements are within defined limits  Patient Coping  Patient Coping: Accepting  Accompanied by  Accompanied by: Family Member    ECOG performance status and Distress Assessment      ECOG Performance: 1   ECOG Performance Status: 0    Distress Assessment  Distress Assessment Score: No distress:     Pain Status  Currently in Pain: Yes        Vital Signs     Vitals:    12/18/17 0917   BP: 140/63   Pulse: 87   Temp: 97.7  F (36.5  C)   SpO2: 97%       Physical Exam     GENERAL: No acute distress. Cooperative in conversation.   HEENT: Pupils are equal, round and reactive. Oral mucosa is clean and intact. No ulcerations or mucositis noted. No bleeding noted.  RESP:Chest symmetric lungs are clear bilaterally per auscultation. Regular respiratory rate. No wheezes or rhonchi.  CV: Normal S1 S2 Regular, rate and rhythm. No murmurs.  ABD: Nondistended, soft, nontender. Positive bowel sounds. No organomegaly.   EXTREMITIES: No lower extremity edema.   NEURO: Non- focal. Alert and oriented x3.  Cranial  nerves appear intact.  PSYCH: Within normal limits. No depression or anxiety.  SKIN: Warm dry intact.    LYMPH NODES: Bilateral cervical, supraclavicular, axillary lymph node examination was done.  Slight right axillary palpable adenopathy.  BREAST: Palpable lesion in lower inner quadrant of right breast. Some biopsy changes. No nipple discharge. Still some skin discoloration.      Lab Results     Results for orders placed or performed in visit on 12/18/17   Comprehensive Metabolic Panel   Result Value Ref Range    Sodium 139 136 - 145 mmol/L    Potassium 4.1 3.5 - 5.0 mmol/L    Chloride 104 98 - 107 mmol/L    CO2 24 22 - 31 mmol/L    Anion Gap, Calculation 11 5 - 18 mmol/L    Glucose 152 (H) 70 - 125 mg/dL    BUN 34 (H) 8 - 28 mg/dL    Creatinine 1.15 (H) 0.60 - 1.10 mg/dL    GFR MDRD Af Amer 55 (L) >60 mL/min/1.73m2    GFR MDRD Non Af Amer 45 (L) >60 mL/min/1.73m2    Bilirubin, Total 0.5 0.0 - 1.0 mg/dL    Calcium 9.9 8.5 - 10.5 mg/dL    Protein, Total 6.5 6.0 - 8.0 g/dL    Albumin 3.2 (L) 3.5 - 5.0 g/dL    Alkaline Phosphatase 49 45 - 120 U/L    AST 16 0 - 40 U/L    ALT 10 0 - 45 U/L   HM1 (CBC with Diff)   Result Value Ref Range    WBC 9.4 4.0 - 11.0 thou/uL    RBC 4.22 3.80 - 5.40 mill/uL    Hemoglobin 13.7 12.0 - 16.0 g/dL    Hematocrit 41.3 35.0 - 47.0 %    MCV 98 80 - 100 fL    MCH 32.5 27.0 - 34.0 pg    MCHC 33.2 32.0 - 36.0 g/dL    RDW 13.2 11.0 - 14.5 %    Platelets 292 140 - 440 thou/uL    MPV 10.0 8.5 - 12.5 fL    Neutrophils % 71 (H) 50 - 70 %    Lymphocytes % 13 (L) 20 - 40 %    Monocytes % 11 (H) 2 - 10 %    Eosinophils % 5 0 - 6 %    Basophils % 0 0 - 2 %    Neutrophils Absolute 6.6 2.0 - 7.7 thou/uL    Lymphocytes Absolute 1.3 0.8 - 4.4 thou/uL    Monocytes Absolute 1.1 (H) 0.0 - 0.9 thou/uL    Eosinophils Absolute 0.4 0.0 - 0.4 thou/uL    Basophils Absolute 0.0 0.0 - 0.2 thou/uL         Imaging Results     Nm Muga    Result Date: 12/14/2017    1.The left ventricular ejection fraction is 75.7%.    2.Normal study.   3.No prior assessment of left ventricular ejection fraction available.      Nm Pet Ct Skull To Mid Thigh    Result Date: 12/11/2017  [facility] PET FDG/CT 12/11/2017 10:53 AM INDICATION: Right breast cancer staging, initial treatment strategy. TECHNIQUE: Serum glucose level 110 mg/dL. One hour post intravenous administration of 8.1 mCi F-18 FDG, PET imaging was performed from the skull base to the mid thighs utilizing attenuation correction with concurrent axial CT and PET/CT image fusion. Dose reduction techniques were used. COMPARISON: None. FINDINGS: Moderately FDG avid (SUVmax 4.3) soft tissue in the right breast has an infiltrative appearance and spans about 10 x 2 x 4 cm. FDG avid right axillary lymphadenopathy at levels I and II. A representative node at level I measures 2.0 x 1.2 cm (SUVmax 5.5). 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 Mild calcified atherosclerosis, including left anterior descending coronary disease. Hysterectomy. Pelvic phleboliths. Left total hip arthroplasty. Moderate degenerative change cervical and lumbar spine. Slight anterolisthesis of L4 on L5 and L5 on S1.     CONCLUSION: Findings consistent with right breast cancer with lymph node metastases in the right axilla at levels I and II. No evidence of distant metastases.        Holland Ho MD

## 2021-07-20 NOTE — PROGRESS NOTES
[facility] Cancer Care Progress Note    Patient: Jacque Bernstein  MRN: [id number]  Date of Service: 01/29/2018         Reason for visit      1. 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  A very pleasant 83 y.o.  woman with advanced breast cancer located on the right side T3 N2 M0 which is ER negative MO weakly positive but HER-2/matthew 3+.  Currently on neoadjuvant chemotherapy with weekly paclitaxel, Herceptin and Perjetta.  But it has been given once every 3 weeks.  She seems to be having good clinical response with pretty much no palpable lesion in her breast.  2.  Chemotherapy most likely Perjetta induced diarrhea.  3.  Good general health otherwise.  4.  Normal MUGA    Plan     1. Continue with chemotherapy with weekly paclitaxel, herceptin along with Perjetta.  She can continue to take Imodium to control her diarrhea.  2.  Discussed with the patient that after she is finished with her chemotherapy Taxol then she will go for surgery.  After surgery she will need radiation therapy.  3. Counseled patient and family at length about management of side effects.    Clinical stage      Malignant neoplasm of central portion of right breast in female, estrogen receptor negative, HER-2 positive    Staging form: Breast, AJCC 7th Edition    - Clinical stage from 12/11/2017: Stage IIIA (T3, N2a, M0) - Signed by Holland Ho MD on 12/14/2017          ER Status: Negative          MO Status: Positive          HER2 Status: Positive    History     Jacque Bernstein is a very pleasant 83 y.o. old female with a history of invasive ductal carcinoma.  Jacque noted some fullness in her breast about end of November.  She had a bruise few days but prior to that in that breast.  After the bruise resolved she felt that there was still the lump present.  She went and saw Dr. Chou who felt that this was very concerning for malignancy.   Ultrasound and mammogram were done which were very suggestive of malignancy.  She underwent biopsy of the right axillary lymph node which confirmed the presence of invasive ductal carcinoma which was ER negative, ID weakly positive but HER-2/matthew 3+ on immunohistochemistry.  This was a high-grade tumor.  The lymph nodes are clinically palpable.     The patient has been since seen by Dr. Fregoso.  Dr. Fregoso also performed a punch biopsy of the skin.  She was concerned that this might be inflammatory breast cancer.  The skin biopsies came back negative for involvement with cancer so this is not inflammatory breast cancer.  She has since started on neoadjuvant chemotherapy with weekly paclitaxel along with Herceptin and once every 3 week Perjetta.  She seems to be tolerating treatment quite well.  Her biggest complaint is some degree of diarrhea which makes her not want to go too far away from her home.  Other than that she is doing okay.  She has noticed significant improvement in her breast lump in fact that she can feel it anymore.    Past Medical History     Past Medical History:   Diagnosis Date     Breast cancer      Encephalomyelitis 1956     History of anesthesia complications     Felt jitterty after some surgeries for days not sure what caused it     Hypertension      Shingles        Review of Systems   Constitutional  Constitutional (WDL): Exceptions to WDL  Fatigue: Fatigue not relieved by rest - Limiting instrumental ADL  Neurosensory  Neurosensory (WDL): Exceptions to WDL  Peripheral Motor Neuropathy: Asymptomatic, clinical or diagnostic observations only, intervention not indicated  Ataxia: Moderate symptoms, limiting instrumental ADL (using a cane for balance)  Peripheral Sensory Neuropathy: Moderate symptoms, limiting instrumental ADL (feet numb on bottom, fingertips numb)  Cardiovascular  Cardiovascular (WDL): Exceptions to WDL  Edema Limbs: 5 - 10% inter-limb discrepancy in volume or circumference at  point of greatest visible difference, swelling or obscuration of anatomic architecture on close inspection (lower extremities)  Pulmonary  Respiratory (WDL): Exceptions to WDL  Dyspnea: Shortness of breath with minimal exertion, limiting instrumental ADL  Gastrointestinal  Gastrointestinal (WDL): Exceptions to WDL  Anorexia: Loss of appetite without alteration in eating habits  Dysgeusia: Altered taste but no change in diet  Genitourinary  Genitourinary (WDL): Exceptions to WDL  Urinary Frequency: Present (up 4 times nightly)  Integumentary  Integumentary (WDL): Exceptions to WDL (arms and legs get red spots post TX)  Patient Coping  Patient Coping: Accepting  Accompanied by  Accompanied by: Family Member    ECOG performance status and Distress Assessment      ECOG Performance:    ECOG Performance Status: 2    Distress Assessment  Distress Assessment Score: 3:     Pain Status  Currently in Pain: No/denies        Vital Signs     Vitals:    01/29/18 0845   BP: 145/65   Pulse: (!) 111   Temp: 97.9  F (36.6  C)   SpO2: 98%       Physical Exam     GENERAL: No acute distress. Cooperative in conversation.   HEENT: Pupils are equal, round and reactive. Oral mucosa is clean and intact. No ulcerations or mucositis noted. No bleeding noted.  RESP:Chest symmetric lungs are clear bilaterally per auscultation. Regular respiratory rate. No wheezes or rhonchi.  CV: Normal S1 S2 Regular, rate and rhythm. No murmurs.  ABD: Nondistended, soft, nontender. Positive bowel sounds. No organomegaly.   EXTREMITIES: No lower extremity edema.   NEURO: Non- focal. Alert and oriented x3.  Cranial nerves appear intact.  PSYCH: Within normal limits. No depression or anxiety.  SKIN: Warm dry intact.    LYMPH NODES: Bilateral cervical, supraclavicular, axillary lymph node examination was done.  Slight right axillary palpable adenopathy.      Lab Results     Results for orders placed or performed in visit on 01/29/18   Comprehensive Metabolic Panel    Result Value Ref Range    Sodium 140 136 - 145 mmol/L    Potassium 3.9 3.5 - 5.0 mmol/L    Chloride 103 98 - 107 mmol/L    CO2 21 (L) 22 - 31 mmol/L    Anion Gap, Calculation 16 5 - 18 mmol/L    Glucose 125 70 - 125 mg/dL    BUN 21 8 - 28 mg/dL    Creatinine 1.21 (H) 0.60 - 1.10 mg/dL    GFR MDRD Af Amer 52 (L) >60 mL/min/1.73m2    GFR MDRD Non Af Amer 42 (L) >60 mL/min/1.73m2    Bilirubin, Total 0.5 0.0 - 1.0 mg/dL    Calcium 9.5 8.5 - 10.5 mg/dL    Protein, Total 6.3 6.0 - 8.0 g/dL    Albumin 3.0 (L) 3.5 - 5.0 g/dL    Alkaline Phosphatase 54 45 - 120 U/L    AST 16 0 - 40 U/L    ALT 19 0 - 45 U/L   Breast Carcinoma-Associated Antigen (CA 27.29)   Result Value Ref Range    CA 27.29, Serum 24 0 - 39 U/mL   HM1 (CBC with Diff)   Result Value Ref Range    WBC 8.0 4.0 - 11.0 thou/uL    RBC 3.39 (L) 3.80 - 5.40 mill/uL    Hemoglobin 11.0 (L) 12.0 - 16.0 g/dL    Hematocrit 33.1 (L) 35.0 - 47.0 %    MCV 98 80 - 100 fL    MCH 32.4 27.0 - 34.0 pg    MCHC 33.2 32.0 - 36.0 g/dL    RDW 15.7 (H) 11.0 - 14.5 %    Platelets 447 (H) 140 - 440 thou/uL    MPV 9.5 8.5 - 12.5 fL    Neutrophils % 64 50 - 70 %    Lymphocytes % 22 20 - 40 %    Monocytes % 11 (H) 2 - 10 %    Eosinophils % 2 0 - 6 %    Basophils % 1 0 - 2 %    Neutrophils Absolute 5.0 2.0 - 7.7 thou/uL    Lymphocytes Absolute 1.8 0.8 - 4.4 thou/uL    Monocytes Absolute 0.9 0.0 - 0.9 thou/uL    Eosinophils Absolute 0.1 0.0 - 0.4 thou/uL    Basophils Absolute 0.1 0.0 - 0.2 thou/uL         Imaging Results     No results found.      Holland Ho MD

## 2021-07-20 NOTE — ANESTHESIA POSTPROCEDURE EVALUATION
Patient: Jacque Bernstein  Right Modified Radical Mastectomy  Anesthesia type: No value filed.    Patient location: PACU  Last vitals:   Vitals:    04/02/18 1130   BP: 157/70   Pulse: 86   Resp: 12   Temp:    SpO2: 100%     Post vital signs: stable  Level of consciousness: awake and responds to simple questions  Post-anesthesia pain: pain controlled  Post-anesthesia nausea and vomiting: no  Pulmonary: unassisted, return to baseline  Cardiovascular: stable and blood pressure at baseline  Hydration: adequate  Anesthetic events: no    QCDR Measures:  ASA# 11 - Elizabeth-op Cardiac Arrest: ASA11B - Patient did NOT experience unanticipated cardiac arrest  ASA# 12 - Elizabeth-op Mortality Rate: ASA12B - Patient did NOT die  ASA# 13 - PACU Re-Intubation Rate: ASA13B - Patient did NOT require a new airway mgmt  ASA# 10 - Composite Anes Safety: ASA10A - No serious adverse event    Additional Notes:

## 2021-07-20 NOTE — TELEPHONE ENCOUNTER
Patient calls in today stating that she noticed a new lump on her left arm on Sunday.  She states that it is on the backside of her arm just above her elbow and is about 1-1/2 inches in diameter.  There is no pain or color changes in the area but there is some minor swelling.  She said that she is calling as she was told to call with any changes.  Dr. Ho does not seem concerned with this.  He states that if she would be more comfortable we can move her appointment that is scheduled for 6/22/2020 up sooner.  If she is comfortable leaving it where it is at we can do that as well.  Patient states that she will monitor for another week or 2 and let us know if she wants to move up this appointment.  For now she plans on keeping it for 6/22/2020.    Mary Anne Lazcano RN

## 2021-07-20 NOTE — ANESTHESIA POSTPROCEDURE EVALUATION
Patient: Jacque JUNIOR North  Port Placement  Anesthesia type: MAC    Patient location: Phase II Recovery  Last vitals:   Vitals:    12/14/17 0830   BP: 117/58   Pulse: 83   Resp: 16   Temp:    SpO2: 96%     Post vital signs: stable  Level of consciousness: awake and responds to simple questions  Post-anesthesia pain: pain controlled  Post-anesthesia nausea and vomiting: no  Pulmonary: unassisted, return to baseline  Cardiovascular: stable and blood pressure at baseline  Hydration: adequate  Anesthetic events: no    QCDR Measures:  ASA# 11 - Elizabeth-op Cardiac Arrest: ASA11B - Patient did NOT experience unanticipated cardiac arrest  ASA# 12 - Elizabeth-op Mortality Rate: ASA12B - Patient did NOT die  ASA# 13 - PACU Re-Intubation Rate: ASA13B - Patient did NOT require a new airway mgmt  ASA# 10 - Composite Anes Safety: ASA10A - No serious adverse event    Additional Notes:

## 2021-07-20 NOTE — PROGRESS NOTES
Jacque (Lauren) came to chemo infusion this morning for C3D8 treatment using taxol and herceptin. VSS. Pt assessed.  She is having more numbness in both her hands and feet.  This is not painful and she can still do most fine motor activities though this can be clumsy.  Port assessed and found patent with good blood return.  Lab drawn and results noted and reviewed with patient.  Pt met provision for treatment.  She received premedication and chemotherapy as ordered and tolerated it well while in clinic today.  Port was flushed with ns then heparinized and deaccessed.  Site was covered with gauze.  Jacque d/c from clinic ambulatory accompanied by her family.  She is aware of her future appointment.

## 2021-07-20 NOTE — PRE-PROCEDURE
Pre-Procedure Unconfirmed COVID Test     Jacque M North    COVID Screening  Due to the inability to confirm the patient's COVID status (positive or negative), the patient was screened for COVID symptoms     Patient reports the following:  Fever? No   Cough? No   Shortness of breath? No   Skin rash? No    If the patient is positive for new symptoms or worsening symptoms, and the procedure is deemed necessary by the ordering provider, notify your manager/supervisor     Patient Information  Patient informed of the no visitor policy  Patient instructed to continue to self-quarantine prior to procedure  Patient informed to contact the ordering provider if the following symptoms develop prior to procedure:   Fever  Cough  Shortness of Breath  Sore throat   Runny or stuffy nose  Muscle or body aches  Headaches  Fatigue  Vomiting or diarrhea   Rash    Shaun Sloan

## 2021-07-20 NOTE — PROGRESS NOTES
Met with Lauren and her family in chemo infusion.  States she feels well supported and expresses no need from navigation perspective today.  Dorothy Alejandre RN

## 2021-07-20 NOTE — PROGRESS NOTES
[facility] Cancer Care Progress Note    Patient: Jacque Bernstein  MRN: [id number]  Date of Service: 9/19/2019          Reason for visit      1. Malignant neoplasm of central portion of right breast in female, estrogen receptor positive, Her 2 matthew positive.        Assessment     1.  A very pleasant 85 y.o.  woman with advanced breast cancer located on the right side T3 N2 M0 which was ER negative OR weakly positive but HER-2/matthew 3+.  Status post neoadjuvant chemotherapy with weekly paclitaxel, Herceptin and Perjetta.  Status post mastectomy with small amount of residual tumor in the breast and one positive lymph node.  The residual tumor in the breast is ER positive OR negative and HER-2/matthew positive.  Anderson disease is ER negative OR negative HER-2/matthew 3+.  She has finished radiation therapy and also 1 year of Herceptin.  2.  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  3.  Good general health otherwise.  4.  Some osteopenia seen on her bone density.  5.  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  Slowly recovering from it.    Plan     1.  Patient will continue on anastrozole for at least 5 years which she would finish in July 2023.  2.  Continue symptomatic care for her neuropathy.  She is slowly getting better.  3.  Good diet and exercise.    4.  Advised to continue to wear the compression sleeve as much as she can.  She absolutely has to use it if she takes a flight in an airplane.    5.  Continue to take some calcium rich diet and vitamin D.  Continue to exercise outside in the form of walking and get some sunlight.  6.  Continue rehab from hip surgery.    Clinical stage      Cancer Staging  Malignant neoplasm of central portion of right breast in female, estrogen receptor positive, Her 2 matthew positive.  Staging form: Breast, AJCC 7th Edition  - Clinical stage from 12/11/2017: Stage IIIA (T3, N2a, M0) - Signed by Holland Ho MD on  12/14/2017  ER Status: Negative  MS Status: Positive  HER2 Status: Positive      History     Jacque Bernstein is a very pleasant 85 y.o. old female with a history of invasive ductal carcinoma.  Jacque noted some fullness in her breast about end of November 2017.  She had a bruise few days prior to that in that breast.  After the bruise resolved she felt that there was still the lump present.  She went and saw Dr. Chou who felt that this was very concerning for malignancy.  Ultrasound and mammogram were done which were very suggestive of malignancy.  She underwent biopsy of the right axillary lymph node which confirmed the presence of invasive ductal carcinoma which was ER negative, MS weakly positive but HER-2/matthew 3+ on immunohistochemistry.  This was a high-grade tumor.  The lymph nodes are clinically palpable.     The patient has been since seen by Dr. Fregoso.  Dr. Fregoso performed a punch biopsy of the skin.  She was concerned that this might be inflammatory breast cancer.  The skin biopsies came back negative for involvement with cancer.  She started on neoadjuvant chemotherapy with weekly paclitaxel along with Herceptin and once every 3 week Perjetta.  He had good clinical response.    She underwent mastectomy in April 2018 and pathology revealed one positive lymph node and a small 1.8 cm lesion in the breast.  Margins negative.  Interestingly the lesion in the breast was ER positive MS borderline positive HER-2/matthew positive at least in the parts of the tumor.  The lymph node metastases ER negative MS negative and HER-2/matthew 3+.      She is started on radiation therapy.  Finished that on July 18, 2018.  She had some radiation burns.  She was also noted to have slight lymphedema so she was prescribed a lymphedema sleeve as well on her right arm.  She finished a year of Herceptin.    We also started her on anastrozole on July 2018.  She is tolerating that with the expected side effects.  Comes in today for  scheduled follow-up.  Her neuropathy is getting better in her hands and perhaps in her feet.  She underwent right hip surgery in July 2019.  She is recovering well from that.  Due to the hip surgery she is walking slower and needs a walker.  She has lost a little bit of weight.  She feels her balance is not as good.  No significant hot flashes.  She still has some trouble with bladder incontinence issues.    Past Medical History     Past Medical History:   Diagnosis Date     Arthritis      Breast cancer (H)     breast     Chronic kidney disease      Encephalomyelitis 1956     History of anesthesia complications     Felt jittery after some surgeries for days not sure what caused it     Hypertension      Incontinence of urine      Neuropathy      Shingles        Review of Systems   Constitutional  Constitutional (WDL): Exceptions to WDL  Fatigue: Fatigue relieved by rest(post surgery 6 week ago)  Weight Gain: 5 - <10% from baseline(up 5 lbs)  Neurosensory  Neurosensory (WDL): Exceptions to WDL  Peripheral Motor Neuropathy: Asymptomatic, clinical or diagnostic observations only, intervention not indicated  Ataxia: Moderate symptoms, limiting instrumental ADL(using a walker)  Peripheral Sensory Neuropathy: Moderate symptoms, limiting instrumental ADL(hands and toes)  Cardiovascular  Cardiovascular (WDL): Exceptions to WDL  Edema: Yes  Edema Limbs: 5 - 10% inter-limb discrepancy in volume or circumference at point of greatest visible difference, swelling or obscuration of anatomic architecture on close inspection(rt ankle)  Pulmonary  Respiratory (WDL): Within Defined Limits  Gastrointestinal  Gastrointestinal (WDL): All gastrointestinal elements are within defined limits  Genitourinary  Genitourinary (WDL): Exceptions to WDL(incontinence)  Integumentary  Integumentary (WDL): Exceptions to WDL(stitch in rt leg from hip surgery still draining)  Patient Coping  Patient Coping: Accepting  Accompanied by  Accompanied by:  Family Member()    ECOG performance status and Distress Assessment      ECOG Performance:    ECOG Performance Status: 1    Distress Assessment  Distress Assessment Score: No distress:     Pain Status  Currently in Pain: No/denies      Vital Signs     Vitals:    09/19/19 1110   BP: 157/70   Pulse: 81   Temp: 98  F (36.7  C)   SpO2: 97%       Physical Exam     GENERAL: No acute distress. Cooperative in conversation.   HEENT: Pupils are equal, round and reactive. Oral mucosa is clean and intact. No ulcerations or mucositis noted. No bleeding noted.  RESP:Chest symmetric lungs are clear bilaterally per auscultation. Regular respiratory rate. No wheezes or rhonchi.  CV: Normal S1 S2 Regular, rate and rhythm. No murmurs.  ABD: Nondistended, soft, nontender. Positive bowel sounds. No organomegaly.   EXTREMITIES: No lower extremity edema.  She is wearing a lymphedema sleeve on her right arm but does not appear to have any lymphedema.  In fact the sleeve appears somewhat loose.  NEURO: Non- focal. Alert and oriented x3.  Cranial nerves appear intact.  PSYCH: Within normal limits. No depression or anxiety.  SKIN: Warm dry intact.  Well-healing incision on her right hip.  LYMPH NODES: Bilateral cervical, supraclavicular, axillary lymph node examination was done.  Slight right axillary palpable adenopathy.  BREAST: s/p Right mastectomy with some radiation changes. Left breast normal. No palpable lump. No nipple discharge.      Lab Results     Results for orders placed or performed in visit on 09/19/19   Comprehensive Metabolic Panel   Result Value Ref Range    Sodium 141 136 - 145 mmol/L    Potassium 4.3 3.5 - 5.0 mmol/L    Chloride 104 98 - 107 mmol/L    CO2 28 22 - 31 mmol/L    Anion Gap, Calculation 9 5 - 18 mmol/L    Glucose 104 70 - 125 mg/dL    BUN 32 (H) 8 - 28 mg/dL    Creatinine 1.17 (H) 0.60 - 1.10 mg/dL    GFR MDRD Af Amer 53 (L) >60 mL/min/1.73m2    GFR MDRD Non Af Amer 44 (L) >60 mL/min/1.73m2    Bilirubin,  Total 0.4 0.0 - 1.0 mg/dL    Calcium 10.2 8.5 - 10.5 mg/dL    Protein, Total 6.8 6.0 - 8.0 g/dL    Albumin 3.4 (L) 3.5 - 5.0 g/dL    Alkaline Phosphatase 75 45 - 120 U/L    AST 15 0 - 40 U/L    ALT 10 0 - 45 U/L   HM1 (CBC with Diff)   Result Value Ref Range    WBC 9.1 4.0 - 11.0 thou/uL    RBC 3.90 3.80 - 5.40 mill/uL    Hemoglobin 12.3 12.0 - 16.0 g/dL    Hematocrit 39.4 35.0 - 47.0 %     (H) 80 - 100 fL    MCH 31.5 27.0 - 34.0 pg    MCHC 31.2 (L) 32.0 - 36.0 g/dL    RDW 13.2 11.0 - 14.5 %    Platelets 335 140 - 440 thou/uL    MPV 9.4 8.5 - 12.5 fL    Neutrophils % 58 50 - 70 %    Lymphocytes % 23 20 - 40 %    Monocytes % 12 (H) 2 - 10 %    Eosinophils % 6 0 - 6 %    Basophils % 1 0 - 2 %    Neutrophils Absolute 5.3 2.0 - 7.7 thou/uL    Lymphocytes Absolute 2.1 0.8 - 4.4 thou/uL    Monocytes Absolute 1.1 (H) 0.0 - 0.9 thou/uL    Eosinophils Absolute 0.5 (H) 0.0 - 0.4 thou/uL    Basophils Absolute 0.1 0.0 - 0.2 thou/uL         Imaging Results     No results found.      Holland Ho MD

## 2021-07-20 NOTE — ANESTHESIA CARE TRANSFER NOTE
Last vitals:   Vitals:    04/02/18 1127   BP: 157/75   Pulse: 88   Resp: 12   Temp: 36.7  C (98  F)   SpO2: 100%     Patient's level of consciousness is drowsy  Spontaneous respirations: yes  Maintains airway independently: yes  Dentition unchanged: yes  Oropharynx: oropharynx clear of all foreign objects    QCDR Measures:  ASA# 20 - Surgical Safety Checklist: WHO surgical safety checklist completed prior to induction  PQRS# 430 - Adult PONV Prevention: 4558F - Pt received => 2 anti-emetic agents (different classes) preop & intraop  ASA# 8 - Peds PONV Prevention: NA - Not pediatric patient, not GA or 2 or more risk factors NOT present  PQRS# 424 - Elizabeth-op Temp Management: 4559F - At least one body temp DOCUMENTED => 35.5C or 95.9F within required timeframe  PQRS# 426 - PACU Transfer Protocol: - Transfer of care checklist used  ASA# 14 - Acute Post-op Pain: ASA14B - Patient did NOT experience pain >= 7 out of 10

## 2021-07-20 NOTE — PROGRESS NOTES
[facility] Progress Note    Patient: Jacque Bernstein  MRN: [id number]  Date of Service: 7/16/2018          Reason for visit      1. Malignant neoplasm of central portion of right breast in female, estrogen receptor positive, Her 2 matthew positive.    2. Long term current use of aromatase inhibitor         Assessment     1.  A very pleasant 83 y.o.  woman with advanced breast cancer located on the right side T3 N2 M0 which is ER negative KY weakly positive but HER-2/matthew 3+.  Status post neoadjuvant chemotherapy with weekly paclitaxel, Herceptin and Perjetta.  Status post mastectomy with small amount of residual tumor in the breast and one positive lymph node.  The residual tumor in the breast is ER positive KY negative and HER-2/matthew positive.  Anderson disease is ER negative KY negative HER-2/matthew 3+..  2.  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  3.  Good general health otherwise.  4.  Hypertension due to a combination of slight decreased fluid intake and her antihypertensive medications.    Plan     1.  I had lengthy discussion with her regarding management of her condition.  She will need to continue on Herceptin for a year as discussed before.  Due to the presence of ER positive disease I will start her on anastrozole 1 mg p.o. daily.  I gave her printed information about that.  2.  Continue symptomatic care for her neuropathy.  3.  Good diet and exercise.  4.  Advised to continue to wear the compression sleeve for a few more months.    Clinical stage      Malignant neoplasm of central portion of right breast in female, estrogen receptor positive, Her 2 matthew positive.    Staging form: Breast, AJCC 7th Edition    - Clinical stage from 12/11/2017: Stage IIIA (T3, N2a, M0) - Signed by Holland Ho MD on 12/14/2017          ER Status: Negative          KY Status: Positive          HER2 Status: Positive    History     Jacque Bernstein is a very pleasant 83 y.o. old female with a  history of invasive ductal carcinoma.  Jacque noted some fullness in her breast about end of November.  She had a bruise few days but prior to that in that breast.  After the bruise resolved she felt that there was still the lump present.  She went and saw Dr. Chuo who felt that this was very concerning for malignancy.  Ultrasound and mammogram were done which were very suggestive of malignancy.  She underwent biopsy of the right axillary lymph node which confirmed the presence of invasive ductal carcinoma which was ER negative, CA weakly positive but HER-2/matthew 3+ on immunohistochemistry.  This was a high-grade tumor.  The lymph nodes are clinically palpable.     The patient has been since seen by Dr. Fregoso.  Dr. Fregosoperformed a punch biopsy of the skin.  She was concerned that this might be inflammatory breast cancer.  The skin biopsies came back negative for involvement with cancer.  She has since started on neoadjuvant chemotherapy with weekly paclitaxel along with Herceptin and once every 3 week Perjetta.  She seems to be tolerating treatment quite well.  She noticed significant improvement in her breast lump in fact that she could not feel it anymore.    She underwent mastectomy in April 2018 and pathology revealed one positive lymph node and a small 1.8 cm lesion in the breast.  Margins negative.  Interestingly the lesion in the breast was ER positive CA borderline positive HER-2/matthew positive at least in the parts of the tumor.  The lymph node metastases ER negative CA negative and HER-2/matthew 3+.      She is started on radiation therapy.  Going to be finishing that on July 18.  She has some radiation burns.  She was also noted to have slight lymphedema so she is wearing a lymphedema sleeve as well on her right arm.  Her neuropathy is getting better.    Past Medical History     Past Medical History:   Diagnosis Date     Breast cancer (H)      Chronic kidney disease     Stage III     Encephalomyelitis 1956      History of anesthesia complications     Felt jittery after some surgeries for days not sure what caused it     Hypertension      Incontinence of urine      Neuropathy (H)      Shingles        Review of Systems   Constitutional  Constitutional (WDL): Exceptions to WDL  Fatigue: Fatigue relieved by rest (very slight)  Neurosensory  Neurosensory (WDL): Exceptions to WDL  Peripheral Motor Neuropathy: Asymptomatic, clinical or diagnostic observations only, intervention not indicated  Ataxia: Asymptomatic, clinical or diagnostic observations only, intervention not indicated  Peripheral Sensory Neuropathy: Moderate symptoms, limiting instrumental ADL (feet and hands)  Cardiovascular  Cardiovascular (WDL): Exceptions to WDL  Edema: Yes  Edema Limbs: 5 - 10% inter-limb discrepancy in volume or circumference at point of greatest visible difference, swelling or obscuration of anatomic architecture on close inspection (ankles)  Pulmonary  Respiratory (WDL): Within Defined Limits  Gastrointestinal  Gastrointestinal (WDL): Exceptions to WDL  Genitourinary  Genitourinary (WDL): Exceptions to WDL (some incontinence)  Integumentary  Integumentary (WDL): Exceptions to WDL (red skin on radiation area rt chest)  Patient Coping  Patient Coping: Accepting  Accompanied by  Accompanied by: Family Member ( and son)    ECOG performance status and Distress Assessment      ECOG Performance:    ECOG Performance Status: 1    Distress Assessment  Distress Assessment Score: No distress:     Pain Status  Currently in Pain: No/denies        Vital Signs     Vitals:    07/16/18 0947   BP: 138/68   Pulse:    Temp:    SpO2:        Physical Exam     GENERAL: No acute distress. Cooperative in conversation.   HEENT: Pupils are equal, round and reactive. Oral mucosa is clean and intact. No ulcerations or mucositis noted. No bleeding noted.  RESP:Chest symmetric lungs are clear bilaterally per auscultation. Regular respiratory rate. No wheezes or  rhonchi.  CV: Normal S1 S2 Regular, rate and rhythm. No murmurs.  ABD: Nondistended, soft, nontender. Positive bowel sounds. No organomegaly.   EXTREMITIES: No lower extremity edema.   NEURO: Non- focal. Alert and oriented x3.  Cranial nerves appear intact.  PSYCH: Within normal limits. No depression or anxiety.  SKIN: Warm dry intact.    LYMPH NODES: Bilateral cervical, supraclavicular, axillary lymph node examination was done.  Slight right axillary palpable adenopathy.Still has a drain in  BREAST status post right-sided mastectomy.  Significant radiation skin changes.      Lab Results     Results for orders placed or performed in visit on 07/16/18   Comprehensive Metabolic Panel   Result Value Ref Range    Sodium 139 136 - 145 mmol/L    Potassium 3.8 3.5 - 5.0 mmol/L    Chloride 103 98 - 107 mmol/L    CO2 28 22 - 31 mmol/L    Anion Gap, Calculation 8 5 - 18 mmol/L    Glucose 108 70 - 125 mg/dL    BUN 25 8 - 28 mg/dL    Creatinine 0.93 0.60 - 1.10 mg/dL    GFR MDRD Af Amer >60 >60 mL/min/1.73m2    GFR MDRD Non Af Amer 58 (L) >60 mL/min/1.73m2    Bilirubin, Total 0.6 0.0 - 1.0 mg/dL    Calcium 9.8 8.5 - 10.5 mg/dL    Protein, Total 6.0 6.0 - 8.0 g/dL    Albumin 3.5 3.5 - 5.0 g/dL    Alkaline Phosphatase 47 45 - 120 U/L    AST 16 0 - 40 U/L    ALT 13 0 - 45 U/L   HM1 (CBC with Diff)   Result Value Ref Range    WBC 6.9 4.0 - 11.0 thou/uL    RBC 3.96 3.80 - 5.40 mill/uL    Hemoglobin 12.2 12.0 - 16.0 g/dL    Hematocrit 37.3 35.0 - 47.0 %    MCV 94 80 - 100 fL    MCH 30.8 27.0 - 34.0 pg    MCHC 32.7 32.0 - 36.0 g/dL    RDW 15.3 (H) 11.0 - 14.5 %    Platelets 275 140 - 440 thou/uL    MPV 9.3 8.5 - 12.5 fL    Neutrophils % 69 50 - 70 %    Lymphocytes % 12 (L) 20 - 40 %    Monocytes % 14 (H) 2 - 10 %    Eosinophils % 4 0 - 6 %    Basophils % 1 0 - 2 %    Neutrophils Absolute 4.7 2.0 - 7.7 thou/uL    Lymphocytes Absolute 0.9 0.8 - 4.4 thou/uL    Monocytes Absolute 1.0 (H) 0.0 - 0.9 thou/uL    Eosinophils Absolute 0.3 0.0 -  0.4 thou/uL    Basophils Absolute 0.0 0.0 - 0.2 thou/uL         Imaging Results     No results found.  Total time spent was 40 minutes, more than half of it was in face-to-face counseling regarding disease state, treatment, side effects and management.      Holland Ho MD

## 2021-07-20 NOTE — PROGRESS NOTES
Met with Lauren and her spouse in medical oncology to introduce myself and welcome them to our clinic.  We discussed resources available through our clinic including, but not limited to, resource center (they had already located and selected some printed resources), oncology psychotherapist, financial counselor, and educational and support groups.  They were also joined by son Vick for this appointment.    Lauren and her spouse have 4 children and two sons are in the [facility]. I described the role of navigation and will plan on touching base in the next day or two by phone.  Lauren has my contact information and calls welcomed.   Dorothy Alejandre RN

## 2021-07-21 ENCOUNTER — RECORDS - HEALTHEAST (OUTPATIENT)
Dept: ADMINISTRATIVE | Facility: CLINIC | Age: 86
End: 2021-07-21

## 2021-08-03 PROBLEM — C50.911 BREAST CANCER, RIGHT (H): Status: RESOLVED | Noted: 2017-12-12 | Resolved: 2017-12-18

## 2021-12-16 NOTE — PROGRESS NOTES
HPI:   Cornelio Adler is a 62year old female who presents for a complete physical exam. .   Wt Readings from Last 6 Encounters:  12/16/21 : 186 lb (84.4 kg)  08/21/20 : 183 lb 3.2 oz (83.1 kg)  07/09/19 : 179 lb 12.8 oz (81.6 kg)  05/24/18 : 186 lb (8 Patient seen in clinic today for follow-up of breast cancer.     CONSTITUTIONAL: no night sweats, fevers, unexplained wt loss,  SKIN no rashes, suspicious or changing lesions  EYES denies visual change or disturbance, drainage or irritation  TEETH no gum disease, no loose dentition, no sores,   EARs/HEARING no c/o hea deformities or inflammation, spine normal curvature  EXTREMITIES: no cyanosis, clubbing or edema, no varicosities or stasis change  NEURO: Oriented times three,cranial nerves are intact,motor and gait are grossly intact,       ASSESSMENT AND PLAN:   Chris Pierre currently  Tobacco Cessation Documentation (Smoking and Smokeless included): I had an in depth therapy session with Nely Hodgedixon about her tobacco use risks and options using the [employer]PSTF's Five A's approach:    Ask: Riley Castorena is using tobacco products.

## 2023-06-08 NOTE — CONSULTS
[facility]  Visit Note     Date of Service: 6/8/2023  Location seen at: [facility]  Subacute / Skilled Need: Rehabilitation    PCP: Rhonda Johns MD   Patient Care Team:  Rhonda Johns MD as PCP - General (Internal Medicine)  Grady Beckwith MD as Nephrologist (Nephrology)  Partha Oakes MD as Cardiologist (Internal Medicine- Interventional Cardiology)  Niki Castro MD as Rheumatology (Internal Medicine)  Aguila Mena MD as Post Acute Facility Provider: Physician (Internal Medicine)  Karin Lu APNP as Post Acute Facility Provider: APC (Nurse Practitioner)  Seen by JORDIN Rothman today    Maggie Montejo is a 88 year old female presenting to Post Acute Skilled Nursing for: Rehabilitation after hospitalization at [facility] from 5/18/2023 to 5/23/2023. Her past medical and surgical history is as below.   HPI: Patient was walking with her laundry tripped and fell onto her knees. In ER she had leukocytosis X ray of the knee showed femur fracture.CT scan confirmed femur fracture of the left knee. Ortho was consulted and determined it was non surgical. Manage pain and and undergo therapy. She will be started on asa 81 mg for dvt prevention. 25 % weight bearing for ambulation. She also had UA positive for organism culture is pending. She was started on Keflex 500 mg BID for five days. She was referred to rehab and discharged with pain controlled voiding stooling tolerating diet and stable to discharge.      Patient is alert and oriented x3,in no acute distress. She denies pain at rest, 4/10 with movement to her left lower extremity with immobilizer.  She is continue reporting diffused abdominal discomfort, intermittent but improved from prior visit. The KUB was negative no UTI no indication of infection.  She denies abdominal pain,, denies nausea, vomiting. Abdomen is soft, nontender, denies diarrhea or constipation  She does have her  SANDI [facility]    Neurosurgery Consultation     Date of Admission:  1/3/2021  Date of Consult (When I saw the patient): 01/03/21    Assessment & Plan   Jacque Bernstein is a 86 year old female who was admitted on 1/3/2021. I was asked to see the patient for brain masses.  She had presented to [facility] today, with a recent history of multiple falls and dizziness.  She states that she has often felt unsteady recently.  Head CT at [facility] showed 2 intra-axial masses suspicious for metastatic disease.  There is mass-effect and tonsillar herniation, as well as associated mild hydrocephalus.  She was given steroids, and has felt much better since then.  She denies any headaches at present.  She denies any focal weaknesses in the upper or lower extremities.  She was transferred and admitted to the stroke floor at [facility].    Active Problems:  2 intra-axial masses suspicious for metastatic disease, with a history of breast cancer.  These will likely require surgical resection.  We will get MRI Stealth protocol ordered.  Dr. Rojas will meet with the patient early this week.      I have discussed the following assessment and plan with Dr. Rojas, who is in agreement with the initial plan and will follow up with further consultation recommendations.    Celestino Fisher PA-C  [facility] Neurosurgery  [place] [address]    Tel [phone]  Pager [phone]        Code Status    No Order    Reason for Consult   Reason for consult: brain masses    Primary Care Physician   No primary care provider on file.    Chief Complaint   falls    History is obtained from the patient, electronic health record and emergency department physician    History of Present Illness   Jacque Bernstein is a 86 year old female who presents with brain masses.  She had presented to [facility] today, with a recent history of multiple falls and dizziness.  She  gallbladder and with the meal association symptoms could be that. May need to follow up as outpatient. If it recurs can refer do further studies.  She denies SOB, chest pain , palpitations, chills. She denies any urinary burning or frequency.    Therapy Is ongoing. She has ortho follow up on Monday no other concerns today.    HISTORY  Past Medical History:   Diagnosis Date   • Bilateral lower extremity edema    • Bronchitis    • Chronic anemia     etiology unclear. colonoscopy 3/2013   • Chronic pain syndrome    • Chronic Thrombocytopenia     etiology unclear. baseline platlets 130   • Congestive cardiac failure (CMD)    • DDD (degenerative disc disease)     multilevel formainla stenosis L5-S1   • Degenerative lumbar spinal stenosis     s/p eipdural injections 2012 ans 2013   • Essential (primary) hypertension    • Fracture    • Myocardial infarction (CMD)    • Obesity    • Osteoarthritis    • RAD (reactive airway disease)     no PFT's. dx with \"asthma\"   • Toxic multinodular goiter    • Urinary incontinence       reports that she quit smoking about 38 years ago. Her smoking use included cigarettes. She started smoking about 83 years ago. She has a 36.0 pack-year smoking history. She has never used smokeless tobacco. She reports current alcohol use of about 0.8 standard drinks of alcohol per week. She reports that she does not use drugs.  Past Surgical History:   Procedure Laterality Date   • Carpal tunnel release     • Colonoscopy  7/24/2013    WNL. Dr. Rashid   • Echo heart resting w doppler & color flow  08/31/2016   • Event monitor  08/2016    3 weeks   • Eye surgery      bilateral cataracts    • Gynecologic cryosurgery     • Hemorhoidectomy int ext single column group  7/24/2013   • Hysterectomy     • Joint replacement     • Total hip arthroplasty  8/6/2012    RIGHT. Dr. Fitzgerald   • Total hip arthroplasty  1/23/2012    LEFT Dr. Anthony   • Total knee replacement      bilateral      Family History   Problem  states that she has often felt unsteady recently.  Head CT at [facility] showed 2 intra-axial masses suspicious for metastatic disease.  There is mass-effect and tonsillar herniation, as well as associated mild hydrocephalus.  She was given steroids, and has felt much better since then.  She denies any headaches at present.  She denies any focal weaknesses in the upper or lower extremities.  She was transferred and admitted to the stroke floor at [facility].    Past Medical History   I have reviewed this patient's medical history and updated it with pertinent information if needed.   No past medical history on file.    Past Surgical History   I have reviewed this patient's surgical history and updated it with pertinent information if needed.  No past surgical history on file.    Prior to Admission Medications   Prior to Admission Medications   Prescriptions Last Dose Informant Patient Reported? Taking?   B Complex Vitamins (VITAMIN B-COMPLEX) TABS 1/2/2021 at Unknown time  Yes Yes   Sig: Take 1 tablet by mouth daily   Calcium Carb-Ergocalciferol 500-200 MG-UNIT TABS 1/2/2021 at Unknown time  Yes Yes   Sig: Take 1 tablet by mouth daily   acetaminophen (TYLENOL) 500 MG tablet prn  Yes Yes   Sig: Take 1,000 mg by mouth every 6 hours as needed   amoxicillin (AMOXIL) 500 MG capsule prn  Yes Yes   Sig: Take 2,000 mg by mouth as needed Dental procedures   anastrozole (ARIMIDEX) 1 MG tablet 1/2/2021 at Unknown time  Yes Yes   Sig: Take 1 mg by mouth daily   docusate sodium (DSS) 100 MG capsule 1/2/2021 at Unknown time  Yes Yes   Sig: Take 100 mg by mouth daily   lisinopril (ZESTRIL) 20 MG tablet 1/2/2021 at Unknown time  Yes Yes   Sig: Take 20 mg by mouth daily   melatonin 3 MG tablet prn  Yes Yes   Sig: Take 3 mg by mouth nightly as needed      Facility-Administered Medications: None     Allergies   Not on File    Social History   I have reviewed this patient's social history and updated it with pertinent information if  Relation Age of Onset   • Osteoarthritis Brother    • Asthma Brother    • Heart disease Brother    • Cancer Brother 80        lung   • Osteoarthritis Son    • Asthma Son    • Heart disease Son    • Thyroid Mother    • Patient is unaware of any medical problems Father    • Patient is unaware of any medical problems Son    • Patient is unaware of any medical problems Maternal Grandmother    • Patient is unaware of any medical problems Maternal Grandfather    • Patient is unaware of any medical problems Paternal Grandmother    • Patient is unaware of any medical problems Paternal Grandfather      History     Not marked as reviewed during this visit.          PROBLEM LIST:  Patient Active Problem List   Diagnosis   • Lumbar degenerative disc disease   • ARF (acute renal failure) (CMD)   • Acute bronchitis   • Essential (primary) hypertension   • RAD (reactive airway disease)   • Osteoarthritis   • DDD (degenerative disc disease)   • Degenerative lumbar spinal stenosis   • Obesity   • Bilateral lower extremity edema   • Chronic anemia   • Generalized OA   • Hyperthyroidism   • Bilateral leg edema   • Chronic diastolic heart failure (CMD)   • Lumbosacral spondylosis without myelopathy   • Arthropathy of lumbar facet joint   • Acne rosacea   • Eczema   • Dermatophytosis of nail   • Other specified peripheral vascular diseases (CMD)   • Closed displaced fracture of medial condyle of left femur, initial encounter (CMD)   • Closed patellar sleeve fracture of left knee, initial encounter   • Inability to ambulate due to knee   • History of arthroplasty of left knee   • Debility       DEPRESSION SCREENING:  Recent PHQ 2/9 Score    PHQ 2:  Date Adult PHQ 2 Score Adult PHQ 2 Interpretation   5/18/2023 0 No further screening needed       PHQ 9:       DEPRESSION ASSESSMENT/PLAN:  Depression screening is negative no further plan needed.    ALLERGIES:  Allergies as of 06/08/2023 - Reviewed 06/01/2023   Allergen Reaction Noted   •  needed. Jacque Bernstein      Family History   I have reviewed this patient's family history and updated it with pertinent information if needed.   No family history on file.    Review of Systems   10 point review of systems is negative with the exception of HPI and PMH.       Physical Exam   Temp: 98.2  F (36.8  C) Temp src: Oral BP: (!) 160/81 Pulse: 86   Resp: 18 SpO2: 95 % O2 Device: None (Room air)    Vital Signs with Ranges  Temp:  [98.2  F (36.8  C)] 98.2  F (36.8  C)  Pulse:  [86] 86  Resp:  [18] 18  BP: (160)/(81) 160/81  SpO2:  [95 %] 95 %  0 lbs 0 oz     , Blood pressure (!) 160/81, pulse 86, temperature 98.2  F (36.8  C), temperature source Oral, resp. rate 18, SpO2 95 %.  0 lbs 0 oz  HEENT:  Normocephalic, atraumatic.  PERRLA.  EOM s intact.   Neck:  Supple, non-tender, without lymphadenopathy.  Heart:  No peripheral edema  Lungs:  No SOB  Abdomen:  Soft, non-tender, non-distended.  Skin:  Warm and dry, good capillary refill.  Extremities:  Good radial and dorsalis pedis pulses bilaterally, no edema, cyanosis or clubbing.    NEUROLOGICAL EXAMINATION:     Mental status:  Alert and Oriented x 3, speech is fluent.  Cranial nerves:  II-XII intact.   Motor:  Strength is 5/5 throughout the upper and lower extremities  Shoulder Abduction:  Right:  5/5   Left:  5/5  Biceps:                      Right:  5/5   Left:  5/5  Triceps:                     Right:  5/5   Left:  5/5  Wrist Extensors:       Right:  5/5   Left:  5/5  Wrist Flexors:           Right:  5/5   Left:  5/5  interosseus :            Right:  5/5   Left:  5/5   Hip Flexor:                Right: 5/5  Left:  5/5  Hip Adductor:             Right:  5/5  Left:  5/5  Hip Abductor:             Right:  5/5  Left:  5/5  Gastroc Soleus:        Right:  5/5  Left:  5/5  Tib/Ant:                      Right:  5/5  Left:  5/5  EHL:                     Right:  5/5  Left:  5/5  Sensation:  intact  Reflexes:   Negative Babinski.  Negative Clonus.        Data   All new  Baclofen RASH and PRURITUS 02/11/2013       CURRENT MEDICATIONS:   Current Outpatient Medications   Medication Sig Dispense Refill   • ondansetron (Zofran) 8 MG tablet Take 1 tablet by mouth every 8 hours as needed for Nausea. 20 tablet 0   • nystatin (MYCOSTATIN) 152275 UNIT/GM powder Apply topically 3 times daily. 30 g 0   • pantoprazole (PROTONIX) 40 MG tablet Take 1 tablet by mouth nightly.     • docusate sodium-sennosides (SENOKOT S) 50-8.6 MG per tablet Take 2 tablets by mouth daily as needed for Constipation.     • aluminum-magnesium hydroxide-simethicone (MAALOX) 200-200-20 MG/5ML Suspension Take 30 mLs by mouth every 4 hours as needed (Gas, Bloating).  0   • HYDROcodone-acetaminophen (NORCO) 5-325 MG per tablet Take 1 tablet by mouth every 4 hours as needed for Pain. 20 tablet 0   • levothyroxine 75 MCG tablet Take 1 tablet by mouth daily. 30 min's prior to breakfast. Needs to be seen and have labs in July. 90 tablet 0   • montelukast (SINGULAIR) 10 MG tablet TAKE 1 TABLET BY MOUTH IN THE EVENING FOR ASTHMA 90 tablet 3   • minoxidil (LONITEN) 2.5 MG tablet TAKE 1 TABLET BY MOUTH ONCE DAILY AT BEDTIME FOR BLOOD PRESSURE AND HAIR 90 tablet 0   • isosorbide mononitrate (IMDUR) 60 MG 24 hr tablet Take 1 tablet by mouth every morning. 90 tablet 3   • Multiple Vitamins-Minerals (PRESERVISION AREDS PO) Take 1 capsule by mouth in the morning and 1 capsule in the evening.     • losartan (COZAAR) 50 MG tablet Take 1 tablet by mouth once daily 90 tablet 0   • atorvastatin (LIPITOR) 20 MG tablet Take 1 tablet by mouth nightly. 90 tablet 3   • carvedilol (COREG) 6.25 MG tablet Take 1 tablet by mouth every 12 hours. 180 tablet 3   • furosemide (LASIX) 40 MG tablet Take 1 tablet by mouth once daily 30 tablet 5   • mometasone-formoterol (Dulera) 200-5 MCG/ACT inhaler Inhale 2 puffs into the lungs 2 times daily. 13 g 11   • albuterol 108 (90 Base) MCG/ACT inhaler Inhale 2 puffs into the lungs every 4 hours as needed for  lab and imaging data was personally reviewed by me.  CT:  MRI:  CBC RESULTS: No results for input(s): WBC, RBC, HGB, HCT, MCV, MCH, MCHC, RDW, PLT in the last 12065 hours.  Basic Metabolic Panel:  No results found for: NA   No results found for: POTASSIUM  No results found for: CHLORIDE  No results found for: LADI  No results found for: CO2  No results found for: BUN  No results found for: CR  No results found for: GLC  INR:  No results found for: INR       Wheezing. 8.5 g 0   • diclofenac (VOLTAREN) 1 % gel Apply 1-2 g topically 4 times daily as needed (pain). 60 g 2   • Acetaminophen (TYLENOL ARTHRITIS PAIN PO)      • VITAMIN D PO Take 2 tablets by mouth daily.      • aspirin (ECOTRIN) 81 MG EC tablet Take 1 tablet by mouth daily. 90 tablet 3     No current facility-administered medications for this visit.     Medications reviewed / reconciled: reviewed     BASELINE FUNCTIONAL STATUS:  Independent at home used a walker    CURRENT FUNCTIONAL STATUS:  Mobility: 2 WW 25 %WB for ambulation WBAT for transfers   Bed Mobility min assist   Transfers 2 WW assist of one   UB min assist   LB total dependence   Toileting   Hygiene min assist     DIET:  Consistency: General   Type: cardiac diet, KADEEM  Appetite: Normal    REVIEW OF SYSTEMS:  Review of Systems   Constitutional: Positive for activity change, appetite change and fatigue. Negative for chills and fever.   HENT: Negative.  Negative for sore throat and trouble swallowing.    Eyes: Negative.    Respiratory: Negative for cough and shortness of breath.    Cardiovascular: Positive for leg swelling. Negative for chest pain and palpitations.   Gastrointestinal: Negative for abdominal pain, constipation, diarrhea, nausea and vomiting.   Endocrine: Negative.    Genitourinary: Negative for difficulty urinating, dysuria, flank pain, frequency and urgency.   Musculoskeletal: Positive for gait problem and joint swelling.   Skin: Negative.    Allergic/Immunologic: Negative.    Neurological: Positive for weakness. Negative for dizziness, syncope, light-headedness and headaches.   Hematological: Bruises/bleeds easily.   Psychiatric/Behavioral: Negative for agitation, confusion, sleep disturbance and suicidal ideas. The patient is not nervous/anxious.        VITALS:  Visit Vitals  /52   Pulse 73   Temp 98 °F (36.7 °C)   Resp 18   SpO2 94%       PAIN SCORE:  Vitals:    06/08/23 0752   PainSc: 4        PHYSICAL ASSESSMENT:  Physical  Exam  Vitals and nursing note reviewed.   Constitutional:       General: She is not in acute distress.     Appearance: Normal appearance. She is not ill-appearing, toxic-appearing or diaphoretic.   HENT:      Head: Normocephalic and atraumatic.      Right Ear: External ear normal.      Left Ear: External ear normal.      Nose: Nose normal.      Mouth/Throat:      Mouth: Mucous membranes are moist.      Pharynx: Oropharynx is clear. No oropharyngeal exudate or posterior oropharyngeal erythema.      Neck: Normal range of motion. No rigidity or tenderness.   Eyes:      General:         Right eye: No discharge.         Left eye: No discharge.      Extraocular Movements: Extraocular movements intact.      Conjunctiva/sclera: Conjunctivae normal.   Cardiovascular:      Rate and Rhythm: Normal rate and regular rhythm.      Pulses: Normal pulses.      Heart sounds: Normal heart sounds.   Pulmonary:      Effort: Pulmonary effort is normal. No respiratory distress.      Breath sounds: Normal breath sounds. No stridor. No wheezing, rhonchi or rales.   Abdominal:      General: Bowel sounds are normal. There is no distension.      Palpations: Abdomen is soft.      Tenderness: There is no abdominal tenderness. There is no right CVA tenderness, left CVA tenderness or guarding.   Musculoskeletal:      Right lower leg: Edema (3+ pitting ) present.      Left lower leg: Edema (1+ pitting ) present.      Comments: LLE brace on     Lymphadenopathy:      Cervical: No cervical adenopathy.   Skin:     General: Skin is warm and dry.      Capillary Refill: Capillary refill takes 2 to 3 seconds.      Findings: Bruising present.   Neurological:      Mental Status: She is alert and oriented to person, place, and time.      Sensory: No sensory deficit.      Motor: Weakness present.      Coordination: Coordination normal.   Psychiatric:         Mood and Affect: Mood normal.         Behavior: Behavior normal.         Thought Content: Thought  content normal.         Judgment: Judgment normal.         LABS:  CBC:   WBC (K/mcL)   Date Value   06/07/2023 9.0     RBC (mil/mcL)   Date Value   06/07/2023 3.29 (L)     HGB (g/dL)   Date Value   06/07/2023 10.2 (L)     PLT (K/mcL)   Date Value   06/07/2023 224   , BMP:   Sodium (mmol/L)   Date Value   06/06/2023 139     Potassium (mmol/L)   Date Value   06/06/2023 4.1     Chloride (mmol/L)   Date Value   06/06/2023 103     Glucose (mg/dL)   Date Value   06/06/2023 121 (H)     Calcium (mg/dL)   Date Value   06/06/2023 8.1 (L)     Carbon Dioxide (mmol/L)   Date Value   06/06/2023 22     BUN (mg/dL)   Date Value   06/06/2023 24 (H)     Creatinine (mg/dL)   Date Value   06/06/2023 1.09 (H)   , LIPID Panel:   CHOLESTEROL (mg/dL)   Date Value   02/16/2017 147     HDL (mg/dL)   Date Value   02/16/2017 69     CHOL/HDL (no units)   Date Value   02/16/2017 2.1     TRIGLYCERIDE (mg/dL)   Date Value   02/16/2017 107     CALCULATED LDL (mg/dL)   Date Value   02/16/2017 57   , INR:   INR (no units)   Date Value   05/18/2023 1.0   , Mg:   Magnesium (mg/dL)   Date Value   05/31/2023 2.0       ASSESSMENT AND PLAN  Closed displaced fracture of medial condyle of left femur with routine healing, subsequent encounter  Closed patellar sleeve fracture of left knee, with delayed healing, subsequent encounter  Inability to ambulate due to knee  Continue with PT and OT  Hinged brace to left leg  25% WB for ambulation  WBAT for transfers   Pain medications Norco and tylenol   Monitor skin around brace for breakdown  Ortho on 6/12/2023    Chronic diastolic heart failure (CMD)  Bilateral lower extremity edema  Continue with lasix 40 mg daily  Elevate legs when in bed and sitting  Daily weight   Follow with cardiology     Essential (primary) hypertension  Continue current medications coreg 6.25 mg BID isosorbide 60 mg daily,losartan 50 mg daily, minoxidil 2.5 m at hs,   Monitor blood pressures  Low sodium diet     Chronic anemia  CBC weekly      Nausea   Abdominal pressure  Denies nausea/vomiting this visit, diffuse abdominal pressure with oral intake  Stat UA with CS negative   Stat KUB was negative   Liver panel not done   Zofran 8 mg po every 8 hours as needed    Hyperthyroidism  TSH (mcUnits/mL)   Date Value   07/19/2022 2.198   Continue with levothyroxine 75 mcg daily      Generalized weakness  Impaired mobility and ADLs  Continue PT (Physical Therapy) and OT (Occupational Therapy)   Use assisted devices as demonstrated by therapy  Fall Precautions         FOLLOW UP APPOINTMENTS:  Future Appointments   Date Time Provider Department Center   6/12/2023  8:45 AM ALG XRAY 1 [facility]   6/12/2023  9:00 AM Cherelle Taylor NP [facility]   6/15/2023 10:30 AM Jovani Baziz PA-C [facility]   10/11/2023 10:00 AM Subhash Jessica DO [facility]       DISCHARGE PLANNING: To return to her senior apartment     Prognosis: good    Discussed with: RN / Nursing, Patient and SW/    Barriers to discharge: therapy needs    Anticipated disposition: Home With Home Health    Total time spent is more than 35 minutes, with more than 50% of the time spent in coordination of care, counseling, review of records and discussion of plan of care with the patient /staff /family.    JORDIN Rothman

## 2023-06-13 NOTE — PLAN OF CARE
Fax sent to [facility] asking for imagine of recent EKG be sent to our facility.    PT: Orders received and chart reviewed, pt out of room at MRI, neurosurgery following with evolving plan of care.Will hold PT eval this day.   PAIN/TENDERNESS

## 2023-06-18 NOTE — PROGRESS NOTES
CLINICAL NUTRITION SERVICES - REASSESSMENT NOTE      Malnutrition: % Weight Loss:  Weight loss does not meet criteria for malnutrition  % Intake:  Decreased intake does not meet criteria for malnutrition (inadequate intake prior to admit but now meeting needs from TF)  Subcutaneous Fat Loss:  None observed  Muscle Loss:  None observed  Fluid Retention:  Moderate as above     Malnutrition Diagnosis: Patient does not meet two of the above criteria necessary for diagnosing malnutrition       EVALUATION OF PROGRESS TOWARD GOALS   Diet:  NPO on TF    Nutrition Support:  Patient continues on goal TF as follows ~    Nutrition Support Enteral:  Type of Feeding Tube: Nasoduodenal   Enteral Frequency:  Continuous  Enteral Regimen: Isosource 1.5 at 45 mL/hr  Total Enteral Provisions: 1620 kcal (27 kcal/kg), 73 g protein (1.2 g/kg), 190 g CHO, 16 g fiber, 821 mL H2O  Free Water Flush: 60 mL every 4 hours       Intake/Tolerance:    Na 130 (L), Phos 2.0 (L) - NaPhos given   -212 with High sliding scale insulin (increased today) and Lantus 17 units at HS (increased yesterday), also on Decardron   Last BM 1/18 - Getting Colace and Senokot   I/O 1520/1850, wt 71.1 kg (down slightly from admit)       ASSESSED NUTRITION NEEDS:  Dosing Weight 60.8 kg (adjusted for overweight)  Estimated Energy Needs: 3806-8730 kcals (25-30 Kcal/Kg)  Justification: overweight  Estimated Protein Needs: 70-90 grams protein (1.2-1.5 g pro/Kg)  Justification: hypercatabolism with acute illness      NEW FINDINGS:   Patient now DNR/DNI    1/18:  BIANCA drain removed   1/19:  Abd CT     1.  Diffuse bowel wall thickening in the distal descending and sigmoid colon, as well as the rectum, are likely related to an infectious or inflammatory proctocolitis.   2.  Small bilateral pleural effusions are new since the previous exam.   3.  Moderate subcutaneous edema about the pelvis is new since previous exam.   1/19:  EVD removed   1/20:  SLP eval attempted -->  [facility]  Franca Rasheed RN     Second RN:  Liliam Tinoco RN         patient unable to participate     Noted 2-3+ edema     Previous Goals (1/18):   TF will continue to meet % estimated needs while poor oral intake  Evaluation: Met    Previous Nutrition Diagnosis (1/18):   Inadequate oral intake related to dysphagia and lethargy as evidenced by poor oral intake and continued TF reliance  Evaluation: Changed, now NPO       MALNUTRITION  % Weight Loss:  Weight loss does not meet criteria for malnutrition  % Intake:  Decreased intake does not meet criteria for malnutrition (inadequate intake prior to admit but now meeting needs from TF)  Subcutaneous Fat Loss:  None observed  Muscle Loss:  None observed  Fluid Retention:  Moderate as above     Malnutrition Diagnosis: Patient does not meet two of the above criteria necessary for diagnosing malnutrition    CURRENT NUTRITION DIAGNOSIS  No nutrition diagnosis identified at this time     INTERVENTIONS  Recommendations / Nutrition Prescription  Continue Isosource 1.5 at 45 mL/hr as above     Implementation  Collaboration and Referral of Nutrition care:  Patient discussed today during interdisciplinary bedside rounds     Goals  Isosource 1.5 at 45 mL/hr will continue to meet % needs     MONITORING AND EVALUATION:  Progress towards goals will be monitored and evaluated per protocol and Practice Guidelines    Aaliyah Jameson RD, LD, CNSC   Clinical Dietitian - [facility]

## (undated) DEVICE — SOL WATER IRRIG 1000ML BOTTLE 2F7114

## (undated) DEVICE — SU NUROLON 4-0 TF CR 8X18" C584D

## (undated) DEVICE — SOL NACL 0.9% IRRIG 1000ML BOTTLE 2F7124

## (undated) DEVICE — DRAPE SPLIT SHEET 77X108 REINFORCED 29436

## (undated) DEVICE — TUBING SUCTION 12"X1/4" N612

## (undated) DEVICE — SU VICRYL 2-0 CT-2 CR 8X18" J726D

## (undated) DEVICE — PREP SKIN SCRUB TRAY 4461A

## (undated) DEVICE — ESU ELEC BLADE 2.75" COATED/INSULATED E1455

## (undated) DEVICE — ESU GROUND PAD UNIVERSAL W/O CORD

## (undated) DEVICE — SU ETHILON 3-0 FS-1 18" 669H

## (undated) DEVICE — DRAPE STERI TOWEL SM 1000

## (undated) DEVICE — STPL SKIN 35W ROTATING HEAD PRW35

## (undated) DEVICE — RX SURGIFLO HEMOSTATIC MATRIX 10ML 199102S

## (undated) DEVICE — GLOVE PROTEXIS BLUE W/NEU-THERA 7.5  2D73EB75

## (undated) DEVICE — ESU CORD BIPOLAR 12' E0512

## (undated) DEVICE — BATTERY PACK FOR POWERED DRIVER 05.000.007.01S

## (undated) DEVICE — Device

## (undated) DEVICE — SU MONOCRYL 3-0 PS-2 27" Y427H

## (undated) DEVICE — PIN SKULL MAYFIELD ADULT TITANIUM 3/PK A1120

## (undated) DEVICE — PERFORATOR 14MM CODMAN

## (undated) DEVICE — TOOL DISSECT MIDAS MR8 F2/7CM TAPER 2.3MM DI MR8-F2/7TA23

## (undated) DEVICE — GOWN XXL 9575

## (undated) DEVICE — SPONGE COTTONOID 3/4X3/4" 80-1401

## (undated) DEVICE — DRAPE POUCH INSTRUMENT 1018

## (undated) DEVICE — MANIFOLD NEPTUNE 4 PORT 700-20

## (undated) DEVICE — GLOVE PROTEXIS W/NEU-THERA 8.0  2D73TE80

## (undated) DEVICE — SU VICRYL 0 CT-1 CR 8X18" J740D

## (undated) DEVICE — TUBING IRRIG/BIPOLAR SET 80-9102

## (undated) DEVICE — GLOVE PROTEXIS MICRO 8.5  2D73PM85

## (undated) DEVICE — GLOVE PROTEXIS W/NEU-THERA 7.5  2D73TE75

## (undated) DEVICE — BLADE CLIPPER 4412A

## (undated) DEVICE — SYR 10ML SLIP TIP W/O NDL

## (undated) DEVICE — DRAIN JACKSON PRATT RESERVOIR 100ML SU130-1305

## (undated) DEVICE — PACK SET-UP STD 9102

## (undated) DEVICE — NDL BLUNT 17GA 1.5" 8881202330

## (undated) DEVICE — SPONGE SURGIFOAM 100 1974

## (undated) DEVICE — DRAIN JACKSON PRATT 10FR ROUND SU130-1321

## (undated) DEVICE — SPONGE COTTONOID 1/2X3" 80-1407

## (undated) DEVICE — DRAIN SYSTEM CSF EXTERNAL DRAINAGE VENTRIC/LUMBAR 46914

## (undated) DEVICE — ESU PENCIL W/HOLSTER E2350H

## (undated) DEVICE — GOWN XXLG REINFORCED 9071EL

## (undated) DEVICE — NDL 19GA 1.5"

## (undated) DEVICE — GLOVE PROTEXIS BLUE W/NEU-THERA 8.0  2D73EB80

## (undated) DEVICE — DECANTER VIAL 2006S

## (undated) DEVICE — LINEN TOWEL PACK X5 5464

## (undated) DEVICE — DRAPE VARI-LENS II MICROSCOPE 52X150" 6120VL2

## (undated) DEVICE — DRAPE MICROSCOPE EQUIP 48X120" 6130VL2

## (undated) DEVICE — PACK NEURO SNE15SNFSA

## (undated) DEVICE — SPONGE COTTONOID 1X3" 80-1408

## (undated) DEVICE — SU VICRYL 0 CT-2 CR 8X18" J727D

## (undated) DEVICE — DRSG TELFA 3X8" 1238

## (undated) DEVICE — MARKER SPHERES PASSIVE MEDT PACK 5 8801075

## (undated) RX ORDER — EPINEPHRINE 1 MG/ML
INJECTION, SOLUTION INTRAMUSCULAR; SUBCUTANEOUS
Status: DISPENSED
Start: 2021-01-01

## (undated) RX ORDER — HYDROMORPHONE HYDROCHLORIDE 1 MG/ML
INJECTION, SOLUTION INTRAMUSCULAR; INTRAVENOUS; SUBCUTANEOUS
Status: DISPENSED
Start: 2021-01-01

## (undated) RX ORDER — DEXAMETHASONE SODIUM PHOSPHATE 4 MG/ML
INJECTION, SOLUTION INTRA-ARTICULAR; INTRALESIONAL; INTRAMUSCULAR; INTRAVENOUS; SOFT TISSUE
Status: DISPENSED
Start: 2021-01-01

## (undated) RX ORDER — ONDANSETRON 2 MG/ML
INJECTION INTRAMUSCULAR; INTRAVENOUS
Status: DISPENSED
Start: 2021-01-01

## (undated) RX ORDER — PROPOFOL 10 MG/ML
INJECTION, EMULSION INTRAVENOUS
Status: DISPENSED
Start: 2021-01-01

## (undated) RX ORDER — VECURONIUM BROMIDE 1 MG/ML
INJECTION, POWDER, LYOPHILIZED, FOR SOLUTION INTRAVENOUS
Status: DISPENSED
Start: 2021-01-01

## (undated) RX ORDER — FENTANYL CITRATE 50 UG/ML
INJECTION, SOLUTION INTRAMUSCULAR; INTRAVENOUS
Status: DISPENSED
Start: 2021-01-01

## (undated) RX ORDER — HYDRALAZINE HYDROCHLORIDE 20 MG/ML
INJECTION INTRAMUSCULAR; INTRAVENOUS
Status: DISPENSED
Start: 2021-01-01

## (undated) RX ORDER — BUPIVACAINE HYDROCHLORIDE AND EPINEPHRINE 2.5; 5 MG/ML; UG/ML
INJECTION, SOLUTION EPIDURAL; INFILTRATION; INTRACAUDAL; PERINEURAL
Status: DISPENSED
Start: 2021-01-01

## (undated) RX ORDER — CEFAZOLIN SODIUM 1 G/3ML
INJECTION, POWDER, FOR SOLUTION INTRAMUSCULAR; INTRAVENOUS
Status: DISPENSED
Start: 2021-01-01

## (undated) RX ORDER — ALBUMIN, HUMAN INJ 5% 5 %
SOLUTION INTRAVENOUS
Status: DISPENSED
Start: 2021-01-01

## (undated) RX ORDER — LIDOCAINE HYDROCHLORIDE 20 MG/ML
INJECTION, SOLUTION EPIDURAL; INFILTRATION; INTRACAUDAL; PERINEURAL
Status: DISPENSED
Start: 2021-01-01

## (undated) RX ORDER — BUPIVACAINE HYDROCHLORIDE 5 MG/ML
INJECTION, SOLUTION EPIDURAL; INTRACAUDAL
Status: DISPENSED
Start: 2021-01-01

## (undated) RX ORDER — GINSENG 100 MG
CAPSULE ORAL
Status: DISPENSED
Start: 2021-01-01

## (undated) RX ORDER — BUPIVACAINE HYDROCHLORIDE AND EPINEPHRINE 5; 5 MG/ML; UG/ML
INJECTION, SOLUTION EPIDURAL; INTRACAUDAL; PERINEURAL
Status: DISPENSED
Start: 2021-01-01